# Patient Record
Sex: FEMALE | Race: WHITE | Employment: FULL TIME | URBAN - METROPOLITAN AREA
[De-identification: names, ages, dates, MRNs, and addresses within clinical notes are randomized per-mention and may not be internally consistent; named-entity substitution may affect disease eponyms.]

---

## 2020-03-16 ENCOUNTER — TELEPHONE (OUTPATIENT)
Dept: WOUND CARE | Age: 60
End: 2020-03-16

## 2020-03-16 NOTE — TELEPHONE ENCOUNTER
Attempted to perform COVID-19 telephone screening but patient did not answer phone call. Left voicemail regarding nature of call and to please return call.

## 2020-03-17 ENCOUNTER — HOSPITAL ENCOUNTER (OUTPATIENT)
Dept: WOUND CARE | Age: 60
Discharge: HOME | End: 2020-03-17
Payer: COMMERCIAL

## 2020-03-17 VITALS
SYSTOLIC BLOOD PRESSURE: 120 MMHG | HEIGHT: 65 IN | RESPIRATION RATE: 16 BRPM | BODY MASS INDEX: 41.65 KG/M2 | WEIGHT: 250 LBS | DIASTOLIC BLOOD PRESSURE: 76 MMHG | TEMPERATURE: 97.8 F | HEART RATE: 84 BPM

## 2020-03-17 PROBLEM — Z43.2 ILEOSTOMY CARE (HCC): Status: ACTIVE | Noted: 2020-03-17

## 2020-03-17 PROCEDURE — 99204 OFFICE O/P NEW MOD 45 MIN: CPT | Performed by: NURSE PRACTITIONER

## 2020-03-17 PROCEDURE — 99211 OFF/OP EST MAY X REQ PHY/QHP: CPT

## 2020-03-17 PROCEDURE — 99201 HC NEW PT, E/M LEVEL 1: CPT

## 2020-03-17 RX ORDER — GINSENG 100 MG
CAPSULE ORAL ONCE
Status: CANCELLED | OUTPATIENT
Start: 2020-03-17

## 2020-03-17 RX ORDER — DULAGLUTIDE 1.5 MG/.5ML
1.5 INJECTION, SOLUTION SUBCUTANEOUS WEEKLY
COMMUNITY
End: 2020-12-10 | Stop reason: SDUPTHER

## 2020-03-17 RX ORDER — LOSARTAN POTASSIUM 100 MG/1
100 TABLET ORAL DAILY
COMMUNITY

## 2020-03-17 RX ORDER — BACITRACIN ZINC AND POLYMYXIN B SULFATE 500; 1000 [USP'U]/G; [USP'U]/G
OINTMENT TOPICAL ONCE
Status: CANCELLED | OUTPATIENT
Start: 2020-03-17

## 2020-03-17 RX ORDER — CHLORAL HYDRATE 500 MG
3000 CAPSULE ORAL DAILY
COMMUNITY

## 2020-03-17 RX ORDER — BETAMETHASONE DIPROPIONATE 0.05 %
OINTMENT (GRAM) TOPICAL ONCE
Status: CANCELLED | OUTPATIENT
Start: 2020-03-17

## 2020-03-17 RX ORDER — ACETAMINOPHEN 500 MG
500 TABLET ORAL EVERY 6 HOURS PRN
COMMUNITY

## 2020-03-17 RX ORDER — OMEPRAZOLE 10 MG/1
10 CAPSULE, DELAYED RELEASE ORAL EVERY OTHER DAY
COMMUNITY
End: 2020-12-10

## 2020-03-17 RX ORDER — ATORVASTATIN CALCIUM 10 MG/1
10 TABLET, FILM COATED ORAL DAILY
COMMUNITY

## 2020-03-17 RX ORDER — LIDOCAINE 40 MG/G
CREAM TOPICAL ONCE
Status: CANCELLED | OUTPATIENT
Start: 2020-03-17

## 2020-03-17 RX ORDER — INSULIN GLARGINE 100 [IU]/ML
50 INJECTION, SOLUTION SUBCUTANEOUS 2 TIMES DAILY
COMMUNITY
End: 2020-12-10 | Stop reason: ALTCHOICE

## 2020-03-17 RX ORDER — LIDOCAINE 50 MG/G
OINTMENT TOPICAL ONCE
Status: CANCELLED | OUTPATIENT
Start: 2020-03-17

## 2020-03-17 RX ORDER — LIDOCAINE HYDROCHLORIDE 40 MG/ML
SOLUTION TOPICAL ONCE
Status: CANCELLED | OUTPATIENT
Start: 2020-03-17

## 2020-03-17 RX ORDER — CLOBETASOL PROPIONATE 0.5 MG/G
OINTMENT TOPICAL ONCE
Status: CANCELLED | OUTPATIENT
Start: 2020-03-17

## 2020-03-17 RX ORDER — BACITRACIN, NEOMYCIN, POLYMYXIN B 400; 3.5; 5 [USP'U]/G; MG/G; [USP'U]/G
OINTMENT TOPICAL ONCE
Status: CANCELLED | OUTPATIENT
Start: 2020-03-17

## 2020-03-17 RX ORDER — LEVOTHYROXINE SODIUM 0.03 MG/1
25 TABLET ORAL DAILY
COMMUNITY
End: 2020-12-10 | Stop reason: SDUPTHER

## 2020-03-17 RX ORDER — GENTAMICIN SULFATE 1 MG/G
OINTMENT TOPICAL ONCE
Status: CANCELLED | OUTPATIENT
Start: 2020-03-17

## 2020-03-17 ASSESSMENT — PAIN SCALES - GENERAL
PAINLEVEL_OUTOF10: 0
PAINLEVEL_OUTOF10: 0

## 2020-03-17 NOTE — PROGRESS NOTES
acute distress  Skin: warm and dry  Head: normocephalic and atraumatic  Eyes: pupils equal, round, and reactive to light  Pulmonary/Chest: clear to auscultation bilaterally- no wheezes, rales or rhonchi, normal air movement, no respiratory distress  Cardiovascular: normal rate, regular rhythm, intact distal pulses and no carotid bruits    Ostomy Type: ileostomy with soft formed liquid stool    Stoma Assessment: red, moist with mucus fistula open at 12 o'clock and wound at 1 o'clock. Stoma \"piston's in and out with movement and position changes. Peristomal Skin Assessment: Wound with fragile pink edges and red moist wound bed. Pt has a history of PG when first had ileostomy in . Plan   Plan for Ostomy Care:  Goals:  Treat new wound and progress to healing without infection                Asssess new pouching system with more flexible convexity and same or better wear time                     Patient examined and evaluated and put in a flexible light convex Coloplast pouch system. Treating wound with silver alginate and covered with thin DuoDerm to prevent undermining pouch system. Pt agreeable to changes and questions answered. Extra supplies given. Please see attached Discharge Instructions    Written patient dismissal instructions given to patient and signed by patient or POA. Discharge [address] [address]  [address] [facility]. [address] [facility] [address], [address]   [phone]      Name: Catarina Fernandez  : 1960   AGE: 63 y.o.   MRN: [id number]  Today's Date: 3/17/2020    Ostomy skin care  Cleanse skin prior to applying a new pouch/wafer with:  [] Water    [x] Cleanse skin with Mild Soap & Water  [x] May Shower    [] Other:         Pouch/Wafer/Accessory Product Numbers       [x] Appliance: 1 piece Coloplast    [x] Product [id number]  [] Other: Accessories:        []   Barrier Film outside edges of pouch. [] Apply belt if using  [x] Lay/Sit quietly for 15-20 min to allow adhesives to mold to skin. [] Other: _____________________________________    Emptying Pouch  Colostomy/Ileostomy  [x] Empty  pouch when 1/3 full of gas, stool. Clean bottom edge with damp toilet paper or bathroom wipe and close pouch. [x] For non-drainable pouch - remove when 1/2 full and throw away. Clean wafer flange (ring) with toilet tissue or damp paper towel before reapplying new pouch      Other:  [x] Shower or Swim Care Pat pouch dry, Use hair dryer on cool setting to dry back of pouch and border and Reapply barrier extenders or tape.   [] Odor Control with deodorizer into bottom of pouch after each emptying  [] Lubricating gel to pouch to help emptying of thick stool  [] Other: See additional instructions _______________________________________     Contact Ostomy Care Nurse Practitioner  [x] leaking issues and skin irritation     ** FOLLOW UP IN 1 WEEK**    Provider Signature/Date/Time:_____________________________________________    CORDELL Cuh, MSN, RN, CWON-AP  [phone]                   Electronically signed by CORDELL Romero - CNP on 3/17/2020 at 2:47 PM

## 2020-03-24 ENCOUNTER — HOSPITAL ENCOUNTER (OUTPATIENT)
Dept: WOUND CARE | Age: 60
Discharge: HOME | End: 2020-03-24
Payer: COMMERCIAL

## 2020-03-24 VITALS
HEART RATE: 83 BPM | DIASTOLIC BLOOD PRESSURE: 87 MMHG | TEMPERATURE: 97.9 F | RESPIRATION RATE: 16 BRPM | SYSTOLIC BLOOD PRESSURE: 151 MMHG

## 2020-03-24 PROCEDURE — 99211 OFF/OP EST MAY X REQ PHY/QHP: CPT

## 2020-03-24 PROCEDURE — 99213 OFFICE O/P EST LOW 20 MIN: CPT | Performed by: NURSE PRACTITIONER

## 2020-03-24 RX ORDER — FLUTICASONE FUROATE AND VILANTEROL TRIFENATATE 100; 25 UG/1; UG/1
1 POWDER RESPIRATORY (INHALATION) PRN
COMMUNITY

## 2020-03-24 ASSESSMENT — PAIN SCALES - GENERAL
PAINLEVEL_OUTOF10: 0
PAINLEVEL_OUTOF10: 0

## 2020-03-24 NOTE — PROGRESS NOTES
Laterality Date    ABDOMEN SURGERY      COLON RESECTION FOR ULCERATIVE COLITIS THEN HAD ANUS REMOVED    ILEOSTOMY OR JEJUNOSTOMY      PERMANENT       FAMILY HISTORY    Family History   Problem Relation Age of Onset    Cancer Mother         uterine    No Known Problems Father        SOCIAL HISTORY    Social History     Tobacco Use    Smoking status: Never Smoker    Smokeless tobacco: Never Used   Substance Use Topics    Alcohol use: No    Drug use: No       ALLERGIES    No Known Allergies    MEDICATIONS    Current Outpatient Medications on File Prior to Encounter   Medication Sig Dispense Refill    fluticasone-vilanterol (BREO ELLIPTA) 100-25 MCG/INH AEPB inhaler Inhale 1 puff into the lungs daily      losartan (COZAAR) 100 MG tablet Take 100 mg by mouth daily      levothyroxine (SYNTHROID) 25 MCG tablet Take 25 mcg by mouth Daily      omeprazole (PRILOSEC) 10 MG delayed release capsule Take 10 mg by mouth every other day      Omega-3 Fatty Acids (FISH OIL) 1000 MG CAPS Take 3,000 mg by mouth daily      insulin glargine (LANTUS) 100 UNIT/ML injection vial Inject 50 Units into the skin 2 times daily      insulin lispro (HUMALOG) 100 UNIT/ML injection vial Inject 25 Units into the skin 2 times daily      Dulaglutide (TRULICITY) 1.5 OI/4.7TZ SOPN Inject 1.5 mg into the skin once a week Saturdays      atorvastatin (LIPITOR) 10 MG tablet Take 10 mg by mouth daily      CRANBERRY PO Take by mouth daily      acetaminophen (TYLENOL) 500 MG tablet Take 500 mg by mouth every 6 hours as needed for Pain      Loratadine (CLARITIN) 10 MG CAPS Take  by mouth.  pioglitazone (ACTOS) 15 MG tablet Take 15 mg by mouth daily.  metformin (GLUCOPHAGE-XR) 500 MG XR tablet Take 500 mg by mouth daily (with breakfast)        No current facility-administered medications on file prior to encounter. REVIEW OF SYSTEMS    Pertinent items are noted in HPI.     Objective    BP (!) 151/87   Pulse 83   Temp 97.9 °F (36.6 °C) (Oral)   Resp 16     Wt Readings from Last 3 Encounters:   20 250 lb (113.4 kg)       PHYSICAL EXAM    General Appearance: alert and oriented to person, place and time, well-developed and well-nourished, in no acute distress and obese  Skin: warm and dry  Head: normocephalic and atraumatic  Eyes: pupils equal, round, and reactive to light  Pulmonary/Chest: normal air movement, no respiratory distress  Cardiovascular: normal rate and regular rhythm    Ostomy Type: ileostomy with soft liquid yellow/green stool    Stoma Assessment: Stoma red moist and protruding    Peristomal Skin Assessment: wound tissue epithelializing and smaller in size. Plan   Patient examined and evaluated and will continue with same system. Discussed with pt potential ways getting pressure around appliance that could have caused wound. Pt to continue with current plan of care until wound healed and then will continue to use thin DuoDerm over site to protect for awhile. Pt to call if any further issues with peristomal skin, has plenty of supplies. No scheduled follow-up visits. Please see attached Discharge Instructions    Written patient dismissal instructions given to patient and signed by patient or POA. Discharge Instructions            [address] [place] [address],  [address]   [phone]        Name: Luigi Rivera  : 1960   AGE: 63 y.o. MRN: [id number]  Today's Date: 3/24/2020     Ostomy skin care  Cleanse skin prior to applying a new pouch/wafer with:  []? Water             [x]? Cleanse skin with Mild Soap & Water  [x]? May Shower    []? Other:           Pouch/Wafer/Accessory Product Numbers        [x]? Appliance: 1 piece Coloplast    [x]? Product [id number]  []? Other: Accessories:        []? Barrier Film                                                             [x]? Adapt Barrier Ring - flat       []?     Adhesive

## 2020-12-10 ENCOUNTER — VIRTUAL VISIT (OUTPATIENT)
Dept: ENDOCRINOLOGY | Age: 60
End: 2020-12-10
Payer: COMMERCIAL

## 2020-12-10 PROBLEM — I10 ESSENTIAL HYPERTENSION: Status: ACTIVE | Noted: 2020-12-10

## 2020-12-10 PROBLEM — Z79.4 CONTROLLED TYPE 2 DIABETES MELLITUS WITHOUT COMPLICATION, WITH LONG-TERM CURRENT USE OF INSULIN (HCC): Status: ACTIVE | Noted: 2020-12-10

## 2020-12-10 PROBLEM — K51.019: Status: ACTIVE | Noted: 2020-12-10

## 2020-12-10 PROBLEM — E78.2 MIXED HYPERLIPIDEMIA: Status: ACTIVE | Noted: 2020-12-10

## 2020-12-10 PROBLEM — E03.9 HYPOTHYROIDISM: Status: ACTIVE | Noted: 2020-12-10

## 2020-12-10 PROBLEM — E11.9 CONTROLLED TYPE 2 DIABETES MELLITUS WITHOUT COMPLICATION, WITH LONG-TERM CURRENT USE OF INSULIN (HCC): Status: ACTIVE | Noted: 2020-12-10

## 2020-12-10 PROCEDURE — 99243 OFF/OP CNSLTJ NEW/EST LOW 30: CPT | Performed by: INTERNAL MEDICINE

## 2020-12-10 RX ORDER — LANCETS
EACH MISCELLANEOUS
COMMUNITY
Start: 2020-11-07

## 2020-12-10 RX ORDER — PIOGLITAZONEHYDROCHLORIDE 15 MG/1
15 TABLET ORAL DAILY
Qty: 30 TABLET | Refills: 3 | Status: SHIPPED | OUTPATIENT
Start: 2020-12-10 | End: 2021-04-13

## 2020-12-10 RX ORDER — PEN NEEDLE, DIABETIC 29 G X1/2"
NEEDLE, DISPOSABLE MISCELLANEOUS
COMMUNITY
Start: 2020-11-13 | End: 2020-12-10 | Stop reason: SDUPTHER

## 2020-12-10 RX ORDER — INSULIN GLARGINE 100 [IU]/ML
INJECTION, SOLUTION SUBCUTANEOUS
Qty: 10 PEN | Refills: 3 | Status: SHIPPED | OUTPATIENT
Start: 2020-12-10 | End: 2021-07-14 | Stop reason: SDUPTHER

## 2020-12-10 RX ORDER — PEN NEEDLE, DIABETIC 29 G X1/2"
NEEDLE, DISPOSABLE MISCELLANEOUS
Qty: 100 EACH | Refills: 5 | Status: SHIPPED | OUTPATIENT
Start: 2020-12-10 | End: 2022-10-27

## 2020-12-10 RX ORDER — CHOLECALCIFEROL (VITAMIN D3) 1250 MCG
CAPSULE ORAL WEEKLY
COMMUNITY

## 2020-12-10 RX ORDER — LEVOTHYROXINE SODIUM 0.03 MG/1
25 TABLET ORAL DAILY
Qty: 30 TABLET | Refills: 3 | Status: SHIPPED | OUTPATIENT
Start: 2020-12-10 | End: 2021-08-13

## 2020-12-10 RX ORDER — DULAGLUTIDE 1.5 MG/.5ML
1.5 INJECTION, SOLUTION SUBCUTANEOUS WEEKLY
Qty: 4 PEN | Refills: 3 | Status: SHIPPED | OUTPATIENT
Start: 2020-12-10 | End: 2021-09-24

## 2020-12-10 NOTE — PROGRESS NOTES
Danika Valle is a 62 y.o. female is referred to me by Dr Timo Domingo for evaluation and management of uncontrolled Type 2  Diabetes and Hypothyroidism. Patient has complex medical history inclusive of hypertension hyperlipidemia, asthma, PCOD, ulcerative colitis status post total colectomy and now has a colectomy bag. Danika Valle was diagnosed with Diabetes mellitus at age 28   Diabetes was diagnosed at routine screening . Marisa Valle got diabetic education in the past.  Comorbid conditions: Neuropathy    Hypothyroidism diagnosed in 2015 which was diagnosed in 2015 due to her abnormal thyroid fx test   She also has MNG, which was considered stable as per previous imaging and there were no compressive symptoms  She has hypertension and hyperlipidemia   She has Ulcerative coliitis diagnosed  at age 11 she had colectomy in 2004 , in 2006 she had rectal surgery due to UC she has colectomy bag     INTERIM:    Diabetes   She presents for her initial diabetic visit. She has type 2 diabetes mellitus. No MedicAlert identification noted. The initial diagnosis of diabetes was made 29 years ago. Her disease course has been improving. Hypoglycemia symptoms include hunger and sweats. Associated symptoms include foot paresthesias. Pertinent negatives for diabetes include no weight loss. There are no hypoglycemic complications. Symptoms are improving. Risk factors for coronary artery disease include dyslipidemia and obesity. Current diabetic treatment includes insulin injections. She is compliant with treatment most of the time. Her breakfast blood glucose is taken between 7-8 am. Her breakfast blood glucose range is generally 70-90 mg/dl. Patient is currently taking 50 units of Lantus twice daily and has occasional fasting glucose in the 70s since she has modified her diet. And she tends to omit bedtime doses.   She takes Humalog with the meals approximately 18 units she bases it on carbohydrate content Past Medical History:   Diagnosis Date    Asthma     Diabetes mellitus ([facility]     GERD (gastroesophageal reflux disease)     Hyperlipidemia     Hypertension     Ileostomy care ([facility] 3/17/2020    Iritis     PCOS (polycystic ovarian syndrome)     Pyoderma     Thyroid disease     Ulcerative colitis     Ulcerative colitis ([facility]       Patient Active Problem List   Diagnosis    Ileostomy care ([facility]    Controlled type 2 diabetes mellitus without complication, with long-term current use of insulin (HCC)    Hypothyroidism    Essential hypertension    Mixed hyperlipidemia    Ulcerative (chronic) enterocolitis, unspecified complication (HCC)     Past Surgical History:   Procedure Laterality Date    ABDOMEN SURGERY      COLON RESECTION FOR ULCERATIVE COLITIS THEN HAD ANUS REMOVED    ILEOSTOMY OR JEJUNOSTOMY      PERMANENT     Social History     Socioeconomic History    Marital status: Single     Spouse name: Not on file    Number of children: Not on file    Years of education: Not on file    Highest education level: Not on file   Occupational History    Not on file   Social Needs    Financial resource strain: Not on file    Food insecurity     Worry: Not on file     Inability: Not on file    Transportation needs     Medical: Not on file     Non-medical: Not on file   Tobacco Use    Smoking status: Never Smoker    Smokeless tobacco: Never Used   Substance and Sexual Activity    Alcohol use: No    Drug use: No    Sexual activity: Not on file   Lifestyle    Physical activity     Days per week: Not on file     Minutes per session: Not on file    Stress: Not on file   Relationships    Social connections     Talks on phone: Not on file     Gets together: Not on file     Attends Nondenominational service: Not on file     Active member of club or organization: Not on file     Attends meetings of clubs or organizations: Not on file     Relationship status: Not on file    Intimate partner violence Fear of current or ex partner: Not on file     Emotionally abused: Not on file     Physically abused: Not on file     Forced sexual activity: Not on file   Other Topics Concern    Not on file   Social History Narrative    Not on file     Family History   Problem Relation Age of Onset    Cancer Mother         uterine    No Known Problems Father      Current Outpatient Medications   Medication Sig Dispense Refill    ONE TOUCH ULTRASOFT LANCETS MISC USE TO TEST BLOOD GLUCOSE 4 TIMES DAILY      POTASSIUM PO Take by mouth      MAGNESIUM PO Take by mouth      Montelukast Sodium (SINGULAIR PO) Take by mouth      Cholecalciferol (VITAMIN D3) 1.25 MG (73212 UT) CAPS Take by mouth once a week      TRIAMCINOLONE ACETONIDE NA by Nasal route      dapagliflozin (FARXIGA) 10 MG tablet TAKE 1 TABLET BY MOUTH EVERY DAY IN THE MORNING 30 tablet 3    levothyroxine (SYNTHROID) 25 MCG tablet Take 1 tablet by mouth Daily 30 tablet 3    Dulaglutide (TRULICITY) 1.5 GG/1.2GW SOPN Inject 1.5 mg into the skin once a week Saturdays 4 pen 3    insulin lispro (HUMALOG) 100 UNIT/ML injection vial Inject 25 units into the skin once daily. 1 vial 3    insulin glargine (LANTUS SOLOSTAR) 100 UNIT/ML injection pen Inject 50 units into the skin twice daily.  10 pen 3    BD INSULIN SYRINGE U/F 30G X 1/2\" 0.3 ML MISC USE WITH INSULIN TWICE A  each 5    pioglitazone (ACTOS) 15 MG tablet Take 1 tablet by mouth daily 30 tablet 3    metFORMIN (GLUCOPHAGE) 500 MG tablet TAKE 1 TABLET BY MOUTH EVERY 24 HOURS 60 tablet 3    fluticasone-vilanterol (BREO ELLIPTA) 100-25 MCG/INH AEPB inhaler Inhale 1 puff into the lungs daily      losartan (COZAAR) 100 MG tablet Take 100 mg by mouth daily      Omega-3 Fatty Acids (FISH OIL) 1000 MG CAPS Take 3,000 mg by mouth daily      atorvastatin (LIPITOR) 10 MG tablet Take 10 mg by mouth daily      CRANBERRY PO Take by mouth daily      acetaminophen (TYLENOL) 500 MG tablet Take 500 mg by mouth every 6 hours as needed for Pain      Loratadine (CLARITIN) 10 MG CAPS Take  by mouth. No current facility-administered medications for this visit. No Known Allergies  Family Status   Relation Name Status    Mother      Father           OBJECTIVE:  There were no vitals taken for this visit. Wt Readings from Last 3 Encounters:   20 250 lb (113.4 kg)       Constitutional: no acute distress, well appearing and well nourished  Psychiatric: oriented to person, place and time, judgement and insight and normal, recent and remote memory and intact and mood and affect are normal  Skin: skin and subcutaneous tissue is normal without mass, normal turgor  Head and Face: examination of head and face revealed no abnormalities  Eyes: no lid or conjunctival swelling, erythema or discharge, pupils are normal  Ears/Nose: external inspection of ears and nose revealed no abnormalities, hearing is grossly normal  Oropharynx/Mouth/Face: lips, tongue and gums are normal with no lesions, the voice quality was normal  Neck: neck is supple and symmetric, with midline trachea and no masses, thyroid is normal  Lymphatics: normal cervical lymph nodes, normal supraclavicular nodes  Pulmonary: no increased work of breathing or signs of respiratory distress, lungs are clear to auscultation  Cardiovascular: normal heart rate and rhythm, normal S1 and S2, no murmurs   Abdomen: abdomen is soft, non-tender with no masses  Musculoskeletal: normal gait and station . Neurological: normal coordination and normal general cortical function  Foot exam with PCP in     Lab Results   Component Value Date    LABA1C 7.1 11/15/2013    LABA1C 7.4 2013    LABA1C 7.3 2013         ASSESSMENT/PLAN:  1. Controlled type 2 diabetes mellitus without complication, with long-term current use of insulin A1c improved to 7.0 in September.   Tomeka Muñoz was advised that lifestyle modification is the key to better control of her Diabetes. We discussed carbohydrate restriction in detail. Since patient is having fasting hypoglycemia on the current regimen of 15 units twice daily of Lantus will reduce the dose to 70 units daily and will uptitrate if the fasting glucose is above 150  She will continue with her sliding scale of Humalog with each meal  She would like to continue with Trulicity 1.5 mg weekly as it is helped with her appetite significantly she did not notice any weight loss with this medication  She is also on Farxiga, Metformin 1000 mg twice daily and Actos. She denies any swelling or edema with this medication  We will continue with the oral pills  She will start sending her sugar logs to me on regular basis and will further titrate the dose of insulin based on those  Patient was offered to get a continuous glucose sensor she is concerned that her skin is sensitive and she might get issues with the sensor as she is currently experiencing with her colectomy bag. Larry Feliciano was advised to check her  fingerstick glucose at least 3-4 times daily. Hypoglycemia protocol reviewed in detail with patient Patient was advised to carry glucose tablets    Patient was advised that sending of her fingerstick blood glucose logs is crucial in management of her  diabetes. I will adjust the  doses of diabetic medications  according to sent data. Health Maintenance       Last Eye Exam: advised to have annual dilated eye exam. her last eye exam was in 2020  Last Podiatry Exam:  Last foot exam was in sept 2020 with PCP   Lipids: Last LDL level was at a level of 58 in May 2020  Microalbumin/Creatinine Ratio: I have ordered MA with next lab work     . Education: Reviewed ABCs of diabetes management (respective goals in parentheses): A1C (<7), blood pressure (<130/80), and cholesterol (LDL <100). - dapagliflozin (FARXIGA) 10 MG tablet; TAKE 1 TABLET BY MOUTH EVERY DAY IN THE MORNING  Dispense: 30 tablet;  Refill: 3  - Dulaglutide (TRULICITY) 1.5 VQ/0.4BU SOPN; Inject 1.5 mg into the skin once a week Saturdays  Dispense: 4 pen; Refill: 3  - insulin lispro (HUMALOG) 100 UNIT/ML injection vial; Inject 25 units into the skin once daily. Dispense: 1 vial; Refill: 3  - insulin glargine (LANTUS SOLOSTAR) 100 UNIT/ML injection pen; Inject 50 units into the skin twice daily. Dispense: 10 pen; Refill: 3  - BD INSULIN SYRINGE U/F 30G X 1/2\" 0.3 ML MISC; USE WITH INSULIN TWICE A DAY  Dispense: 100 each; Refill: 5  - pioglitazone (ACTOS) 15 MG tablet; Take 1 tablet by mouth daily  Dispense: 30 tablet; Refill: 3  - metFORMIN (GLUCOPHAGE) 500 MG tablet; TAKE 1 TABLET BY MOUTH EVERY 24 HOURS  Dispense: 60 tablet; Refill: 3    2. Hypothyroidism, unspecified type  On review of chart notes from previous endocrinologist at [address] [facility] and the [address] [facility] visits at [facility] shows that she has a history of Hashimoto's hypothyroidism and apparently in 2010 her thyroid antibodies were positive. I do not have access to those labs I will repeat her thyroid antibodies and will continue on 25 mcg of levothyroxine as patient symptomatically feels improved at this dose. She feels her sleep got better when she takes this medication.    - levothyroxine (SYNTHROID) 25 MCG tablet; Take 1 tablet by mouth Daily  Dispense: 30 tablet; Refill: 3    3. Essential hypertension  Blood pressure control is adequate historically and she is on losartan 100 mg daily    4. Mixed hyperlipidemia  Last LDL was at target in May 2020 she is currently on Lipitor 10 mg daily    5.  Ulcerative (chronic) enterocolitis, status post colectomy and rectal surgery she has a colectomy bag now  Symptoms are now stable    Reviewed and/or ordered clinical lab results yes   Reviewed and/or ordered radiology tests Yes  Reviewed and/or ordered other diagnostic tests yes   Made a decision to obtain old records yes   Reviewed and summarized old records yes     Tomeka Muñoz was counseled regarding symptoms of current diagnosis, course and complications of disease if inadequately treated, side effects of medications, diagnosis, treatment options, and prognosis, risks, benefits, complications, and alternatives of treatment, labs, imaging and other studies and treatment targets and goals. She understands instructions and counseling    TELEHEALTH EVALUATION -- Audio/Visual (During [id number] public health emergency)  Pursuant to the emergency declaration under the [address] [place] [address] waiver authority and the [employer] and [employer] Act, this Virtual  Visit was conducted, with patient's consent, to reduce the patient's risk of exposure to COVID-19 and provide care for  patient. Services were provided through a video synchronous discussion virtually to substitute for in-person clinic visit. Patient's location : home     Patient provided verbal consent to use the video visit. Total time spent : 45 min Reviewing the chart, conducting an interview, performing a limited exam by video and educating the patient on my assessment plan. Return in about 3 months (around 3/10/2021). Please note that some or all of this report was generated using voice recognition software. Please notify me in case of any questions about the content of this document, as some errors in transcription may have occurred .

## 2020-12-10 NOTE — PROGRESS NOTES
Appt scheduled. Patient is going to use a [facility] lab. Glucose logs mailed.

## 2021-02-01 DIAGNOSIS — E11.9 CONTROLLED TYPE 2 DIABETES MELLITUS WITHOUT COMPLICATION, WITH LONG-TERM CURRENT USE OF INSULIN (HCC): Primary | ICD-10-CM

## 2021-02-01 DIAGNOSIS — Z79.4 CONTROLLED TYPE 2 DIABETES MELLITUS WITHOUT COMPLICATION, WITH LONG-TERM CURRENT USE OF INSULIN (HCC): Primary | ICD-10-CM

## 2021-02-01 RX ORDER — PEN NEEDLE, DIABETIC 31 GX5/16"
NEEDLE, DISPOSABLE MISCELLANEOUS
Qty: 100 EACH | Refills: 3 | Status: SHIPPED | OUTPATIENT
Start: 2021-02-01 | End: 2021-04-14 | Stop reason: SDUPTHER

## 2021-02-01 NOTE — TELEPHONE ENCOUNTER
Fax from [facility]. Refill request for Pen needles    LOV    12-10-20  FOV    3-11-21

## 2021-04-14 DIAGNOSIS — E11.9 CONTROLLED TYPE 2 DIABETES MELLITUS WITHOUT COMPLICATION, WITH LONG-TERM CURRENT USE OF INSULIN (HCC): ICD-10-CM

## 2021-04-14 DIAGNOSIS — Z79.4 CONTROLLED TYPE 2 DIABETES MELLITUS WITHOUT COMPLICATION, WITH LONG-TERM CURRENT USE OF INSULIN (HCC): ICD-10-CM

## 2021-04-14 RX ORDER — PEN NEEDLE, DIABETIC 31 GX5/16"
NEEDLE, DISPOSABLE MISCELLANEOUS
Qty: 100 EACH | Refills: 5 | Status: SHIPPED | OUTPATIENT
Start: 2021-04-14 | End: 2022-03-16

## 2021-04-14 NOTE — TELEPHONE ENCOUNTER
Fax from [facility]. Pt says she uses 2 needles daily.  Please resend w/ correct directions for the BD UF Short pen Needle [phone]

## 2021-04-24 ENCOUNTER — HOSPITAL ENCOUNTER (OUTPATIENT)
Age: 61
Discharge: HOME | End: 2021-04-24
Payer: COMMERCIAL

## 2021-04-24 DIAGNOSIS — E11.9 CONTROLLED TYPE 2 DIABETES MELLITUS WITHOUT COMPLICATION, WITH LONG-TERM CURRENT USE OF INSULIN (HCC): ICD-10-CM

## 2021-04-24 DIAGNOSIS — I10 ESSENTIAL HYPERTENSION: ICD-10-CM

## 2021-04-24 DIAGNOSIS — Z79.4 CONTROLLED TYPE 2 DIABETES MELLITUS WITHOUT COMPLICATION, WITH LONG-TERM CURRENT USE OF INSULIN (HCC): ICD-10-CM

## 2021-04-24 DIAGNOSIS — E78.2 MIXED HYPERLIPIDEMIA: ICD-10-CM

## 2021-04-24 DIAGNOSIS — E03.9 HYPOTHYROIDISM, UNSPECIFIED TYPE: ICD-10-CM

## 2021-04-24 LAB
A/G RATIO: 1.2 (ref 1.1–2.2)
ALBUMIN SERPL-MCNC: 3.9 G/DL (ref 3.4–5)
ALP BLD-CCNC: 88 U/L (ref 40–129)
ALT SERPL-CCNC: 20 U/L (ref 10–40)
ANION GAP SERPL CALCULATED.3IONS-SCNC: 10 MMOL/L (ref 3–16)
ANTI-THYROGLOB ABS: 12 IU/ML
AST SERPL-CCNC: 17 U/L (ref 15–37)
BILIRUB SERPL-MCNC: 0.4 MG/DL (ref 0–1)
BUN BLDV-MCNC: 7 MG/DL (ref 7–20)
CALCIUM SERPL-MCNC: 9 MG/DL (ref 8.3–10.6)
CHLORIDE BLD-SCNC: 102 MMOL/L (ref 99–110)
CHOLESTEROL, TOTAL: 125 MG/DL (ref 0–199)
CO2: 26 MMOL/L (ref 21–32)
CREAT SERPL-MCNC: 0.6 MG/DL (ref 0.6–1.1)
CREATININE URINE: 193.7 MG/DL (ref 28–259)
GFR AFRICAN AMERICAN: >60
GFR NON-AFRICAN AMERICAN: >60
GLOBULIN: 3.3 G/DL
GLUCOSE BLD-MCNC: 153 MG/DL (ref 70–99)
HDLC SERPL-MCNC: 41 MG/DL (ref 40–60)
LDL CHOLESTEROL CALCULATED: 63 MG/DL
MICROALBUMIN UR-MCNC: <1.2 MG/DL
MICROALBUMIN/CREAT UR-RTO: NORMAL MG/G (ref 0–30)
POTASSIUM SERPL-SCNC: 4.4 MMOL/L (ref 3.5–5.1)
SODIUM BLD-SCNC: 138 MMOL/L (ref 136–145)
T4 FREE: 1.4 NG/DL (ref 0.9–1.8)
THYROID PEROXIDASE (TPO) ABS: 7 IU/ML
TOTAL PROTEIN: 7.2 G/DL (ref 6.4–8.2)
TRIGL SERPL-MCNC: 106 MG/DL (ref 0–150)
TSH SERPL DL<=0.05 MIU/L-ACNC: 2.73 UIU/ML (ref 0.27–4.2)
VITAMIN D 25-HYDROXY: 48.4 NG/ML
VLDLC SERPL CALC-MCNC: 21 MG/DL

## 2021-04-24 PROCEDURE — 83036 HEMOGLOBIN GLYCOSYLATED A1C: CPT

## 2021-04-24 PROCEDURE — 84439 ASSAY OF FREE THYROXINE: CPT

## 2021-04-24 PROCEDURE — 84443 ASSAY THYROID STIM HORMONE: CPT

## 2021-04-24 PROCEDURE — 80053 COMPREHEN METABOLIC PANEL: CPT

## 2021-04-24 PROCEDURE — 82043 UR ALBUMIN QUANTITATIVE: CPT

## 2021-04-24 PROCEDURE — 86800 THYROGLOBULIN ANTIBODY: CPT

## 2021-04-24 PROCEDURE — 82570 ASSAY OF URINE CREATININE: CPT

## 2021-04-24 PROCEDURE — 82306 VITAMIN D 25 HYDROXY: CPT

## 2021-04-24 PROCEDURE — 80061 LIPID PANEL: CPT

## 2021-04-24 PROCEDURE — 86376 MICROSOMAL ANTIBODY EACH: CPT

## 2021-04-25 LAB
ESTIMATED AVERAGE GLUCOSE: 177.2 MG/DL
HBA1C MFR BLD: 7.8 %

## 2021-04-27 ENCOUNTER — VIRTUAL VISIT (OUTPATIENT)
Dept: ENDOCRINOLOGY | Age: 61
End: 2021-04-27
Payer: COMMERCIAL

## 2021-04-27 DIAGNOSIS — E06.3 HYPOTHYROIDISM DUE TO HASHIMOTO'S THYROIDITIS: ICD-10-CM

## 2021-04-27 DIAGNOSIS — I10 ESSENTIAL HYPERTENSION: ICD-10-CM

## 2021-04-27 DIAGNOSIS — E78.2 MIXED HYPERLIPIDEMIA: ICD-10-CM

## 2021-04-27 DIAGNOSIS — E03.8 HYPOTHYROIDISM DUE TO HASHIMOTO'S THYROIDITIS: ICD-10-CM

## 2021-04-27 PROCEDURE — 99214 OFFICE O/P EST MOD 30 MIN: CPT | Performed by: INTERNAL MEDICINE

## 2021-04-27 PROCEDURE — 3051F HG A1C>EQUAL 7.0%<8.0%: CPT | Performed by: INTERNAL MEDICINE

## 2021-04-27 NOTE — PROGRESS NOTES
Angela Keys is a 62 y.o. female is seen for management of uncontrolled Type 2  Diabetes and Hypothyroidism. Patient has complex medical history inclusive of hypertension hyperlipidemia, asthma, PCOD, ulcerative colitis status post total colectomy and now has a colectomy bag. Angela Keys was diagnosed with Diabetes mellitus at age 28   Diabetes was diagnosed at routine screening . Eloise Poncetrent Angela Keys got diabetic education in the past.  Comorbid conditions: Neuropathy    Hypothyroidism diagnosed in 2015 which was diagnosed in 2015 due to her abnormal thyroid fx test   She also has MNG, which was considered stable as per previous imaging and there were no compressive symptoms  She has hypertension and hyperlipidemia   She has Ulcerative coliitis diagnosed  at age 11 she had colectomy in 2004 , in 2006 she had rectal surgery due to UC she has colectomy bag     INTERIM:    Diabetes  She presents for her initial diabetic visit. She has type 2 diabetes mellitus. No MedicAlert identification noted. The initial diagnosis of diabetes was made 29 years ago. Her disease course has been improving. Hypoglycemia symptoms include hunger and sweats. Associated symptoms include foot paresthesias. Pertinent negatives for diabetes include no weight loss. There are no hypoglycemic complications. Symptoms are improving. Risk factors for coronary artery disease include dyslipidemia and obesity. Current diabetic treatment includes insulin injections. She is compliant with treatment most of the time. Her breakfast blood glucose is taken between 7-8 am. Her breakfast blood glucose range is generally 70-90 mg/dl. since she has modified her diet. And she tends to omit bedtime doses.   She takes Humalog with the meals approximately 2 to 10 units units she bases it on carbohydrate content     She admits she has not been watching her diet closely and her glucose are running high due to that she will work on her diet and send me her logs      Past Medical History:   Diagnosis Date    Asthma     Diabetes mellitus ([facility]     GERD (gastroesophageal reflux disease)     Hyperlipidemia     Hypertension     Ileostomy care ([facility] 3/17/2020    Iritis     PCOS (polycystic ovarian syndrome)     Pyoderma     Thyroid disease     Ulcerative colitis     Ulcerative colitis ([facility]       Patient Active Problem List   Diagnosis    Ileostomy care ([facility]    Uncontrolled type 2 diabetes mellitus with complication, with long-term current use of insulin ([facility]    Hypothyroidism    Essential hypertension    Mixed hyperlipidemia    Ulcerative (chronic) enterocolitis, unspecified complication (HCC)     Past Surgical History:   Procedure Laterality Date    ABDOMEN SURGERY      COLON RESECTION FOR ULCERATIVE COLITIS THEN HAD ANUS REMOVED    ILEOSTOMY OR JEJUNOSTOMY      PERMANENT     Social History     Socioeconomic History    Marital status: Single     Spouse name: Not on file    Number of children: Not on file    Years of education: Not on file    Highest education level: Not on file   Occupational History    Not on file   Social Needs    Financial resource strain: Not on file    Food insecurity     Worry: Not on file     Inability: Not on file    Transportation needs     Medical: Not on file     Non-medical: Not on file   Tobacco Use    Smoking status: Never Smoker    Smokeless tobacco: Never Used   Substance and Sexual Activity    Alcohol use: No    Drug use: No    Sexual activity: Not on file   Lifestyle    Physical activity     Days per week: Not on file     Minutes per session: Not on file    Stress: Not on file   Relationships    Social connections     Talks on phone: Not on file     Gets together: Not on file     Attends [employer] service: Not on file     Active member of club or organization: Not on file     Attends meetings of clubs or organizations: Not on file     Relationship status: Not on file    Intimate partner violence atorvastatin (LIPITOR) 10 MG tablet Take 10 mg by mouth daily      CRANBERRY PO Take by mouth daily      acetaminophen (TYLENOL) 500 MG tablet Take 500 mg by mouth every 6 hours as needed for Pain      Loratadine (CLARITIN) 10 MG CAPS Take  by mouth.  dapagliflozin (FARXIGA) 10 MG tablet TAKE 1 TABLET BY MOUTH EVERY DAY IN THE MORNING (Patient not taking: Reported on 2021) 30 tablet 3     No current facility-administered medications for this visit. No Known Allergies  Family Status   Relation Name Status    Mother      Father           OBJECTIVE:  There were no vitals taken for this visit. Wt Readings from Last 3 Encounters:   20 250 lb (113.4 kg)       Constitutional: no acute distress, well appearing and well nourished  Psychiatric: oriented to person, place and time, judgement and insight and normal, recent and remote memory intact and mood and affect are normal  Skin: skin and subcutaneous tissue is normal without visible mass,   Head and Face: visual inspection  of head and face revealed no abnormalities  Eyes: visual inspection showed no lid or conjunctival swelling, erythema or discharge, pupils are normal, equal, round  Ears/Nose: external inspection of ears and nose revealed no abnormalities, hearing is grossly normal  Oropharynx/Mouth/Face: lips, tongue and gums appear  normal with no lesions, the voice quality was normal  Neck: neck appears symmetric, with no visible masses,   Pulmonary: no increased work of breathing or signs of respiratory distress,  Musculoskeletal: normal on inspection    Neurological: normal coordination and normal general cortical function    Foot exam with PCP in  0    Lab Results   Component Value Date    LABA1C 7.8 2021    LABA1C 7.1 11/15/2013    LABA1C 7.4 2013         ASSESSMENT/PLAN:  1.  Controlled type 2 diabetes mellitus without complication, with long-term current use of insulin     Diabetes control has worsened since the last visit with me  Vicki Sharif was advised that lifestyle modification is the key to better control of her Diabetes. We discussed carbohydrate restriction in detail. --Since now having hyper glycemia mostly she was advised to increase her basal insulin  She will continue with her sliding scale of Humalog with each meal  She is advised to start taking her meal boluses regularly and increase her Humalog with the meals  She would like to continue with Trulicity 1.5 mg weekly as it is helped with her appetite significantly she did not notice any weight loss with this medication, she has taken it off and on she was advised to be regular with this medication and that I will recommend increasing the dose to 3 mg weekly  She stopped  Farxiga, Metformin 1000 mg twice daily and Actos. She denies any swelling or edema with this medication  We will continue with the oral pills  She will start sending her sugar logs to me on regular basis and will further titrate the dose of insulin based on those  Patient was offered to get a continuous glucose sensor she is concerned that her skin is sensitive and she might get issues with the sensor as she is currently experiencing with her colectomy bag. Vicki Sharif was advised to check her  fingerstick glucose at least 3-4 times daily. Hypoglycemia protocol reviewed in detail with patient Patient was advised to carry glucose tablets    Patient was advised that sending of her fingerstick blood glucose logs is crucial in management of her  diabetes. I will adjust the  doses of diabetic medications  according to sent data. Health Maintenance       Last Eye Exam: advised to have annual dilated eye exam. her last eye exam was in 2020  Last Podiatry Exam:  Last foot exam was in sept 2020 with PCP   Lipids: Last LDL level was at a level of 58 in May 2020  Microalbumin/Creatinine Ratio: I have ordered MA with next lab work     .  Education: Reviewed ABCs of diabetes management (respective goals in parentheses): A1C (<7), blood pressure (<130/80), and cholesterol (LDL <100). 2. Hypothyroidism, unspecified type thyroid antibodies were negative in April 2021  On review of chart notes from previous endocrinologist at [facility] and the [facility] visits at [facility] shows that she has a history of Hashimoto's hypothyroidism and apparently in 2010 her thyroid antibodies were positive. I do not have access to those labs I will repeat her thyroid antibodies and will continue on 25 mcg of levothyroxine as patient symptomatically feels improved at this dose. She feels her sleep got better when she takes this medication.    - levothyroxine (SYNTHROID) 25 MCG tablet; Take 1 tablet by mouth Daily  Dispense: 30 tablet; Refill: 3    3. Essential hypertension  Blood pressure control is adequate historically and she is on losartan 100 mg daily    4. Mixed hyperlipidemia  Last LDL was at target in April 2021 she is currently on Lipitor 10 mg daily    5. Ulcerative (chronic) enterocolitis, status post colectomy and rectal surgery she has a colectomy bag now  Symptoms are now stable    Reviewed and/or ordered clinical lab results yes   Reviewed and/or ordered radiology tests Yes  Reviewed and/or ordered other diagnostic tests yes   Made a decision to obtain old records yes   Reviewed and summarized old records yes     Merline Blind was counseled regarding symptoms of current diagnosis, course and complications of disease if inadequately treated, side effects of medications, diagnosis, treatment options, and prognosis, risks, benefits, complications, and alternatives of treatment, labs, imaging and other studies and treatment targets and goals.   She understands instructions and counseling    TELEHEALTH EVALUATION -- Audio/Visual ([facility])  Pursuant to the emergency declaration under the [facility], 1135 waiver authority and the Coronavirus Preparedness and Response Supplemental Appropriations Act, this Virtual  Visit was conducted, with patient's consent, to reduce the patient's risk of exposure to COVID-19 and provide care for  patient. Services were provided through a video synchronous discussion virtually to substitute for in-person clinic visit. Patient's location : home     Patient provided verbal consent to use the video visit. Total time spent : 30+ min Reviewing the chart, conducting an interview, performing a limited exam by video and educating the patient on my assessment plan. No follow-ups on file. Please note that some or all of this report was generated using voice recognition software. Please notify me in case of any questions about the content of this document, as some errors in transcription may have occurred .

## 2021-07-14 ENCOUNTER — TELEPHONE (OUTPATIENT)
Dept: ENDOCRINOLOGY | Age: 61
End: 2021-07-14

## 2021-07-14 DIAGNOSIS — E11.9 CONTROLLED TYPE 2 DIABETES MELLITUS WITHOUT COMPLICATION, WITH LONG-TERM CURRENT USE OF INSULIN (HCC): ICD-10-CM

## 2021-07-14 DIAGNOSIS — Z79.4 CONTROLLED TYPE 2 DIABETES MELLITUS WITHOUT COMPLICATION, WITH LONG-TERM CURRENT USE OF INSULIN (HCC): ICD-10-CM

## 2021-07-14 RX ORDER — INSULIN GLARGINE 100 [IU]/ML
INJECTION, SOLUTION SUBCUTANEOUS
Qty: 10 PEN | Refills: 3 | Status: SHIPPED | OUTPATIENT
Start: 2021-07-14 | End: 2021-11-08

## 2021-07-14 NOTE — TELEPHONE ENCOUNTER
PT needs to have Talhatus called into [facility] @ [phone].       LOV:  4/27/21    FOV:  7/29/21

## 2021-07-24 ENCOUNTER — HOSPITAL ENCOUNTER (OUTPATIENT)
Age: 61
Discharge: HOME | End: 2021-07-24
Payer: COMMERCIAL

## 2021-07-24 DIAGNOSIS — E03.9 HYPOTHYROIDISM, UNSPECIFIED TYPE: ICD-10-CM

## 2021-07-24 DIAGNOSIS — E11.9 CONTROLLED TYPE 2 DIABETES MELLITUS WITHOUT COMPLICATION, WITH LONG-TERM CURRENT USE OF INSULIN (HCC): ICD-10-CM

## 2021-07-24 DIAGNOSIS — Z79.4 CONTROLLED TYPE 2 DIABETES MELLITUS WITHOUT COMPLICATION, WITH LONG-TERM CURRENT USE OF INSULIN (HCC): ICD-10-CM

## 2021-07-24 DIAGNOSIS — E78.2 MIXED HYPERLIPIDEMIA: ICD-10-CM

## 2021-07-24 DIAGNOSIS — I10 ESSENTIAL HYPERTENSION: ICD-10-CM

## 2021-07-24 DIAGNOSIS — E03.8 HYPOTHYROIDISM DUE TO HASHIMOTO'S THYROIDITIS: ICD-10-CM

## 2021-07-24 DIAGNOSIS — E06.3 HYPOTHYROIDISM DUE TO HASHIMOTO'S THYROIDITIS: ICD-10-CM

## 2021-07-24 LAB
A/G RATIO: 1.2 (ref 1.1–2.2)
ALBUMIN SERPL-MCNC: 3.9 G/DL (ref 3.4–5)
ALP BLD-CCNC: 90 U/L (ref 40–129)
ALT SERPL-CCNC: 24 U/L (ref 10–40)
ANION GAP SERPL CALCULATED.3IONS-SCNC: 10 MMOL/L (ref 3–16)
AST SERPL-CCNC: 24 U/L (ref 15–37)
BILIRUB SERPL-MCNC: 0.4 MG/DL (ref 0–1)
BUN BLDV-MCNC: 9 MG/DL (ref 7–20)
CALCIUM SERPL-MCNC: 9 MG/DL (ref 8.3–10.6)
CHLORIDE BLD-SCNC: 105 MMOL/L (ref 99–110)
CHOLESTEROL, TOTAL: 119 MG/DL (ref 0–199)
CO2: 25 MMOL/L (ref 21–32)
CREAT SERPL-MCNC: 0.6 MG/DL (ref 0.6–1.2)
GFR AFRICAN AMERICAN: >60
GFR NON-AFRICAN AMERICAN: >60
GLOBULIN: 3.3 G/DL
GLUCOSE BLD-MCNC: 120 MG/DL (ref 70–99)
HDLC SERPL-MCNC: 49 MG/DL (ref 40–60)
LDL CHOLESTEROL CALCULATED: 55 MG/DL
POTASSIUM SERPL-SCNC: 4.5 MMOL/L (ref 3.5–5.1)
SODIUM BLD-SCNC: 140 MMOL/L (ref 136–145)
T4 FREE: 1.4 NG/DL (ref 0.9–1.8)
TOTAL PROTEIN: 7.2 G/DL (ref 6.4–8.2)
TRIGL SERPL-MCNC: 75 MG/DL (ref 0–150)
TSH SERPL DL<=0.05 MIU/L-ACNC: 2.91 UIU/ML (ref 0.27–4.2)
VLDLC SERPL CALC-MCNC: 15 MG/DL

## 2021-07-24 PROCEDURE — 80061 LIPID PANEL: CPT

## 2021-07-24 PROCEDURE — 82043 UR ALBUMIN QUANTITATIVE: CPT

## 2021-07-24 PROCEDURE — 84443 ASSAY THYROID STIM HORMONE: CPT

## 2021-07-24 PROCEDURE — 83036 HEMOGLOBIN GLYCOSYLATED A1C: CPT

## 2021-07-24 PROCEDURE — 84439 ASSAY OF FREE THYROXINE: CPT

## 2021-07-24 PROCEDURE — 36415 COLL VENOUS BLD VENIPUNCTURE: CPT

## 2021-07-24 PROCEDURE — 82570 ASSAY OF URINE CREATININE: CPT

## 2021-07-24 PROCEDURE — 80053 COMPREHEN METABOLIC PANEL: CPT

## 2021-07-25 LAB
CREATININE URINE: 216.3 MG/DL (ref 28–259)
ESTIMATED AVERAGE GLUCOSE: 180 MG/DL
HBA1C MFR BLD: 7.9 %
MICROALBUMIN UR-MCNC: <1.2 MG/DL
MICROALBUMIN/CREAT UR-RTO: NORMAL MG/G (ref 0–30)

## 2021-07-29 ENCOUNTER — OFFICE VISIT (OUTPATIENT)
Dept: ENDOCRINOLOGY | Age: 61
End: 2021-07-29
Payer: COMMERCIAL

## 2021-07-29 VITALS
DIASTOLIC BLOOD PRESSURE: 74 MMHG | BODY MASS INDEX: 41.99 KG/M2 | HEART RATE: 94 BPM | HEIGHT: 65 IN | WEIGHT: 252 LBS | SYSTOLIC BLOOD PRESSURE: 140 MMHG

## 2021-07-29 DIAGNOSIS — E78.2 MIXED HYPERLIPIDEMIA: ICD-10-CM

## 2021-07-29 DIAGNOSIS — I10 ESSENTIAL HYPERTENSION: ICD-10-CM

## 2021-07-29 PROCEDURE — 99214 OFFICE O/P EST MOD 30 MIN: CPT | Performed by: INTERNAL MEDICINE

## 2021-07-29 PROCEDURE — 3051F HG A1C>EQUAL 7.0%<8.0%: CPT | Performed by: INTERNAL MEDICINE

## 2021-07-29 NOTE — PROGRESS NOTES
Tammie Go is a 62 y.o. female is seen for management of uncontrolled Type 2  Diabetes and Hypothyroidism. Patient has complex medical history inclusive of hypertension hyperlipidemia, asthma, PCOD, ulcerative colitis status post total colectomy and now has a colectomy bag. Tammie Go was diagnosed with Diabetes mellitus at age 28   Diabetes was diagnosed at routine screening . Meryl Caldera Tammie Go got diabetic education in the past.  Comorbid conditions: Neuropathy    Hypothyroidism diagnosed in 2015 which was diagnosed in 2015 due to her abnormal thyroid fx test   She also has MNG, which was considered stable as per previous imaging and there were no compressive symptoms  She has hypertension and hyperlipidemia   She has Ulcerative coliitis diagnosed  at age 11 she had colectomy in 2004 , in 2006 she had rectal surgery due to UC she has colectomy bag     INTERIM:    Diabetes  She presents for her initial diabetic visit. She has type 2 diabetes mellitus. No MedicAlert identification noted. The initial diagnosis of diabetes was made 29 years ago. Her disease course has been improving. Hypoglycemia symptoms include hunger and sweats. Associated symptoms include foot paresthesias. Pertinent negatives for diabetes include no weight loss. There are no hypoglycemic complications. Symptoms are improving. Risk factors for coronary artery disease include dyslipidemia and obesity. Current diabetic treatment includes insulin injections. She is compliant with treatment most of the time. Her breakfast blood glucose is taken between 7-8 am. Her breakfast blood glucose range is generally 70-90 mg/dl.         She tells me she is more compliant with meds but usually doesn't take insulin at lunch since she is at work   Mostly dinner has high carbs   She takes Humalog with the meals approximately 2 to 10 units units she bases it on carbohydrate content  She has been noticing flare up of Asthma     Past Medical History: Diagnosis Date    Asthma     Diabetes mellitus ([facility]     GERD (gastroesophageal reflux disease)     Hyperlipidemia     Hypertension     Ileostomy care ([facility] 3/17/2020    Iritis     PCOS (polycystic ovarian syndrome)     Pyoderma     Thyroid disease     Ulcerative colitis     Ulcerative colitis ([facility]       Patient Active Problem List   Diagnosis    Ileostomy care ([facility]    Uncontrolled type 2 diabetes mellitus with complication, with long-term current use of insulin ([facility]    Hypothyroidism    Essential hypertension    Mixed hyperlipidemia    Ulcerative (chronic) enterocolitis, unspecified complication (HCC)     Past Surgical History:   Procedure Laterality Date    ABDOMEN SURGERY      COLON RESECTION FOR ULCERATIVE COLITIS THEN HAD ANUS REMOVED    ILEOSTOMY OR JEJUNOSTOMY      PERMANENT     Social History     Socioeconomic History    Marital status: Single     Spouse name: Not on file    Number of children: Not on file    Years of education: Not on file    Highest education level: Not on file   Occupational History    Not on file   Tobacco Use    Smoking status: Never Smoker    Smokeless tobacco: Never Used   Vaping Use    Vaping Use: Never used   Substance and Sexual Activity    Alcohol use: No    Drug use: No    Sexual activity: Not on file   Other Topics Concern    Not on file   Social History Narrative    Not on file     Social Determinants of Health     Financial Resource Strain:     Difficulty of Paying Living Expenses:    Food Insecurity:     Worried About Running Out of Food in the Last Year:     Ran Out of Food in the Last Year:    Transportation Needs:     Lack of Transportation (Medical):      Lack of Transportation (Non-Medical):    Physical Activity:     Days of Exercise per Week:     Minutes of Exercise per Session:    Stress:     Feeling of Stress :    Social Connections:     Frequency of Communication with Friends and Family:     Frequency of Social Gatherings with Friends and Family:     Attends Tenriism Services:     Active Member of Clubs or Organizations:     Attends Club or Organization Meetings:     Marital Status:    Intimate Partner Violence:     Fear of Current or Ex-Partner:     Emotionally Abused:     Physically Abused:     Sexually Abused:      Family History   Problem Relation Age of Onset    Cancer Mother         uterine    No Known Problems Father      Current Outpatient Medications   Medication Sig Dispense Refill    ALBUTEROL IN Inhale into the lungs      insulin glargine (LANTUS SOLOSTAR) 100 UNIT/ML injection pen Inject 50 units into the skin twice daily. 10 pen 3    insulin lispro (HUMALOG) 100 UNIT/ML injection vial INJECT 25 UNITS INTO THE SKIN ONCE DAILY. 10 mL 3    Multiple Vitamin (MULTIVITAMIN ADULT PO) Take by mouth      Ferrous Sulfate (IRON PO) Take by mouth      COENZYME Q10 PO Take by mouth      Insulin Pen Needle (PEN NEEDLES 31GX5/16\") 31G X 8 MM MISC Use to inject insulin twice daily.  100 each 5    pioglitazone (ACTOS) 15 MG tablet TAKE 1 TABLET BY MOUTH EVERY DAY 90 tablet 1    ONE TOUCH ULTRASOFT LANCETS MISC USE TO TEST BLOOD GLUCOSE 4 TIMES DAILY      POTASSIUM PO Take by mouth      MAGNESIUM PO Take by mouth      Montelukast Sodium (SINGULAIR PO) Take by mouth      Cholecalciferol (VITAMIN D3) 1.25 MG (38433 UT) CAPS Take by mouth once a week      TRIAMCINOLONE ACETONIDE NA by Nasal route as needed       levothyroxine (SYNTHROID) 25 MCG tablet Take 1 tablet by mouth Daily 30 tablet 3    Dulaglutide (TRULICITY) 1.5 AH/7.6BR SOPN Inject 1.5 mg into the skin once a week Saturdays 4 pen 3    BD INSULIN SYRINGE U/F 30G X 1/2\" 0.3 ML MISC USE WITH INSULIN TWICE A  each 5    metFORMIN (GLUCOPHAGE) 500 MG tablet TAKE 1 TABLET BY MOUTH EVERY 24 HOURS 60 tablet 3    fluticasone-vilanterol (BREO ELLIPTA) 100-25 MCG/INH AEPB inhaler Inhale 1 puff into the lungs daily      losartan (COZAAR) 100 MG ASSESSMENT/PLAN:    1. Controlled type 2 diabetes mellitus without complication, with long-term current use of insulin     aic 7.9     Adam Salas was advised that lifestyle modification is the key to better control of her Diabetes. We discussed carbohydrate restriction in detail. Fasting glucose 130 or above   lantus 50 units BID   Increase to 52 units BID   She takes humalog with lunch and dinner   Her friend who was cooking for her  in 2021   Now she lives with her sister   ---trulicity 1.5 mg weekly ---still has nausea  farxiga gave multiple yeast infection   She is on, Metformin 500  Mg  daily only and Actos 15 mg daily . She denies any swelling or edema with this medication  We will continue with the oral pills    She will start sending her sugar logs to me on regular basis and will further titrate the dose of insulin based on those  Patient was offered to get a continuous glucose sensor she is concerned that her skin is sensitive and she might get issues with the sensor as she is currently experiencing with her colectomy bag. Hypoglycemia protocol reviewed in detail with patient Patient was advised to carry glucose tablets    Patient was advised that sending of her fingerstick blood glucose logs is crucial in management of her  diabetes. I will adjust the  doses of diabetic medications  according to sent data. Health Maintenance       Last Eye Exam: advised to have annual dilated eye exam. her last eye exam was in   Last Podiatry Exam:  Last foot exam was in 2020 with PCP   Lipids: Last LDL level was at a level of 58 in May 2020  Microalbumin/Creatinine Ratio: I have ordered MA with next lab work     . Education: Reviewed ABCs of diabetes management (respective goals in parentheses): A1C (<7), blood pressure (<130/80), and cholesterol (LDL <100).     2. Acquired Hypothyroidism, ---thyroid Ab negative 2021   On review of chart notes from previous endocrinologist at [facility] and the [address] visits at [facility] shows that she has a history of Hashimoto's hypothyroidism and apparently in 2010 her thyroid antibodies were positive. I do not have access to those labs I will repeat her thyroid antibodies and will continue on 25 mcg of levothyroxine as patient symptomatically feels improved at this dose. She feels her sleep got better when she takes this medication. 3. Essential hypertension  Blood pressure control is adequate historically and she is on losartan 100 mg daily    4. Mixed hyperlipidemia  Last LDL was at target in April 2021 she is currently on Lipitor 10 mg daily    5. Ulcerative (chronic) enterocolitis, status post colectomy and rectal surgery she has a colectomy bag now  Symptoms are now stable    Reviewed and/or ordered clinical lab results yes   Reviewed and/or ordered radiology tests Yes  Reviewed and/or ordered other diagnostic tests yes   Made a decision to obtain old records yes   Reviewed and summarized old records yes     Adam Salas was counseled regarding symptoms of current diagnosis, course and complications of disease if inadequately treated, side effects of medications, diagnosis, treatment options, and prognosis, risks, benefits, complications, and alternatives of treatment, labs, imaging and other studies and treatment targets and goals. She understands instructions and counseling. Return in about 3 months (around 10/29/2021). Please note that some or all of this report was generated using voice recognition software. Please notify me in case of any questions about the content of this document, as some errors in transcription may have occurred .

## 2021-11-07 DIAGNOSIS — Z79.4 CONTROLLED TYPE 2 DIABETES MELLITUS WITHOUT COMPLICATION, WITH LONG-TERM CURRENT USE OF INSULIN (HCC): ICD-10-CM

## 2021-11-07 DIAGNOSIS — E11.9 CONTROLLED TYPE 2 DIABETES MELLITUS WITHOUT COMPLICATION, WITH LONG-TERM CURRENT USE OF INSULIN (HCC): ICD-10-CM

## 2021-11-08 RX ORDER — INSULIN GLARGINE 100 [IU]/ML
INJECTION, SOLUTION SUBCUTANEOUS
Qty: 5 PEN | Refills: 3 | Status: SHIPPED | OUTPATIENT
Start: 2021-11-08 | End: 2022-03-16

## 2021-11-15 ENCOUNTER — HOSPITAL ENCOUNTER (INPATIENT)
Age: 61
LOS: 4 days | Discharge: HOME | DRG: 336 | End: 2021-11-19
Attending: EMERGENCY MEDICINE | Admitting: INTERNAL MEDICINE
Payer: COMMERCIAL

## 2021-11-15 ENCOUNTER — APPOINTMENT (OUTPATIENT)
Dept: CT IMAGING | Age: 61
DRG: 336 | End: 2021-11-15
Payer: COMMERCIAL

## 2021-11-15 ENCOUNTER — TELEPHONE (OUTPATIENT)
Dept: ENDOCRINOLOGY | Age: 61
End: 2021-11-15

## 2021-11-15 ENCOUNTER — ANESTHESIA EVENT (OUTPATIENT)
Dept: OPERATING ROOM | Age: 61
DRG: 336 | End: 2021-11-15
Payer: COMMERCIAL

## 2021-11-15 DIAGNOSIS — K43.0 INCARCERATED INCISIONAL HERNIA: ICD-10-CM

## 2021-11-15 DIAGNOSIS — R10.84 GENERALIZED ABDOMINAL PAIN: Primary | ICD-10-CM

## 2021-11-15 DIAGNOSIS — R11.2 NON-INTRACTABLE VOMITING WITH NAUSEA, UNSPECIFIED VOMITING TYPE: ICD-10-CM

## 2021-11-15 DIAGNOSIS — K56.609 SBO (SMALL BOWEL OBSTRUCTION) (HCC): ICD-10-CM

## 2021-11-15 LAB
A/G RATIO: 1 (ref 1.1–2.2)
ALBUMIN SERPL-MCNC: 3.9 G/DL (ref 3.4–5)
ALP BLD-CCNC: 98 U/L (ref 40–129)
ALT SERPL-CCNC: 24 U/L (ref 10–40)
ANION GAP SERPL CALCULATED.3IONS-SCNC: 14 MMOL/L (ref 3–16)
AST SERPL-CCNC: 26 U/L (ref 15–37)
BASOPHILS ABSOLUTE: 0.1 K/UL (ref 0–0.2)
BASOPHILS RELATIVE PERCENT: 0.5 %
BILIRUB SERPL-MCNC: 0.4 MG/DL (ref 0–1)
BUN BLDV-MCNC: 10 MG/DL (ref 7–20)
CALCIUM SERPL-MCNC: 9.9 MG/DL (ref 8.3–10.6)
CHLORIDE BLD-SCNC: 100 MMOL/L (ref 99–110)
CO2: 22 MMOL/L (ref 21–32)
CREAT SERPL-MCNC: 0.6 MG/DL (ref 0.6–1.2)
EKG ATRIAL RATE: 119 BPM
EKG DIAGNOSIS: NORMAL
EKG P AXIS: 29 DEGREES
EKG P-R INTERVAL: 156 MS
EKG Q-T INTERVAL: 308 MS
EKG QRS DURATION: 82 MS
EKG QTC CALCULATION (BAZETT): 433 MS
EKG R AXIS: -17 DEGREES
EKG T AXIS: 15 DEGREES
EKG VENTRICULAR RATE: 119 BPM
EOSINOPHILS ABSOLUTE: 0.1 K/UL (ref 0–0.6)
EOSINOPHILS RELATIVE PERCENT: 1.2 %
GFR AFRICAN AMERICAN: >60
GFR NON-AFRICAN AMERICAN: >60
GLUCOSE BLD-MCNC: 150 MG/DL (ref 70–99)
GLUCOSE BLD-MCNC: 183 MG/DL (ref 70–99)
GLUCOSE BLD-MCNC: 211 MG/DL (ref 70–99)
GLUCOSE BLD-MCNC: 220 MG/DL (ref 70–99)
GLUCOSE BLD-MCNC: 224 MG/DL (ref 70–99)
HCT VFR BLD CALC: 42.6 % (ref 36–48)
HEMOGLOBIN: 14.3 G/DL (ref 12–16)
LACTIC ACID, SEPSIS: 2.9 MMOL/L (ref 0.4–1.9)
LACTIC ACID: 3.1 MMOL/L (ref 0.4–2)
LACTIC ACID: 4.3 MMOL/L (ref 0.4–2)
LIPASE: 34 U/L (ref 13–60)
LYMPHOCYTES ABSOLUTE: 2 K/UL (ref 1–5.1)
LYMPHOCYTES RELATIVE PERCENT: 19.9 %
MCH RBC QN AUTO: 28.7 PG (ref 26–34)
MCHC RBC AUTO-ENTMCNC: 33.5 G/DL (ref 31–36)
MCV RBC AUTO: 85.6 FL (ref 80–100)
MONOCYTES ABSOLUTE: 0.5 K/UL (ref 0–1.3)
MONOCYTES RELATIVE PERCENT: 5.3 %
NEUTROPHILS ABSOLUTE: 7.4 K/UL (ref 1.7–7.7)
NEUTROPHILS RELATIVE PERCENT: 73.1 %
PDW BLD-RTO: 14.3 % (ref 12.4–15.4)
PERFORMED ON: ABNORMAL
PLATELET # BLD: 265 K/UL (ref 135–450)
PMV BLD AUTO: 7.2 FL (ref 5–10.5)
POTASSIUM SERPL-SCNC: 4.2 MMOL/L (ref 3.5–5.1)
RBC # BLD: 4.98 M/UL (ref 4–5.2)
SARS-COV-2, NAAT: NOT DETECTED
SODIUM BLD-SCNC: 136 MMOL/L (ref 136–145)
TOTAL PROTEIN: 7.7 G/DL (ref 6.4–8.2)
WBC # BLD: 10.1 K/UL (ref 4–11)

## 2021-11-15 PROCEDURE — 6360000004 HC RX CONTRAST MEDICATION: Performed by: EMERGENCY MEDICINE

## 2021-11-15 PROCEDURE — 87635 SARS-COV-2 COVID-19 AMP PRB: CPT

## 2021-11-15 PROCEDURE — 1200000000 HC SEMI PRIVATE

## 2021-11-15 PROCEDURE — 99222 1ST HOSP IP/OBS MODERATE 55: CPT | Performed by: SURGERY

## 2021-11-15 PROCEDURE — 80053 COMPREHEN METABOLIC PANEL: CPT

## 2021-11-15 PROCEDURE — 2580000003 HC RX 258: Performed by: SURGERY

## 2021-11-15 PROCEDURE — 94761 N-INVAS EAR/PLS OXIMETRY MLT: CPT

## 2021-11-15 PROCEDURE — 36415 COLL VENOUS BLD VENIPUNCTURE: CPT

## 2021-11-15 PROCEDURE — 93010 ELECTROCARDIOGRAM REPORT: CPT | Performed by: INTERNAL MEDICINE

## 2021-11-15 PROCEDURE — 85025 COMPLETE CBC W/AUTO DIFF WBC: CPT

## 2021-11-15 PROCEDURE — 6370000000 HC RX 637 (ALT 250 FOR IP): Performed by: HOSPITALIST

## 2021-11-15 PROCEDURE — 74177 CT ABD & PELVIS W/CONTRAST: CPT

## 2021-11-15 PROCEDURE — 2580000003 HC RX 258: Performed by: INTERNAL MEDICINE

## 2021-11-15 PROCEDURE — 6360000002 HC RX W HCPCS: Performed by: INTERNAL MEDICINE

## 2021-11-15 PROCEDURE — 83605 ASSAY OF LACTIC ACID: CPT

## 2021-11-15 PROCEDURE — 6370000000 HC RX 637 (ALT 250 FOR IP): Performed by: INTERNAL MEDICINE

## 2021-11-15 PROCEDURE — 83690 ASSAY OF LIPASE: CPT

## 2021-11-15 PROCEDURE — 99284 EMERGENCY DEPT VISIT MOD MDM: CPT

## 2021-11-15 PROCEDURE — 2500000003 HC RX 250 WO HCPCS: Performed by: SURGERY

## 2021-11-15 PROCEDURE — 94640 AIRWAY INHALATION TREATMENT: CPT

## 2021-11-15 PROCEDURE — 83036 HEMOGLOBIN GLYCOSYLATED A1C: CPT

## 2021-11-15 PROCEDURE — 93005 ELECTROCARDIOGRAM TRACING: CPT | Performed by: ANESTHESIOLOGY

## 2021-11-15 PROCEDURE — 6360000002 HC RX W HCPCS: Performed by: SURGERY

## 2021-11-15 RX ORDER — SODIUM CHLORIDE 0.9 % (FLUSH) 0.9 %
5-40 SYRINGE (ML) INJECTION EVERY 12 HOURS SCHEDULED
Status: DISCONTINUED | OUTPATIENT
Start: 2021-11-15 | End: 2021-11-19 | Stop reason: HOSPADM

## 2021-11-15 RX ORDER — DEXTROSE MONOHYDRATE 25 G/50ML
12.5 INJECTION, SOLUTION INTRAVENOUS PRN
Status: DISCONTINUED | OUTPATIENT
Start: 2021-11-15 | End: 2021-11-19 | Stop reason: HOSPADM

## 2021-11-15 RX ORDER — DEXTROSE MONOHYDRATE 50 MG/ML
100 INJECTION, SOLUTION INTRAVENOUS PRN
Status: DISCONTINUED | OUTPATIENT
Start: 2021-11-15 | End: 2021-11-19 | Stop reason: HOSPADM

## 2021-11-15 RX ORDER — NICOTINE POLACRILEX 4 MG
15 LOZENGE BUCCAL PRN
Status: DISCONTINUED | OUTPATIENT
Start: 2021-11-15 | End: 2021-11-19 | Stop reason: HOSPADM

## 2021-11-15 RX ORDER — ATORVASTATIN CALCIUM 10 MG/1
10 TABLET, FILM COATED ORAL DAILY
Status: DISCONTINUED | OUTPATIENT
Start: 2021-11-15 | End: 2021-11-19 | Stop reason: HOSPADM

## 2021-11-15 RX ORDER — BUDESONIDE AND FORMOTEROL FUMARATE DIHYDRATE 80; 4.5 UG/1; UG/1
2 AEROSOL RESPIRATORY (INHALATION) 2 TIMES DAILY
Status: DISCONTINUED | OUTPATIENT
Start: 2021-11-15 | End: 2021-11-19 | Stop reason: HOSPADM

## 2021-11-15 RX ORDER — SODIUM CHLORIDE 9 MG/ML
INJECTION, SOLUTION INTRAVENOUS CONTINUOUS
Status: DISCONTINUED | OUTPATIENT
Start: 2021-11-15 | End: 2021-11-18

## 2021-11-15 RX ORDER — CHLORAL HYDRATE 500 MG
3000 CAPSULE ORAL DAILY
Status: DISCONTINUED | OUTPATIENT
Start: 2021-11-15 | End: 2021-11-15 | Stop reason: CLARIF

## 2021-11-15 RX ORDER — SODIUM CHLORIDE 9 MG/ML
25 INJECTION, SOLUTION INTRAVENOUS PRN
Status: DISCONTINUED | OUTPATIENT
Start: 2021-11-15 | End: 2021-11-19 | Stop reason: HOSPADM

## 2021-11-15 RX ORDER — ACETAMINOPHEN 650 MG/1
650 SUPPOSITORY RECTAL EVERY 6 HOURS PRN
Status: DISCONTINUED | OUTPATIENT
Start: 2021-11-15 | End: 2021-11-19 | Stop reason: HOSPADM

## 2021-11-15 RX ORDER — CIPROFLOXACIN 2 MG/ML
400 INJECTION, SOLUTION INTRAVENOUS EVERY 12 HOURS
Status: DISCONTINUED | OUTPATIENT
Start: 2021-11-15 | End: 2021-11-19 | Stop reason: HOSPADM

## 2021-11-15 RX ORDER — SODIUM CHLORIDE, SODIUM LACTATE, POTASSIUM CHLORIDE, CALCIUM CHLORIDE 600; 310; 30; 20 MG/100ML; MG/100ML; MG/100ML; MG/100ML
INJECTION, SOLUTION INTRAVENOUS CONTINUOUS
Status: ACTIVE | OUTPATIENT
Start: 2021-11-15 | End: 2021-11-17

## 2021-11-15 RX ORDER — 0.9 % SODIUM CHLORIDE 0.9 %
1000 INTRAVENOUS SOLUTION INTRAVENOUS ONCE
Status: COMPLETED | OUTPATIENT
Start: 2021-11-15 | End: 2021-11-15

## 2021-11-15 RX ORDER — LOSARTAN POTASSIUM 100 MG/1
100 TABLET ORAL DAILY
Status: DISCONTINUED | OUTPATIENT
Start: 2021-11-15 | End: 2021-11-19 | Stop reason: HOSPADM

## 2021-11-15 RX ORDER — ALBUTEROL SULFATE 90 UG/1
2 AEROSOL, METERED RESPIRATORY (INHALATION) 2 TIMES DAILY
Status: DISCONTINUED | OUTPATIENT
Start: 2021-11-15 | End: 2021-11-19 | Stop reason: HOSPADM

## 2021-11-15 RX ORDER — ALBUTEROL SULFATE 90 UG/1
2 AEROSOL, METERED RESPIRATORY (INHALATION) EVERY 6 HOURS PRN
Status: DISCONTINUED | OUTPATIENT
Start: 2021-11-15 | End: 2021-11-15

## 2021-11-15 RX ORDER — SODIUM CHLORIDE 0.9 % (FLUSH) 0.9 %
10 SYRINGE (ML) INJECTION PRN
Status: DISCONTINUED | OUTPATIENT
Start: 2021-11-15 | End: 2021-11-19 | Stop reason: HOSPADM

## 2021-11-15 RX ORDER — MORPHINE SULFATE 2 MG/ML
2 INJECTION, SOLUTION INTRAMUSCULAR; INTRAVENOUS EVERY 4 HOURS PRN
Status: COMPLETED | OUTPATIENT
Start: 2021-11-15 | End: 2021-11-15

## 2021-11-15 RX ORDER — ONDANSETRON 4 MG/1
4 TABLET, ORALLY DISINTEGRATING ORAL EVERY 8 HOURS PRN
Status: DISCONTINUED | OUTPATIENT
Start: 2021-11-15 | End: 2021-11-19 | Stop reason: HOSPADM

## 2021-11-15 RX ORDER — INSULIN LISPRO 100 [IU]/ML
0-6 INJECTION, SOLUTION INTRAVENOUS; SUBCUTANEOUS EVERY 4 HOURS
Status: DISCONTINUED | OUTPATIENT
Start: 2021-11-15 | End: 2021-11-17

## 2021-11-15 RX ORDER — ONDANSETRON 2 MG/ML
4 INJECTION INTRAMUSCULAR; INTRAVENOUS EVERY 6 HOURS PRN
Status: DISCONTINUED | OUTPATIENT
Start: 2021-11-15 | End: 2021-11-19 | Stop reason: HOSPADM

## 2021-11-15 RX ORDER — ACETAMINOPHEN 325 MG/1
650 TABLET ORAL EVERY 6 HOURS PRN
Status: DISCONTINUED | OUTPATIENT
Start: 2021-11-15 | End: 2021-11-19 | Stop reason: HOSPADM

## 2021-11-15 RX ORDER — MONTELUKAST SODIUM 10 MG/1
10 TABLET ORAL NIGHTLY
Status: DISCONTINUED | OUTPATIENT
Start: 2021-11-15 | End: 2021-11-19 | Stop reason: HOSPADM

## 2021-11-15 RX ORDER — POLYETHYLENE GLYCOL 3350 17 G/17G
17 POWDER, FOR SOLUTION ORAL DAILY PRN
Status: DISCONTINUED | OUTPATIENT
Start: 2021-11-15 | End: 2021-11-19 | Stop reason: HOSPADM

## 2021-11-15 RX ORDER — LEVOTHYROXINE SODIUM 0.03 MG/1
25 TABLET ORAL DAILY
Status: DISCONTINUED | OUTPATIENT
Start: 2021-11-15 | End: 2021-11-19 | Stop reason: HOSPADM

## 2021-11-15 RX ORDER — FLUTICASONE FUROATE AND VILANTEROL 100; 25 UG/1; UG/1
1 POWDER RESPIRATORY (INHALATION) DAILY
Status: DISCONTINUED | OUTPATIENT
Start: 2021-11-15 | End: 2021-11-15 | Stop reason: CLARIF

## 2021-11-15 RX ADMIN — CIPROFLOXACIN 400 MG: 2 INJECTION, SOLUTION INTRAVENOUS at 13:05

## 2021-11-15 RX ADMIN — METRONIDAZOLE 500 MG: 500 INJECTION, SOLUTION INTRAVENOUS at 21:15

## 2021-11-15 RX ADMIN — MORPHINE SULFATE 2 MG: 2 INJECTION, SOLUTION INTRAMUSCULAR; INTRAVENOUS at 08:13

## 2021-11-15 RX ADMIN — MONTELUKAST SODIUM 10 MG: 10 TABLET, FILM COATED ORAL at 21:07

## 2021-11-15 RX ADMIN — MORPHINE SULFATE 2 MG: 2 INJECTION, SOLUTION INTRAMUSCULAR; INTRAVENOUS at 12:56

## 2021-11-15 RX ADMIN — SODIUM CHLORIDE, POTASSIUM CHLORIDE, SODIUM LACTATE AND CALCIUM CHLORIDE: 600; 310; 30; 20 INJECTION, SOLUTION INTRAVENOUS at 08:32

## 2021-11-15 RX ADMIN — Medication 2 PUFF: at 09:25

## 2021-11-15 RX ADMIN — INSULIN GLARGINE 25 UNITS: 100 INJECTION, SOLUTION SUBCUTANEOUS at 16:52

## 2021-11-15 RX ADMIN — IOPAMIDOL 75 ML: 755 INJECTION, SOLUTION INTRAVENOUS at 02:32

## 2021-11-15 RX ADMIN — ATORVASTATIN CALCIUM 10 MG: 10 TABLET, FILM COATED ORAL at 21:07

## 2021-11-15 RX ADMIN — METRONIDAZOLE 500 MG: 500 INJECTION, SOLUTION INTRAVENOUS at 16:40

## 2021-11-15 RX ADMIN — MORPHINE SULFATE 2 MG: 2 INJECTION, SOLUTION INTRAMUSCULAR; INTRAVENOUS at 21:06

## 2021-11-15 RX ADMIN — SODIUM CHLORIDE 1000 ML: 9 INJECTION, SOLUTION INTRAVENOUS at 16:45

## 2021-11-15 RX ADMIN — SODIUM CHLORIDE, POTASSIUM CHLORIDE, SODIUM LACTATE AND CALCIUM CHLORIDE: 600; 310; 30; 20 INJECTION, SOLUTION INTRAVENOUS at 11:25

## 2021-11-15 RX ADMIN — Medication 2 PUFF: at 19:59

## 2021-11-15 RX ADMIN — SODIUM CHLORIDE: 9 INJECTION, SOLUTION INTRAVENOUS at 18:07

## 2021-11-15 RX ADMIN — ONDANSETRON 4 MG: 4 TABLET, ORALLY DISINTEGRATING ORAL at 21:07

## 2021-11-15 RX ADMIN — SODIUM CHLORIDE 1000 ML: 9 INJECTION, SOLUTION INTRAVENOUS at 14:47

## 2021-11-15 RX ADMIN — INSULIN GLARGINE 25 UNITS: 100 INJECTION, SOLUTION SUBCUTANEOUS at 11:34

## 2021-11-15 RX ADMIN — ACETAMINOPHEN 650 MG: 325 TABLET ORAL at 17:32

## 2021-11-15 RX ADMIN — Medication 10 ML: at 21:08

## 2021-11-15 ASSESSMENT — PAIN SCALES - GENERAL
PAINLEVEL_OUTOF10: 4
PAINLEVEL_OUTOF10: 6
PAINLEVEL_OUTOF10: 4
PAINLEVEL_OUTOF10: 7

## 2021-11-15 ASSESSMENT — ENCOUNTER SYMPTOMS
SHORTNESS OF BREATH: 0
VOMITING: 1
WHEEZING: 0
COUGH: 0
ABDOMINAL PAIN: 1
RHINORRHEA: 0
DIARRHEA: 0
NAUSEA: 1

## 2021-11-15 NOTE — ED PROVIDER NOTES
[facility]        Pt Name: Mykel Bertrand  MRN: [id number]  [place] 1960  Date of evaluation: 11/15/2021  Provider: Verónica Casey PA-C  PCP: Ellie ORELLANA  Note Started: 12:55 AM EST        I have seen and evaluated this patient with my supervising physician Dr. Deonna Hernandez. CHIEF COMPLAINT       Chief Complaint   Patient presents with    Abdominal Pain     Pt has a hx of colitis with colon, rectum and portions of the small bowel removed. Pt CO abdominal cramping, decreased stool output and emesis x 2 today. HISTORY OF PRESENT ILLNESS   (Location, Timing/Onset, Context/Setting, Quality, Duration, Modifying Factors, Severity, Associated Signs and Symptoms)  Note limiting factors. Chief Complaint: Abd pain     Mykel Bertrand is a 62 y.o. female who presents for evaluation of severe abdominal pain and cramping that started this morning with associated nausea and vomiting. Patient has history of ulcerative colitis with complete colon and rectal resection. She has chronic in the ileostomy. Patient states that she has not had any output and is not passing gas and is concerned for small bowel obstruction. She denies fevers or chills. No cough congestion runny nose. No chest pain or shortness of breath. No urinary complaints. She has no other complaints or concerns at this time. Nursing Notes were all reviewed and agreed with or any disagreements were addressed in the HPI. REVIEW OF SYSTEMS    (2-9 systems for level 4, 10 or more for level 5)     Review of Systems   Constitutional: Negative for appetite change, chills and fever. HENT: Negative for congestion and rhinorrhea. Respiratory: Negative for cough, shortness of breath and wheezing. Cardiovascular: Negative for chest pain. Gastrointestinal: Positive for abdominal pain, nausea and vomiting. Negative for diarrhea.    Genitourinary: Negative for difficulty urinating, dysuria and hematuria. Musculoskeletal: Negative for neck pain and neck stiffness. Skin: Negative for rash. Neurological: Negative for headaches. Positives and Pertinent negatives as per HPI. Except as noted above in the ROS, all other systems were reviewed and negative. PAST MEDICAL HISTORY     Past Medical History:   Diagnosis Date    Asthma     Diabetes mellitus ([facility]     GERD (gastroesophageal reflux disease)     Hyperlipidemia     Hypertension     Ileostomy care ([facility] 3/17/2020    Iritis     PCOS (polycystic ovarian syndrome)     Pyoderma     Thyroid disease     Ulcerative colitis     Ulcerative colitis ([facility]          SURGICAL HISTORY     Past Surgical History:   Procedure Laterality Date    ABDOMEN SURGERY      COLON RESECTION FOR ULCERATIVE COLITIS THEN HAD ANUS REMOVED    ILEOSTOMY OR JEJUNOSTOMY      PERMANENT         CURRENTMEDICATIONS       Previous Medications    ACETAMINOPHEN (TYLENOL) 500 MG TABLET    Take 500 mg by mouth every 6 hours as needed for Pain    ALBUTEROL IN    Inhale into the lungs    ATORVASTATIN (LIPITOR) 10 MG TABLET    Take 10 mg by mouth daily    BD INSULIN SYRINGE U/F 30G X 1/2\" 0.3 ML MISC    USE WITH INSULIN TWICE A DAY    CHOLECALCIFEROL (VITAMIN D3) 1.25 MG (16423 UT) CAPS    Take by mouth once a week    COENZYME Q10 PO    Take by mouth    CRANBERRY PO    Take by mouth daily    FERROUS SULFATE (IRON PO)    Take by mouth    FLUTICASONE-VILANTEROL (BREO ELLIPTA) 100-25 MCG/INH AEPB INHALER    Inhale 1 puff into the lungs daily    INSULIN LISPRO (HUMALOG) 100 UNIT/ML INJECTION VIAL    INJECT 25 UNITS INTO THE SKIN ONCE DAILY. INSULIN PEN NEEDLE (PEN NEEDLES 31GX5/16\") 31G X 8 MM MISC    Use to inject insulin twice daily. LANTUS SOLOSTAR 100 UNIT/ML INJECTION PEN    INJECT 50 UNITS INTO THE SKIN TWICE DAILY.     LEVOTHYROXINE (SYNTHROID) 25 MCG TABLET    TAKE 1 TABLET BY MOUTH EVERY DAY    LORATADINE (CLARITIN) 10 MG CAPS    Take  by mouth. LOSARTAN (COZAAR) 100 MG TABLET    Take 100 mg by mouth daily    MAGNESIUM PO    Take by mouth    METFORMIN (GLUCOPHAGE) 500 MG TABLET    TAKE 1 TABLET BY MOUTH EVERY 24 HOURS    MONTELUKAST SODIUM (SINGULAIR PO)    Take by mouth    MULTIPLE VITAMIN (MULTIVITAMIN ADULT PO)    Take by mouth    OMEGA-3 FATTY ACIDS (FISH OIL) 1000 MG CAPS    Take 3,000 mg by mouth daily    ONE TOUCH ULTRASOFT LANCETS MISC    USE TO TEST BLOOD GLUCOSE 4 TIMES DAILY    PIOGLITAZONE (ACTOS) 15 MG TABLET    TAKE 1 TABLET BY MOUTH EVERY DAY    POTASSIUM PO    Take by mouth    TRIAMCINOLONE ACETONIDE NA    by Nasal route as needed     TRULICITY 1.5 QB/8.2BR SOPN    INJECT 1.5 MG SUBCUTANEOUSLY EVERY 7 DAYS. ALLERGIES     Patient has no known allergies. FAMILYHISTORY       Family History   Problem Relation Age of Onset    Cancer Mother         uterine    No Known Problems Father           SOCIAL HISTORY       Social History     Tobacco Use    Smoking status: Never Smoker    Smokeless tobacco: Never Used   Vaping Use    Vaping Use: Never used   Substance Use Topics    Alcohol use: No    Drug use: No       SCREENINGS             PHYSICAL EXAM    (up to 7 for level 4, 8 or more for level 5)     ED Triage Vitals   BP Temp Temp src Pulse Resp SpO2 Height Weight   -- -- -- -- -- -- -- --       Physical Exam  Vitals and nursing note reviewed. Constitutional:       Appearance: She is well-developed. She is not diaphoretic. HENT:      Head: Normocephalic and atraumatic. Right Ear: External ear normal.      Left Ear: External ear normal.      Nose: Nose normal.   Eyes:      General:         Right eye: No discharge. Left eye: No discharge. Cardiovascular:      Rate and Rhythm: Normal rate and regular rhythm. Heart sounds: Normal heart sounds. Pulmonary:      Effort: Pulmonary effort is normal. No respiratory distress. Breath sounds: Normal breath sounds.    Chest: Chest wall: No tenderness. Abdominal:      General: Bowel sounds are decreased. There is no distension. Palpations: Abdomen is soft. Tenderness: There is generalized abdominal tenderness. There is no guarding or rebound. Musculoskeletal:         General: Normal range of motion. Cervical back: Normal range of motion and neck supple. Skin:     General: Skin is warm and dry. Neurological:      Mental Status: She is alert and oriented to person, place, and time. Psychiatric:         Behavior: Behavior normal.         DIAGNOSTIC RESULTS   LABS:    Labs Reviewed   CBC WITH AUTO DIFFERENTIAL   COMPREHENSIVE METABOLIC PANEL   LIPASE   URINE RT REFLEX TO CULTURE   LACTATE, SEPSIS   LACTATE, SEPSIS       When ordered only abnormal lab results are displayed. All other labs were within normal range or not returned as of this dictation. EKG: When ordered, EKG's are interpreted by the Emergency Department Physician in the absence of a cardiologist.  Please see their note for interpretation of EKG. RADIOLOGY:   Non-plain film images such as CT, Ultrasound and MRI are read by the radiologist. Plain radiographic images are visualized and preliminarily interpreted by the ED Provider with the below findings:        Interpretation per the Radiologist below, if available at the time of this note:    CT ABDOMEN PELVIS W IV CONTRAST Additional Contrast? None    (Results Pending)     No results found. PROCEDURES   Unless otherwise noted below, none     Procedures    CRITICAL CARE TIME   N/A    CONSULTS:  None      EMERGENCY DEPARTMENT COURSE and DIFFERENTIAL DIAGNOSIS/MDM:   Vitals:    Vitals:    11/15/21 0055   BP: 134/71   Pulse: 88   Resp: 20   SpO2: 98%       Patient was given the following medications:  Medications - No data to display        Patient presents for evaluation of severe abdominal pain nausea vomiting. On exam, she is resting comfortably in bed no acute distress nontoxic.   Vitals are stable and she is afebrile. Lungs are clear to auscultation bilaterally. She has generalized abdominal pain worse to the left abdomen with no peritoneal signs. Ileostomy site is well-appearing. Minimal stool in bag. Hypoactive bowel sounds. Patient declined need for any pain medication at this time and will be reevaluated. Work-up pending at end of shift. Please see attending note for additional information regarding these results, reevaluation patient disposition. FINAL IMPRESSION      1. Generalized abdominal pain    2. Non-intractable vomiting with nausea, unspecified vomiting type          DISPOSITION/PLAN   DISPOSITION        PATIENT REFERRED TO:  No follow-up provider specified.     DISCHARGE MEDICATIONS:  New Prescriptions    No medications on file       DISCONTINUED MEDICATIONS:  Discontinued Medications    No medications on file              (Please note that portions of this note were completed with a voice recognition program.  Efforts were made to edit the dictations but occasionally words are mis-transcribed.)    Zeny Mallory PA-C (electronically signed)           Nitesh Gaspar PA-C  11/15/21 0201

## 2021-11-15 NOTE — PROGRESS NOTES
11/15/21 1106   RT Protocol   History Pulmonary Disease 2   Respiratory pattern 0   Breath sounds 2   Cough 0   Indications for Bronchodilator Therapy None   Bronchodilator Assessment Score 4

## 2021-11-15 NOTE — ED PROVIDER NOTES
[facility]        Pt Name: Andre Deleon  MRN: [id number]  [place] 1960  Date of evaluation: 11/15/2021  Provider: Laina Rowell MD  PCP: Ponce ORELLANA    This patient was seen and evaluated by the attending physician Laina Rowell MD.      [address] [place] [address]       Chief Complaint   Patient presents with    Abdominal Pain     Pt has a hx of colitis with colon, rectum and portions of the small bowel removed. Pt CO abdominal cramping, decreased stool output and emesis x 2 today. HISTORY OF PRESENT ILLNESS   (Location/Symptom, Timing/Onset, Context/Setting, Quality, Duration, Modifying Factors, Severity)  Note limiting factors. Andre Deleon is a 62 y.o. female today with a chief complaint of abdominal pain. History ulcerative colitis status post colonic resection with rectal and anal resection as well and diverting ileostomy. For the past days notes abdominal pain cramping and decreased ostomy output. No fevers chills sweats no headaches chest pain dyspnea. Concern for SBO. Nursing Notes were all reviewed and agreed with or any disagreements were addressed  in the HPI. REVIEW OF SYSTEMS    (2-9 systems for level 4, 10 or more for level 5)     Review of Systems    Positives and Pertinent negatives as per HPI. Except as noted abovein the ROS, all other systems were reviewed and negative.        PAST MEDICAL HISTORY     Past Medical History:   Diagnosis Date    Asthma     Diabetes mellitus ([address]     GERD (gastroesophageal reflux disease)     Hyperlipidemia     Hypertension     [facility]le[facility] care ([facility] 3/17/2020    Iritis     PCOS (polycystic ovarian syndrome)     Pyoderma     Thyroid disease     Ulcerative colitis     Ulcerative colitis ([facility]          SURGICAL HISTORY     Past Surgical History:   Procedure Laterality Date    ABDOMEN SURGERY      COLON RESECTION FOR ULCERATIVE COLITIS THEN HAD ANUS REMOVED    ILEOSTOMY OR JEJUNOSTOMY      PERMANENT         CURRENTMEDICATIONS       Previous Medications    ACETAMINOPHEN (TYLENOL) 500 MG TABLET    Take 500 mg by mouth every 6 hours as needed for Pain    ALBUTEROL IN    Inhale into the lungs    ATORVASTATIN (LIPITOR) 10 MG TABLET    Take 10 mg by mouth daily    BD INSULIN SYRINGE U/F 30G X 1/2\" 0.3 ML MISC    USE WITH INSULIN TWICE A DAY    CHOLECALCIFEROL (VITAMIN D3) 1.25 MG (23381 UT) CAPS    Take by mouth once a week    COENZYME Q10 PO    Take by mouth    CRANBERRY PO    Take by mouth daily    FERROUS SULFATE (IRON PO)    Take by mouth    FLUTICASONE-VILANTEROL (BREO ELLIPTA) 100-25 MCG/INH AEPB INHALER    Inhale 1 puff into the lungs daily    INSULIN LISPRO (HUMALOG) 100 UNIT/ML INJECTION VIAL    INJECT 25 UNITS INTO THE SKIN ONCE DAILY. INSULIN PEN NEEDLE (PEN NEEDLES 31GX5/16\") 31G X 8 MM MISC    Use to inject insulin twice daily. LANTUS SOLOSTAR 100 UNIT/ML INJECTION PEN    INJECT 50 UNITS INTO THE SKIN TWICE DAILY. LEVOTHYROXINE (SYNTHROID) 25 MCG TABLET    TAKE 1 TABLET BY MOUTH EVERY DAY    LORATADINE (CLARITIN) 10 MG CAPS    Take  by mouth. LOSARTAN (COZAAR) 100 MG TABLET    Take 100 mg by mouth daily    MAGNESIUM PO    Take by mouth    METFORMIN (GLUCOPHAGE) 500 MG TABLET    TAKE 1 TABLET BY MOUTH EVERY 24 HOURS    MONTELUKAST SODIUM (SINGULAIR PO)    Take by mouth    MULTIPLE VITAMIN (MULTIVITAMIN ADULT PO)    Take by mouth    OMEGA-3 FATTY ACIDS (FISH OIL) 1000 MG CAPS    Take 3,000 mg by mouth daily    ONE TOUCH ULTRASOFT LANCETS MISC    USE TO TEST BLOOD GLUCOSE 4 TIMES DAILY    PIOGLITAZONE (ACTOS) 15 MG TABLET    TAKE 1 TABLET BY MOUTH EVERY DAY    POTASSIUM PO    Take by mouth    TRIAMCINOLONE ACETONIDE NA    by Nasal route as needed     TRULICITY 1.5 BQ/6.2NI SOPN    INJECT 1.5 MG SUBCUTANEOUSLY EVERY 7 DAYS. ALLERGIES     Patient has no known allergies.     FAMILYHISTORY       Family History   Problem Relation Age of Onset    Cancer Mother         uterine    No Known Problems Father           SOCIAL HISTORY       Social History     Socioeconomic History    Marital status: Single     Spouse name: None    Number of children: None    Years of education: None    Highest education level: None   Occupational History    None   Tobacco Use    Smoking status: Never Smoker    Smokeless tobacco: Never Used   Vaping Use    Vaping Use: Never used   Substance and Sexual Activity    Alcohol use: No    Drug use: No    Sexual activity: None   Other Topics Concern    None   Social History Narrative    None     Social Determinants of Health     Financial Resource Strain:     Difficulty of Paying Living Expenses: Not on file   Food Insecurity:     Worried About Running Out of Food in the Last Year: Not on file    Gomez of Food in the Last Year: Not on file   Transportation Needs:     Lack of Transportation (Medical): Not on file    Lack of Transportation (Non-Medical):  Not on file   Physical Activity:     Days of Exercise per Week: Not on file    Minutes of Exercise per Session: Not on file   Stress:     Feeling of Stress : Not on file   Social Connections:     Frequency of Communication with Friends and Family: Not on file    Frequency of Social Gatherings with Friends and Family: Not on file    Attends [employer] Services: Not on file    Active Member of [address] [place] [address] or Organizations: Not on file    Attends Club or Organization Meetings: Not on file    Marital Status: Not on file   Intimate Partner Violence:     Fear of Current or Ex-Partner: Not on file    Emotionally Abused: Not on file    Physically Abused: Not on file    Sexually Abused: Not on file   Housing Stability:     Unable to Pay for Housing in the Last Year: Not on file    Number of [place] in the Last Year: Not on file    Unstable Housing in the Last Year: Not on file       SCREENINGS             PHYSICAL EXAM    (up to 7 for level 4, 8 or more for level 5)     ED Triage Vitals [11/15/21 0055]   BP Temp Temp src Pulse Resp SpO2 Height Weight   134/71 -- -- 88 20 98 % -- --       Physical Exam    General Appearance:  Alert, cooperative, no distress, appears stated age. Head:  Normocephalic, without obvious abnormality, atraumatic. Eyes:  conjunctiva/corneas clear, EOM's intact. Sclera anicteric. ENT: Mucous membranes moist.   Neck: Supple, symmetrical, trachea midline, no adenopathy. No jugular venous distention. Lungs:   No Respiratory Distress. Chest Wall:  Atraumatic   Heart:  RRR   Abdomen:   Soft, NT, ND; diminished bowel sounds. Not tympanitic. Extremities:  Full range of motion. Pulses: Symmetric x4   Skin:  No rashes or lesions to exposed skin. Neurologic: Alert and oriented X 3. Motor grossly normal.  Speech clear. DIAGNOSTIC RESULTS   LABS:    Labs Reviewed   COMPREHENSIVE METABOLIC PANEL - Abnormal; Notable for the following components:       Result Value    Glucose 183 (*)     Albumin/Globulin Ratio 1.0 (*)     All other components within normal limits    Narrative:     Performed at:  [facility] [address], [place], [address] [employer]   Phone [phone]   LACTATE, SEPSIS - Abnormal; Notable for the following components:    Lactic Acid, Sepsis 2.9 (*)     All other components within normal limits    Narrative:     Performed at:  [facility] [address], [place], [address] [employer]   Phone [phone]   CBC WITH AUTO DIFFERENTIAL    Narrative:     Performed at:  [facility] [address],  [place] [address] [employer]   Phone [phone]   LIPASE    Narrative:     Performed at:  [facility] [address] [facility]   Phone [phone]   URINE RT REFLEX TO CULTURE   LACTATE, SEPSIS       All other labs were within normal range or not returned as of this dictation.     EKG: All EKG's are interpreted by the Emergency Department Physician in the absence of a cardiologist.  Please see their note for interpretation of EKG. RADIOLOGY:   Non-plain film images such as CT, Ultrasound and MRI are read by the radiologist. Plain radiographic images are visualized andpreliminarily interpreted by the  ED Provider with the below findings:        Interpretation perthe Radiologist below, if available at the time of this note:    CT ABDOMEN PELVIS W IV CONTRAST Additional Contrast? None   Preliminary Result   1. Small bowel obstruction with transition point within the right paramidline   ventral hernia in the anterior pelvis. Trace free fluid is noted. 2. Right lower quadrant peristomal hernia is noted containing bowel. Although there is no significant bowel dilation within the hernia sac, there   is mild inflammatory stranding and trace amount of fluid raising suspicion   for incarceration. 3. Cholelithiasis. 4. Thickened endometrium measuring up to 10 mm. Recommend correlation with   any history of postmenopausal bleeding. Consider further evaluation with   nonemergent ultrasound. No results found. PROCEDURES   Unless otherwise noted below, none     Procedures    CRITICAL CARE TIME   N/A    CONSULTS:  IP CONSULT TO GENERAL SURGERY  IP CONSULT TO HOSPITALIST      EMERGENCY DEPARTMENT COURSE and DIFFERENTIAL DIAGNOSIS/MDM:   Vitals:    Vitals:    11/15/21 0055 11/15/21 0149 11/15/21 0159 11/15/21 0219   BP: 134/71 139/75 133/78 127/68   Pulse: 88 93 93 102   Resp: 20 16 18 21   SpO2: 98%          Patient was given thefollowing medications:  Medications   iopamidol (ISOVUE-370) 76 % injection 75 mL (75 mLs IntraVENous Given 11/15/21 0232)       81-year-old female with history of prior cellulitis including significant resections now with a diverting ileostomy, also history of diabetes. Here today with chief complaint abdominal pain and concern for obstruction.     Labs and imaging as per above. Labs reviewed; benign  Imaging with SBO noted. CS placed to general surgery & hospitalists. Will admit      03 40 patient with sudden increase in abdominal pain is now actively retching and guarding her abdomen. This is a change in examination. I will call surgery back. Rather concerning for incarcerated internal hernia. General surgery again reviewed the CT and concurs. After OR this morning. I sent a Covid swab as well. FINAL IMPRESSION      1. Generalized abdominal pain    2. Non-intractable vomiting with nausea, unspecified vomiting type    3. SBO (small bowel obstruction) ([facility]          DISPOSITION/PLAN   DISPOSITION        PATIENT REFERREDTO:  No follow-up provider specified.     DISCHARGE MEDICATIONS:  New Prescriptions    No medications on file       DISCONTINUED MEDICATIONS:  Discontinued Medications    No medications on file              (Please note that portions ofthis note were completed with a voice recognition program.  Efforts were made to edit the dictations but occasionally words are mis-transcribed.)    Juliana Henry MD (electronically signed)           Juliana Henry MD  11/15/21 59[facility] MD Spike  11/15/21 1396

## 2021-11-15 NOTE — ED NOTES
Bed: 21  Expected date:   Expected time:   Means of arrival: [facility]  Comments:     Raúl Gilbert RN  11/15/21 7138

## 2021-11-15 NOTE — RT PROTOCOL NOTE
using Per Protocol order mode. 4-6 - enter or revise RT Bronchodilator order(s) to two equivalent RT bronchodilator orders with one order with BID Frequency and one order with Frequency of every 4 hours PRN wheezing or increased work of breathing using Per Protocol order mode. 7-10 - enter or revise RT Bronchodilator order(s) to two equivalent RT bronchodilator orders with one order with TID Frequency and one order with Frequency of every 4 hours PRN wheezing or increased work of breathing using Per Protocol order mode. 11-13 - enter or revise RT Bronchodilator order(s) to one equivalent RT bronchodilator order with QID Frequency and an Albuterol order with Frequency of every 4 hours PRN wheezing or increased work of breathing using Per Protocol order mode. Greater than 13 - enter or revise RT Bronchodilator order(s) to one equivalent RT bronchodilator order with every 4 hours Frequency and an Albuterol order with Frequency of every 2 hours PRN wheezing or increased work of breathing using Per Protocol order mode. RT to enter RT Home Evaluation for COPD & MDI Assessment order using Per Protocol order mode.     Electronically signed by Jayme Mackey RCP on 11/15/2021 at 11:09 AM

## 2021-11-15 NOTE — TELEPHONE ENCOUNTER
PT is currently in [facility].  She states she has multi hernias and is going to have surgery for them.   This is just an FYI for Dr Laury Umaña

## 2021-11-15 NOTE — H&P
Hospital Medicine History and Physical    11/15/2021    Date of Admission: 11/15/2021    Date of Service: Pt seen/examined on 11/15/2021 and admitted to inpatient. Assessment/plan:  1. Complete small bowel obstruction. Secondary to ventral hernia; there is concern for incarcerated hernia. General surgeon consulted from ER - recommends admission to hospital medicine service. Make NPO. Start IVF. NGT to low intermittent suction. As needed morphine for pain. Patient will likely need urgent surgery evaluation; I have discussed with emergency room physician (who is currently reaching out to general surgeon). 2. Abnormal finding on CT-abd/pelvis with thickened endometrium measuring up to 10 mm. Patient will benefit from pelvic ultrasound for further evaluation. 3. Other comorbidities: history of diabetes 2 with hyperglycemia, GERD, hyperlipidemia, essential hypertension, PCOS, ulcerative colitis s/p colon, rectum . Activities: Up with assist  Prophylaxis: SC lovenox  Code status: Full    ==========================================================  Chief complaint:  Chief Complaint   Patient presents with    Abdominal Pain     Pt has a hx of colitis with colon, rectum and portions of the small bowel removed. Pt CO abdominal cramping, decreased stool output and emesis x 2 today. History of Presenting Illness: This is a pleasant 62 y.o. female with history of diabetes 2 with hyperglycemia, GERD, hyperlipidemia, essential hypertension, PCOS, ulcerative colitis s/p colon, rectum and partial small bowel resection, who presents to the emergency room with complaints of abdominal discomfort, , nausea, vomiting and decreased PO intake.     CT-abd/pelvis with IV contrast obtained in ER reveals small bowel obstruction with transition point within the right para-midline ventral hernia in the anterior pelvis; right lower quadrant peristomal hernia noted containing bowel, concerning for incarceration; thickened endometrium measuring up to 10 mm. Past Medical History:      Diagnosis Date    Asthma     [facility] mellitus ([facility]     GERD (gastroesophageal reflux disease)     Hyperlipidemia     Hypertension     [facility]ostomy care ([facility] 3/17/2020    Iritis     PCOS (polycystic ovarian syndrome)     Pyoderma     Thyroid disease     Ulcerative colitis     Ulcerative colitis ([facility]        Past Surgical History:      Procedure Laterality Date    ABDOMEN SURGERY      COLON RESECTION FOR ULCERATIVE COLITIS THEN HAD ANUS REMOVED    ILEOSTOMY OR JEJUNOSTOMY      PERMANENT       Medications (prior to admission):  Prior to Admission medications    Medication Sig Start Date End Date Taking? Authorizing Provider   LANTUS SOLOSTAR 100 UNIT/ML injection pen INJECT 50 UNITS INTO THE SKIN TWICE DAILY. 11/8/21   Val Villalobos MD   levothyroxine (SYNTHROID) 25 MCG tablet TAKE 1 TABLET BY MOUTH EVERY DAY 11/1/21   Val Villalobos MD   TRULICITY 1.5 JK/2.9CV SOPN INJECT 1.5 MG SUBCUTANEOUSLY EVERY 7 DAYS. 9/24/21   Val Villalobos MD   ALBUTEROL IN Inhale into the lungs    Historical Provider, MD   insulin lispro (HUMALOG) 100 UNIT/ML injection vial INJECT 25 UNITS INTO THE SKIN ONCE DAILY. 7/12/21   Val Villalobos MD   Multiple Vitamin (MULTIVITAMIN ADULT PO) Take by mouth    Historical Provider, MD   Ferrous Sulfate (IRON PO) Take by mouth    Historical Provider, MD   COENZYME Q10 PO Take by mouth    Historical Provider, MD   Insulin Pen Needle (PEN NEEDLES 31GX5/16\") 31G X 8 MM MISC Use to inject insulin twice daily.  4/14/21   Val Villalobos MD   pioglitazone (ACTOS) 15 MG tablet TAKE 1 TABLET BY MOUTH EVERY DAY 4/13/21   Val Villalobos MD   ONE TOUCH ULTRASOFT LANCETS MISC USE TO TEST BLOOD GLUCOSE 4 TIMES DAILY 11/7/20   Miles Provider, MD   POTASSIUM PO Take by mouth    Historical Provider, MD   MAGNESIUM PO Take by mouth    Historical Provider, MD   Montelukast Sodium (SINGULAIR PO) Take by mouth    Historical Provider, MD Cholecalciferol (VITAMIN D3) 1.25 MG (87204 UT) CAPS Take by mouth once a week    Historical Provider, MD   TRIAMCINOLONE ACETONIDE NA by Nasal route as needed     Historical Provider, MD   BD INSULIN SYRINGE U/F 30G X 1/2\" 0.3 ML MISC USE WITH INSULIN TWICE A DAY 12/10/20   Celine Ryan MD   metFORMIN (GLUCOPHAGE) 500 MG tablet TAKE 1 TABLET BY MOUTH EVERY 24 HOURS 12/10/20   Celine Ryan MD   fluticasone-vilanterol (BREO ELLIPTA) 100-25 MCG/INH AEPB inhaler Inhale 1 puff into the lungs daily    Historical Provider, MD   losartan (COZAAR) 100 MG tablet Take 100 mg by mouth daily    Historical Provider, MD   Omega-3 Fatty Acids (FISH OIL) 1000 MG CAPS Take 3,000 mg by mouth daily    Historical Provider, MD   atorvastatin (LIPITOR) 10 MG tablet Take 10 mg by mouth daily    Historical Provider, MD   CRANBERRY PO Take by mouth daily    Historical Provider, MD   acetaminophen (TYLENOL) 500 MG tablet Take 500 mg by mouth every 6 hours as needed for Pain    Historical Provider, MD   Loratadine (CLARITIN) 10 MG CAPS Take  by mouth. Historical Provider, MD       Allergy(ies):  Patient has no known allergies. Social History:  TOBACCO:  reports that she has never smoked. She has never used smokeless tobacco.  ETOH:  reports no history of alcohol use. Family History:      Problem Relation Age of Onset    Cancer Mother         uterine    No Known Problems Father        Review of Systems:  Pertinent positives are listed in HPI. At least 10-point ROS reviewed and were negative. Vitals and physical examination:  /68   Pulse 102   Resp 21   LMP 08/16/2010   SpO2 98%   Gen/overall appearance: In moderate distress due to abdominal pain and nausea. Alert. Oriented x3  Head: Normocephalic, atraumatic  Eyes: EOMI, good acuity  ENT: Oral mucosa moist  Neck: No JVD, thyromegaly  CVS: Nml S1S2, no MRG, RRR  Pulm: Clear bilaterally. No crackles/wheezes  Gastrointestinal: Abdomen is distended.   Tender to palpation in the right paraumbilical area. There is no rebound or guarding. Musculoskeletal: No edema. Warm  Neuro: No focal deficit. Moves extremity spontaneously. Psychiatry: Appropriate affect. Not agitated. Skin: Warm, dry with normal turgor. No rash  Capillary refill: Brisk,< 3 seconds   Peripheral Pulses: +2 palpable, equal bilaterally       Labs/imaging/EKG:  CBC:   Recent Labs     11/15/21  0141   WBC 10.1   HGB 14.3        BMP:    Recent Labs     11/15/21  0141      K 4.2      CO2 22   BUN 10   CREATININE 0.6   GLUCOSE 183*     Hepatic:   Recent Labs     11/15/21  0141   AST 26   ALT 24   BILITOT 0.4   ALKPHOS 80       CT ABDOMEN PELVIS W IV CONTRAST Additional Contrast? None    Result Date: 11/15/2021  1. Small bowel obstruction with transition point within the right paramidline ventral hernia in the anterior pelvis. Trace free fluid is noted. 2. Right lower quadrant peristomal hernia is noted containing bowel. Although there is no significant bowel dilation within the hernia sac, there is mild inflammatory stranding and trace amount of fluid raising suspicion for incarceration. 3. Cholelithiasis. 4. Thickened endometrium measuring up to 10 mm. Recommend correlation with any history of postmenopausal bleeding. Consider further evaluation with nonemergent ultrasound. Discussed with ER physician.       Thank you Razia Mccullough for the opportunity to be involved in this patient's care.    -----------------------------  Abram Beck MD  [facility] [place] hospitalist

## 2021-11-15 NOTE — ANESTHESIA PRE PROCEDURE
Department of Anesthesiology  Preprocedure Note       Name:  Sachin Levine   Age:  62 y.o.  :  1960                                          MRN:  [id number]         Date:  11/15/2021      Surgeon: Alexandria Rod):  Pauly Sabillon MD    Procedure: Procedure(s):  REPAIR INCISIONAL HERNIA WITH POSSIBLE MESH, POSSIBLE SMALL BOWEL RESECTION    Medications prior to admission:   Prior to Admission medications    Medication Sig Start Date End Date Taking? Authorizing Provider   LANTUS SOLOSTAR 100 UNIT/ML injection pen INJECT 50 UNITS INTO THE SKIN TWICE DAILY. 21   Som Carpenter MD   levothyroxine (SYNTHROID) 25 MCG tablet TAKE 1 TABLET BY MOUTH EVERY DAY 21   Som Carpenter MD   TRULICITY 1.5 UH/1.5OH SOPN INJECT 1.5 MG SUBCUTANEOUSLY EVERY 7 DAYS. 21   Som Carpenter MD   ALBUTEROL IN Inhale into the lungs    Historical Provider, MD   insulin lispro (HUMALOG) 100 UNIT/ML injection vial INJECT 25 UNITS INTO THE SKIN ONCE DAILY. 21   Som Carpenter MD   Multiple Vitamin (MULTIVITAMIN ADULT PO) Take by mouth    Historical Provider, MD   Ferrous Sulfate (IRON PO) Take by mouth    Historical Provider, MD   COENZYME Q10 PO Take by mouth    Historical Provider, MD   Insulin Pen Needle (PEN NEEDLES 31GX5/16\") 31G X 8 MM MISC Use to inject insulin twice daily.  21   Som Carpenter MD   pioglitazone (ACTOS) 15 MG tablet TAKE 1 TABLET BY MOUTH EVERY DAY 21   Som Carpenter MD   ONE TOUCH ULTRASOFT LANCETS MISC USE TO TEST BLOOD GLUCOSE 4 TIMES DAILY 20   Historical Provider, MD   POTASSIUM PO Take by mouth    Historical Provider, MD   MAGNESIUM PO Take by mouth    Historical Provider, MD   Montelukast Sodium (SINGULAIR PO) Take by mouth    Historical Provider, MD   Cholecalciferol (VITAMIN D3) 1.25 MG (77408 UT) CAPS Take by mouth once a week    Historical Provider, MD   TRIAMCINOLONE ACETONIDE NA by Nasal route as needed     Historical Provider, MD   BD INSULIN SYRINGE U/F 30G X 1/2\" 0.3 ML MISC USE WITH INSULIN TWICE A DAY 12/10/20   Marivel Blood MD   metFORMIN (GLUCOPHAGE) 500 MG tablet TAKE 1 TABLET BY MOUTH EVERY 24 HOURS 12/10/20   Marivel Blood MD   fluticasone-vilanterol (BREO ELLIPTA) 100-25 MCG/INH AEPB inhaler Inhale 1 puff into the lungs daily    Historical Provider, MD   losartan (COZAAR) 100 MG tablet Take 100 mg by mouth daily    Historical Provider, MD   Omega-3 Fatty Acids (FISH OIL) 1000 MG CAPS Take 3,000 mg by mouth daily    Historical Provider, MD   atorvastatin (LIPITOR) 10 MG tablet Take 10 mg by mouth daily    Historical Provider, MD   CRANBERRY PO Take by mouth daily    Historical Provider, MD   acetaminophen (TYLENOL) 500 MG tablet Take 500 mg by mouth every 6 hours as needed for Pain    Historical Provider, MD   Loratadine (CLARITIN) 10 MG CAPS Take  by mouth.     Historical Provider, MD       Current medications:    Current Facility-Administered Medications   Medication Dose Route Frequency Provider Last Rate Last Admin    lactated ringers infusion   IntraVENous Continuous Rayleen Goldberg,  mL/hr at 11/15/21 1125 New Bag at 11/15/21 1125    albuterol sulfate  (90 Base) MCG/ACT inhaler 2 puff  2 puff Inhalation Q6H PRN Rayleen Goldberg, MD        atorvastatin (LIPITOR) tablet 10 mg  10 mg Oral Daily Rayleen Goldberg, MD        insulin glargine (LANTUS;BASAGLAR) injection pen 25 Units  25 Units SubCUTAneous QAM Rayleen Goldberg, MD   25 Units at 11/15/21 1134    levothyroxine (SYNTHROID) tablet 25 mcg  25 mcg Oral Daily Rayleen Goldberg, MD        losartan (COZAAR) tablet 100 mg  100 mg Oral Daily Rayleen Goldberg, MD        montelukast (SINGULAIR) tablet 10 mg  10 mg Oral Nightly Rayleen Goldberg, MD        insulin lispro (1 Unit Dial) 0-6 Units  0-6 Units SubCUTAneous Q4H Lisa Martin MD        glucose (GLUTOSE) 40 % oral gel 15 g  15 g Oral PRN Rayleen Goldberg, MD        dextrose 50 % IV solution  12.5 g IntraVENous PRN Rayleen Goldberg, MD  glucagon (rDNA) injection 1 mg  1 mg IntraMUSCular PRN Cherri Pagan MD        dextrose 5 % solution  100 mL/hr IntraVENous PRN Cherri Pagan MD        sodium chloride flush 0.9 % injection 5-40 mL  5-40 mL IntraVENous 2 times per day Cherri Pagan MD        sodium chloride flush 0.9 % injection 10 mL  10 mL IntraVENous PRN Cherri Pagan MD        0.9 % sodium chloride infusion  25 mL IntraVENous PRN Cherri Pagan MD        enoxaparin (LOVENOX) injection 40 mg  40 mg SubCUTAneous Daily Cherri Pagan MD        ondansetron (ZOFRAN-ODT) disintegrating tablet 4 mg  4 mg Oral Q8H PRN Cherri Pagan MD        Or    ondansetron (ZOFRAN) injection 4 mg  4 mg IntraVENous Q6H PRN Cherri Pagan MD        polyethylene glycol (GLYCOLAX) packet 17 g  17 g Oral Daily PRN Cherri Pagan MD        acetaminophen (TYLENOL) tablet 650 mg  650 mg Oral Q6H PRN Cherri Pagan MD        Or    acetaminophen (TYLENOL) suppository 650 mg  650 mg Rectal Q6H PRN Cherri Pagan MD        morphine (PF) injection 2 mg  2 mg IntraVENous Q4H PRN Cherri Pagan MD   2 mg at 11/15/21 1256    budesonide-formoterol (SYMBICORT) 80-4.5 MCG/ACT inhaler 2 puff  2 puff Inhalation BID Cherri Pagan MD   2 puff at 11/15/21 0925    0.9 % sodium chloride bolus  1,000 mL IntraVENous Once Donny Avalos MD        ciprofloxacin (CIPRO) IVPB 400 mg  400 mg IntraVENous Q12H Donny Avalos  mL/hr at 11/15/21 1305 400 mg at 11/15/21 1305    metronidazole (FLAGYL) 500 mg in NaCl 100 mL IVPB premix  500 mg IntraVENous Q8H Donny Avalos MD           Allergies:  No Known Allergies    Problem List:    Patient Active Problem List   Diagnosis Code    Ileostomy care [facility]) Z43.2    Uncontrolled type 2 diabetes mellitus with complication, with long-term current use of insulin ([facility] E11.8, E11.65, Z79.4    Hypothyroidism E03.9    Essential hypertension I10    Mixed hyperlipidemia E78.2    Ulcerative (chronic) enterocolitis, unspecified complication ([facility] L27.386    Small bowel obstruction ([facility] K56.609       Past Medical History:        Diagnosis Date    Asthma     [facility]     GERD (gastroesophageal reflux disease)     Hyperlipidemia     Hypertension     [facility] 3[facility]17[facility]    Iritis     PCOS (polycystic ovarian syndrome)     Pyoderma     Thyroid disease     Ulcerative colitis     [facility]        Past Surgical History:        Procedure Laterality Date    ABDOMEN SURGERY      COLON RESECTION FOR ULCERATIVE COLITIS THEN HAD ANUS REMOVED    ILEOSTOMY OR JEJUNOSTOMY      PERMANENT       Social History:    Social History     Tobacco Use    Smoking status: Never Smoker    Smokeless tobacco: Never Used   Substance Use Topics    Alcohol use:  No                                Counseling given: Not Answered      Vital Signs (Current):   Vitals:    11/15/21 0219 11/15/21 0813 11/15/21 0925 11/15/21 1130   BP: 127/68 138/83  129/61   Pulse: 102 113  121   Resp: 21 20 16   Temp:  36.6 °C (97.9 °F)  36.7 °C (98.1 °F)   TempSrc:    Oral   SpO2:  92% 95% 93%                                              BP Readings from Last 3 Encounters:   11/15/21 129/61   07/29/21 (!) 140/74   03/24/20 (!) 151/87       NPO Status:                                                                                 BMI:   Wt Readings from Last 3 Encounters:   07/29/21 252 lb (114.3 kg)   03/17/20 250 lb (113.4 kg)     There is no height or weight on file to calculate BMI.    CBC:   Lab Results   Component Value Date    WBC 10.1 11/15/2021    RBC 4.98 11/15/2021    HGB 14.3 11/15/2021    HCT 42.6 11/15/2021    MCV 85.6 11/15/2021    RDW 14.3 11/15/2021     11/15/2021       CMP:   Lab Results   Component Value Date     11/15/2021    K 4.2 11/15/2021     11/15/2021    CO2 22 11/15/2021    BUN 10 11/15/2021    CREATININE 0.6 11/15/2021 GFRAA >60 11/15/2021    GFRAA >60 03/22/2013    AGRATIO 1.0 11/15/2021    LABGLOM >60 11/15/2021    GLUCOSE 183 11/15/2021    PROT 7.7 11/15/2021    PROT 7.8 12/22/2012    CALCIUM 9.9 11/15/2021    BILITOT 0.4 11/15/2021    ALKPHOS 98 11/15/2021    AST 26 11/15/2021    ALT 24 11/15/2021       POC Tests:   Recent Labs     11/15/21  1136   POCGLU 220*       Coags: No results found for: PROTIME, INR, APTT    HCG (If Applicable): No results found for: PREGTESTUR, PREGSERUM, HCG, HCGQUANT     ABGs: No results found for: PHART, PO2ART, TJC0JDV, WCY5SLB, BEART, D7TEVXNB     Type & Screen (If Applicable):  No results found for: LABABO, LABRH    Drug/Infectious Status (If Applicable):  No results found for: HIV, HEPCAB    COVID-19 Screening (If Applicable):   Lab Results   Component Value Date    COVID19 Not Detected 11/15/2021           Anesthesia Evaluation  Patient summary reviewed and Nursing notes reviewed no history of anesthetic complications:   Airway:         Dental:          Pulmonary:   (+) asthma:                            Cardiovascular:    (+) hypertension:,                   Neuro/Psych:               GI/Hepatic/Renal:   (+) GERD:, PUD,           Endo/Other:    (+) DiabetesType II DM, poorly controlled, using insulin, hypothyroidism::., .                 Abdominal:             Vascular: Other Findings:           Anesthesia Plan      general     ASA 3             Anesthetic plan and risks discussed with patient. Use of blood products discussed with patient whom consented to blood products. Plan discussed with CRNA.     Attending anesthesiologist reviewed and agrees with Preprocedure content              CORDELL Teixeira - CHERY   11/15/2021

## 2021-11-15 NOTE — CONSULTS
[facility] GENERAL AND LAPAROSCOPIC SURGERY                       PATIENT NAME: Rhonda Salter     ADMISSION DATE: 11/15/2021 12:41 AM      TODAY'S DATE: 11/15/2021    Reason for Consult:  ABDOMINAL PAIN    Requesting Physician:  DR. Gerri Paige. OMA    HISTORY OF PRESENT ILLNESS:              The patient is a 62 y.o. female who presents with abdominal pain, has had over one day, felt sharp, moderate, mid abdomen and coming intermittently. Has had lower ileostomy output as well. Pt with prior total colectomy, loop ileostomy for UC, then had revision, and ultimately a rectal perforation resulting in resection and permanent ileostomy. Prior CT done last year shows hernia, has not had repair. Has more recent sx's now of hernia and concern of obstruction. Pt has noted more bulging out around her stomal area as well. Past Medical History:        Diagnosis Date    Asthma     Diabetes mellitus ([facility]     GERD (gastroesophageal reflux disease)     Hyperlipidemia     Hypertension     Ileostomy care ([facility] 3/17/2020    Iritis     PCOS (polycystic ovarian syndrome)     Pyoderma     Thyroid disease     Ulcerative colitis     Ulcerative colitis ([facility]        Past Surgical History:        Procedure Laterality Date    ABDOMEN SURGERY      COLON RESECTION FOR ULCERATIVE COLITIS THEN HAD ANUS REMOVED    ILEOSTOMY OR JEJUNOSTOMY      PERMANENT       Current Medications:   Current Facility-Administered Medications: lactated ringers infusion, , IntraVENous, Continuous  ondansetron (ZOFRAN-ODT) disintegrating tablet 4 mg, 4 mg, Oral, Q8H PRN **OR** ondansetron (ZOFRAN) injection 4 mg, 4 mg, IntraVENous, Q6H PRN  morphine (PF) injection 2 mg, 2 mg, IntraVENous, Q4H PRN  Prior to Admission medications    Medication Sig Start Date End Date Taking? Authorizing Provider   LANSOFIAUS SOLOSTAR 100 UNIT/ML injection pen INJECT 50 UNITS INTO THE SKIN TWICE DAILY.  11/8/21   Ana Maria Andres MD   levothyroxine (SYNTHROID) 25 MCG tablet TAKE 1 TABLET BY MOUTH EVERY DAY 11/1/21   Erica Rehman MD   TRULICITY 1.5 IW/2.9CA SOPN INJECT 1.5 MG SUBCUTANEOUSLY EVERY 7 DAYS. 9/24/21   Erica Rehman MD   ALBUTEROL IN Inhale into the lungs    Historical Provider, MD   insulin lispro (HUMALOG) 100 UNIT/ML injection vial INJECT 25 UNITS INTO THE SKIN ONCE DAILY. 7/12/21   Erica Rehman MD   Multiple Vitamin (MULTIVITAMIN ADULT PO) Take by mouth    Historical Provider, MD   Ferrous Sulfate (IRON PO) Take by mouth    Historical Provider, MD   COENZYME Q10 PO Take by mouth    Historical Provider, MD   Insulin Pen Needle (PEN NEEDLES 31GX5/16\") 31G X 8 MM MISC Use to inject insulin twice daily.  4/14/21   Erica Rehman MD   pioglitazone (ACTOS) 15 MG tablet TAKE 1 TABLET BY MOUTH EVERY DAY 4/13/21   Erica Rehman MD   ONE TOUCH ULTRASOFT LANCETS MISC USE TO TEST BLOOD GLUCOSE 4 TIMES DAILY 11/7/20   Historical Provider, MD   POTASSIUM PO Take by mouth    Historical Provider, MD   MAGNESIUM PO Take by mouth    Historical Provider, MD   Montelukast Sodium (SINGULAIR PO) Take by mouth    Historical Provider, MD   Cholecalciferol (VITAMIN D3) 1.25 MG (21847 UT) CAPS Take by mouth once a week    Historical Provider, MD   TRIAMCINOLONE ACETONIDE NA by Nasal route as needed     Historical Provider, MD   BD INSULIN SYRINGE U/F 30G X 1/2\" 0.3 ML MISC USE WITH INSULIN TWICE A DAY 12/10/20   Erica Rehman MD   metFORMIN (GLUCOPHAGE) 500 MG tablet TAKE 1 TABLET BY MOUTH EVERY 24 HOURS 12/10/20   Erica Rehman MD   fluticasone-vilanterol (BREO ELLIPTA) 100-25 MCG/INH AEPB inhaler Inhale 1 puff into the lungs daily    Historical Provider, MD   losartan (COZAAR) 100 MG tablet Take 100 mg by mouth daily    Historical Provider, MD   Omega-3 Fatty Acids (FISH OIL) 1000 MG CAPS Take 3,000 mg by mouth daily    Historical Provider, MD   atorvastatin (LIPITOR) 10 MG tablet Take 10 mg by mouth daily    Historical Provider, MD   CRANBERRY PO Take by mouth daily    Historical Provider, MD acetaminophen (TYLENOL) 500 MG tablet Take 500 mg by mouth every 6 hours as needed for Pain    Historical Provider, MD   Loratadine (CLARITIN) 10 MG CAPS Take  by mouth. Historical Provider, MD        Allergies:  Patient has no known allergies. Social History:    reports that she has never smoked. She has never used smokeless tobacco. She reports that she does not drink alcohol and does not use drugs.     Family History:        Problem Relation Age of Onset    Cancer Mother         uterine    No Known Problems Father        REVIEW OF SYSTEMS:  CONSTITUTIONAL:  negative  HEENT:  negative  RESPIRATORY:  negative  CARDIOVASCULAR:  negative  GASTROINTESTINAL:  negative except for abdominal distention  GENITOURINARY:  negative  HEMATOLOGIC/LYMPHATIC:  negative  NEUROLOGICAL:  negative  SKIN: negative    PHYSICAL EXAM:  VITALS:  /68   Pulse 102   Resp 21   LMP 08/16/2010   SpO2 98%   24HR INTAKE/OUTPUT:  No intake or output data in the 24 hours ending 11/15/21 0730  DRAIN/TUBE OUTPUT:     CONSTITUTIONAL:  alert, mild distress and morbidly obese  EYES:  sclera clear  ENT:  normocepalic, without obvious abnormality  NECK:  supple, symmetrical, trachea midline and no carotid bruits  LUNGS:  clear to auscultation  CARDIOVASCULAR:  regular rate and rhythm and no murmur noted  ABDOMEN:  scars noted large midline scar, normal bowel sounds, soft, non-distended, tenderness noted mild to moderate without peritoneal findings in mid and lower abdomen, voluntary guarding absent, no masses palpated, no hepatosplenomegally and hernia absent - mid and lower abdomen, and peristomal. Stoma on right abdomen  MUSCULOSKELETAL:  0+ pitting edema lower extremities  NEUROLOGIC:  Mental Status Exam:  Level of Alertness:   awake  Orientation:   person, place, time  SKIN:  no bruising or bleeding, normal skin color, texture, turgor and no redness, warmth, or swelling    DATA:    CBC:   Recent Labs     11/15/21  0141   WBC 10.1 HGB 14.3   HCT 42.6        BMP:    Recent Labs     11/15/21  0141      K 4.2      CO2 22   BUN 10   CREATININE 0.6   GLUCOSE 183*     Hepatic:   Recent Labs     11/15/21  0141   AST 26   ALT 24   BILITOT 0.4   ALKPHOS 98     Mag:    No results for input(s): MG in the last 72 hours. Phos:   No results for input(s): PHOS in the last 72 hours. INR: No results for input(s): INR in the last 72 hours. LIPASE:   Recent Labs     11/15/21  0141   LIPASE 34.0      AMYLASE: No results for input(s): AMYLASE in the last 72 hours. Radiology Review:      CT ABDOMEN PELVIS W IV CONTRAST Additional Contrast? None    Result Date: 11/15/2021  1. Small bowel obstruction with transition point within the right paramidline ventral hernia in the anterior pelvis. Trace free fluid is noted. 2. Right lower quadrant peristomal hernia is noted containing bowel. Although there is no significant bowel dilation within the hernia sac, there is mild inflammatory stranding and trace amount of fluid raising suspicion for incarceration. 3. Cholelithiasis. 4. Thickened endometrium measuring up to 10 mm. Recommend correlation with any history of postmenopausal bleeding. Consider further evaluation with nonemergent ultrasound. IMPRESSION/RECOMMENDATIONS:    Incisional hernia - several in incision with incarcerated small bowel and omental fatty tissue. Also with peristomal hernia  Will need surgery to repair. Give IVF bolus, NPO, NG decompression today and plan exploration. Will take a few hours likely, may involve moving stoma as well. D/W pt, agrees and understands plan and problem. Will proceed. Covid is negative.     Thank you,    Sachin Ojeda MD

## 2021-11-15 NOTE — PROGRESS NOTES
Pt arrived to 4t from ER, A&Ox4, /83   Pulse 113   Temp 97.9 °F (36.6 °C)   Resp 20   LMP 08/16/2010   SpO2 92%   C/o 7/10 abdominal pain, morphine given, NG started on CLWS with brown output, pt oriented to room and call light.

## 2021-11-16 ENCOUNTER — ANESTHESIA (OUTPATIENT)
Dept: OPERATING ROOM | Age: 61
DRG: 336 | End: 2021-11-16
Payer: COMMERCIAL

## 2021-11-16 VITALS
RESPIRATION RATE: 8 BRPM | OXYGEN SATURATION: 90 % | TEMPERATURE: 98.6 F | SYSTOLIC BLOOD PRESSURE: 111 MMHG | DIASTOLIC BLOOD PRESSURE: 60 MMHG

## 2021-11-16 LAB
A/G RATIO: 1.1 (ref 1.1–2.2)
ABO/RH: NORMAL
ALBUMIN SERPL-MCNC: 3.3 G/DL (ref 3.4–5)
ALP BLD-CCNC: 68 U/L (ref 40–129)
ALT SERPL-CCNC: 16 U/L (ref 10–40)
ANION GAP SERPL CALCULATED.3IONS-SCNC: 8 MMOL/L (ref 3–16)
ANTIBODY SCREEN: NORMAL
AST SERPL-CCNC: 13 U/L (ref 15–37)
BASOPHILS ABSOLUTE: 0 K/UL (ref 0–0.2)
BASOPHILS RELATIVE PERCENT: 0.4 %
BILIRUB SERPL-MCNC: 0.6 MG/DL (ref 0–1)
BUN BLDV-MCNC: 9 MG/DL (ref 7–20)
CALCIUM SERPL-MCNC: 8.3 MG/DL (ref 8.3–10.6)
CHLORIDE BLD-SCNC: 106 MMOL/L (ref 99–110)
CO2: 24 MMOL/L (ref 21–32)
CREAT SERPL-MCNC: 0.6 MG/DL (ref 0.6–1.2)
EOSINOPHILS ABSOLUTE: 0.1 K/UL (ref 0–0.6)
EOSINOPHILS RELATIVE PERCENT: 1.5 %
ESTIMATED AVERAGE GLUCOSE: 185.8 MG/DL
GFR AFRICAN AMERICAN: >60
GFR NON-AFRICAN AMERICAN: >60
GLUCOSE BLD-MCNC: 148 MG/DL (ref 70–99)
GLUCOSE BLD-MCNC: 175 MG/DL (ref 70–99)
GLUCOSE BLD-MCNC: 181 MG/DL (ref 70–99)
GLUCOSE BLD-MCNC: 198 MG/DL (ref 70–99)
GLUCOSE BLD-MCNC: 205 MG/DL (ref 70–99)
GLUCOSE BLD-MCNC: 221 MG/DL (ref 70–99)
GLUCOSE BLD-MCNC: 232 MG/DL (ref 70–99)
HBA1C MFR BLD: 8.1 %
HCT VFR BLD CALC: 35.1 % (ref 36–48)
HEMOGLOBIN: 11.9 G/DL (ref 12–16)
LYMPHOCYTES ABSOLUTE: 1.1 K/UL (ref 1–5.1)
LYMPHOCYTES RELATIVE PERCENT: 22.5 %
MAGNESIUM: 1.7 MG/DL (ref 1.8–2.4)
MCH RBC QN AUTO: 29 PG (ref 26–34)
MCHC RBC AUTO-ENTMCNC: 33.9 G/DL (ref 31–36)
MCV RBC AUTO: 85.7 FL (ref 80–100)
MONOCYTES ABSOLUTE: 0.7 K/UL (ref 0–1.3)
MONOCYTES RELATIVE PERCENT: 15.6 %
NEUTROPHILS ABSOLUTE: 2.8 K/UL (ref 1.7–7.7)
NEUTROPHILS RELATIVE PERCENT: 60 %
PDW BLD-RTO: 14.7 % (ref 12.4–15.4)
PERFORMED ON: ABNORMAL
PHOSPHORUS: 2.1 MG/DL (ref 2.5–4.9)
PLATELET # BLD: 205 K/UL (ref 135–450)
PMV BLD AUTO: 6.8 FL (ref 5–10.5)
POTASSIUM SERPL-SCNC: 3.8 MMOL/L (ref 3.5–5.1)
RBC # BLD: 4.09 M/UL (ref 4–5.2)
SODIUM BLD-SCNC: 138 MMOL/L (ref 136–145)
TOTAL PROTEIN: 6.3 G/DL (ref 6.4–8.2)
WBC # BLD: 4.7 K/UL (ref 4–11)

## 2021-11-16 PROCEDURE — 94640 AIRWAY INHALATION TREATMENT: CPT

## 2021-11-16 PROCEDURE — 6360000002 HC RX W HCPCS: Performed by: ANESTHESIOLOGY

## 2021-11-16 PROCEDURE — 83735 ASSAY OF MAGNESIUM: CPT

## 2021-11-16 PROCEDURE — 2580000003 HC RX 258: Performed by: SURGERY

## 2021-11-16 PROCEDURE — 1200000000 HC SEMI PRIVATE

## 2021-11-16 PROCEDURE — 0DN80ZZ RELEASE SMALL INTESTINE, OPEN APPROACH: ICD-10-PCS | Performed by: SURGERY

## 2021-11-16 PROCEDURE — 2500000003 HC RX 250 WO HCPCS: Performed by: SURGERY

## 2021-11-16 PROCEDURE — 6360000002 HC RX W HCPCS: Performed by: NURSE ANESTHETIST, CERTIFIED REGISTERED

## 2021-11-16 PROCEDURE — 6370000000 HC RX 637 (ALT 250 FOR IP): Performed by: SURGERY

## 2021-11-16 PROCEDURE — 7100000001 HC PACU RECOVERY - ADDTL 15 MIN: Performed by: SURGERY

## 2021-11-16 PROCEDURE — 2580000003 HC RX 258: Performed by: INTERNAL MEDICINE

## 2021-11-16 PROCEDURE — 88302 TISSUE EXAM BY PATHOLOGIST: CPT

## 2021-11-16 PROCEDURE — C9290 INJ, BUPIVACAINE LIPOSOME: HCPCS | Performed by: SURGERY

## 2021-11-16 PROCEDURE — 2580000003 HC RX 258: Performed by: ANESTHESIOLOGY

## 2021-11-16 PROCEDURE — 2700000000 HC OXYGEN THERAPY PER DAY

## 2021-11-16 PROCEDURE — 2500000003 HC RX 250 WO HCPCS: Performed by: NURSE ANESTHETIST, CERTIFIED REGISTERED

## 2021-11-16 PROCEDURE — 3600000012 HC SURGERY LEVEL 2 ADDTL 15MIN: Performed by: SURGERY

## 2021-11-16 PROCEDURE — 2709999900 HC NON-CHARGEABLE SUPPLY: Performed by: SURGERY

## 2021-11-16 PROCEDURE — 3600000002 HC SURGERY LEVEL 2 BASE: Performed by: SURGERY

## 2021-11-16 PROCEDURE — 0WUF0JZ SUPPLEMENT ABDOMINAL WALL WITH SYNTHETIC SUBSTITUTE, OPEN APPROACH: ICD-10-PCS | Performed by: SURGERY

## 2021-11-16 PROCEDURE — 6360000002 HC RX W HCPCS: Performed by: INTERNAL MEDICINE

## 2021-11-16 PROCEDURE — 3700000000 HC ANESTHESIA ATTENDED CARE: Performed by: SURGERY

## 2021-11-16 PROCEDURE — 94761 N-INVAS EAR/PLS OXIMETRY MLT: CPT

## 2021-11-16 PROCEDURE — 2500000003 HC RX 250 WO HCPCS: Performed by: FAMILY MEDICINE

## 2021-11-16 PROCEDURE — 49568 PR IMPLANT MESH HERNIA REPAIR/DEBRIDEMENT CLOSURE: CPT | Performed by: SURGERY

## 2021-11-16 PROCEDURE — 6360000002 HC RX W HCPCS: Performed by: SURGERY

## 2021-11-16 PROCEDURE — 2580000003 HC RX 258: Performed by: FAMILY MEDICINE

## 2021-11-16 PROCEDURE — 80053 COMPREHEN METABOLIC PANEL: CPT

## 2021-11-16 PROCEDURE — 2580000003 HC RX 258: Performed by: NURSE ANESTHETIST, CERTIFIED REGISTERED

## 2021-11-16 PROCEDURE — 85025 COMPLETE CBC W/AUTO DIFF WBC: CPT

## 2021-11-16 PROCEDURE — 84100 ASSAY OF PHOSPHORUS: CPT

## 2021-11-16 PROCEDURE — 3700000001 HC ADD 15 MINUTES (ANESTHESIA): Performed by: SURGERY

## 2021-11-16 PROCEDURE — 36415 COLL VENOUS BLD VENIPUNCTURE: CPT

## 2021-11-16 PROCEDURE — 86901 BLOOD TYPING SEROLOGIC RH(D): CPT

## 2021-11-16 PROCEDURE — 86900 BLOOD TYPING SEROLOGIC ABO: CPT

## 2021-11-16 PROCEDURE — 7100000000 HC PACU RECOVERY - FIRST 15 MIN: Performed by: SURGERY

## 2021-11-16 PROCEDURE — C1781 MESH (IMPLANTABLE): HCPCS | Performed by: SURGERY

## 2021-11-16 PROCEDURE — 86850 RBC ANTIBODY SCREEN: CPT

## 2021-11-16 PROCEDURE — 49561 PR REPAIR INCISIONAL HERNIA,STRANG: CPT | Performed by: SURGERY

## 2021-11-16 DEVICE — MESH HERN XL W22.1XL27.1CM POLYPR OVL UNCOATED MFIL: Type: IMPLANTABLE DEVICE | Site: ABDOMEN | Status: FUNCTIONAL

## 2021-11-16 RX ORDER — SODIUM CHLORIDE, SODIUM LACTATE, POTASSIUM CHLORIDE, CALCIUM CHLORIDE 600; 310; 30; 20 MG/100ML; MG/100ML; MG/100ML; MG/100ML
INJECTION, SOLUTION INTRAVENOUS CONTINUOUS PRN
Status: DISCONTINUED | OUTPATIENT
Start: 2021-11-16 | End: 2021-11-16 | Stop reason: SDUPTHER

## 2021-11-16 RX ORDER — PROMETHAZINE HYDROCHLORIDE 25 MG/ML
6.25 INJECTION, SOLUTION INTRAMUSCULAR; INTRAVENOUS
Status: DISCONTINUED | OUTPATIENT
Start: 2021-11-16 | End: 2021-11-16

## 2021-11-16 RX ORDER — KETOROLAC TROMETHAMINE 30 MG/ML
INJECTION, SOLUTION INTRAMUSCULAR; INTRAVENOUS PRN
Status: DISCONTINUED | OUTPATIENT
Start: 2021-11-16 | End: 2021-11-16 | Stop reason: SDUPTHER

## 2021-11-16 RX ORDER — METOCLOPRAMIDE HYDROCHLORIDE 5 MG/ML
10 INJECTION INTRAMUSCULAR; INTRAVENOUS EVERY 6 HOURS
Status: DISPENSED | OUTPATIENT
Start: 2021-11-16 | End: 2021-11-18

## 2021-11-16 RX ORDER — ROCURONIUM BROMIDE 10 MG/ML
INJECTION, SOLUTION INTRAVENOUS PRN
Status: DISCONTINUED | OUTPATIENT
Start: 2021-11-16 | End: 2021-11-16 | Stop reason: SDUPTHER

## 2021-11-16 RX ORDER — FENTANYL CITRATE 50 UG/ML
25 INJECTION, SOLUTION INTRAMUSCULAR; INTRAVENOUS EVERY 5 MIN PRN
Status: DISCONTINUED | OUTPATIENT
Start: 2021-11-16 | End: 2021-11-16

## 2021-11-16 RX ORDER — MORPHINE SULFATE 2 MG/ML
4 INJECTION, SOLUTION INTRAMUSCULAR; INTRAVENOUS
Status: DISCONTINUED | OUTPATIENT
Start: 2021-11-16 | End: 2021-11-18

## 2021-11-16 RX ORDER — PROPOFOL 10 MG/ML
INJECTION, EMULSION INTRAVENOUS PRN
Status: DISCONTINUED | OUTPATIENT
Start: 2021-11-16 | End: 2021-11-16 | Stop reason: SDUPTHER

## 2021-11-16 RX ORDER — BUPIVACAINE HYDROCHLORIDE AND EPINEPHRINE 5; 5 MG/ML; UG/ML
INJECTION, SOLUTION EPIDURAL; INTRACAUDAL; PERINEURAL CONTINUOUS PRN
Status: COMPLETED | OUTPATIENT
Start: 2021-11-16 | End: 2021-11-16

## 2021-11-16 RX ORDER — DEXAMETHASONE SODIUM PHOSPHATE 4 MG/ML
INJECTION, SOLUTION INTRA-ARTICULAR; INTRALESIONAL; INTRAMUSCULAR; INTRAVENOUS; SOFT TISSUE PRN
Status: DISCONTINUED | OUTPATIENT
Start: 2021-11-16 | End: 2021-11-16 | Stop reason: SDUPTHER

## 2021-11-16 RX ORDER — CIPROFLOXACIN 2 MG/ML
INJECTION, SOLUTION INTRAVENOUS PRN
Status: DISCONTINUED | OUTPATIENT
Start: 2021-11-16 | End: 2021-11-16 | Stop reason: SDUPTHER

## 2021-11-16 RX ORDER — LIDOCAINE HYDROCHLORIDE 10 MG/ML
1 INJECTION, SOLUTION EPIDURAL; INFILTRATION; INTRACAUDAL; PERINEURAL
Status: DISCONTINUED | OUTPATIENT
Start: 2021-11-16 | End: 2021-11-16

## 2021-11-16 RX ORDER — KETOROLAC TROMETHAMINE 30 MG/ML
15 INJECTION, SOLUTION INTRAMUSCULAR; INTRAVENOUS EVERY 6 HOURS PRN
Status: DISCONTINUED | OUTPATIENT
Start: 2021-11-16 | End: 2021-11-19 | Stop reason: HOSPADM

## 2021-11-16 RX ORDER — KETAMINE HCL IN NACL, ISO-OSM 100MG/10ML
SYRINGE (ML) INJECTION PRN
Status: DISCONTINUED | OUTPATIENT
Start: 2021-11-16 | End: 2021-11-16 | Stop reason: SDUPTHER

## 2021-11-16 RX ORDER — SUCCINYLCHOLINE/SOD CL,ISO/PF 200MG/10ML
SYRINGE (ML) INTRAVENOUS PRN
Status: DISCONTINUED | OUTPATIENT
Start: 2021-11-16 | End: 2021-11-16 | Stop reason: SDUPTHER

## 2021-11-16 RX ORDER — SODIUM CHLORIDE 9 MG/ML
INJECTION, SOLUTION INTRAVENOUS CONTINUOUS
Status: DISCONTINUED | OUTPATIENT
Start: 2021-11-16 | End: 2021-11-16

## 2021-11-16 RX ORDER — LIDOCAINE HYDROCHLORIDE 20 MG/ML
INJECTION, SOLUTION EPIDURAL; INFILTRATION; INTRACAUDAL; PERINEURAL PRN
Status: DISCONTINUED | OUTPATIENT
Start: 2021-11-16 | End: 2021-11-16 | Stop reason: SDUPTHER

## 2021-11-16 RX ORDER — MAGNESIUM HYDROXIDE 1200 MG/15ML
LIQUID ORAL CONTINUOUS PRN
Status: COMPLETED | OUTPATIENT
Start: 2021-11-16 | End: 2021-11-16

## 2021-11-16 RX ORDER — ONDANSETRON 2 MG/ML
4 INJECTION INTRAMUSCULAR; INTRAVENOUS
Status: DISCONTINUED | OUTPATIENT
Start: 2021-11-16 | End: 2021-11-16

## 2021-11-16 RX ORDER — GLYCOPYRROLATE 0.2 MG/ML
INJECTION INTRAMUSCULAR; INTRAVENOUS PRN
Status: DISCONTINUED | OUTPATIENT
Start: 2021-11-16 | End: 2021-11-16 | Stop reason: SDUPTHER

## 2021-11-16 RX ORDER — HYDROMORPHONE HCL 110MG/55ML
PATIENT CONTROLLED ANALGESIA SYRINGE INTRAVENOUS PRN
Status: DISCONTINUED | OUTPATIENT
Start: 2021-11-16 | End: 2021-11-16 | Stop reason: SDUPTHER

## 2021-11-16 RX ORDER — MIDAZOLAM HYDROCHLORIDE 1 MG/ML
INJECTION INTRAMUSCULAR; INTRAVENOUS PRN
Status: DISCONTINUED | OUTPATIENT
Start: 2021-11-16 | End: 2021-11-16 | Stop reason: SDUPTHER

## 2021-11-16 RX ORDER — HYDROCODONE BITARTRATE AND ACETAMINOPHEN 5; 325 MG/1; MG/1
1 TABLET ORAL
Status: DISCONTINUED | OUTPATIENT
Start: 2021-11-16 | End: 2021-11-16

## 2021-11-16 RX ORDER — ACETAMINOPHEN 500 MG
1000 TABLET ORAL EVERY 8 HOURS SCHEDULED
Status: DISCONTINUED | OUTPATIENT
Start: 2021-11-16 | End: 2021-11-16 | Stop reason: HOSPADM

## 2021-11-16 RX ORDER — ONDANSETRON 2 MG/ML
INJECTION INTRAMUSCULAR; INTRAVENOUS PRN
Status: DISCONTINUED | OUTPATIENT
Start: 2021-11-16 | End: 2021-11-16 | Stop reason: SDUPTHER

## 2021-11-16 RX ORDER — MORPHINE SULFATE 2 MG/ML
2 INJECTION, SOLUTION INTRAMUSCULAR; INTRAVENOUS
Status: DISCONTINUED | OUTPATIENT
Start: 2021-11-16 | End: 2021-11-18

## 2021-11-16 RX ORDER — HYDROMORPHONE HCL 110MG/55ML
0.25 PATIENT CONTROLLED ANALGESIA SYRINGE INTRAVENOUS EVERY 5 MIN PRN
Status: DISCONTINUED | OUTPATIENT
Start: 2021-11-16 | End: 2021-11-16

## 2021-11-16 RX ORDER — MAGNESIUM SULFATE HEPTAHYDRATE 500 MG/ML
INJECTION, SOLUTION INTRAMUSCULAR; INTRAVENOUS PRN
Status: DISCONTINUED | OUTPATIENT
Start: 2021-11-16 | End: 2021-11-16 | Stop reason: SDUPTHER

## 2021-11-16 RX ORDER — FENTANYL CITRATE 50 UG/ML
50 INJECTION, SOLUTION INTRAMUSCULAR; INTRAVENOUS EVERY 5 MIN PRN
Status: DISCONTINUED | OUTPATIENT
Start: 2021-11-16 | End: 2021-11-16

## 2021-11-16 RX ORDER — HYDROMORPHONE HCL 110MG/55ML
0.5 PATIENT CONTROLLED ANALGESIA SYRINGE INTRAVENOUS EVERY 5 MIN PRN
Status: DISCONTINUED | OUTPATIENT
Start: 2021-11-16 | End: 2021-11-16

## 2021-11-16 RX ADMIN — ROCURONIUM BROMIDE 20 MG: 10 INJECTION, SOLUTION INTRAVENOUS at 08:58

## 2021-11-16 RX ADMIN — DEXAMETHASONE SODIUM PHOSPHATE 4 MG: 4 INJECTION, SOLUTION INTRAMUSCULAR; INTRAVENOUS at 07:42

## 2021-11-16 RX ADMIN — Medication 10 ML: at 20:45

## 2021-11-16 RX ADMIN — Medication 10 MG: at 10:13

## 2021-11-16 RX ADMIN — MORPHINE SULFATE 2 MG: 2 INJECTION, SOLUTION INTRAMUSCULAR; INTRAVENOUS at 20:35

## 2021-11-16 RX ADMIN — MORPHINE SULFATE 2 MG: 2 INJECTION, SOLUTION INTRAMUSCULAR; INTRAVENOUS at 17:29

## 2021-11-16 RX ADMIN — METRONIDAZOLE 500 MG: 500 INJECTION, SOLUTION INTRAVENOUS at 14:30

## 2021-11-16 RX ADMIN — ONDANSETRON 4 MG: 2 INJECTION INTRAMUSCULAR; INTRAVENOUS at 09:37

## 2021-11-16 RX ADMIN — HYDROMORPHONE HYDROCHLORIDE 0.5 MG: 2 INJECTION, SOLUTION INTRAMUSCULAR; INTRAVENOUS; SUBCUTANEOUS at 12:03

## 2021-11-16 RX ADMIN — HYDROMORPHONE HYDROCHLORIDE 0.5 MG: 2 INJECTION, SOLUTION INTRAMUSCULAR; INTRAVENOUS; SUBCUTANEOUS at 11:53

## 2021-11-16 RX ADMIN — SUGAMMADEX 200 MG: 100 INJECTION, SOLUTION INTRAVENOUS at 10:47

## 2021-11-16 RX ADMIN — SODIUM CHLORIDE, POTASSIUM CHLORIDE, SODIUM LACTATE AND CALCIUM CHLORIDE: 600; 310; 30; 20 INJECTION, SOLUTION INTRAVENOUS at 00:56

## 2021-11-16 RX ADMIN — SODIUM CHLORIDE: 9 INJECTION, SOLUTION INTRAVENOUS at 22:24

## 2021-11-16 RX ADMIN — KETOROLAC TROMETHAMINE 15 MG: 30 INJECTION, SOLUTION INTRAMUSCULAR; INTRAVENOUS at 17:06

## 2021-11-16 RX ADMIN — HYDROMORPHONE HYDROCHLORIDE 0.5 MG: 2 INJECTION, SOLUTION INTRAMUSCULAR; INTRAVENOUS; SUBCUTANEOUS at 09:45

## 2021-11-16 RX ADMIN — HYDROMORPHONE HYDROCHLORIDE 0.5 MG: 2 INJECTION, SOLUTION INTRAMUSCULAR; INTRAVENOUS; SUBCUTANEOUS at 11:26

## 2021-11-16 RX ADMIN — HYDROMORPHONE HYDROCHLORIDE 0.5 MG: 2 INJECTION, SOLUTION INTRAMUSCULAR; INTRAVENOUS; SUBCUTANEOUS at 10:43

## 2021-11-16 RX ADMIN — MAGNESIUM SULFATE HEPTAHYDRATE 1 G: 500 INJECTION, SOLUTION INTRAMUSCULAR; INTRAVENOUS at 07:42

## 2021-11-16 RX ADMIN — GLYCOPYRROLATE 0.2 MG: 0.2 INJECTION, SOLUTION INTRAMUSCULAR; INTRAVENOUS at 07:30

## 2021-11-16 RX ADMIN — ROCURONIUM BROMIDE 10 MG: 10 INJECTION, SOLUTION INTRAVENOUS at 08:28

## 2021-11-16 RX ADMIN — ROCURONIUM BROMIDE 20 MG: 10 INJECTION, SOLUTION INTRAVENOUS at 09:30

## 2021-11-16 RX ADMIN — Medication 100 MG: at 07:34

## 2021-11-16 RX ADMIN — CIPROFLOXACIN 400 MG: 2 INJECTION, SOLUTION INTRAVENOUS at 00:52

## 2021-11-16 RX ADMIN — Medication 10 ML: at 12:32

## 2021-11-16 RX ADMIN — SODIUM CHLORIDE, POTASSIUM CHLORIDE, SODIUM LACTATE AND CALCIUM CHLORIDE: 600; 310; 30; 20 INJECTION, SOLUTION INTRAVENOUS at 08:33

## 2021-11-16 RX ADMIN — LIDOCAINE HYDROCHLORIDE 50 MG: 20 INJECTION, SOLUTION EPIDURAL; INFILTRATION; INTRACAUDAL; PERINEURAL at 07:34

## 2021-11-16 RX ADMIN — ROCURONIUM BROMIDE 50 MG: 10 INJECTION, SOLUTION INTRAVENOUS at 07:42

## 2021-11-16 RX ADMIN — METOCLOPRAMIDE 10 MG: 5 INJECTION, SOLUTION INTRAMUSCULAR; INTRAVENOUS at 18:30

## 2021-11-16 RX ADMIN — INSULIN LISPRO 2 UNITS: 100 INJECTION, SOLUTION INTRAVENOUS; SUBCUTANEOUS at 22:03

## 2021-11-16 RX ADMIN — MONTELUKAST SODIUM 10 MG: 10 TABLET, FILM COATED ORAL at 20:35

## 2021-11-16 RX ADMIN — ONDANSETRON 4 MG: 2 INJECTION INTRAMUSCULAR; INTRAVENOUS at 04:53

## 2021-11-16 RX ADMIN — Medication 2 PUFF: at 20:01

## 2021-11-16 RX ADMIN — KETOROLAC TROMETHAMINE 30 MG: 60 INJECTION, SOLUTION INTRAMUSCULAR at 09:37

## 2021-11-16 RX ADMIN — POTASSIUM PHOSPHATE, MONOBASIC AND POTASSIUM PHOSPHATE, DIBASIC 15 MMOL: 224; 236 INJECTION, SOLUTION INTRAVENOUS at 18:29

## 2021-11-16 RX ADMIN — METOCLOPRAMIDE 10 MG: 5 INJECTION, SOLUTION INTRAMUSCULAR; INTRAVENOUS at 13:13

## 2021-11-16 RX ADMIN — SODIUM CHLORIDE, POTASSIUM CHLORIDE, SODIUM LACTATE AND CALCIUM CHLORIDE: 600; 310; 30; 20 INJECTION, SOLUTION INTRAVENOUS at 22:25

## 2021-11-16 RX ADMIN — HYDROMORPHONE HYDROCHLORIDE 0.5 MG: 2 INJECTION, SOLUTION INTRAMUSCULAR; INTRAVENOUS; SUBCUTANEOUS at 07:30

## 2021-11-16 RX ADMIN — Medication 10 MG: at 09:53

## 2021-11-16 RX ADMIN — Medication 10 MG: at 10:25

## 2021-11-16 RX ADMIN — SODIUM CHLORIDE: 9 INJECTION, SOLUTION INTRAVENOUS at 12:31

## 2021-11-16 RX ADMIN — HYDROMORPHONE HYDROCHLORIDE 0.5 MG: 2 INJECTION, SOLUTION INTRAMUSCULAR; INTRAVENOUS; SUBCUTANEOUS at 10:57

## 2021-11-16 RX ADMIN — PROPOFOL 120 MG: 10 INJECTION, EMULSION INTRAVENOUS at 07:34

## 2021-11-16 RX ADMIN — METRONIDAZOLE 500 MG: 500 INJECTION, SOLUTION INTRAVENOUS at 04:59

## 2021-11-16 RX ADMIN — CIPROFLOXACIN 400 MG: 2 INJECTION, SOLUTION INTRAVENOUS at 12:33

## 2021-11-16 RX ADMIN — MIDAZOLAM 2 MG: 1 INJECTION INTRAMUSCULAR; INTRAVENOUS at 07:30

## 2021-11-16 RX ADMIN — ACETAMINOPHEN 650 MG: 325 TABLET ORAL at 22:19

## 2021-11-16 RX ADMIN — CIPROFLOXACIN 400 MG: 2 INJECTION, SOLUTION INTRAVENOUS at 08:15

## 2021-11-16 RX ADMIN — METRONIDAZOLE 500 MG: 500 INJECTION, SOLUTION INTRAVENOUS at 20:39

## 2021-11-16 ASSESSMENT — PULMONARY FUNCTION TESTS
PIF_VALUE: 28
PIF_VALUE: 22
PIF_VALUE: 28
PIF_VALUE: 26
PIF_VALUE: 27
PIF_VALUE: 27
PIF_VALUE: 22
PIF_VALUE: 27
PIF_VALUE: 26
PIF_VALUE: 24
PIF_VALUE: 25
PIF_VALUE: 27
PIF_VALUE: 27
PIF_VALUE: 24
PIF_VALUE: 25
PIF_VALUE: 29
PIF_VALUE: 25
PIF_VALUE: 22
PIF_VALUE: 26
PIF_VALUE: 26
PIF_VALUE: 23
PIF_VALUE: 27
PIF_VALUE: 29
PIF_VALUE: 25
PIF_VALUE: 0
PIF_VALUE: 23
PIF_VALUE: 25
PIF_VALUE: 26
PIF_VALUE: 22
PIF_VALUE: 22
PIF_VALUE: 25
PIF_VALUE: 1
PIF_VALUE: 27
PIF_VALUE: 23
PIF_VALUE: 26
PIF_VALUE: 23
PIF_VALUE: 27
PIF_VALUE: 27
PIF_VALUE: 26
PIF_VALUE: 1
PIF_VALUE: 25
PIF_VALUE: 29
PIF_VALUE: 29
PIF_VALUE: 25
PIF_VALUE: 26
PIF_VALUE: 27
PIF_VALUE: 23
PIF_VALUE: 24
PIF_VALUE: 24
PIF_VALUE: 28
PIF_VALUE: 22
PIF_VALUE: 23
PIF_VALUE: 27
PIF_VALUE: 23
PIF_VALUE: 27
PIF_VALUE: 26
PIF_VALUE: 27
PIF_VALUE: 27
PIF_VALUE: 26
PIF_VALUE: 22
PIF_VALUE: 22
PIF_VALUE: 23
PIF_VALUE: 22
PIF_VALUE: 22
PIF_VALUE: 26
PIF_VALUE: 27
PIF_VALUE: 5
PIF_VALUE: 27
PIF_VALUE: 26
PIF_VALUE: 27
PIF_VALUE: 20
PIF_VALUE: 28
PIF_VALUE: 22
PIF_VALUE: 26
PIF_VALUE: 22
PIF_VALUE: 22
PIF_VALUE: 27
PIF_VALUE: 25
PIF_VALUE: 22
PIF_VALUE: 25
PIF_VALUE: 26
PIF_VALUE: 25
PIF_VALUE: 26
PIF_VALUE: 23
PIF_VALUE: 26
PIF_VALUE: 22
PIF_VALUE: 27
PIF_VALUE: 26
PIF_VALUE: 26
PIF_VALUE: 27
PIF_VALUE: 23
PIF_VALUE: 1
PIF_VALUE: 27
PIF_VALUE: 28
PIF_VALUE: 26
PIF_VALUE: 23
PIF_VALUE: 27
PIF_VALUE: 26
PIF_VALUE: 24
PIF_VALUE: 28
PIF_VALUE: 25
PIF_VALUE: 28
PIF_VALUE: 26
PIF_VALUE: 28
PIF_VALUE: 28
PIF_VALUE: 23
PIF_VALUE: 27
PIF_VALUE: 27
PIF_VALUE: 26
PIF_VALUE: 25
PIF_VALUE: 26
PIF_VALUE: 24
PIF_VALUE: 26
PIF_VALUE: 0
PIF_VALUE: 25
PIF_VALUE: 26
PIF_VALUE: 27
PIF_VALUE: 33
PIF_VALUE: 23
PIF_VALUE: 24
PIF_VALUE: 22
PIF_VALUE: 26
PIF_VALUE: 26
PIF_VALUE: 23
PIF_VALUE: 26
PIF_VALUE: 22
PIF_VALUE: 27
PIF_VALUE: 22
PIF_VALUE: 22
PIF_VALUE: 23
PIF_VALUE: 25
PIF_VALUE: 23
PIF_VALUE: 22
PIF_VALUE: 23
PIF_VALUE: 3
PIF_VALUE: 26
PIF_VALUE: 25
PIF_VALUE: 22
PIF_VALUE: 27
PIF_VALUE: 22
PIF_VALUE: 27
PIF_VALUE: 28
PIF_VALUE: 22
PIF_VALUE: 1
PIF_VALUE: 25
PIF_VALUE: 28
PIF_VALUE: 22
PIF_VALUE: 26
PIF_VALUE: 27
PIF_VALUE: 22
PIF_VALUE: 25
PIF_VALUE: 23
PIF_VALUE: 26
PIF_VALUE: 27
PIF_VALUE: 26
PIF_VALUE: 27
PIF_VALUE: 27
PIF_VALUE: 1
PIF_VALUE: 27
PIF_VALUE: 23
PIF_VALUE: 22
PIF_VALUE: 27
PIF_VALUE: 24
PIF_VALUE: 26
PIF_VALUE: 22
PIF_VALUE: 22
PIF_VALUE: 28
PIF_VALUE: 25
PIF_VALUE: 26
PIF_VALUE: 23
PIF_VALUE: 27
PIF_VALUE: 24
PIF_VALUE: 29
PIF_VALUE: 23
PIF_VALUE: 22
PIF_VALUE: 27
PIF_VALUE: 24
PIF_VALUE: 24
PIF_VALUE: 22
PIF_VALUE: 26
PIF_VALUE: 26
PIF_VALUE: 23
PIF_VALUE: 25
PIF_VALUE: 26
PIF_VALUE: 1
PIF_VALUE: 25
PIF_VALUE: 27
PIF_VALUE: 26
PIF_VALUE: 27
PIF_VALUE: 26
PIF_VALUE: 25

## 2021-11-16 ASSESSMENT — PAIN SCALES - GENERAL
PAINLEVEL_OUTOF10: 2
PAINLEVEL_OUTOF10: 2
PAINLEVEL_OUTOF10: 0
PAINLEVEL_OUTOF10: 9
PAINLEVEL_OUTOF10: 7
PAINLEVEL_OUTOF10: 2
PAINLEVEL_OUTOF10: 4
PAINLEVEL_OUTOF10: 7
PAINLEVEL_OUTOF10: 2
PAINLEVEL_OUTOF10: 9
PAINLEVEL_OUTOF10: 0

## 2021-11-16 ASSESSMENT — PAIN - FUNCTIONAL ASSESSMENT: PAIN_FUNCTIONAL_ASSESSMENT: 0-10

## 2021-11-16 NOTE — PROGRESS NOTES
[address] [place] [address] PROGRESS NOTE    11/16/2021 4:24 PM        Name: Andre Deleon . Admitted: 11/15/2021  Primary Care Provider: Cristiano Patel (Tel: [phone])      Subjective:  . Patient feeling ok. Post op pain as expected however pain much better compared to yesterday. No cp or sob.      Reviewed interval ancillary notes    Current Medications  metoclopramide (REGLAN) injection 10 mg, Q6H  morphine (PF) injection 2 mg, Q2H PRN   Or  morphine (PF) injection 4 mg, Q2H PRN  ketorolac (TORADOL) injection 15 mg, Q6H PRN  potassium phosphate 15 mmol in dextrose 5 % 250 mL IVPB, Once  lactated ringers infusion, Continuous  atorvastatin (LIPITOR) tablet 10 mg, Daily  insulin glargine (LANTUS;BASAGLAR) injection pen 25 Units, QAM  levothyroxine (SYNTHROID) tablet 25 mcg, Daily  losartan (COZAAR) tablet 100 mg, Daily  montelukast (SINGULAIR) tablet 10 mg, Nightly  insulin lispro (1 Unit Dial) 0-6 Units, Q4H  glucose (GLUTOSE) 40 % oral gel 15 g, PRN  dextrose 50 % IV solution, PRN  glucagon (rDNA) injection 1 mg, PRN  dextrose 5 % solution, PRN  sodium chloride flush 0.9 % injection 5-40 mL, 2 times per day  sodium chloride flush 0.9 % injection 10 mL, PRN  0.9 % sodium chloride infusion, PRN  enoxaparin (LOVENOX) injection 40 mg, Daily  ondansetron (ZOFRAN-ODT) disintegrating tablet 4 mg, Q8H PRN   Or  ondansetron (ZOFRAN) injection 4 mg, Q6H PRN  polyethylene glycol (GLYCOLAX) packet 17 g, Daily PRN  acetaminophen (TYLENOL) tablet 650 mg, Q6H PRN   Or  acetaminophen (TYLENOL) suppository 650 mg, Q6H PRN  budesonide-formoterol (SYMBICORT) 80-4.5 MCG/ACT inhaler 2 puff, BID  ciprofloxacin (CIPRO) IVPB 400 mg, Q12H  metronidazole (FLAGYL) 500 mg in NaCl 100 mL IVPB premix, Q8H  albuterol sulfate  (90 Base) MCG/ACT inhaler 2 puff, BID  0.9 % sodium chloride infusion, Continuous        Objective:  /69   Pulse 88   Temp 98.3 °F (36.8 °C) (Oral)   Resp 16   Ht 5' 5\" (1.651 m)   Wt 255 lb (115.7 kg)   LMP 08/16/2010   SpO2 94%   BMI 42.43 kg/m²     Intake/Output Summary (Last 24 hours) at 11/16/2021 1624  Last data filed at 11/16/2021 1312  Gross per 24 hour   Intake 2320 ml   Output 100 ml   Net 2220 ml      Wt Readings from Last 3 Encounters:   11/15/21 255 lb (115.7 kg)   07/29/21 252 lb (114.3 kg)   03/17/20 250 lb (113.4 kg)       General appearance:  Appears comfortable  Eyes: Sclera clear. Pupils equal.  ENT: Moist oral mucosa. Trachea midline, no adenopathy. Cardiovascular: Regular rhythm, normal S1, S2. No murmur. No edema in lower extremities  Respiratory: Not using accessory muscles. Good inspiratory effort. Clear to auscultation bilaterally, no wheeze or crackles. GI: Abdomen soft, no tenderness, not distended, normal bowel sounds  Musculoskeletal: No cyanosis in digits, neck supple  Neurology: CN 2-12 grossly intact. No speech or motor deficits  Psych: Normal affect. Alert and oriented in time, place and person  Skin: Warm, dry, normal turgor    Labs and Tests:  CBC:   Recent Labs     11/15/21  0141 11/16/21  0449   WBC 10.1 4.7   HGB 14.3 11.9*    205     BMP:    Recent Labs     11/15/21  0141 11/16/21  0449    138   K 4.2 3.8    106   CO2 22 24   BUN 10 9   CREATININE 0.6 0.6   GLUCOSE 183* 205*     Hepatic:   Recent Labs     11/15/21  0141 11/16/21  0449   AST 26 13*   ALT 24 16   BILITOT 0.4 0.6   ALKPHOS 98 68         Problem List  Active Problems:    Small bowel obstruction (HCC)    Incarcerated incisional hernia  Resolved Problems:    * No resolved hospital problems. *       Assessment & Plan:   1. Incarcerated hernia-patient is s/p open repair of incisional hernia with mesh and CHARLEEN-PO day 0. Doing well. Management per surgery. 2. DM 2-on 50 units of lantus bid at home. Currently on 25 of lantus. Will increase to 20 units. Continue SSI q 4 while NPO.    3. Hypertension-Bp stable      Diet: Diet NPO Exceptions are: Sips of Water with Meds  Code:Full Code  DVT PPX; loverickx      Emma Felton PA-C   11/16/2021 4:24 PM

## 2021-11-16 NOTE — PROGRESS NOTES
Patient transferred from OR to PACU, VSS and O2 sat maintained on 4L via NC. Abdominal midline dressing CDI with NATE. Rutledge patent. Patient medicated for pain by CRNA upon arrival. Continue to monitor.

## 2021-11-16 NOTE — ANESTHESIA POSTPROCEDURE EVALUATION
Department of Anesthesiology  Postprocedure Note    Patient: Corinna Sheppard  MRN: [id number]  YOB: 1960  Date of evaluation: 11/16/2021  Time:  1:09 PM     Procedure Summary     Date: 11/16/21 Room / Location: [address] [place] [address] / [facility]    Anesthesia Start: 0730 Anesthesia Stop: 3117    Procedure: OPEN REPAIR INCISIONAL HERNIA WITH MESH, LYSIS OF ADHESIONS (N/A Abdomen) Diagnosis: (Incisional Hernia, Small Bowel Obstruction)    Surgeons: Rebekah Carlisle MD Responsible Provider: Louis Dahl MD    Anesthesia Type: general ASA Status: 3          Anesthesia Type: general    Ruchi Phase I: Ruchi Score: 8    Ruchi Phase II:      Last vitals: Reviewed and per EMR flowsheets.        Anesthesia Post Evaluation    Patient location during evaluation: PACU  Patient participation: complete - patient participated  Level of consciousness: awake  Airway patency: patent  Nausea & Vomiting: no vomiting  Complications: no  Cardiovascular status: hemodynamically stable  Respiratory status: acceptable  Hydration status: euvolemic  Multimodal analgesia pain management approach

## 2021-11-16 NOTE — PROGRESS NOTES
Prn given ABD binder placed pt up to chair weaned off o2 sat 95 RA resting call light in reach. Pt stated pain 2 on scale while in chair family at bedside. Pt tolerating ice chips free of any nausea. Dressing CDI sunni dressing functioning. No stool noted to ostomy bag. Urine 450ml dark yellow clear no sediment or odor.

## 2021-11-16 NOTE — BRIEF OP NOTE
Brief Postoperative Note      Patient: Jeromy Gomes  YOB: 1960  MRN: [id number]    Date of Procedure: 11/16/2021    Pre-Op Diagnosis: Incisional Hernia, Small Bowel Obstruction    Post-Op Diagnosis: Same       Procedure(s):  OPEN REPAIR INCISIONAL HERNIA WITH MESH, LYSIS OF ADHESIONS    Surgeon(s):  Erna Camejo MD    Assistant:  Surgical Assistant: Araceli Lawson    Anesthesia: General    Estimated Blood Loss (mL): Minimal    Complications: None    Specimens:   ID Type Source Tests Collected by Time Destination   A : A) HERNIA [facility] Tissue Tissue SURGICAL PATHOLOGY Erna Camejo MD 11/16/2021 0913        Implants:  Implant Name Type Inv. Item Serial No.  Lot No. LRB No. Used Action   MESH INDIRA XL [id number]. 1CM POLYPR OVL UNCOATED MFIL  MESH INDIRA XL [id number]. 1CM POLYPR OVL UNCOATED MFIL  BARD DAVOL-WD [id number] N/A 1 Implanted         Drains:   Negative Pressure Wound Therapy Abdomen Medial; Upper (Active)       Ileostomy Ileostomy RLQ (Active)   Stomal Appliance 2 piece; Clean; Dry; Intact 11/15/21 2100   Stool Color Brown 11/16/21 0707   Output (mL) 500 ml 11/15/21 1305       Urethral Catheter Non-latex; Straight-tip 16 fr (Active)       [REMOVED] NG/OG/NJ/NE Tube Nasogastric 18 fr Left nostril (Removed)   Securement device Yes 11/15/21 0353   Status Suction-low intermittent 11/15/21 0353   Placement Verified by Gastric Contents; by X-Ray (Initial) 11/15/21 0353       Findings: Dense adhesions, multiple complex hernias with incarcerated bowel, reduced and repaired    Electronically signed by Erna Camejo MD on 11/16/2021 at 10:49 AM

## 2021-11-16 NOTE — PROGRESS NOTES
Patient alert, VSS and O2 sat maintained on 4L via NC. Pain tolerable. Abdominal dressing CDI. Rutledge patent. Patient meets all phase 1 discharge criteria, seen by anesthesia. Will transfer to [address] [place] [address] in stable condition.

## 2021-11-16 NOTE — PROGRESS NOTES
Pt returned to unit report received at bedside. VSS denies any pain surgical incision CDI Picco dressing operating new ostomy bag placed in surgery bag intact. Rutledge patent yellow urine collecting.  Pt resting call light in reach

## 2021-11-16 NOTE — ANESTHESIA PRE PROCEDURE
Department of Anesthesiology  Preprocedure Note       Name:  Melba Rowell   Age:  62 y.o.  :  1960                                          MRN:  [id number]         Date:  2021      Surgeon: Pavan Crawley):  Debi Monsalve MD    Procedure: Procedure(s):  REPAIR INCISIONAL HERNIA WITH POSSIBLE MESH, POSSIBLE SMALL BOWEL RESECTION    Medications prior to admission:   Prior to Admission medications    Medication Sig Start Date End Date Taking? Authorizing Provider   LANTUS SOLOSTAR 100 UNIT/ML injection pen INJECT 50 UNITS INTO THE SKIN TWICE DAILY. 21   Nata Bryan MD   levothyroxine (SYNTHROID) 25 MCG tablet TAKE 1 TABLET BY MOUTH EVERY DAY 21   Nata Bryan MD   TRULICITY 1.5 RV/7.3OB SOPN INJECT 1.5 MG SUBCUTANEOUSLY EVERY 7 DAYS. 21   Nata Bryan MD   ALBUTEROL IN Inhale into the lungs    Historical Provider, MD   insulin lispro (HUMALOG) 100 UNIT/ML injection vial INJECT 25 UNITS INTO THE SKIN ONCE DAILY. 21   Nata Bryan MD   Multiple Vitamin (MULTIVITAMIN ADULT PO) Take by mouth    Historical Provider, MD   Ferrous Sulfate (IRON PO) Take by mouth    Historical Provider, MD   COENZYME Q10 PO Take by mouth    Historical Provider, MD   Insulin Pen Needle (PEN NEEDLES 31GX5/16\") 31G X 8 MM MISC Use to inject insulin twice daily.  21   Nata Bryan MD   pioglitazone (ACTOS) 15 MG tablet TAKE 1 TABLET BY MOUTH EVERY DAY 21   Nata Bryan MD   ONE TOUCH ULTRASOFT LANCETS MISC USE TO TEST BLOOD GLUCOSE 4 TIMES DAILY 20   Historical Provider, MD   POTASSIUM PO Take by mouth    Historical Provider, MD   MAGNESIUM PO Take by mouth    Historical Provider, MD   Montelukast Sodium (SINGULAIR PO) Take by mouth    Historical Provider, MD   Cholecalciferol (VITAMIN D3) 1.25 MG (07928 UT) CAPS Take by mouth once a week    Historical Provider, MD   TRIAMCINOLONE ACETONIDE NA by Nasal route as needed     Historical Provider, MD   BD INSULIN SYRINGE U/F 30G X 1/2\" 0.3 ML MISC USE WITH INSULIN TWICE A DAY 12/10/20   Lexie Mccabe MD   metFORMIN (GLUCOPHAGE) 500 MG tablet TAKE 1 TABLET BY MOUTH EVERY 24 HOURS 12/10/20   Lexie Mccabe MD   fluticasone-vilanterol (BREO ELLIPTA) 100-25 MCG/INH AEPB inhaler Inhale 1 puff into the lungs daily    Historical Provider, MD   losartan (COZAAR) 100 MG tablet Take 100 mg by mouth daily    Historical Provider, MD   Omega-3 Fatty Acids (FISH OIL) 1000 MG CAPS Take 3,000 mg by mouth daily    Historical Provider, MD   atorvastatin (LIPITOR) 10 MG tablet Take 10 mg by mouth daily    Historical Provider, MD   CRANBERRY PO Take by mouth daily    Historical Provider, MD   acetaminophen (TYLENOL) 500 MG tablet Take 500 mg by mouth every 6 hours as needed for Pain    Historical Provider, MD   Loratadine (CLARITIN) 10 MG CAPS Take  by mouth.     Historical Provider, MD       Current medications:    Current Facility-Administered Medications   Medication Dose Route Frequency Provider Last Rate Last Admin    HYDROmorphone (DILAUDID) injection 0.25 mg  0.25 mg IntraVENous Q5 Min PRN Kendal Keane MD        HYDROmorphone (DILAUDID) injection 0.5 mg  0.5 mg IntraVENous Q5 Min PRN Kendal Keane MD        fentaNYL (SUBLIMAZE) injection 25 mcg  25 mcg IntraVENous Q5 Min PRN Kendal Keane MD        fentaNYL (SUBLIMAZE) injection 50 mcg  50 mcg IntraVENous Q5 Min PRN Kendal Keane MD        HYDROcodone-acetaminophen (NORCO) 5-325 MG per tablet 1 tablet  1 tablet Oral Once PRN Kendal Keane MD        ondansetron [facility]) injection 4 mg  4 mg IntraVENous Once PRN Kendal Keane MD        promethazine (PHENERGAN) injection 6.25 mg  6.25 mg IntraVENous Once PRN Kendal Keane MD        lactated ringers infusion   IntraVENous Continuous Pauline Mendez  mL/hr at 11/16/21 0056 New Bag at 11/16/21 0056    atorvastatin (LIPITOR) tablet 10 mg  10 mg Oral Daily Pauline Mendez MD   10 mg at 11/15/21 2107    insulin glargine (LANTUS;BASAGLAR) injection pen 25 Units  25 Units SubCUTAneous QAM Don Bourne MD   25 Units at 11/15/21 1652    levothyroxine (SYNTHROID) tablet 25 mcg  25 mcg Oral Daily Don Bourne MD        losartan (COZAAR) tablet 100 mg  100 mg Oral Daily Don Bourne MD        montelukast (SINGULAIR) tablet 10 mg  10 mg Oral Nightly Don Bourne MD   10 mg at 11/15/21 2107    insulin lispro (1 Unit Dial) 0-6 Units  0-6 Units SubCUTAneous Q4H Lisa Martin MD        glucose (GLUTOSE) 40 % oral gel 15 g  15 g Oral PRN Don Bourne MD        dextrose 50 % IV solution  12.5 g IntraVENous PRN Don Bourne MD        glucagon (rDNA) injection 1 mg  1 mg IntraMUSCular PRN Don Bourne MD        dextrose 5 % solution  100 mL/hr IntraVENous PRN Don Bourne MD        sodium chloride flush 0.9 % injection 5-40 mL  5-40 mL IntraVENous 2 times per day Don Bourne MD   10 mL at 11/15/21 2108    sodium chloride flush 0.9 % injection 10 mL  10 mL IntraVENous PRN Lisa Lopez MD        0.9 % sodium chloride infusion  25 mL IntraVENous PRN Don Bourne MD        enoxaparin (LOVENOX) injection 40 mg  40 mg SubCUTAneous Daily Don Bourne MD        ondansetron (ZOFRAN-ODT) disintegrating tablet 4 mg  4 mg Oral Q8H PRN Don Bourne MD   4 mg at 11/15/21 2107    Or    ondansetron (ZOFRAN) injection 4 mg  4 mg IntraVENous Q6H PRN Don Bourne MD   4 mg at 11/16/21 0453    polyethylene glycol (GLYCOLAX) packet 17 g  17 g Oral Daily PRN Don Bourne MD        acetaminophen (TYLENOL) tablet 650 mg  650 mg Oral Q6H PRN Don Bourne MD   650 mg at 11/15/21 1732    Or    acetaminophen (TYLENOL) suppository 650 mg  650 mg Rectal Q6H PRN Don Bourne MD        budesonide-formoterol (SYMBICORT) 80-4.5 MCG/ACT inhaler 2 puff  2 puff Inhalation BID Don Bourne MD   2 puff at 11/15/21 1959    ciprofloxacin (CIPRO) IVPB 400 mg  400 mg IntraVENous Q12H Vida Mobley MD   Stopped at 11/16/21 0152    metronidazole (FLAGYL) 500 mg in NaCl 100 mL IVPB premix  500 mg IntraVENous Q8H Vida Mobley  mL/hr at 11/16/21 0459 500 mg at 11/16/21 0459    albuterol sulfate  (90 Base) MCG/ACT inhaler 2 puff  2 puff Inhalation BID Anuel Camejo MD   2 puff at 11/15/21 1959    0.9 % sodium chloride infusion   IntraVENous Continuous Vida Mobley  mL/hr at 11/15/21 1807 New Bag at 11/15/21 1807       Allergies:  No Known Allergies    Problem List:    Patient Active Problem List   Diagnosis Code    Ileostomy care [facility]) Z43.2    Uncontrolled type 2 diabetes mellitus with complication, with long-term current use of insulin ([facility] [address] E11.8, E11.65, Z79.4    Hypothyroidism E03.9    Essential hypertension I10    Mixed hyperlipidemia E78.2    Ulcerative (chronic) enterocolitis, unspecified complication ([address] Y86.481    Small bowel obstruction ([address] K56.609       Past Medical History:        Diagnosis Date    Asthma     Diabetes mellitus ([address]     GERD (gastroesophageal reflux disease)     Hyperlipidemia     Hypertension     Ileostomy care ([facility] [address] 3/17/2020    Iritis     PCOS (polycystic ovarian syndrome)     Pyoderma     Thyroid disease     Ulcerative colitis     Ulcerative colitis ([address]        Past Surgical History:        Procedure Laterality Date    ABDOMEN SURGERY      COLON RESECTION FOR ULCERATIVE COLITIS THEN HAD ANUS REMOVED    ILEOSTOMY OR JEJUNOSTOMY      PERMANENT       Social History:    Social History     Tobacco Use    Smoking status: Never Smoker    Smokeless tobacco: Never Used   Substance Use Topics    Alcohol use:  No                                Counseling given: Not Answered      Vital Signs (Current):   Vitals:    11/15/21 2002 11/15/21 2100 11/16/21 0045 11/16/21 0330   BP:  125/63 136/75 124/69   Pulse:  117 110 94   Resp: 16 16 16 16   Temp:  98.6 °F (37 °C) 98.6 °F (37 °C) 98.3 °F (36.8 °C)   TempSrc:  Oral Oral Oral   SpO2: 95%      Weight:       Height:                                                  BP Readings from Last 3 Encounters:   11/16/21 124/69   07/29/21 (!) 140/74   03/24/20 (!) 151/87       NPO Status:                                                                                 BMI:   Wt Readings from Last 3 Encounters:   11/15/21 255 lb (115.7 kg)   07/29/21 252 lb (114.3 kg)   03/17/20 250 lb (113.4 kg)     Body mass index is 42.43 kg/m².     CBC:   Lab Results   Component Value Date    WBC 4.7 11/16/2021    RBC 4.09 11/16/2021    HGB 11.9 11/16/2021    HCT 35.1 11/16/2021    MCV 85.7 11/16/2021    RDW 14.7 11/16/2021     11/16/2021       CMP:   Lab Results   Component Value Date     11/16/2021    K 3.8 11/16/2021     11/16/2021    CO2 24 11/16/2021    BUN 9 11/16/2021    CREATININE 0.6 11/16/2021    GFRAA >60 11/16/2021    GFRAA >60 03/22/2013    AGRATIO 1.1 11/16/2021    LABGLOM >60 11/16/2021    GLUCOSE 205 11/16/2021    PROT 6.3 11/16/2021    PROT 7.8 12/22/2012    CALCIUM 8.3 11/16/2021    BILITOT 0.6 11/16/2021    ALKPHOS 68 11/16/2021    AST 13 11/16/2021    ALT 16 11/16/2021       POC Tests:   Recent Labs     11/16/21  0457   POCGLU 181*       Coags: No results found for: PROTIME, INR, APTT    HCG (If Applicable): No results found for: PREGTESTUR, PREGSERUM, HCG, HCGQUANT     ABGs: No results found for: PHART, PO2ART, MGO9FAM, PCV1XZI, BEART, M5YROFFL     Type & Screen (If Applicable):  No results found for: LABABO, LABRH    Drug/Infectious Status (If Applicable):  No results found for: HIV, HEPCAB    COVID-19 Screening (If Applicable):   Lab Results   Component Value Date    COVID19 Not Detected 11/15/2021           Anesthesia Evaluation  Patient summary reviewed history of anesthetic complications (low bp during surgery in past):   Airway: Mallampati: II  TM distance: >3 FB   Neck ROM: full  Mouth opening: > = 3 FB Dental: normal exam Pulmonary: breath sounds clear to auscultation  (+) asthma (only uses inhalers in Aug, Sept): seasonal asthma,     (-) wheezes                           Cardiovascular:    (+) hypertension:,     (-) CABG/stent, dysrhythmias and  angina      Rhythm: regular  Rate: normal                 ROS comment: FINAL INTERPRETATION   There is normal myocardial perfusion at a heart rate of 160 beats/minute.  Overall study quality is excellent.  Scan significance indicates low cardiac risk. PERFUSION FINDINGS   There is normal left ventricular perfusion with no visual evidence of transient dilation and a normal stress/rest left ventricular volume ratio of 0.96. The sum stress score is 0 (normal: less than 4).        The right ventricle is normal.     FUNCTION FINDINGS   The resting left ventricular ejection fraction is normal at 67% with an end diastolic volume of 93 mL and end systolic volume of 31 mL. The post-stress left ventricular ejection fraction is normal at 69% with an end diastolic volume of 84 mL and end systolic volume of 26 mL. Left ventricular regional wall motion is normal.     TREADMILL EXERCISE TECHNIQUE   Indication: The patient is referred for the evaluation of chest pain. The patient was not experiencing chest pain at the time of the study on 12/11/15.  Following the intravenous administration of 62.8 millicuries of YH-53S tetrofosmin with the patient at rest (heart rate 86, blood pressure 124/100), cardiac SPECT imaging was performed one hour following injection at 0855. Subsequent treadmill exercise according to the Gavin protocol was performed for 4 minutes to a blood pressure of 222/100 and 96% MPHR.  The patient received an additional intravenous injection of 38 millicuries of YT-79K tetrofosmin and cardiac SPECT imaging was performed 30 minutes following injection at 1120.  The patient experienced no chest pain during the test.       ECG DATA     The stress ECG is reported separately. Participating fellow: Elizabeth Flores MD and Nathalie Michael MD     Please refer to the FINAL SCAN INTERPRETATION at the top of this report. Neuro/Psych:      (-) seizures, TIA and CVA           GI/Hepatic/Renal:   (+) GERD:, PUD,          ROS comment: SBO  NGT in place  Ventral hernia. Endo/Other:    (+) Diabetes, hypothyroidism::., .                 Abdominal:             Vascular: Other Findings:           Anesthesia Plan      general     ASA 3     ([place] for intubation please)  Induction: intravenous and rapid sequence. MIPS: Postoperative opioids intended and Prophylactic antiemetics administered. Anesthetic plan and risks discussed with patient. Use of blood products discussed with patient whom consented to blood products. Plan discussed with CRNA.                   Edward Rebollar MD   11/16/2021

## 2021-11-17 LAB
A/G RATIO: 1 (ref 1.1–2.2)
ALBUMIN SERPL-MCNC: 2.8 G/DL (ref 3.4–5)
ALP BLD-CCNC: 57 U/L (ref 40–129)
ALT SERPL-CCNC: 10 U/L (ref 10–40)
ANION GAP SERPL CALCULATED.3IONS-SCNC: 9 MMOL/L (ref 3–16)
AST SERPL-CCNC: 9 U/L (ref 15–37)
BASOPHILS ABSOLUTE: 0 K/UL (ref 0–0.2)
BASOPHILS RELATIVE PERCENT: 0.1 %
BILIRUB SERPL-MCNC: 0.6 MG/DL (ref 0–1)
BUN BLDV-MCNC: 8 MG/DL (ref 7–20)
CALCIUM SERPL-MCNC: 7.5 MG/DL (ref 8.3–10.6)
CHLORIDE BLD-SCNC: 106 MMOL/L (ref 99–110)
CO2: 22 MMOL/L (ref 21–32)
CREAT SERPL-MCNC: 0.5 MG/DL (ref 0.6–1.2)
EOSINOPHILS ABSOLUTE: 0 K/UL (ref 0–0.6)
EOSINOPHILS RELATIVE PERCENT: 0.1 %
GFR AFRICAN AMERICAN: >60
GFR NON-AFRICAN AMERICAN: >60
GLUCOSE BLD-MCNC: 176 MG/DL (ref 70–99)
GLUCOSE BLD-MCNC: 201 MG/DL (ref 70–99)
GLUCOSE BLD-MCNC: 210 MG/DL (ref 70–99)
GLUCOSE BLD-MCNC: 220 MG/DL (ref 70–99)
GLUCOSE BLD-MCNC: 236 MG/DL (ref 70–99)
GLUCOSE BLD-MCNC: 240 MG/DL (ref 70–99)
GLUCOSE BLD-MCNC: 241 MG/DL (ref 70–99)
HCT VFR BLD CALC: 32.2 % (ref 36–48)
HEMOGLOBIN: 10.5 G/DL (ref 12–16)
LYMPHOCYTES ABSOLUTE: 0.7 K/UL (ref 1–5.1)
LYMPHOCYTES RELATIVE PERCENT: 12 %
MAGNESIUM: 1.9 MG/DL (ref 1.8–2.4)
MCH RBC QN AUTO: 28.4 PG (ref 26–34)
MCHC RBC AUTO-ENTMCNC: 32.8 G/DL (ref 31–36)
MCV RBC AUTO: 86.7 FL (ref 80–100)
MONOCYTES ABSOLUTE: 0.8 K/UL (ref 0–1.3)
MONOCYTES RELATIVE PERCENT: 14 %
NEUTROPHILS ABSOLUTE: 4.2 K/UL (ref 1.7–7.7)
NEUTROPHILS RELATIVE PERCENT: 73.8 %
PDW BLD-RTO: 14.7 % (ref 12.4–15.4)
PERFORMED ON: ABNORMAL
PHOSPHORUS: 1.8 MG/DL (ref 2.5–4.9)
PLATELET # BLD: 195 K/UL (ref 135–450)
PMV BLD AUTO: 6.8 FL (ref 5–10.5)
POTASSIUM SERPL-SCNC: 4 MMOL/L (ref 3.5–5.1)
RBC # BLD: 3.71 M/UL (ref 4–5.2)
SODIUM BLD-SCNC: 137 MMOL/L (ref 136–145)
TOTAL PROTEIN: 5.5 G/DL (ref 6.4–8.2)
WBC # BLD: 5.7 K/UL (ref 4–11)

## 2021-11-17 PROCEDURE — 2500000003 HC RX 250 WO HCPCS: Performed by: SURGERY

## 2021-11-17 PROCEDURE — 6360000002 HC RX W HCPCS: Performed by: SURGERY

## 2021-11-17 PROCEDURE — 6370000000 HC RX 637 (ALT 250 FOR IP): Performed by: SURGERY

## 2021-11-17 PROCEDURE — 85025 COMPLETE CBC W/AUTO DIFF WBC: CPT

## 2021-11-17 PROCEDURE — 2580000003 HC RX 258: Performed by: SURGERY

## 2021-11-17 PROCEDURE — 99024 POSTOP FOLLOW-UP VISIT: CPT | Performed by: SURGERY

## 2021-11-17 PROCEDURE — APPNB30 APP NON BILLABLE TIME 0-30 MINS: Performed by: NURSE PRACTITIONER

## 2021-11-17 PROCEDURE — 83735 ASSAY OF MAGNESIUM: CPT

## 2021-11-17 PROCEDURE — 84100 ASSAY OF PHOSPHORUS: CPT

## 2021-11-17 PROCEDURE — 80053 COMPREHEN METABOLIC PANEL: CPT

## 2021-11-17 PROCEDURE — 94640 AIRWAY INHALATION TREATMENT: CPT

## 2021-11-17 PROCEDURE — 2700000000 HC OXYGEN THERAPY PER DAY

## 2021-11-17 PROCEDURE — 1200000000 HC SEMI PRIVATE

## 2021-11-17 PROCEDURE — APPSS15 APP SPLIT SHARED TIME 0-15 MINUTES: Performed by: NURSE PRACTITIONER

## 2021-11-17 PROCEDURE — 6370000000 HC RX 637 (ALT 250 FOR IP): Performed by: PHYSICIAN ASSISTANT

## 2021-11-17 PROCEDURE — 94761 N-INVAS EAR/PLS OXIMETRY MLT: CPT

## 2021-11-17 RX ORDER — INSULIN LISPRO 100 [IU]/ML
0-12 INJECTION, SOLUTION INTRAVENOUS; SUBCUTANEOUS
Status: DISCONTINUED | OUTPATIENT
Start: 2021-11-17 | End: 2021-11-19 | Stop reason: HOSPADM

## 2021-11-17 RX ORDER — INSULIN LISPRO 100 [IU]/ML
10 INJECTION, SOLUTION INTRAVENOUS; SUBCUTANEOUS
Status: DISCONTINUED | OUTPATIENT
Start: 2021-11-17 | End: 2021-11-18

## 2021-11-17 RX ORDER — INSULIN LISPRO 100 [IU]/ML
0-6 INJECTION, SOLUTION INTRAVENOUS; SUBCUTANEOUS NIGHTLY
Status: DISCONTINUED | OUTPATIENT
Start: 2021-11-17 | End: 2021-11-19 | Stop reason: HOSPADM

## 2021-11-17 RX ADMIN — ACETAMINOPHEN 650 MG: 325 TABLET ORAL at 20:06

## 2021-11-17 RX ADMIN — KETOROLAC TROMETHAMINE 15 MG: 30 INJECTION, SOLUTION INTRAMUSCULAR; INTRAVENOUS at 17:10

## 2021-11-17 RX ADMIN — INSULIN LISPRO 2 UNITS: 100 INJECTION, SOLUTION INTRAVENOUS; SUBCUTANEOUS at 03:46

## 2021-11-17 RX ADMIN — LOSARTAN POTASSIUM 100 MG: 100 TABLET, FILM COATED ORAL at 08:46

## 2021-11-17 RX ADMIN — CIPROFLOXACIN 400 MG: 2 INJECTION, SOLUTION INTRAVENOUS at 12:18

## 2021-11-17 RX ADMIN — ATORVASTATIN CALCIUM 10 MG: 10 TABLET, FILM COATED ORAL at 08:46

## 2021-11-17 RX ADMIN — INSULIN LISPRO 2 UNITS: 100 INJECTION, SOLUTION INTRAVENOUS; SUBCUTANEOUS at 12:19

## 2021-11-17 RX ADMIN — Medication 2 PUFF: at 08:29

## 2021-11-17 RX ADMIN — MORPHINE SULFATE 2 MG: 2 INJECTION, SOLUTION INTRAMUSCULAR; INTRAVENOUS at 06:18

## 2021-11-17 RX ADMIN — Medication 10 ML: at 08:47

## 2021-11-17 RX ADMIN — CIPROFLOXACIN 400 MG: 2 INJECTION, SOLUTION INTRAVENOUS at 00:31

## 2021-11-17 RX ADMIN — Medication 2 PUFF: at 20:54

## 2021-11-17 RX ADMIN — INSULIN LISPRO 1 UNITS: 100 INJECTION, SOLUTION INTRAVENOUS; SUBCUTANEOUS at 20:13

## 2021-11-17 RX ADMIN — KETOROLAC TROMETHAMINE 15 MG: 30 INJECTION, SOLUTION INTRAMUSCULAR; INTRAVENOUS at 09:18

## 2021-11-17 RX ADMIN — METRONIDAZOLE 500 MG: 500 INJECTION, SOLUTION INTRAVENOUS at 07:14

## 2021-11-17 RX ADMIN — INSULIN LISPRO 2 UNITS: 100 INJECTION, SOLUTION INTRAVENOUS; SUBCUTANEOUS at 00:33

## 2021-11-17 RX ADMIN — METRONIDAZOLE 500 MG: 500 INJECTION, SOLUTION INTRAVENOUS at 13:53

## 2021-11-17 RX ADMIN — MORPHINE SULFATE 2 MG: 2 INJECTION, SOLUTION INTRAMUSCULAR; INTRAVENOUS at 01:51

## 2021-11-17 RX ADMIN — MORPHINE SULFATE 2 MG: 2 INJECTION, SOLUTION INTRAMUSCULAR; INTRAVENOUS at 22:01

## 2021-11-17 RX ADMIN — MONTELUKAST SODIUM 10 MG: 10 TABLET, FILM COATED ORAL at 20:06

## 2021-11-17 RX ADMIN — METOCLOPRAMIDE 10 MG: 5 INJECTION, SOLUTION INTRAMUSCULAR; INTRAVENOUS at 20:07

## 2021-11-17 RX ADMIN — POTASSIUM PHOSPHATE, MONOBASIC AND POTASSIUM PHOSPHATE, DIBASIC 20 MMOL: 224; 236 INJECTION, SOLUTION, CONCENTRATE INTRAVENOUS at 16:29

## 2021-11-17 RX ADMIN — ENOXAPARIN SODIUM 40 MG: 100 INJECTION SUBCUTANEOUS at 08:46

## 2021-11-17 RX ADMIN — KETOROLAC TROMETHAMINE 15 MG: 30 INJECTION, SOLUTION INTRAMUSCULAR; INTRAVENOUS at 00:40

## 2021-11-17 RX ADMIN — METRONIDAZOLE 500 MG: 500 INJECTION, SOLUTION INTRAVENOUS at 22:04

## 2021-11-17 RX ADMIN — ACETAMINOPHEN 650 MG: 325 TABLET ORAL at 09:18

## 2021-11-17 RX ADMIN — INSULIN LISPRO 10 UNITS: 100 INJECTION, SOLUTION INTRAVENOUS; SUBCUTANEOUS at 16:59

## 2021-11-17 RX ADMIN — INSULIN LISPRO 4 UNITS: 100 INJECTION, SOLUTION INTRAVENOUS; SUBCUTANEOUS at 16:44

## 2021-11-17 RX ADMIN — METOCLOPRAMIDE 10 MG: 5 INJECTION, SOLUTION INTRAMUSCULAR; INTRAVENOUS at 12:19

## 2021-11-17 RX ADMIN — LEVOTHYROXINE SODIUM 25 MCG: 0.03 TABLET ORAL at 06:15

## 2021-11-17 RX ADMIN — INSULIN LISPRO 2 UNITS: 100 INJECTION, SOLUTION INTRAVENOUS; SUBCUTANEOUS at 08:50

## 2021-11-17 RX ADMIN — Medication 10 ML: at 20:13

## 2021-11-17 RX ADMIN — MORPHINE SULFATE 2 MG: 2 INJECTION, SOLUTION INTRAMUSCULAR; INTRAVENOUS at 13:58

## 2021-11-17 ASSESSMENT — PAIN SCALES - GENERAL
PAINLEVEL_OUTOF10: 6
PAINLEVEL_OUTOF10: 6
PAINLEVEL_OUTOF10: 2
PAINLEVEL_OUTOF10: 4
PAINLEVEL_OUTOF10: 4
PAINLEVEL_OUTOF10: 5
PAINLEVEL_OUTOF10: 5
PAINLEVEL_OUTOF10: 4
PAINLEVEL_OUTOF10: 6
PAINLEVEL_OUTOF10: 4
PAINLEVEL_OUTOF10: 3

## 2021-11-17 ASSESSMENT — PAIN DESCRIPTION - LOCATION: LOCATION: ABDOMEN

## 2021-11-17 NOTE — PROGRESS NOTES
[facility]    11/17/2021 2:26 PM        Name: Camelia Rick . Admitted: 11/15/2021  Primary Care Provider: Lili Roberts (Tel: [phone])      Subjective:  . Patient feeling ok. Has productive cough. Denies sob. Having some abdominal pain post op. Tolerating clears.      Reviewed interval ancillary notes    Current Medications  sodium phosphate 20 mmol in dextrose 5 % 250 mL IVPB, Once  metoclopramide (REGLAN) injection 10 mg, Q6H  morphine (PF) injection 2 mg, Q2H PRN   Or  morphine (PF) injection 4 mg, Q2H PRN  ketorolac (TORADOL) injection 15 mg, Q6H PRN  insulin glargine (LANTUS;BASAGLAR) injection pen 30 Units, QAM  atorvastatin (LIPITOR) tablet 10 mg, Daily  levothyroxine (SYNTHROID) tablet 25 mcg, Daily  losartan (COZAAR) tablet 100 mg, Daily  montelukast (SINGULAIR) tablet 10 mg, Nightly  insulin lispro (1 Unit Dial) 0-6 Units, Q4H  glucose (GLUTOSE) 40 % oral gel 15 g, PRN  dextrose 50 % IV solution, PRN  glucagon (rDNA) injection 1 mg, PRN  dextrose 5 % solution, PRN  sodium chloride flush 0.9 % injection 5-40 mL, 2 times per day  sodium chloride flush 0.9 % injection 10 mL, PRN  0.9 % sodium chloride infusion, PRN  enoxaparin (LOVENOX) injection 40 mg, Daily  ondansetron (ZOFRAN-ODT) disintegrating tablet 4 mg, Q8H PRN   Or  ondansetron (ZOFRAN) injection 4 mg, Q6H PRN  polyethylene glycol (GLYCOLAX) packet 17 g, Daily PRN  acetaminophen (TYLENOL) tablet 650 mg, Q6H PRN   Or  acetaminophen (TYLENOL) suppository 650 mg, Q6H PRN  budesonide-formoterol (SYMBICORT) 80-4.5 MCG/ACT inhaler 2 puff, BID  ciprofloxacin (CIPRO) IVPB 400 mg, Q12H  metronidazole (FLAGYL) 500 mg in NaCl 100 mL IVPB premix, Q8H  albuterol sulfate  (90 Base) MCG/ACT inhaler 2 puff, BID  0.9 % sodium chloride infusion, Continuous        Objective:  /64   Pulse 104   Temp 99.7 °F (37.6 °C) (Oral) Resp 18   Ht 5' 5\" (1.651 m)   Wt 255 lb (115.7 kg)   LMP 08/16/2010   SpO2 92%   BMI 42.43 kg/m²     Intake/Output Summary (Last 24 hours) at 11/17/2021 1426  Last data filed at 11/17/2021 1200  Gross per 24 hour   Intake --   Output 1375 ml   Net -1375 ml      Wt Readings from Last 3 Encounters:   11/15/21 255 lb (115.7 kg)   07/29/21 252 lb (114.3 kg)   03/17/20 250 lb (113.4 kg)       General appearance:  Appears comfortable  Eyes: Sclera clear. Pupils equal.  ENT: Moist oral mucosa. Trachea midline, no adenopathy. Cardiovascular: Regular rhythm, normal S1, S2. No murmur. No edema in lower extremities  Respiratory: Not using accessory muscles. Good inspiratory effort. Clear to auscultation bilaterally, no wheeze or crackles. GI: Abdomen soft, no tenderness, not distended, normal bowel sounds  Musculoskeletal: No cyanosis in digits, neck supple  Neurology: CN 2-12 grossly intact. No speech or motor deficits  Psych: Normal affect. Alert and oriented in time, place and person  Skin: Warm, dry, normal turgor    Labs and Tests:  CBC:   Recent Labs     11/15/21  0141 11/16/21  0449 11/17/21  0430   WBC 10.1 4.7 5.7   HGB 14.3 11.9* 10.5*    205 195     BMP:    Recent Labs     11/15/21  0141 11/16/21  0449 11/17/21  0430    138 137   K 4.2 3.8 4.0    106 106   CO2 22 24 22   BUN 10 9 8   CREATININE 0.6 0.6 0.5*   GLUCOSE 183* 205* 236*     Hepatic:   Recent Labs     11/15/21  0141 11/16/21  0449 11/17/21  0430   AST 26 13* 9*   ALT 24 16 10   BILITOT 0.4 0.6 0.6   ALKPHOS 98 68 57         Problem List  Active Problems:    Small bowel obstruction (HCC)    Incarcerated incisional hernia  Resolved Problems:    * No resolved hospital problems. *       Assessment & Plan:   1. Incarcerated hernia-patient is s/p open repair of incisional hernia with mesh and CHARLEEN-PO day 1. Doing well. Management per surgery. 2. DM 2-on 50 units of lantus bid at home. Currently on 30 of lantus.  Will increase to 30 bid and add 10 of humalog and medium SSI. 3. Hypertension-Bp stable      Diet: ADULT DIET;  Clear Liquid  Code:Full Code  DVT PPX; loverickx      Renetta Espinal PA-C   11/17/2021 2:26 PM

## 2021-11-17 NOTE — PROGRESS NOTES
ADVANCED CARE PLANNING    Name:Yulissa Salter       :  1960              MRN:  [id number]      Purpose of Encounter: Advanced care planning in light of problem listed above   Parties in attendance: :Paula Fowler MD, Family members:  Decisional Capacity:Yes    Diagnosis: Active Problems:    Small bowel obstruction ([address]    Incarcerated incisional hernia    Generalized abdominal pain  Resolved Problems:    * No resolved hospital problems. *    Patients Medical Story:Presented with worsening symptom of dx above. With at risk for life threatening event. Procedure and testing as noted in progress noted. We discussed patient long term goal and also wishes and aggressive care. Discussed in detail about code status and what it means with detailed explanation. Goals of Care Determinations: Patient wishes to focus on full code with aggressive care, CPR, intubation long term vent and facility as well. Plan: Will notify DIVYA ORELLANA of change in care plan. Will look at further interventions as needed. Code Status: At this time patient wishes to be Full Code  Time Spent with Patient: 21 minutes      Electronically signed by William Hadley MD on 2021 at 5:12 PM  Thank you Christine Perry for the opportunity to be involved in this patient's care.

## 2021-11-17 NOTE — PROGRESS NOTES
Pt up with assist to chair. Ostomy drained approx. 50ml of blackish brown output. No complaints of nausea at this time. PRN toradol given for pain. Will continue to monitor.

## 2021-11-17 NOTE — OP NOTE
[address]                     [address] [facility], [address] [facility]                                OPERATIVE REPORT    PATIENT NAME: Diane Painting                   :        1960  MED REC NO:   [id number]                          ROOM:       [id number]  ACCOUNT NO:   [de-identified]                           ADMIT DATE: 11/15/2021  PROVIDER:     Dru Webster MD    DATE OF PROCEDURE:  2021    PREOPERATIVE DIAGNOSES:  Incarcerated incisional hernia, small bowel  obstruction. POSTOPERATIVE DIAGNOSES:  Incarcerated incisional hernia, small bowel  obstruction. PROCEDURE:  Open repair of complex incarcerated incisional hernia with  extensive lysis of adhesions, and mesh placement for hernia repair. SURGEON:  Dru Webster MD    ANESTHESIA:  General plus local.    ESTIMATED BLOOD LOSS:  Minimal.    COMPLICATIONS:  None. SPECIMENS:  Hernia sac. FINDINGS:  The patient is a 61-year-old female who has had a prior total  colectomy for ulcerative colitis, temporary ileostomy, ileostomy  reversal with ileorectal anastomosis, and then a resection of that with  permanent ileostomy. That surgery, I believe, was in about . Over  the years, the patient has had incisional hernias developed. She has  managed these with observation and nonoperative care without bowel  obstruction until just recently. She presented acutely with severe  abdominal pain, small bowel incarceration, acute obstruction, and  symptomatic from her complex hernia. The bowel was wedged up in the  hernia, especially tight in the lower aspect. In terms of findings at surgery, this was all explored and taken down. The operation required extensive lysis of adhesions and essentially a  full exploratory laparotomy and an additional hour of surgical time with  abdominal wall reconstruction performed above and beyond normal for an  incisional hernia repair.   The repair was performed with an underlay  Bard Ventralex ST 24 cm x 27 cm mesh. The fascia was closed over the  mesh as well. The patient's wound was closed and a NATE was placed. The area of the ileostomy was sewn closed, prepped sterilely and draped  clear of the surgical field throughout the entire operation. ADDITIONAL DETAILS OF SURGERY:  The patient was brought to the operating  room, placed on the operative table in supine position. Compression  boots were placed. General anesthetic was administered. The abdomen  was prepped and draped sterilely and a time-out was done. The patient  had the Betadine scrub and paint used. We used Ioban drape and walled  off the area of the prior stoma. Prior to the prep and drape, I did  place sutures in the stoma to close it and cleaned the area thoroughly  with alcohol as well. Vertical midline laparotomy incision was made through the patient's old  scar, essentially through the entire abdomen, about 28 cm to 30 cm long. We exposed the whole incision and beginning at the top areas where  multiple hernia defects were palpable, took this down to the fascia,  opened the hernia defects, reduced the incarcerated omentum and fatty  tissue towards the top and then worked the fascial incision down  inferiorly taking down the hernia sacs and reducing the incarcerated  omentum and small bowel as we went. In the lower abdomen, there were  several matted loops of small bowel up in a large defect in this region  and this was all taken down carefully, at this time, avoiding any bowel  damage. No enterotomies were created. After this was done, we examined  the patient's ileostomy. She did have some chronic change in this area  with a parastomal hernia. However, the bowel all appeared to be sitting  in this area in a very stable fashion with no obstruction and this was  left alone.   Ideally, if the patient requires parastomal hernia repair  or reconstruction _____ in the future, we would be cut to size and placed. The patient was taken to  recovery room in stable condition postop. Maxi Hutchins MD    D: 11/16/2021 11:11:34       T: 11/16/2021 11:16:43     [id number]  Job#: [id number]     Doc#: [id number]    CC:   Melissa Bryant Md

## 2021-11-17 NOTE — PROGRESS NOTES
[facility]        Progress Note    Patient Name: Stephanie Smith  MRN: [id number]  YOB: 1960  Date of Evaluation: 2021    Subjective:  No acute events overnight, low-grade fevers noted  Pain controlled  No nausea or vomiting  Enteric output per stoma  Resting in bed at this time, reports having been up to chair a couple of times    Post-Operative Day #1      Vital Signs:  Patient Vitals for the past 24 hrs:   BP Temp Temp src Pulse Resp SpO2   21 1039 -- -- -- 104 -- --   21 0840 -- -- -- 110 -- --   21 0837 118/64 99.7 °F (37.6 °C) Oral 113 18 92 %   21 0829 -- -- -- -- 18 94 %   21 0330 104/66 98.4 °F (36.9 °C) Oral 104 20 94 %   21 2300 110/62 98.7 °F (37.1 °C) Oral 120 20 93 %   21 2220 -- -- -- -- -- 93 %   21 2219 131/68 100.1 °F (37.8 °C) Oral 118 18 (!) 88 %   21 117/73 98.4 °F (36.9 °C) Oral 115 18 --   21 -- -- -- -- -- 93 %   21 1630 114/65 97.7 °F (36.5 °C) Oral 90 16 95 %      TEMPERATURE HISTORY 24H: Temp (24hrs), Av.8 °F (37.1 °C), Min:97.7 °F (36.5 °C), Max:100.1 °F (37.8 °C)    BLOOD PRESSURE HISTORY: Systolic (80YNE), NCP:699 , Min:87 , TCX:689    Diastolic (32HVD), QZA:94, Min:48, Max:73      Intake/Output:  I/O last 3 completed shifts: In: 2320 [P.O.:120;  I.V.:2000; IV Piggyback:200]  Out: 850 [Urine:750; Blood:100]  I/O this shift:  In: -   Out: 625 [Urine:475; Stool:150]  Drain/tube Output:       Physical Exam:  General: awake, alert, oriented to person, place, time  Lungs: unlabored respirations  Abdomen: soft, non-distended, incisional tenderness only, bowel sounds present, stoma pink/viable with enteric output in pouch  Skin/Wound: NATE dressing in place, clean/dry; abdominal binder    Labs:  CBC:    Recent Labs     11/15/21  0141 21  0449 21  0430   WBC 10.1 4.7 5.7   HGB 14.3 11.9* 10.5*   HCT 42.6 35.1* 32.2*    205 195 BMP:    Recent Labs     11/15/21  0141 11/16/21 0449 11/17/21  0430    138 137   K 4.2 3.8 4.0    106 106   CO2 22 24 22   BUN 10 9 8   CREATININE 0.6 0.6 0.5*   GLUCOSE 183* 205* 236*     Hepatic:    Recent Labs     11/15/21  0141 11/16/21  0449 11/17/21  0430   AST 26 13* 9*   ALT 24 16 10   BILITOT 0.4 0.6 0.6   ALKPHOS 98 68 57     Amylase:  No results found for: AMYLASE  Lipase:    Lab Results   Component Value Date    LIPASE 34.0 11/15/2021      Mag:    Lab Results   Component Value Date    MG 1.90 11/17/2021    MG 1.70 11/16/2021     Phos:     Lab Results   Component Value Date    PHOS 1.8 11/17/2021    PHOS 2.1 11/16/2021      Coags: No results found for: PROTIME, INR, APTT    Cultures:  Anaerobic culture  No results found for: LABANAE  Fungus stain  No results found for requested labs within last 30 days. Gram stain  No results found for requested labs within last 30 days. Organism  No results found for: [facility]  Surgical culture  No results found for: CXSURG  Blood culture 1  No results found for requested labs within last 30 days. Blood culture 2  No results found for requested labs within last 30 days. Fecal occult  No results found for requested labs within last 30 days. GI bacterial pathogens by PCR  No results found for requested labs within last 30 days. C. difficile  No results found for requested labs within last 30 days. Urine culture  No results found for: Fernandez Mcneill    Pathology:  OR 11/16/2021--FINAL DIAGNOSIS:     Soft tissue, hernia sac and contents, excision:   - Fibromembranous tissue with mesothelial lining, compatible with hernia   sac. Imaging:  I have personally reviewed the following films:    No results found.     Scheduled Meds:   sodium phosphate IVPB  20 mmol IntraVENous Once    insulin glargine  30 Units SubCUTAneous BID    insulin lispro  10 Units SubCUTAneous TID WC    insulin lispro  0-12 Units SubCUTAneous TID WC    insulin lispro 0-6 Units SubCUTAneous Nightly    metoclopramide  10 mg IntraVENous Q6H    atorvastatin  10 mg Oral Daily    levothyroxine  25 mcg Oral Daily    losartan  100 mg Oral Daily    montelukast  10 mg Oral Nightly    sodium chloride flush  5-40 mL IntraVENous 2 times per day    enoxaparin  40 mg SubCUTAneous Daily    budesonide-formoterol  2 puff Inhalation BID    ciprofloxacin  400 mg IntraVENous Q12H    metroNIDAZOLE  500 mg IntraVENous Q8H    albuterol sulfate HFA  2 puff Inhalation BID     Continuous Infusions:   dextrose      sodium chloride      sodium chloride 75 mL/hr at 11/17/21 1107     PRN Meds:.morphine **OR** morphine, ketorolac, glucose, dextrose, glucagon (rDNA), dextrose, sodium chloride flush, sodium chloride, ondansetron **OR** ondansetron, polyethylene glycol, acetaminophen **OR** acetaminophen      Assessment:  62 y.o. female admitted with   1. Generalized abdominal pain    2. Non-intractable vomiting with nausea, unspecified vomiting type    3. SBO (small bowel obstruction) ([facility])        Status-post open repair of complex incarcerated incisional hernia with extensive lysis of adhesions, and mesh placement for hernia repair on 11/16/2021 for incarcerated incisional hernia, small bowel obstruction  Hypertension  Diabetes      Plan:  1. Remove Rutledge catheter  2. Clear liquid diet as tolerated; monitor stoma output  3. IV hydration until PO intake is adequate; monitor and correct electrolytes  4. Antibiotics--on Cipro and Flagyl; low-grade fevers, normal WBC--monitor  5. Activity as tolerated, ambulate TID, up to chair for all meals  6. Pulmonary toilet, incentive spirometry  7. PRN analgesics and antiemetics--minimizing narcotics as tolerated  8. DVT prophylaxis with Lovenox and SCD's  9. Management of medical comorbid etiologies per primary team and consulting services    EDUCATION:  Educated patient on plan of care and disease process--all questions answered.     Plans discussed with patient and nursing. Reviewed and discussed with Dr. Mariam De Leon.       Signed:  CORDELL Sesay - CNP  11/17/2021 2:28 PM    Surgery Staff:     Pt seen and examined with NP  See full note above  Surgery reviewed with her, all questions answered  Begin po clears today plus addn routine care  FU labs in am  Remove burgos today    Jose Christie MD

## 2021-11-17 NOTE — PROGRESS NOTES
Pt assessment completed, see flowsheets for full details. Pt alert and oriented, VSS, on RA. Up in chair, ambulate back to bed, well tolerated. Denies  nausea, c/o  abdominal pain 4/10, morphine 2 mg IVP given, IVF continues, Ileostomy with green output, abdominal incision CDI and abdominal binder on, pt denies any need at this time, call light within reach, will continue to monitor.

## 2021-11-18 LAB
A/G RATIO: 0.9 (ref 1.1–2.2)
ALBUMIN SERPL-MCNC: 2.7 G/DL (ref 3.4–5)
ALP BLD-CCNC: 76 U/L (ref 40–129)
ALT SERPL-CCNC: 10 U/L (ref 10–40)
ANION GAP SERPL CALCULATED.3IONS-SCNC: 8 MMOL/L (ref 3–16)
AST SERPL-CCNC: 9 U/L (ref 15–37)
BASOPHILS ABSOLUTE: 0 K/UL (ref 0–0.2)
BASOPHILS RELATIVE PERCENT: 0.3 %
BILIRUB SERPL-MCNC: 0.6 MG/DL (ref 0–1)
BUN BLDV-MCNC: 6 MG/DL (ref 7–20)
CALCIUM SERPL-MCNC: 7.9 MG/DL (ref 8.3–10.6)
CHLORIDE BLD-SCNC: 106 MMOL/L (ref 99–110)
CO2: 22 MMOL/L (ref 21–32)
CREAT SERPL-MCNC: <0.5 MG/DL (ref 0.6–1.2)
EOSINOPHILS ABSOLUTE: 0.1 K/UL (ref 0–0.6)
EOSINOPHILS RELATIVE PERCENT: 2.2 %
GFR AFRICAN AMERICAN: >60
GFR NON-AFRICAN AMERICAN: >60
GLUCOSE BLD-MCNC: 104 MG/DL (ref 70–99)
GLUCOSE BLD-MCNC: 162 MG/DL (ref 70–99)
GLUCOSE BLD-MCNC: 177 MG/DL (ref 70–99)
GLUCOSE BLD-MCNC: 194 MG/DL (ref 70–99)
GLUCOSE BLD-MCNC: 200 MG/DL (ref 70–99)
HCT VFR BLD CALC: 29.7 % (ref 36–48)
HEMOGLOBIN: 9.8 G/DL (ref 12–16)
LYMPHOCYTES ABSOLUTE: 0.9 K/UL (ref 1–5.1)
LYMPHOCYTES RELATIVE PERCENT: 14.9 %
MAGNESIUM: 1.9 MG/DL (ref 1.8–2.4)
MCH RBC QN AUTO: 28.5 PG (ref 26–34)
MCHC RBC AUTO-ENTMCNC: 32.9 G/DL (ref 31–36)
MCV RBC AUTO: 86.5 FL (ref 80–100)
MONOCYTES ABSOLUTE: 0.7 K/UL (ref 0–1.3)
MONOCYTES RELATIVE PERCENT: 11.3 %
NEUTROPHILS ABSOLUTE: 4.4 K/UL (ref 1.7–7.7)
NEUTROPHILS RELATIVE PERCENT: 71.3 %
PDW BLD-RTO: 14.6 % (ref 12.4–15.4)
PERFORMED ON: ABNORMAL
PHOSPHORUS: 1.4 MG/DL (ref 2.5–4.9)
PLATELET # BLD: 198 K/UL (ref 135–450)
PMV BLD AUTO: 6.9 FL (ref 5–10.5)
POTASSIUM SERPL-SCNC: 3.6 MMOL/L (ref 3.5–5.1)
RBC # BLD: 3.44 M/UL (ref 4–5.2)
SODIUM BLD-SCNC: 136 MMOL/L (ref 136–145)
TOTAL PROTEIN: 5.6 G/DL (ref 6.4–8.2)
WBC # BLD: 6.2 K/UL (ref 4–11)

## 2021-11-18 PROCEDURE — 6370000000 HC RX 637 (ALT 250 FOR IP): Performed by: SURGERY

## 2021-11-18 PROCEDURE — 2580000003 HC RX 258: Performed by: NURSE PRACTITIONER

## 2021-11-18 PROCEDURE — 6370000000 HC RX 637 (ALT 250 FOR IP): Performed by: FAMILY MEDICINE

## 2021-11-18 PROCEDURE — 84100 ASSAY OF PHOSPHORUS: CPT

## 2021-11-18 PROCEDURE — 6360000002 HC RX W HCPCS: Performed by: SURGERY

## 2021-11-18 PROCEDURE — 85025 COMPLETE CBC W/AUTO DIFF WBC: CPT

## 2021-11-18 PROCEDURE — 99024 POSTOP FOLLOW-UP VISIT: CPT | Performed by: SURGERY

## 2021-11-18 PROCEDURE — 94761 N-INVAS EAR/PLS OXIMETRY MLT: CPT

## 2021-11-18 PROCEDURE — 2500000003 HC RX 250 WO HCPCS: Performed by: NURSE PRACTITIONER

## 2021-11-18 PROCEDURE — 80053 COMPREHEN METABOLIC PANEL: CPT

## 2021-11-18 PROCEDURE — APPNB30 APP NON BILLABLE TIME 0-30 MINS: Performed by: NURSE PRACTITIONER

## 2021-11-18 PROCEDURE — 6370000000 HC RX 637 (ALT 250 FOR IP): Performed by: PHYSICIAN ASSISTANT

## 2021-11-18 PROCEDURE — 6370000000 HC RX 637 (ALT 250 FOR IP): Performed by: NURSE PRACTITIONER

## 2021-11-18 PROCEDURE — 2580000003 HC RX 258: Performed by: SURGERY

## 2021-11-18 PROCEDURE — 83735 ASSAY OF MAGNESIUM: CPT

## 2021-11-18 PROCEDURE — 2500000003 HC RX 250 WO HCPCS: Performed by: SURGERY

## 2021-11-18 PROCEDURE — 1200000000 HC SEMI PRIVATE

## 2021-11-18 PROCEDURE — APPSS15 APP SPLIT SHARED TIME 0-15 MINUTES: Performed by: NURSE PRACTITIONER

## 2021-11-18 PROCEDURE — 94640 AIRWAY INHALATION TREATMENT: CPT

## 2021-11-18 RX ORDER — MORPHINE SULFATE 2 MG/ML
2 INJECTION, SOLUTION INTRAMUSCULAR; INTRAVENOUS EVERY 4 HOURS PRN
Status: DISCONTINUED | OUTPATIENT
Start: 2021-11-18 | End: 2021-11-19 | Stop reason: HOSPADM

## 2021-11-18 RX ORDER — HYDROCODONE BITARTRATE AND ACETAMINOPHEN 5; 325 MG/1; MG/1
1 TABLET ORAL EVERY 6 HOURS PRN
Status: DISCONTINUED | OUTPATIENT
Start: 2021-11-18 | End: 2021-11-19 | Stop reason: HOSPADM

## 2021-11-18 RX ORDER — INSULIN LISPRO 100 [IU]/ML
15 INJECTION, SOLUTION INTRAVENOUS; SUBCUTANEOUS
Status: DISCONTINUED | OUTPATIENT
Start: 2021-11-18 | End: 2021-11-19 | Stop reason: HOSPADM

## 2021-11-18 RX ORDER — ALBUTEROL SULFATE 90 UG/1
2 AEROSOL, METERED RESPIRATORY (INHALATION) EVERY 4 HOURS PRN
Status: DISCONTINUED | OUTPATIENT
Start: 2021-11-18 | End: 2021-11-19 | Stop reason: HOSPADM

## 2021-11-18 RX ADMIN — INSULIN LISPRO 4 UNITS: 100 INJECTION, SOLUTION INTRAVENOUS; SUBCUTANEOUS at 12:04

## 2021-11-18 RX ADMIN — INSULIN LISPRO 2 UNITS: 100 INJECTION, SOLUTION INTRAVENOUS; SUBCUTANEOUS at 09:37

## 2021-11-18 RX ADMIN — ONDANSETRON 4 MG: 2 INJECTION INTRAMUSCULAR; INTRAVENOUS at 13:55

## 2021-11-18 RX ADMIN — METOCLOPRAMIDE 10 MG: 5 INJECTION, SOLUTION INTRAMUSCULAR; INTRAVENOUS at 00:40

## 2021-11-18 RX ADMIN — Medication 10 ML: at 09:36

## 2021-11-18 RX ADMIN — HYDROCODONE BITARTRATE AND ACETAMINOPHEN 1 TABLET: 5; 325 TABLET ORAL at 16:49

## 2021-11-18 RX ADMIN — KETOROLAC TROMETHAMINE 15 MG: 30 INJECTION, SOLUTION INTRAMUSCULAR; INTRAVENOUS at 13:54

## 2021-11-18 RX ADMIN — LEVOTHYROXINE SODIUM 25 MCG: 0.03 TABLET ORAL at 06:33

## 2021-11-18 RX ADMIN — SODIUM CHLORIDE: 9 INJECTION, SOLUTION INTRAVENOUS at 00:36

## 2021-11-18 RX ADMIN — ENOXAPARIN SODIUM 40 MG: 100 INJECTION SUBCUTANEOUS at 09:33

## 2021-11-18 RX ADMIN — KETOROLAC TROMETHAMINE 15 MG: 30 INJECTION, SOLUTION INTRAMUSCULAR; INTRAVENOUS at 21:57

## 2021-11-18 RX ADMIN — Medication 2 PUFF: at 20:06

## 2021-11-18 RX ADMIN — INSULIN LISPRO 10 UNITS: 100 INJECTION, SOLUTION INTRAVENOUS; SUBCUTANEOUS at 09:37

## 2021-11-18 RX ADMIN — ACETAMINOPHEN 650 MG: 325 TABLET ORAL at 03:43

## 2021-11-18 RX ADMIN — METOCLOPRAMIDE 10 MG: 5 INJECTION, SOLUTION INTRAMUSCULAR; INTRAVENOUS at 06:31

## 2021-11-18 RX ADMIN — Medication 2 PUFF: at 08:24

## 2021-11-18 RX ADMIN — MONTELUKAST SODIUM 10 MG: 10 TABLET, FILM COATED ORAL at 21:57

## 2021-11-18 RX ADMIN — KETOROLAC TROMETHAMINE 15 MG: 30 INJECTION, SOLUTION INTRAMUSCULAR; INTRAVENOUS at 06:56

## 2021-11-18 RX ADMIN — ACETAMINOPHEN 650 MG: 325 TABLET ORAL at 21:57

## 2021-11-18 RX ADMIN — POTASSIUM PHOSPHATE, MONOBASIC AND POTASSIUM PHOSPHATE, DIBASIC 30 MMOL: 224; 236 INJECTION, SOLUTION, CONCENTRATE INTRAVENOUS at 12:04

## 2021-11-18 RX ADMIN — MORPHINE SULFATE 2 MG: 2 INJECTION, SOLUTION INTRAMUSCULAR; INTRAVENOUS at 10:00

## 2021-11-18 RX ADMIN — Medication 2 PUFF: at 08:25

## 2021-11-18 RX ADMIN — METRONIDAZOLE 500 MG: 500 INJECTION, SOLUTION INTRAVENOUS at 06:30

## 2021-11-18 RX ADMIN — INSULIN LISPRO 2 UNITS: 100 INJECTION, SOLUTION INTRAVENOUS; SUBCUTANEOUS at 16:50

## 2021-11-18 RX ADMIN — INSULIN LISPRO 10 UNITS: 100 INJECTION, SOLUTION INTRAVENOUS; SUBCUTANEOUS at 12:05

## 2021-11-18 RX ADMIN — INSULIN GLARGINE 15 UNITS: 100 INJECTION, SOLUTION SUBCUTANEOUS at 22:03

## 2021-11-18 RX ADMIN — THIAMINE HYDROCHLORIDE 100 MG: 100 INJECTION, SOLUTION INTRAMUSCULAR; INTRAVENOUS at 19:03

## 2021-11-18 RX ADMIN — ACETAMINOPHEN 650 MG: 325 TABLET ORAL at 12:01

## 2021-11-18 RX ADMIN — INSULIN LISPRO 15 UNITS: 100 INJECTION, SOLUTION INTRAVENOUS; SUBCUTANEOUS at 16:50

## 2021-11-18 RX ADMIN — LOSARTAN POTASSIUM 100 MG: 100 TABLET, FILM COATED ORAL at 09:34

## 2021-11-18 RX ADMIN — CIPROFLOXACIN 400 MG: 2 INJECTION, SOLUTION INTRAVENOUS at 14:06

## 2021-11-18 RX ADMIN — METRONIDAZOLE 500 MG: 500 INJECTION, SOLUTION INTRAVENOUS at 22:00

## 2021-11-18 RX ADMIN — ATORVASTATIN CALCIUM 10 MG: 10 TABLET, FILM COATED ORAL at 09:34

## 2021-11-18 RX ADMIN — KETOROLAC TROMETHAMINE 15 MG: 30 INJECTION, SOLUTION INTRAMUSCULAR; INTRAVENOUS at 00:55

## 2021-11-18 RX ADMIN — CIPROFLOXACIN 400 MG: 2 INJECTION, SOLUTION INTRAVENOUS at 00:39

## 2021-11-18 RX ADMIN — Medication 2 PUFF: at 20:08

## 2021-11-18 ASSESSMENT — PAIN - FUNCTIONAL ASSESSMENT
PAIN_FUNCTIONAL_ASSESSMENT: PREVENTS OR INTERFERES SOME ACTIVE ACTIVITIES AND ADLS

## 2021-11-18 ASSESSMENT — PAIN SCALES - GENERAL
PAINLEVEL_OUTOF10: 6
PAINLEVEL_OUTOF10: 4
PAINLEVEL_OUTOF10: 3
PAINLEVEL_OUTOF10: 5
PAINLEVEL_OUTOF10: 4
PAINLEVEL_OUTOF10: 3
PAINLEVEL_OUTOF10: 4
PAINLEVEL_OUTOF10: 4
PAINLEVEL_OUTOF10: 6

## 2021-11-18 ASSESSMENT — PAIN DESCRIPTION - DESCRIPTORS
DESCRIPTORS: OTHER (COMMENT)
DESCRIPTORS: CRAMPING

## 2021-11-18 ASSESSMENT — PAIN DESCRIPTION - ORIENTATION
ORIENTATION: LEFT
ORIENTATION: LEFT;LOWER
ORIENTATION: UPPER;RIGHT
ORIENTATION: LEFT;LOWER
ORIENTATION: LEFT;LOWER

## 2021-11-18 ASSESSMENT — PAIN DESCRIPTION - FREQUENCY
FREQUENCY: CONTINUOUS
FREQUENCY: CONTINUOUS
FREQUENCY: INTERMITTENT
FREQUENCY: CONTINUOUS

## 2021-11-18 ASSESSMENT — PAIN DESCRIPTION - LOCATION
LOCATION: ABDOMEN

## 2021-11-18 ASSESSMENT — PAIN DESCRIPTION - PROGRESSION
CLINICAL_PROGRESSION: GRADUALLY WORSENING
CLINICAL_PROGRESSION: GRADUALLY WORSENING
CLINICAL_PROGRESSION: GRADUALLY IMPROVING
CLINICAL_PROGRESSION: GRADUALLY WORSENING

## 2021-11-18 ASSESSMENT — PAIN DESCRIPTION - PAIN TYPE
TYPE: SURGICAL PAIN

## 2021-11-18 ASSESSMENT — PAIN DESCRIPTION - ONSET
ONSET: ON-GOING

## 2021-11-18 NOTE — PROGRESS NOTES
[facility]        Progress Note    Patient Name: Andre Deleon  MRN: [id number]  YOB: 1960  Date of Evaluation: 2021    Subjective:  No acute events overnight, low-grade fevers resolved  Pain controlled, did increased some overnight after trying to go without dose of morphine  No nausea or vomiting, tolerating clear liquids  Enteric output per stoma; voiding since Rutledge removed  Up to chair at this time    Post-Operative Day #2      Vital Signs:  Patient Vitals for the past 24 hrs:   BP Temp Temp src Pulse Resp SpO2 Height Weight   21 0930 131/68 98.2 °F (36.8 °C) Oral 99 18 94 % -- --   21 0828 -- -- -- -- 16 93 % -- --   21 0336 121/63 98.4 °F (36.9 °C) Oral 93 16 92 % -- --   21 0315 -- -- -- -- -- -- 5' 5\" (1.651 m) 266 lb 4.8 oz (120.8 kg)   21 0030 128/76 98.4 °F (36.9 °C) Oral 103 18 92 % -- --   21 -- -- -- -- -- 92 % -- --   21 1956 127/85 98.4 °F (36.9 °C) Oral 106 16 92 % -- --   21 1600 139/65 98.7 °F (37.1 °C) Oral 112 18 91 % -- --      TEMPERATURE HISTORY 24H: Temp (24hrs), Av.4 °F (36.9 °C), Min:98.2 °F (36.8 °C), Max:98.7 °F (37.1 °C)    BLOOD PRESSURE HISTORY: Systolic (69RGQ), HTI:842 , Min:104 , KBD:697    Diastolic (90TMT), HQE:68, Min:63, Max:85      Intake/Output:  I/O last 3 completed shifts: In: 1850.8 [P.O.:240; I.V.:600; IV Piggyback:1010.8]  Out: 1800 [Urine:1375; Stool:425]  I/O this shift:  In: 1538.7 [I.V.:1141.1;  IV Piggyback:397.6]  Out: -   Drain/tube Output:       Physical Exam:  General: awake, alert, oriented to person, place, time  Lungs: unlabored respirations  Abdomen: soft, non-distended, incisional tenderness only, bowel sounds present, stoma pink/viable with enteric output in pouch  Skin/Wound: NATE dressing in place, clean/dry; abdominal binder    Labs:  CBC:    Recent Labs     21  0449 21  0430 21  0437   WBC 4.7 5.7 6.2   HGB 11.9* 10.5* 9.8*   HCT 35.1* 32.2* 29.7*    195 198     BMP:    Recent Labs     11/16/21 0449 11/17/21 0430 11/18/21 0437    137 136   K 3.8 4.0 3.6    106 106   CO2 24 22 22   BUN 9 8 6*   CREATININE 0.6 0.5* <0.5*   GLUCOSE 205* 236* 194*     Hepatic:    Recent Labs     11/16/21 0449 11/17/21 0430 11/18/21 0437   AST 13* 9* 9*   ALT 16 10 10   BILITOT 0.6 0.6 0.6   ALKPHOS 68 57 76     Amylase:  No results found for: AMYLASE  Lipase:    Lab Results   Component Value Date    LIPASE 34.0 11/15/2021      Mag:    Lab Results   Component Value Date    MG 1.90 11/18/2021    MG 1.90 11/17/2021     Phos:     Lab Results   Component Value Date    PHOS 1.4 11/18/2021    PHOS 1.8 11/17/2021      Coags: No results found for: PROTIME, INR, APTT    Cultures:  Anaerobic culture  No results found for: LABANAE  Fungus stain  No results found for requested labs within last 30 days. Gram stain  No results found for requested labs within last 30 days. Organism  No results found for: [facility]  Surgical culture  No results found for: CXSURG  Blood culture 1  No results found for requested labs within last 30 days. Blood culture 2  No results found for requested labs within last 30 days. Fecal occult  No results found for requested labs within last 30 days. GI bacterial pathogens by PCR  No results found for requested labs within last 30 days. C. difficile  No results found for requested labs within last 30 days. Urine culture  No results found for: Jil Cohen    Pathology:  OR 11/16/2021--FINAL DIAGNOSIS:     Soft tissue, hernia sac and contents, excision:   - Fibromembranous tissue with mesothelial lining, compatible with hernia   sac. Imaging:  I have personally reviewed the following films:    No results found.     Scheduled Meds:   potassium phosphate IVPB  30 mmol IntraVENous Once    thiamine (VITAMIN B1) IVPB  100 mg IntraVENous Q24H    insulin glargine  30 Units SubCUTAneous BID    insulin lispro  10 Units SubCUTAneous TID     insulin lispro  0-12 Units SubCUTAneous TID     insulin lispro  0-6 Units SubCUTAneous Nightly    atorvastatin  10 mg Oral Daily    levothyroxine  25 mcg Oral Daily    losartan  100 mg Oral Daily    montelukast  10 mg Oral Nightly    sodium chloride flush  5-40 mL IntraVENous 2 times per day    enoxaparin  40 mg SubCUTAneous Daily    budesonide-formoterol  2 puff Inhalation BID    ciprofloxacin  400 mg IntraVENous Q12H    metroNIDAZOLE  500 mg IntraVENous Q8H    albuterol sulfate HFA  2 puff Inhalation BID     Continuous Infusions:   dextrose      sodium chloride       PRN Meds:. HYDROcodone 5 mg - acetaminophen, morphine **OR** [DISCONTINUED] morphine, albuterol sulfate HFA, ketorolac, glucose, dextrose, glucagon (rDNA), dextrose, sodium chloride flush, sodium chloride, ondansetron **OR** ondansetron, polyethylene glycol, acetaminophen **OR** acetaminophen      Assessment:  62 y.o. female admitted with   1. Generalized abdominal pain    2. Non-intractable vomiting with nausea, unspecified vomiting type    3. SBO (small bowel obstruction) ([facility])        Status-post open repair of complex incarcerated incisional hernia with extensive lysis of adhesions, and mesh placement for hernia repair on 11/16/2021 for incarcerated incisional hernia, small bowel obstruction  Hypertension  Diabetes      Plan:  1. Keep NATE dressing in place, surgery with change tomorrow  2. Advance to low fiber diet as tolerated; monitor stoma output  3. Stop IV hydration; monitor and correct electrolytes  4. Antibiotics--on Cipro and Flagyl; low-grade fevers resolved, normal WBC--monitor  5. Activity as tolerated, ambulate TID, up to chair for all meals  6. Pulmonary toilet, incentive spirometry  7. PRN analgesics and antiemetics--minimizing narcotics as tolerated, transition to PO  8. DVT prophylaxis with Lovenox and SCD's  9.  Management of medical comorbid etiologies per primary team and consulting services  10. Disposition: Anticipate discharge home in the next 24-48 hours    EDUCATION:  Educated patient on plan of care and disease process--all questions answered. Plans discussed with patient and nursing. Reviewed and discussed with Dr. Batista Credit.       Signed:  CORDELL Flores CNP  11/18/2021 2:08 PM     Surgery Staff:     Pt seen and examined with NP  See full note above  Pt doing well overall  Adv diet today  Rutledge out  Change NATE tomorrow  DC plans in 1 -2 days    Cecille Ballesteros MD

## 2021-11-18 NOTE — PROGRESS NOTES
Assessment complete. Alert, oriented x4. Dressing to abdomen intact; scant old drainage noted. Complaining of abdominal pain, given PRN Toradol per [facility]; per patient, she would like to avoid taking PRN morphine. Ostomy bag intact. Assisted to bathroom, voided. Ambulated short distance in hallway, tolerated fairly well. IS educated, encouraged, practiced. SCDs educated, in place. The care plan and education has been reviewed and mutually agreed upon with the patient. In bed, alarm on, bed in lowest position, call light and table within reach. No further needs expressed at this time. [id number]  Patient feeling restless, assisted up to chair. [id number]  Assisted back to bed. Complaining of pain, given PRN Tylenol per [facility].    0659  Complaining of pain, given PRN Toradol per MAR.

## 2021-11-18 NOTE — RT PROTOCOL NOTE
RT Inhaler-Nebulizer Bronchodilator Protocol Note    There is a bronchodilator order in the chart from a provider indicating to follow the RT Bronchodilator Protocol and there is an Initiate RT Inhaler-Nebulizer Bronchodilator Protocol order as well (see protocol at bottom of note). CXR Findings:  No results found. The findings from the last RT Protocol Assessment were as follows:   History Pulmonary Disease: Chronic pulmonary disease  Respiratory Pattern: Regular pattern and RR 12-20 bpm  Breath Sounds: Slightly diminished and/or crackles  Cough: Strong, spontaneous, non-productive  Indication for Bronchodilator Therapy: None  Bronchodilator Assessment Score: 4    Aerosolized bronchodilator medication orders have been revised according to the RT Inhaler-Nebulizer Bronchodilator Protocol below. Respiratory Therapist to perform RT Therapy Protocol Assessment initially then follow the protocol. Repeat RT Therapy Protocol Assessment PRN for score 0-3 or on second treatment, BID, and PRN for scores above 3. No Indications - adjust the frequency to every 6 hours PRN wheezing or bronchospasm, if no treatments needed after 48 hours then discontinue using Per Protocol order mode. If indication present, adjust the RT bronchodilator orders based on the Bronchodilator Assessment Score as indicated below. Use Inhaler orders unless patient has one or more of the following: on home nebulizer, not able to hold breath for 10 seconds, is not alert and oriented, cannot activate and use MDI correctly, or respiratory rate 25 breaths per minute or more, then use the equivalent nebulizer order(s) with same Frequency and PRN reasons based on the score. If a patient is on this medication at home then do not decrease Frequency below that used at home.     0-3 - enter or revise RT bronchodilator order(s) to equivalent RT Bronchodilator order with Frequency of every 4 hours PRN for wheezing or increased work of breathing using Per Protocol order mode. 4-6 - enter or revise RT Bronchodilator order(s) to two equivalent RT bronchodilator orders with one order with BID Frequency and one order with Frequency of every 4 hours PRN wheezing or increased work of breathing using Per Protocol order mode. 7-10 - enter or revise RT Bronchodilator order(s) to two equivalent RT bronchodilator orders with one order with TID Frequency and one order with Frequency of every 4 hours PRN wheezing or increased work of breathing using Per Protocol order mode. 11-13 - enter or revise RT Bronchodilator order(s) to one equivalent RT bronchodilator order with QID Frequency and an Albuterol order with Frequency of every 4 hours PRN wheezing or increased work of breathing using Per Protocol order mode. Greater than 13 - enter or revise RT Bronchodilator order(s) to one equivalent RT bronchodilator order with every 4 hours Frequency and an Albuterol order with Frequency of every 2 hours PRN wheezing or increased work of breathing using Per Protocol order mode. RT to enter RT Home Evaluation for COPD & MDI Assessment order using Per Protocol order mode.     Electronically signed by Leo Anna RCP on 11/18/2021 at 12:38 PM

## 2021-11-18 NOTE — PROGRESS NOTES
Physician Progress Note      Merlin Boo  CSN #:                  [id number]  :                       1960  ADMIT DATE:       11/15/2021 12:41 AM  DISCH DATE:  RESPONDING  PROVIDER #:        Dixie Soares MD          QUERY TEXT:    Pt admitted with small bowel obstruction. Pt noted to have drop in H&H   following procedure. If possible, please document in the progress notes and/or   discharge summary if you are evaluating and/or treating any of the following: The medical record reflects the following:  Risk Factors: Patient is post-op hernia repair and extensive lysis of   adhesions  Clinical Indicators: H&H on admission 14.3/42.6, H&H on 2021: 9.8/29.7  Treatment: Monitoring of daily labs, patient was typed and cross-matched  Options provided:  -- Postoperative acute blood loss anemia  -- Other - I will add my own diagnosis  -- Disagree - Not applicable / Not valid  -- Disagree - Clinically unable to determine / Unknown  -- Refer to Clinical Documentation Reviewer    PROVIDER RESPONSE TEXT:    This patient has postoperative acute blood loss anemia.     Query created by: Madelin Vale on 2021 11:49 AM      Electronically signed by:  Dixie Soares MD 2021 12:22 PM

## 2021-11-18 NOTE — PROGRESS NOTES
[facility]    11/18/2021 2:20 PM        Name: Cici Childers . Admitted: 11/15/2021  Primary Care Provider: Bushra Medellin (Tel: [phone])      Subjective:  . Patient feeling ok. Has occasional cough. Denies sob. Having some abdominal pain post op. Tolerating clears.      Reviewed interval ancillary notes    Current Medications  potassium phosphate 30 mmol in dextrose 5 % 250 mL IVPB, Once  HYDROcodone-acetaminophen (NORCO) 5-325 MG per tablet 1 tablet, Q6H PRN  morphine (PF) injection 2 mg, Q4H PRN  thiamine (B-1) 100 mg in sodium chloride 0.9 % 100 mL IVPB, Q24H  albuterol sulfate  (90 Base) MCG/ACT inhaler 2 puff, Q4H PRN  insulin glargine (LANTUS;BASAGLAR) injection pen 30 Units, BID  insulin lispro (1 Unit Dial) 10 Units, TID WC  insulin lispro (1 Unit Dial) 0-12 Units, TID WC  insulin lispro (1 Unit Dial) 0-6 Units, Nightly  ketorolac (TORADOL) injection 15 mg, Q6H PRN  atorvastatin (LIPITOR) tablet 10 mg, Daily  levothyroxine (SYNTHROID) tablet 25 mcg, Daily  losartan (COZAAR) tablet 100 mg, Daily  montelukast (SINGULAIR) tablet 10 mg, Nightly  glucose (GLUTOSE) 40 % oral gel 15 g, PRN  dextrose 50 % IV solution, PRN  glucagon (rDNA) injection 1 mg, PRN  dextrose 5 % solution, PRN  sodium chloride flush 0.9 % injection 5-40 mL, 2 times per day  sodium chloride flush 0.9 % injection 10 mL, PRN  0.9 % sodium chloride infusion, PRN  enoxaparin (LOVENOX) injection 40 mg, Daily  ondansetron (ZOFRAN-ODT) disintegrating tablet 4 mg, Q8H PRN   Or  ondansetron (ZOFRAN) injection 4 mg, Q6H PRN  polyethylene glycol (GLYCOLAX) packet 17 g, Daily PRN  acetaminophen (TYLENOL) tablet 650 mg, Q6H PRN   Or  acetaminophen (TYLENOL) suppository 650 mg, Q6H PRN  budesonide-formoterol (SYMBICORT) 80-4.5 MCG/ACT inhaler 2 puff, BID  ciprofloxacin (CIPRO) IVPB 400 mg, Q12H  metronidazole (FLAGYL) 500 mg in NaCl 100 mL IVPB premix, Q8H  albuterol sulfate  (90 Base) MCG/ACT inhaler 2 puff, BID        Objective:  /68   Pulse 99   Temp 98.2 °F (36.8 °C) (Oral)   Resp 18   Ht 5' 5\" (1.651 m)   Wt 266 lb 4.8 oz (120.8 kg)   LMP 08/16/2010   SpO2 94%   BMI 44.31 kg/m²     Intake/Output Summary (Last 24 hours) at 11/18/2021 1420  Last data filed at 11/18/2021 0803  Gross per 24 hour   Intake 3389.46 ml   Output 1175 ml   Net 2214.46 ml      Wt Readings from Last 3 Encounters:   11/18/21 266 lb 4.8 oz (120.8 kg)   07/29/21 252 lb (114.3 kg)   03/17/20 250 lb (113.4 kg)       General appearance:  Appears comfortable  Eyes: Sclera clear. Pupils equal.  ENT: Moist oral mucosa. Trachea midline, no adenopathy. Cardiovascular: Regular rhythm, normal S1, S2. No murmur. No edema in lower extremities  Respiratory: Not using accessory muscles. Good inspiratory effort. Clear to auscultation bilaterally, no wheeze or crackles. GI: Abdomen soft, no tenderness, not distended, normal bowel sounds, stoma RLQ  Musculoskeletal: No cyanosis in digits, neck supple  Neurology: CN 2-12 grossly intact. No speech or motor deficits  Psych: Normal affect. Alert and oriented in time, place and person  Skin: Warm, dry, normal turgor    Labs and Tests:  CBC:   Recent Labs     11/16/21 0449 11/17/21 0430 11/18/21 0437   WBC 4.7 5.7 6.2   HGB 11.9* 10.5* 9.8*    195 198     BMP:    Recent Labs     11/16/21 0449 11/17/21 0430 11/18/21 0437    137 136   K 3.8 4.0 3.6    106 106   CO2 24 22 22   BUN 9 8 6*   CREATININE 0.6 0.5* <0.5*   GLUCOSE 205* 236* 194*     Hepatic:   Recent Labs     11/16/21 0449 11/17/21 0430 11/18/21 0437   AST 13* 9* 9*   ALT 16 10 10   BILITOT 0.6 0.6 0.6   ALKPHOS 68 57 76         Problem List  Active Problems:    Small bowel obstruction (HCC)    Incarcerated incisional hernia    Generalized abdominal pain  Resolved Problems:    * No resolved hospital problems.  * Assessment & Plan:   1. Incarcerated hernia-patient is s/p open repair of incisional hernia with mesh and CHARLEEN-PO day 2. Doing well. Management per surgery. 2. DM 2-on 50 units of lantus bid at home. Currently on 30 of lantus. Will continue 30 bid and increase humalog to 15 units. Continue medium SSI. 3. Hypertension-Bp stable      Diet: ADULT DIET;  Regular; Low Fiber  Code:Full Code  DVT PPX; lovenox      Winston Salinas PA-C   11/18/2021 2:20 PM

## 2021-11-19 VITALS
HEIGHT: 65 IN | DIASTOLIC BLOOD PRESSURE: 76 MMHG | WEIGHT: 266.3 LBS | HEART RATE: 93 BPM | TEMPERATURE: 98.3 F | SYSTOLIC BLOOD PRESSURE: 136 MMHG | RESPIRATION RATE: 18 BRPM | OXYGEN SATURATION: 99 % | BODY MASS INDEX: 44.37 KG/M2

## 2021-11-19 LAB
ANION GAP SERPL CALCULATED.3IONS-SCNC: 11 MMOL/L (ref 3–16)
BANDED NEUTROPHILS RELATIVE PERCENT: 11 % (ref 0–7)
BASOPHILS ABSOLUTE: 0 K/UL (ref 0–0.2)
BASOPHILS RELATIVE PERCENT: 0 %
BUN BLDV-MCNC: 6 MG/DL (ref 7–20)
CALCIUM SERPL-MCNC: 8.5 MG/DL (ref 8.3–10.6)
CHLORIDE BLD-SCNC: 104 MMOL/L (ref 99–110)
CO2: 23 MMOL/L (ref 21–32)
CREAT SERPL-MCNC: <0.5 MG/DL (ref 0.6–1.2)
EOSINOPHILS ABSOLUTE: 0.4 K/UL (ref 0–0.6)
EOSINOPHILS RELATIVE PERCENT: 4 %
GFR AFRICAN AMERICAN: >60
GFR NON-AFRICAN AMERICAN: >60
GLUCOSE BLD-MCNC: 107 MG/DL (ref 70–99)
GLUCOSE BLD-MCNC: 112 MG/DL (ref 70–99)
GLUCOSE BLD-MCNC: 121 MG/DL (ref 70–99)
GLUCOSE BLD-MCNC: 138 MG/DL (ref 70–99)
GLUCOSE BLD-MCNC: 82 MG/DL (ref 70–99)
HCT VFR BLD CALC: 31.5 % (ref 36–48)
HEMOGLOBIN: 10.4 G/DL (ref 12–16)
LYMPHOCYTES ABSOLUTE: 1.6 K/UL (ref 1–5.1)
LYMPHOCYTES RELATIVE PERCENT: 18 %
MAGNESIUM: 1.9 MG/DL (ref 1.8–2.4)
MCH RBC QN AUTO: 28.3 PG (ref 26–34)
MCHC RBC AUTO-ENTMCNC: 33.2 G/DL (ref 31–36)
MCV RBC AUTO: 85.3 FL (ref 80–100)
MONOCYTES ABSOLUTE: 0.5 K/UL (ref 0–1.3)
MONOCYTES RELATIVE PERCENT: 6 %
NEUTROPHILS ABSOLUTE: 6.3 K/UL (ref 1.7–7.7)
NEUTROPHILS RELATIVE PERCENT: 61 %
NUCLEATED RED BLOOD CELLS: 1 /100 WBC
PDW BLD-RTO: 14.5 % (ref 12.4–15.4)
PERFORMED ON: ABNORMAL
PERFORMED ON: NORMAL
PLATELET # BLD: 262 K/UL (ref 135–450)
PMV BLD AUTO: 6.5 FL (ref 5–10.5)
POTASSIUM REFLEX MAGNESIUM: 3.4 MMOL/L (ref 3.5–5.1)
RBC # BLD: 3.69 M/UL (ref 4–5.2)
SODIUM BLD-SCNC: 138 MMOL/L (ref 136–145)
WBC # BLD: 8.8 K/UL (ref 4–11)

## 2021-11-19 PROCEDURE — APPNB30 APP NON BILLABLE TIME 0-30 MINS: Performed by: NURSE PRACTITIONER

## 2021-11-19 PROCEDURE — 6370000000 HC RX 637 (ALT 250 FOR IP): Performed by: SURGERY

## 2021-11-19 PROCEDURE — 94640 AIRWAY INHALATION TREATMENT: CPT

## 2021-11-19 PROCEDURE — APPSS15 APP SPLIT SHARED TIME 0-15 MINUTES: Performed by: NURSE PRACTITIONER

## 2021-11-19 PROCEDURE — 2580000003 HC RX 258: Performed by: SURGERY

## 2021-11-19 PROCEDURE — 2500000003 HC RX 250 WO HCPCS: Performed by: SURGERY

## 2021-11-19 PROCEDURE — 83735 ASSAY OF MAGNESIUM: CPT

## 2021-11-19 PROCEDURE — 6360000002 HC RX W HCPCS: Performed by: SURGERY

## 2021-11-19 PROCEDURE — 80048 BASIC METABOLIC PNL TOTAL CA: CPT

## 2021-11-19 PROCEDURE — 6370000000 HC RX 637 (ALT 250 FOR IP): Performed by: NURSE PRACTITIONER

## 2021-11-19 PROCEDURE — 99024 POSTOP FOLLOW-UP VISIT: CPT | Performed by: SURGERY

## 2021-11-19 PROCEDURE — 6370000000 HC RX 637 (ALT 250 FOR IP): Performed by: PHYSICIAN ASSISTANT

## 2021-11-19 PROCEDURE — 94761 N-INVAS EAR/PLS OXIMETRY MLT: CPT

## 2021-11-19 PROCEDURE — 85025 COMPLETE CBC W/AUTO DIFF WBC: CPT

## 2021-11-19 PROCEDURE — 36415 COLL VENOUS BLD VENIPUNCTURE: CPT

## 2021-11-19 RX ORDER — SULFAMETHOXAZOLE AND TRIMETHOPRIM 800; 160 MG/1; MG/1
1 TABLET ORAL 2 TIMES DAILY
Qty: 10 TABLET | Refills: 0 | Status: ON HOLD | OUTPATIENT
Start: 2021-11-19 | End: 2021-11-25 | Stop reason: HOSPADM

## 2021-11-19 RX ORDER — POTASSIUM CHLORIDE 20 MEQ/1
20 TABLET, EXTENDED RELEASE ORAL ONCE
Status: COMPLETED | OUTPATIENT
Start: 2021-11-19 | End: 2021-11-19

## 2021-11-19 RX ORDER — HYDROCODONE BITARTRATE AND ACETAMINOPHEN 5; 325 MG/1; MG/1
1 TABLET ORAL EVERY 6 HOURS PRN
Qty: 12 TABLET | Refills: 0 | Status: ON HOLD | OUTPATIENT
Start: 2021-11-19 | End: 2021-11-25 | Stop reason: HOSPADM

## 2021-11-19 RX ADMIN — CIPROFLOXACIN 400 MG: 2 INJECTION, SOLUTION INTRAVENOUS at 12:37

## 2021-11-19 RX ADMIN — HYDROCODONE BITARTRATE AND ACETAMINOPHEN 1 TABLET: 5; 325 TABLET ORAL at 03:20

## 2021-11-19 RX ADMIN — LOSARTAN POTASSIUM 100 MG: 100 TABLET, FILM COATED ORAL at 09:32

## 2021-11-19 RX ADMIN — METRONIDAZOLE 500 MG: 500 INJECTION, SOLUTION INTRAVENOUS at 05:39

## 2021-11-19 RX ADMIN — ACETAMINOPHEN 650 MG: 325 TABLET ORAL at 12:45

## 2021-11-19 RX ADMIN — INSULIN LISPRO 15 UNITS: 100 INJECTION, SOLUTION INTRAVENOUS; SUBCUTANEOUS at 12:47

## 2021-11-19 RX ADMIN — ATORVASTATIN CALCIUM 10 MG: 10 TABLET, FILM COATED ORAL at 09:32

## 2021-11-19 RX ADMIN — Medication 10 ML: at 09:34

## 2021-11-19 RX ADMIN — ENOXAPARIN SODIUM 40 MG: 100 INJECTION SUBCUTANEOUS at 09:32

## 2021-11-19 RX ADMIN — METRONIDAZOLE 500 MG: 500 INJECTION, SOLUTION INTRAVENOUS at 13:57

## 2021-11-19 RX ADMIN — Medication 2 PUFF: at 08:43

## 2021-11-19 RX ADMIN — HYDROCODONE BITARTRATE AND ACETAMINOPHEN 1 TABLET: 5; 325 TABLET ORAL at 09:32

## 2021-11-19 RX ADMIN — ONDANSETRON 4 MG: 4 TABLET, ORALLY DISINTEGRATING ORAL at 09:32

## 2021-11-19 RX ADMIN — CIPROFLOXACIN 400 MG: 2 INJECTION, SOLUTION INTRAVENOUS at 01:58

## 2021-11-19 RX ADMIN — LEVOTHYROXINE SODIUM 25 MCG: 0.03 TABLET ORAL at 05:39

## 2021-11-19 RX ADMIN — HYDROCODONE BITARTRATE AND ACETAMINOPHEN 1 TABLET: 5; 325 TABLET ORAL at 15:56

## 2021-11-19 RX ADMIN — KETOROLAC TROMETHAMINE 15 MG: 30 INJECTION, SOLUTION INTRAMUSCULAR; INTRAVENOUS at 06:07

## 2021-11-19 RX ADMIN — INSULIN LISPRO 15 UNITS: 100 INJECTION, SOLUTION INTRAVENOUS; SUBCUTANEOUS at 09:35

## 2021-11-19 RX ADMIN — POTASSIUM CHLORIDE 20 MEQ: 20 TABLET, EXTENDED RELEASE ORAL at 13:59

## 2021-11-19 ASSESSMENT — PAIN DESCRIPTION - ORIENTATION
ORIENTATION: LOWER;LEFT;RIGHT
ORIENTATION: LOWER;LEFT;RIGHT

## 2021-11-19 ASSESSMENT — PAIN DESCRIPTION - LOCATION
LOCATION: ABDOMEN

## 2021-11-19 ASSESSMENT — PAIN DESCRIPTION - DESCRIPTORS
DESCRIPTORS: SORE

## 2021-11-19 ASSESSMENT — PAIN DESCRIPTION - PAIN TYPE
TYPE: SURGICAL PAIN

## 2021-11-19 ASSESSMENT — PAIN DESCRIPTION - PROGRESSION
CLINICAL_PROGRESSION: GRADUALLY IMPROVING
CLINICAL_PROGRESSION: GRADUALLY WORSENING

## 2021-11-19 ASSESSMENT — PAIN SCALES - GENERAL
PAINLEVEL_OUTOF10: 5
PAINLEVEL_OUTOF10: 4
PAINLEVEL_OUTOF10: 3
PAINLEVEL_OUTOF10: 6
PAINLEVEL_OUTOF10: 3

## 2021-11-19 ASSESSMENT — PAIN DESCRIPTION - FREQUENCY
FREQUENCY: CONTINUOUS
FREQUENCY: CONTINUOUS

## 2021-11-19 ASSESSMENT — PAIN DESCRIPTION - ONSET: ONSET: ON-GOING

## 2021-11-19 NOTE — PROGRESS NOTES
Data- discharge order received, pt verbalized agreement to discharge, disposition to previous residence, no needs for HHC/DME. Action- discharge instructions prepared and given to pt, pt verbalized understanding. Medication information packet given r/t NEW and/or CHANGED prescriptions emphasizing name/purpose/side effects, pt verbalized understanding. Discharge instruction summary: Diet- as tolerated, Activity- as tolerated no heavy lifting, instructions given, Primary Care Physician as followsBoyce Case [id number] f/u appointment needs to be scheduled, immunizations reviewed, prescription medications given scripts for pharmacy. Inpatient surgical procedure precautions reviewed: handouts given and reviewed. 1. WEIGHT: Admit Weight: 255 lb (115.7 kg) (11/15/21 1739)        Today  Weight: 266 lb 4.8 oz (120.8 kg) (11/18/21 0315)       2. O2 SAT.: SpO2: 99 % (11/19/21 1710)    Response- Pt belongings gathered, IV removed. Disposition is home (no HHC/DME needs), pt waiting in room for ride, will notify RN when ride arrives.

## 2021-11-19 NOTE — PROGRESS NOTES
Pt tolerating diet without nausea. Pt producing stool via ostomy. NATE dressing intact. D/c order in place, pt states she feels she is ready for d/c.

## 2021-11-19 NOTE — PROGRESS NOTES
Recent Labs     11/17/21 0430 11/18/21 0437 11/19/21  1129   WBC 5.7 6.2 8.8   HGB 10.5* 9.8* 10.4*   HCT 32.2* 29.7* 31.5*    198 262     BMP:    Recent Labs     11/17/21  0430 11/18/21 0437 11/19/21  1129    136 138   K 4.0 3.6 3.4*    106 104   CO2 22 22 23   BUN 8 6* 6*   CREATININE 0.5* <0.5* <0.5*   GLUCOSE 236* 194* 121*     Hepatic:    Recent Labs     11/17/21 0430 11/18/21 0437   AST 9* 9*   ALT 10 10   BILITOT 0.6 0.6   ALKPHOS 57 76     Amylase:  No results found for: AMYLASE  Lipase:    Lab Results   Component Value Date    LIPASE 34.0 11/15/2021      Mag:    Lab Results   Component Value Date    MG 1.90 11/19/2021    MG 1.90 11/18/2021     Phos:     Lab Results   Component Value Date    PHOS 1.4 11/18/2021    PHOS 1.8 11/17/2021      Coags: No results found for: PROTIME, INR, APTT    Cultures:  Anaerobic culture  No results found for: LABANAE  Fungus stain  No results found for requested labs within last 30 days. Gram stain  No results found for requested labs within last 30 days. Organism  No results found for: [facility]  Surgical culture  No results found for: CXSURG  Blood culture 1  No results found for requested labs within last 30 days. Blood culture 2  No results found for requested labs within last 30 days. Fecal occult  No results found for requested labs within last 30 days. GI bacterial pathogens by PCR  No results found for requested labs within last 30 days. C. difficile  No results found for requested labs within last 30 days. Urine culture  No results found for: Yani Chase    Pathology:  OR 11/16/2021--FINAL DIAGNOSIS:     Soft tissue, hernia sac and contents, excision:   - Fibromembranous tissue with mesothelial lining, compatible with hernia   sac. Imaging:  I have personally reviewed the following films:    No results found.     Scheduled Meds:   thiamine (VITAMIN B1) IVPB  100 mg IntraVENous Q24H    insulin lispro  15 Units SubCUTAneous TID     insulin glargine  30 Units SubCUTAneous BID    insulin lispro  0-12 Units SubCUTAneous TID     insulin lispro  0-6 Units SubCUTAneous Nightly    atorvastatin  10 mg Oral Daily    levothyroxine  25 mcg Oral Daily    losartan  100 mg Oral Daily    montelukast  10 mg Oral Nightly    sodium chloride flush  5-40 mL IntraVENous 2 times per day    enoxaparin  40 mg SubCUTAneous Daily    budesonide-formoterol  2 puff Inhalation BID    ciprofloxacin  400 mg IntraVENous Q12H    metroNIDAZOLE  500 mg IntraVENous Q8H    albuterol sulfate HFA  2 puff Inhalation BID     Continuous Infusions:   dextrose      sodium chloride       PRN Meds:. HYDROcodone 5 mg - acetaminophen, morphine **OR** [DISCONTINUED] morphine, albuterol sulfate HFA, ketorolac, glucose, dextrose, glucagon (rDNA), dextrose, sodium chloride flush, sodium chloride, ondansetron **OR** ondansetron, polyethylene glycol, acetaminophen **OR** acetaminophen      Assessment:  62 y.o. female admitted with   1. Generalized abdominal pain    2. Non-intractable vomiting with nausea, unspecified vomiting type    3. SBO (small bowel obstruction) ([facility] [address]    4. Incarcerated incisional hernia        Status-post open repair of complex incarcerated incisional hernia with extensive lysis of adhesions, and mesh placement for hernia repair on 11/16/2021 for incarcerated incisional hernia, small bowel obstruction  Hypertension  Diabetes      Plan:  1. Keep NATE dressing in place, upon discharge--if ostomy pouch leaks again and NATE has to be removed, place dry gauze dressing  2. Continue low fiber diet as tolerated; monitor stoma output  3. Monitor and correct electrolytes  4. Antibiotics--on Cipro and Flagyl; 5 day course of Bactrim prescribed for discharge  5. Activity as tolerated, ambulate TID, up to chair for all meals  6. Pulmonary toilet, incentive spirometry  7.  PRN analgesics and antiemetics--minimizing narcotics as tolerated, transition to PO  8. DVT prophylaxis with Lovenox and SCD's  9. Management of medical comorbid etiologies per primary team and consulting services  10. Disposition: Okay for discharge home this evening after dinner if nausea resolved; follow up with Dr. Beverley Mcarthur in 4 days    EDUCATION:  Educated patient on plan of care and disease process--all questions answered. Plans discussed with patient and nursing. Reviewed and discussed with Dr. Beverley Mcarthur.       Signed:  CORDELL William - CNP  11/19/2021 2:01 PM     Surgery Staff:     Pt seen and examined with NP  See full note above  Doing well  NATE and ileostomy bag changed- discussed wound and keeping incision clean at bag change  Anticipating DC home later today, see in office Deena Landa MD

## 2021-11-20 NOTE — DISCHARGE SUMMARY
[facility]    Patient Demographics    Patient. Heather Reynoso 89  Date of Birth. 1960  MRN. [id number]     Primary care provider. Cielo Emerson  (Tel: [phone])    Admit date: 11/15/2021    Discharge date (blank if same as Note Date): Note Date: 11/19/2021     Reason for Hospitalization. Chief Complaint   Patient presents with    Abdominal Pain     Pt has a hx of colitis with colon, rectum and portions of the small bowel removed. Pt CO abdominal cramping, decreased stool output and emesis x 2 today. Significant Findings. Active Problems:    Small bowel obstruction (HCC)    Incarcerated incisional hernia    Generalized abdominal pain  Resolved Problems:    * No resolved hospital problems. *       Problems and results from this hospitalization that need follow up. 1. Postop follow-up  2. Thickened endometrium-outpatient pelvic ultrasound    Significant test results and incidental findings. 1. CT abd/pelvis  1. Small bowel obstruction with transition point within a right paramidline   ventral hernia in the anterior pelvis.  Trace free fluid is noted. 2. Right lower quadrant peristomal hernia is noted containing bowel. Although there is no significant bowel dilation within the hernia sac, there   is mild inflammatory stranding and trace amount of fluid raising suspicion   for incarceration. 3. Cholelithiasis. 4. Thickened endometrium measuring up to 10 mm.  Recommend correlation with   any history of postmenopausal bleeding.  Consider further evaluation with   nonemergent ultrasound.           Invasive procedures and treatments. Open repair of complex incarcerated incisional hernia with extensive lysis of adhesions and mesh placement for hernia repair 11/16    [facility].   Patient is a pleasant 60-year-old female who presented to the hospital with abdominal pain nausea and vomiting. CT scan in the emergency room revealed a small bowel obstruction with transition point in the right paramidline ventral hernia. There was a right lower quadrant parastomal hernia noted containing bowel concerning for incarceration. Patient was hydrated, had an NG tube placed, and was admitted to the hospital.  She was seen by general surgery and taken to the operating room by Dr. Eloina Dobbins on November 16 for open repair of complex incarcerated incisional hernia with extensive lysis of adhesions and mesh placement for hernia repair. Patient tolerated the procedure well. She was started on clear liquid diet which was slowly advanced. She had an uncomplicated postoperative course and was discharged home in stable condition. Consults. IP CONSULT TO GENERAL SURGERY  IP CONSULT TO HOSPITALIST    Physical examination on discharge day. /76   Pulse 93   Temp 98.3 °F (36.8 °C) (Oral)   Resp 18   Ht 5' 5\" (1.651 m)   Wt 266 lb 4.8 oz (120.8 kg)   LMP 08/16/2010   SpO2 99%   BMI 44.31 kg/m²   General appearance. Alert. Looks comfortable. HEENT. Sclera clear. Moist mucus membranes. Cardiovascular. Regular rate and rhythm, normal S1, S2. No murmur. Respiratory. Not using accessory muscles. Clear to auscultation bilaterally, no wheeze. Gastrointestinal. Abdomen soft, non-tender, not distended, normal bowel sounds  Neurology. Facial symmetry. No speech deficits. Moving all extremities equally. Extremities. No edema in lower extremities. Skin. Warm, dry, normal turgor    Condition at time of discharge stable    Medication instructions provided to patient at discharge. Medication List      START taking these medications    HYDROcodone-acetaminophen 5-325 MG per tablet  Commonly known as: Norco  Take 1 tablet by mouth every 6 hours as needed for Pain for up to 5 days.  Take lowest dose possible to manage pain; may stop taking sooner than 7 days if pain is able to be controlled with non-narcotic analgesics and therapies. Notes to patient: Use: to treat pain  Side effects: drowsiness, nausea dry mouth, muscle weakness      sulfamethoxazole-trimethoprim 800-160 MG per tablet  Commonly known as: BACTRIM DS;SEPTRA DS  Take 1 tablet by mouth 2 times daily for 5 days  Notes to patient: Use: (sulfa based) is a broad spectrum antibiotic used to treat or prevent infections  Side effects: fever, sore throat, upset stomach/indigestion, bruising, cough, diarrhea and loss of appetite. CONTINUE taking these medications    acetaminophen 500 MG tablet  Commonly known as: TYLENOL  Notes to patient: Use: Fever, mild pain  Avoid if history of liver issues  Do not exceed 4000mg of acetaminophen in a 24 hour period. ALBUTEROL IN  Notes to patient: Use: Bronchodilator to open your air way in the even of emergencies, shortness of breath. Side effects: Jitteriness, increased heart rate, thrush (coating on tongue). atorvastatin 10 MG tablet  Commonly known as: LIPITOR  Notes to patient: Use: statin used to treat cholesterol and triglyceride levels, may reduce the risk of stroke or heart attacks  Side effects: muscle weakness, fatigue, cold like symptoms, urinary tract infection, nausea, loss of appetite and indigestion. BD Insulin Syringe U/F 30G X 1/2\" 0.3 ML Misc  Generic drug: Insulin Syringe-Needle U-100  USE WITH INSULIN TWICE A DAY     Breo Ellipta 100-25 MCG/INH Aepb inhaler  Generic drug: fluticasone-vilanterol  Notes to patient: Use: combination of fluticasone and vilanterol. Fluticasone is a steroid. It prevents the release of substances in the body that cause inflammation. Vilanterol is a bronchodilator.  It works by relaxing muscles in the airways to improve breathing  Side effects: muscle aches     Claritin 10 MG capsule  Generic drug: loratadine  Notes to patient: Use: allergies, allergic rhinitis, itching  Side effects: fatigue, dry mouth     COENZYME Q10 PO     CRANBERRY PO     fish oil 1000 MG Caps  Notes to patient: Use: to lower triglycerides  Side effects: burping, change in taste, upset stomach     insulin lispro 100 UNIT/ML injection vial  Commonly known as: HumaLOG  INJECT 25 UNITS INTO THE SKIN ONCE DAILY. Notes to patient: Use: Short acting insulin used to lower blood sugars with meals  Side effects: low blood sugars, nausea dizziness     IRON PO  Notes to patient: Use: is a dietary supplement used to treat anemia  Side effects: stomach discomfort. indigestion, constipation, and possible darkened stools     Lantus SoloStar 100 UNIT/ML injection pen  Generic drug: insulin glargine  INJECT 50 UNITS INTO THE SKIN TWICE DAILY. Notes to patient: Use: long lasting insulin used to help keep blood sugars regular, lower blood sugars  Side effects: Low blood sugars, weight gain     levothyroxine 25 MCG tablet  Commonly known as: SYNTHROID  TAKE 1 TABLET BY MOUTH EVERY DAY  Notes to patient: Use: increases thyroid stimulating hormone  Side effects: weight loss, nervousness   For best results: take on an empty stomach, 30 minutes prior to eating     losartan 100 MG tablet  Commonly known as: COZAAR  Notes to patient: Angiotensin II Receptor Blockers (ARBS)  Use: Opens blood vessels around the heart, lowering blood pressure  Treats: High blood pressure by dilating blood vessels to increase blood flow. Side effects: Dry cough, dizziness, drowsiness & sensitivity to the sun may occur. Also changes in taste (metallic or salty) & elevated potassium & are possible. MAGNESIUM PO  Notes to patient: Use: restore magnesium in your body  Side effects: diarrhea, nausea, and stomach cramps.      metFORMIN 500 MG tablet  Commonly known as: GLUCOPHAGE  TAKE 1 TABLET BY MOUTH EVERY 24 HOURS  Notes to patient: Use: to treat diabetes and lower your blood sugars  Side effects: Upset stomach, loose stools & nausea     MULTIVITAMIN ADULT PO  Notes to patient: Use: dietary supplement  Side effects: upset stomach, nausea     ONE TOUCH ULTRASOFT LANCETS Misc     PEN NEEDLES 31GX5/16\" 31G X 8 MM Misc  Use to inject insulin twice daily. pioglitazone 15 MG tablet  Commonly known as: ACTOS  TAKE 1 TABLET BY MOUTH EVERY DAY  Notes to patient: Use: helps lower blood sugar  Side effects: liver failure, edema, bladder cancer     POTASSIUM PO  Notes to patient: Use: increase potassium electrolytes  Side effects: high potassium     SINGULAIR PO  Notes to patient: Use: is anti-antiinflammatory used to treat allergies and prevent asthma attacks   Side effects: increased risk of upper respiratory infection, fever, headache, sore throat, cough and stomach pain     TRIAMCINOLONE ACETONIDE NA  Notes to patient: Use: This medication is used to treat a variety of skin conditions (e.g., eczema, dermatitis, allergies, rash)  Side effects: Stinging of skin, Erythema     Trulicity 1.5 XF/7.4EO Sopn  Generic drug: Dulaglutide  INJECT 1.5 MG SUBCUTANEOUSLY EVERY 7 DAYS. Notes to patient: Injectable medication to help control type 2 diabetes  Side effects: nausea, diarrhea      Vitamin D3 1.25 MG (03394 UT) Caps  Notes to patient: Use: Dietary supplement, helps absorb calcium better. Side effects: upset stomach, nausea           Where to Get Your Medications      You can get these medications from any pharmacy    Bring a paper prescription for each of these medications  · HYDROcodone-acetaminophen 5-325 MG per tablet  · sulfamethoxazole-trimethoprim 800-160 MG per tablet         Discharge recommendations given to patient. Follow Up. Dr Aditya Duque in 2 weeks, PCP in 1 week   Disposition. home  Activity. activity as tolerated  Diet: ADULT DIET; Regular; Low Fiber      Spent 35 minutes in discharge process. Signed:   Shilpa Ramos PA-C     11/19/2021 7:13 PM

## 2021-11-21 ENCOUNTER — APPOINTMENT (OUTPATIENT)
Dept: GENERAL RADIOLOGY | Age: 61
DRG: 872 | End: 2021-11-21
Payer: COMMERCIAL

## 2021-11-21 ENCOUNTER — APPOINTMENT (OUTPATIENT)
Dept: CT IMAGING | Age: 61
DRG: 872 | End: 2021-11-21
Payer: COMMERCIAL

## 2021-11-21 ENCOUNTER — HOSPITAL ENCOUNTER (INPATIENT)
Age: 61
LOS: 4 days | Discharge: HOME | DRG: 872 | End: 2021-11-25
Attending: FAMILY MEDICINE | Admitting: FAMILY MEDICINE
Payer: COMMERCIAL

## 2021-11-21 DIAGNOSIS — Z79.4 CONTROLLED TYPE 2 DIABETES MELLITUS WITHOUT COMPLICATION, WITH LONG-TERM CURRENT USE OF INSULIN (HCC): ICD-10-CM

## 2021-11-21 DIAGNOSIS — K91.840 POSTOPERATIVE HEMORRHAGE INVOLVING DIGESTIVE SYSTEM FOLLOWING DIGESTIVE SYSTEM PROCEDURE: Primary | ICD-10-CM

## 2021-11-21 DIAGNOSIS — E11.9 CONTROLLED TYPE 2 DIABETES MELLITUS WITHOUT COMPLICATION, WITH LONG-TERM CURRENT USE OF INSULIN (HCC): ICD-10-CM

## 2021-11-21 PROBLEM — R50.82 POSTOPERATIVE FEVER: Status: ACTIVE | Noted: 2021-11-21

## 2021-11-21 LAB
A/G RATIO: 0.8 (ref 1.1–2.2)
ALBUMIN SERPL-MCNC: 3 G/DL (ref 3.4–5)
ALP BLD-CCNC: 149 U/L (ref 40–129)
ALT SERPL-CCNC: 26 U/L (ref 10–40)
ANION GAP SERPL CALCULATED.3IONS-SCNC: 11 MMOL/L (ref 3–16)
AST SERPL-CCNC: 29 U/L (ref 15–37)
BASOPHILS ABSOLUTE: 0 K/UL (ref 0–0.2)
BASOPHILS RELATIVE PERCENT: 0.6 %
BILIRUB SERPL-MCNC: 0.5 MG/DL (ref 0–1)
BILIRUBIN URINE: NEGATIVE
BLOOD, URINE: NEGATIVE
BUN BLDV-MCNC: 6 MG/DL (ref 7–20)
CALCIUM SERPL-MCNC: 8.8 MG/DL (ref 8.3–10.6)
CHLORIDE BLD-SCNC: 96 MMOL/L (ref 99–110)
CLARITY: CLEAR
CO2: 25 MMOL/L (ref 21–32)
COLOR: YELLOW
CREAT SERPL-MCNC: 0.5 MG/DL (ref 0.6–1.2)
EOSINOPHILS ABSOLUTE: 0.1 K/UL (ref 0–0.6)
EOSINOPHILS RELATIVE PERCENT: 0.7 %
GFR AFRICAN AMERICAN: >60
GFR NON-AFRICAN AMERICAN: >60
GLUCOSE BLD-MCNC: 144 MG/DL (ref 70–99)
GLUCOSE BLD-MCNC: 155 MG/DL (ref 70–99)
GLUCOSE BLD-MCNC: 159 MG/DL (ref 70–99)
GLUCOSE URINE: NEGATIVE MG/DL
HCT VFR BLD CALC: 33.3 % (ref 36–48)
HEMOGLOBIN: 11.1 G/DL (ref 12–16)
KETONES, URINE: >=80 MG/DL
LACTIC ACID: 1.2 MMOL/L (ref 0.4–2)
LEUKOCYTE ESTERASE, URINE: NEGATIVE
LIPASE: 58 U/L (ref 13–60)
LYMPHOCYTES ABSOLUTE: 0.4 K/UL (ref 1–5.1)
LYMPHOCYTES RELATIVE PERCENT: 5.3 %
MCH RBC QN AUTO: 28.2 PG (ref 26–34)
MCHC RBC AUTO-ENTMCNC: 33.4 G/DL (ref 31–36)
MCV RBC AUTO: 84.3 FL (ref 80–100)
MICROSCOPIC EXAMINATION: ABNORMAL
MONOCYTES ABSOLUTE: 0.5 K/UL (ref 0–1.3)
MONOCYTES RELATIVE PERCENT: 6.2 %
NEUTROPHILS ABSOLUTE: 7.2 K/UL (ref 1.7–7.7)
NEUTROPHILS RELATIVE PERCENT: 87.2 %
NITRITE, URINE: NEGATIVE
PDW BLD-RTO: 14.6 % (ref 12.4–15.4)
PERFORMED ON: ABNORMAL
PERFORMED ON: ABNORMAL
PH UA: 7 (ref 5–8)
PLATELET # BLD: 304 K/UL (ref 135–450)
PMV BLD AUTO: 6.7 FL (ref 5–10.5)
POTASSIUM REFLEX MAGNESIUM: 3.7 MMOL/L (ref 3.5–5.1)
PROTEIN UA: NEGATIVE MG/DL
RBC # BLD: 3.95 M/UL (ref 4–5.2)
SODIUM BLD-SCNC: 132 MMOL/L (ref 136–145)
SPECIFIC GRAVITY UA: >1.03 (ref 1–1.03)
TOTAL PROTEIN: 6.6 G/DL (ref 6.4–8.2)
URINE REFLEX TO CULTURE: ABNORMAL
URINE TYPE: ABNORMAL
UROBILINOGEN, URINE: 0.2 E.U./DL
WBC # BLD: 8.2 K/UL (ref 4–11)

## 2021-11-21 PROCEDURE — 2580000003 HC RX 258: Performed by: NURSE PRACTITIONER

## 2021-11-21 PROCEDURE — 2580000003 HC RX 258: Performed by: FAMILY MEDICINE

## 2021-11-21 PROCEDURE — 80053 COMPREHEN METABOLIC PANEL: CPT

## 2021-11-21 PROCEDURE — C9113 INJ PANTOPRAZOLE SODIUM, VIA: HCPCS | Performed by: SURGERY

## 2021-11-21 PROCEDURE — 81003 URINALYSIS AUTO W/O SCOPE: CPT

## 2021-11-21 PROCEDURE — 2580000003 HC RX 258: Performed by: SURGERY

## 2021-11-21 PROCEDURE — 6360000002 HC RX W HCPCS: Performed by: SURGERY

## 2021-11-21 PROCEDURE — 74177 CT ABD & PELVIS W/CONTRAST: CPT

## 2021-11-21 PROCEDURE — 85025 COMPLETE CBC W/AUTO DIFF WBC: CPT

## 2021-11-21 PROCEDURE — 71045 X-RAY EXAM CHEST 1 VIEW: CPT

## 2021-11-21 PROCEDURE — 87040 BLOOD CULTURE FOR BACTERIA: CPT

## 2021-11-21 PROCEDURE — 6360000004 HC RX CONTRAST MEDICATION: Performed by: NURSE PRACTITIONER

## 2021-11-21 PROCEDURE — 6370000000 HC RX 637 (ALT 250 FOR IP): Performed by: NURSE PRACTITIONER

## 2021-11-21 PROCEDURE — 6370000000 HC RX 637 (ALT 250 FOR IP): Performed by: FAMILY MEDICINE

## 2021-11-21 PROCEDURE — 36415 COLL VENOUS BLD VENIPUNCTURE: CPT

## 2021-11-21 PROCEDURE — 96375 TX/PRO/DX INJ NEW DRUG ADDON: CPT

## 2021-11-21 PROCEDURE — 96374 THER/PROPH/DIAG INJ IV PUSH: CPT

## 2021-11-21 PROCEDURE — 2060000000 HC ICU INTERMEDIATE R&B

## 2021-11-21 PROCEDURE — 6360000002 HC RX W HCPCS: Performed by: FAMILY MEDICINE

## 2021-11-21 PROCEDURE — 99284 EMERGENCY DEPT VISIT MOD MDM: CPT

## 2021-11-21 PROCEDURE — 83605 ASSAY OF LACTIC ACID: CPT

## 2021-11-21 PROCEDURE — 83036 HEMOGLOBIN GLYCOSYLATED A1C: CPT

## 2021-11-21 PROCEDURE — 83690 ASSAY OF LIPASE: CPT

## 2021-11-21 PROCEDURE — 6360000002 HC RX W HCPCS: Performed by: NURSE PRACTITIONER

## 2021-11-21 RX ORDER — SODIUM CHLORIDE 0.9 % (FLUSH) 0.9 %
5-40 SYRINGE (ML) INJECTION EVERY 12 HOURS SCHEDULED
Status: DISCONTINUED | OUTPATIENT
Start: 2021-11-21 | End: 2021-11-25 | Stop reason: HOSPADM

## 2021-11-21 RX ORDER — SODIUM CHLORIDE 9 MG/ML
INJECTION, SOLUTION INTRAVENOUS CONTINUOUS
Status: DISCONTINUED | OUTPATIENT
Start: 2021-11-21 | End: 2021-11-25 | Stop reason: HOSPADM

## 2021-11-21 RX ORDER — INSULIN LISPRO 100 [IU]/ML
0-12 INJECTION, SOLUTION INTRAVENOUS; SUBCUTANEOUS
Status: DISCONTINUED | OUTPATIENT
Start: 2021-11-21 | End: 2021-11-25 | Stop reason: HOSPADM

## 2021-11-21 RX ORDER — SODIUM CHLORIDE 0.9 % (FLUSH) 0.9 %
5-40 SYRINGE (ML) INJECTION PRN
Status: DISCONTINUED | OUTPATIENT
Start: 2021-11-21 | End: 2021-11-25 | Stop reason: HOSPADM

## 2021-11-21 RX ORDER — ONDANSETRON 2 MG/ML
4 INJECTION INTRAMUSCULAR; INTRAVENOUS EVERY 30 MIN PRN
Status: DISCONTINUED | OUTPATIENT
Start: 2021-11-21 | End: 2021-11-21 | Stop reason: HOSPADM

## 2021-11-21 RX ORDER — POLYETHYLENE GLYCOL 3350 17 G/17G
17 POWDER, FOR SOLUTION ORAL DAILY PRN
Status: DISCONTINUED | OUTPATIENT
Start: 2021-11-21 | End: 2021-11-25 | Stop reason: HOSPADM

## 2021-11-21 RX ORDER — ACETAMINOPHEN 650 MG/1
650 SUPPOSITORY RECTAL EVERY 6 HOURS PRN
Status: DISCONTINUED | OUTPATIENT
Start: 2021-11-21 | End: 2021-11-25 | Stop reason: HOSPADM

## 2021-11-21 RX ORDER — HYDRALAZINE HYDROCHLORIDE 20 MG/ML
10 INJECTION INTRAMUSCULAR; INTRAVENOUS EVERY 4 HOURS PRN
Status: DISCONTINUED | OUTPATIENT
Start: 2021-11-21 | End: 2021-11-25 | Stop reason: HOSPADM

## 2021-11-21 RX ORDER — SODIUM CHLORIDE 9 MG/ML
25 INJECTION, SOLUTION INTRAVENOUS PRN
Status: DISCONTINUED | OUTPATIENT
Start: 2021-11-21 | End: 2021-11-25 | Stop reason: HOSPADM

## 2021-11-21 RX ORDER — 0.9 % SODIUM CHLORIDE 0.9 %
1000 INTRAVENOUS SOLUTION INTRAVENOUS ONCE
Status: COMPLETED | OUTPATIENT
Start: 2021-11-21 | End: 2021-11-21

## 2021-11-21 RX ORDER — DEXTROSE MONOHYDRATE 50 MG/ML
100 INJECTION, SOLUTION INTRAVENOUS PRN
Status: DISCONTINUED | OUTPATIENT
Start: 2021-11-21 | End: 2021-11-25 | Stop reason: HOSPADM

## 2021-11-21 RX ORDER — ATORVASTATIN CALCIUM 10 MG/1
10 TABLET, FILM COATED ORAL NIGHTLY
Status: DISCONTINUED | OUTPATIENT
Start: 2021-11-21 | End: 2021-11-25 | Stop reason: HOSPADM

## 2021-11-21 RX ORDER — ACETAMINOPHEN 325 MG/1
650 TABLET ORAL EVERY 6 HOURS PRN
Status: DISCONTINUED | OUTPATIENT
Start: 2021-11-21 | End: 2021-11-25 | Stop reason: HOSPADM

## 2021-11-21 RX ORDER — LOSARTAN POTASSIUM 100 MG/1
100 TABLET ORAL DAILY
Status: DISCONTINUED | OUTPATIENT
Start: 2021-11-21 | End: 2021-11-25 | Stop reason: HOSPADM

## 2021-11-21 RX ORDER — LEVOTHYROXINE SODIUM 0.03 MG/1
25 TABLET ORAL
Status: DISCONTINUED | OUTPATIENT
Start: 2021-11-22 | End: 2021-11-25 | Stop reason: HOSPADM

## 2021-11-21 RX ORDER — PROMETHAZINE HYDROCHLORIDE 25 MG/ML
12.5 INJECTION, SOLUTION INTRAMUSCULAR; INTRAVENOUS ONCE
Status: COMPLETED | OUTPATIENT
Start: 2021-11-22 | End: 2021-11-22

## 2021-11-21 RX ORDER — MORPHINE SULFATE 4 MG/ML
4 INJECTION, SOLUTION INTRAMUSCULAR; INTRAVENOUS EVERY 4 HOURS PRN
Status: DISCONTINUED | OUTPATIENT
Start: 2021-11-21 | End: 2021-11-25 | Stop reason: HOSPADM

## 2021-11-21 RX ORDER — NICOTINE POLACRILEX 4 MG
15 LOZENGE BUCCAL PRN
Status: DISCONTINUED | OUTPATIENT
Start: 2021-11-21 | End: 2021-11-25 | Stop reason: HOSPADM

## 2021-11-21 RX ORDER — PROMETHAZINE HYDROCHLORIDE 25 MG/ML
12.5 INJECTION, SOLUTION INTRAMUSCULAR; INTRAVENOUS ONCE
Status: COMPLETED | OUTPATIENT
Start: 2021-11-21 | End: 2021-11-21

## 2021-11-21 RX ORDER — ONDANSETRON 4 MG/1
4 TABLET, ORALLY DISINTEGRATING ORAL EVERY 8 HOURS PRN
Status: DISCONTINUED | OUTPATIENT
Start: 2021-11-21 | End: 2021-11-25 | Stop reason: HOSPADM

## 2021-11-21 RX ORDER — ACETAMINOPHEN 500 MG
1000 TABLET ORAL ONCE
Status: COMPLETED | OUTPATIENT
Start: 2021-11-21 | End: 2021-11-21

## 2021-11-21 RX ORDER — ONDANSETRON 2 MG/ML
4 INJECTION INTRAMUSCULAR; INTRAVENOUS EVERY 6 HOURS PRN
Status: DISCONTINUED | OUTPATIENT
Start: 2021-11-21 | End: 2021-11-25 | Stop reason: HOSPADM

## 2021-11-21 RX ORDER — INSULIN LISPRO 100 [IU]/ML
0-6 INJECTION, SOLUTION INTRAVENOUS; SUBCUTANEOUS NIGHTLY
Status: DISCONTINUED | OUTPATIENT
Start: 2021-11-21 | End: 2021-11-25 | Stop reason: HOSPADM

## 2021-11-21 RX ORDER — DEXTROSE MONOHYDRATE 25 G/50ML
12.5 INJECTION, SOLUTION INTRAVENOUS PRN
Status: DISCONTINUED | OUTPATIENT
Start: 2021-11-21 | End: 2021-11-25 | Stop reason: HOSPADM

## 2021-11-21 RX ORDER — PANTOPRAZOLE SODIUM 40 MG/10ML
40 INJECTION, POWDER, LYOPHILIZED, FOR SOLUTION INTRAVENOUS DAILY
Status: DISCONTINUED | OUTPATIENT
Start: 2021-11-21 | End: 2021-11-22

## 2021-11-21 RX ADMIN — SODIUM CHLORIDE: 9 INJECTION, SOLUTION INTRAVENOUS at 17:26

## 2021-11-21 RX ADMIN — ACETAMINOPHEN 1000 MG: 500 TABLET ORAL at 15:59

## 2021-11-21 RX ADMIN — ACETAMINOPHEN 650 MG: 325 TABLET ORAL at 17:17

## 2021-11-21 RX ADMIN — PANTOPRAZOLE SODIUM 40 MG: 40 INJECTION, POWDER, FOR SOLUTION INTRAVENOUS at 17:20

## 2021-11-21 RX ADMIN — ACETAMINOPHEN 650 MG: 325 TABLET ORAL at 23:23

## 2021-11-21 RX ADMIN — ONDANSETRON 4 MG: 2 INJECTION INTRAMUSCULAR; INTRAVENOUS at 13:19

## 2021-11-21 RX ADMIN — ONDANSETRON 4 MG: 4 TABLET, ORALLY DISINTEGRATING ORAL at 22:10

## 2021-11-21 RX ADMIN — SODIUM CHLORIDE 1000 ML: 9 INJECTION, SOLUTION INTRAVENOUS at 13:19

## 2021-11-21 RX ADMIN — PIPERACILLIN AND TAZOBACTAM 3375 MG: 3; .375 INJECTION, POWDER, LYOPHILIZED, FOR SOLUTION INTRAVENOUS at 15:55

## 2021-11-21 RX ADMIN — SODIUM CHLORIDE, PRESERVATIVE FREE 10 ML: 5 INJECTION INTRAVENOUS at 23:56

## 2021-11-21 RX ADMIN — ATORVASTATIN CALCIUM 10 MG: 10 TABLET, FILM COATED ORAL at 21:04

## 2021-11-21 RX ADMIN — HYDRALAZINE HYDROCHLORIDE 10 MG: 20 INJECTION INTRAMUSCULAR; INTRAVENOUS at 23:56

## 2021-11-21 RX ADMIN — IOPAMIDOL 75 ML: 755 INJECTION, SOLUTION INTRAVENOUS at 13:54

## 2021-11-21 RX ADMIN — INSULIN LISPRO 1 UNITS: 100 INJECTION, SOLUTION INTRAVENOUS; SUBCUTANEOUS at 22:10

## 2021-11-21 RX ADMIN — LOSARTAN POTASSIUM 100 MG: 100 TABLET, FILM COATED ORAL at 17:17

## 2021-11-21 RX ADMIN — PROMETHAZINE HYDROCHLORIDE 12.5 MG: 25 INJECTION INTRAMUSCULAR; INTRAVENOUS at 14:49

## 2021-11-21 ASSESSMENT — PAIN DESCRIPTION - LOCATION
LOCATION: ABDOMEN
LOCATION: ABDOMEN

## 2021-11-21 ASSESSMENT — ENCOUNTER SYMPTOMS
RHINORRHEA: 0
NAUSEA: 1
SHORTNESS OF BREATH: 0
VOMITING: 1
DIARRHEA: 0
CONSTIPATION: 0
SORE THROAT: 0
BLOOD IN STOOL: 0
ABDOMINAL PAIN: 1

## 2021-11-21 ASSESSMENT — PAIN SCALES - GENERAL
PAINLEVEL_OUTOF10: 4
PAINLEVEL_OUTOF10: 2
PAINLEVEL_OUTOF10: 7
PAINLEVEL_OUTOF10: 0

## 2021-11-21 ASSESSMENT — PAIN DESCRIPTION - ORIENTATION
ORIENTATION: ANTERIOR
ORIENTATION: ANTERIOR

## 2021-11-21 NOTE — ED NOTES
Blood Culture #1 drawn from left AC at 1307  Site cleaned with Prevantics for 30 seconds and allowed to dry for 30 seconds before cultures were drawn.        Anjelica Fields RN  11/21/21 [id number]

## 2021-11-21 NOTE — ED PROVIDER NOTES
[facility]        Pt Name: Grace Mccarthy  MRN: [id number]  [place] 1960  Date of evaluation: 11/21/2021  Provider: CORDELL Miles CNP  PCP: Cielo Emerson    This patient was not seen and evaluated by the attending physician No att. providers found. CHIEF COMPLAINT       Chief Complaint   Patient presents with    Emesis     abd pain/+n/v/fever at home/recent surgery       HISTORY OF PRESENT ILLNESS   (Location/Symptom, Timing/Onset,Context/Setting, Quality, Duration, Modifying Factors, Severity)  Note limiting factors. Grace Mccarthy is a 62 y.o. female who presents emergency department with concern for nausea, vomiting, and low-grade fever. She reports on Tuesday she had an incisional hernia repair. She had an ileostomy in place for about 20 years now for ulcerative colitis. She reports that she was seen here for a bowel obstruction with incarcerated hernia which was repaired by Dr. Gya Pinto. Since then she has been on Cipro. She is uncertain if the antibiotic is causing her symptoms. She denies injury/trauma, fever, rash, headaches, weakness, dizziness, chest pain, shortness of breath, cough, congestion, diarrhea, constipation, blood in the stool, or painful urination. No family at bedside. Nursing Notes triage note reviewed and agreed with or any disagreements were addressed  in the HPI. REVIEW OF SYSTEMS    (2-9 systems for level 4, 10 or more for level 5)     Review of Systems   Constitutional: Positive for fever. Negative for chills. HENT: Negative for postnasal drip, rhinorrhea and sore throat. Eyes: Negative for visual disturbance. Respiratory: Negative for shortness of breath. Cardiovascular: Negative for chest pain. Gastrointestinal: Positive for abdominal pain, nausea and vomiting. Negative for blood in stool, constipation and diarrhea.    Genitourinary: Negative for dysuria, flank pain and hematuria. Skin: Negative for rash. Neurological: Negative for weakness and headaches. All other systems reviewed and are negative. Positives and Pertinent negatives as per HPI. Except as noted above in the ROS, all other systems were reviewed and negative. PAST MEDICAL HISTORY     Past Medical History:   Diagnosis Date    Asthma     Diabetes mellitus ([facility]     GERD (gastroesophageal reflux disease)     Hyperlipidemia     Hypertension     Ileostomy care ([facility] 3/17/2020    Iritis     PCOS (polycystic ovarian syndrome)     Pyoderma     Thyroid disease     Ulcerative colitis     Ulcerative colitis ([facility]          SURGICAL HISTORY       Past Surgical History:   Procedure Laterality Date    ABDOMEN SURGERY      COLON RESECTION FOR ULCERATIVE COLITIS THEN HAD ANUS REMOVED    ILEOSTOMY OR JEJUNOSTOMY      PERMANENT    VENTRAL HERNIA REPAIR N/A 11/16/2021    OPEN REPAIR INCISIONAL HERNIA WITH MESH, LYSIS OF ADHESIONS performed by Lio Childers MD at [address]       Previous Medications    ACETAMINOPHEN (TYLENOL) 500 MG TABLET    Take 500 mg by mouth every 6 hours as needed for Pain    ALBUTEROL IN    Inhale into the lungs    ATORVASTATIN (LIPITOR) 10 MG TABLET    Take 10 mg by mouth daily    BD INSULIN SYRINGE U/F 30G X 1/2\" 0.3 ML MISC    USE WITH INSULIN TWICE A DAY    CHOLECALCIFEROL (VITAMIN D3) 1.25 MG (68317 UT) CAPS    Take by mouth once a week    COENZYME Q10 PO    Take by mouth    CRANBERRY PO    Take by mouth daily    FERROUS SULFATE (IRON PO)    Take by mouth    FLUTICASONE-VILANTEROL (BREO ELLIPTA) 100-25 MCG/INH AEPB INHALER    Inhale 1 puff into the lungs daily    HYDROCODONE-ACETAMINOPHEN (NORCO) 5-325 MG PER TABLET    Take 1 tablet by mouth every 6 hours as needed for Pain for up to 5 days.  Take lowest dose possible to manage pain; may stop taking sooner than 7 days if pain is able to be controlled with non-narcotic analgesics and therapies. INSULIN LISPRO (HUMALOG) 100 UNIT/ML INJECTION VIAL    INJECT 25 UNITS INTO THE SKIN ONCE DAILY. INSULIN PEN NEEDLE (PEN NEEDLES 31GX5/16\") 31G X 8 MM MISC    Use to inject insulin twice daily. LANTUS SOLOSTAR 100 UNIT/ML INJECTION PEN    INJECT 50 UNITS INTO THE SKIN TWICE DAILY. LEVOTHYROXINE (SYNTHROID) 25 MCG TABLET    TAKE 1 TABLET BY MOUTH EVERY DAY    LORATADINE (CLARITIN) 10 MG CAPS    Take  by mouth. LOSARTAN (COZAAR) 100 MG TABLET    Take 100 mg by mouth daily    MAGNESIUM PO    Take by mouth    METFORMIN (GLUCOPHAGE) 500 MG TABLET    TAKE 1 TABLET BY MOUTH EVERY 24 HOURS    MONTELUKAST SODIUM (SINGULAIR PO)    Take by mouth    MULTIPLE VITAMIN (MULTIVITAMIN ADULT PO)    Take by mouth    OMEGA-3 FATTY ACIDS (FISH OIL) 1000 MG CAPS    Take 3,000 mg by mouth daily    ONE TOUCH ULTRASOFT LANCETS MISC    USE TO TEST BLOOD GLUCOSE 4 TIMES DAILY    PIOGLITAZONE (ACTOS) 15 MG TABLET    TAKE 1 TABLET BY MOUTH EVERY DAY    POTASSIUM PO    Take by mouth    SULFAMETHOXAZOLE-TRIMETHOPRIM (BACTRIM DS;SEPTRA DS) 800-160 MG PER TABLET    Take 1 tablet by mouth 2 times daily for 5 days    TRIAMCINOLONE ACETONIDE NA    by Nasal route as needed     TRULICITY 1.5 WR/7.6VP SOPN    INJECT 1.5 MG SUBCUTANEOUSLY EVERY 7 DAYS. ALLERGIES     Patient has no known allergies.     FAMILY HISTORY       Family History   Problem Relation Age of Onset    Cancer Mother         uterine    No Known Problems Father           SOCIAL HISTORY       Social History     Socioeconomic History    Marital status: Single     Spouse name: None    Number of children: None    Years of education: None    Highest education level: None   Occupational History    None   Tobacco Use    Smoking status: Never Smoker    Smokeless tobacco: Never Used   Vaping Use    Vaping Use: Never used   Substance and Sexual Activity    Alcohol use: No    Drug use: No    Sexual activity: None   Other Topics Concern    None   Social History Narrative    None     Social Determinants of Health     Financial Resource Strain:     Difficulty of Paying Living Expenses: Not on file   Food Insecurity:     Worried About Running Out of Food in the Last Year: Not on file    Gomez of Food in the Last Year: Not on file   Transportation Needs:     Lack of Transportation (Medical): Not on file    Lack of Transportation (Non-Medical): Not on file   Physical Activity:     Days of Exercise per Week: Not on file    Minutes of Exercise per Session: Not on file   Stress:     Feeling of Stress : Not on file   Social Connections:     Frequency of Communication with Friends and Family: Not on file    Frequency of Social Gatherings with Friends and Family: Not on file    Attends Druze Services: Not on file    Active Member of [address] [place] [address] WorkProducts or Organizations: Not on file    Attends Club or Organization Meetings: Not on file    Marital Status: Not on file   Intimate Partner Violence:     Fear of Current or Ex-Partner: Not on file    Emotionally Abused: Not on file    Physically Abused: Not on file    Sexually Abused: Not on file   Housing Stability:     Unable to Pay for Housing in the Last Year: Not on file    Number of [place] in the Last Year: Not on file    Unstable Housing in the Last Year: Not on file       SCREENINGS             PHYSICAL EXAM  (up to 7 for level 4, 8 or more for level 5)     ED Triage Vitals [11/21/21 1238]   BP Temp Temp Source Pulse Resp SpO2 Height Weight   (!) 151/69 100.1 °F (37.8 °C) Oral 107 16 94 % -- --       Physical Exam  Vitals and nursing note reviewed. Constitutional:       Appearance: Normal appearance. She is well-developed. She is not ill-appearing or diaphoretic. HENT:      Head: Normocephalic and atraumatic. Eyes:      General: No scleral icterus. Right eye: No discharge. Left eye: No discharge. Cardiovascular:      Rate and Rhythm: Regular rhythm. Tachycardia present. Heart sounds: Normal heart sounds. No murmur heard. No friction rub. No gallop. Pulmonary:      Effort: Pulmonary effort is normal. No respiratory distress. Breath sounds: Normal breath sounds. No stridor. No wheezing or rales. Chest:      Chest wall: No tenderness. Abdominal:      General: There is no distension. Palpations: Abdomen is soft. There is no mass. Tenderness: There is abdominal tenderness. There is no guarding or rebound. Musculoskeletal:         General: No tenderness. Normal range of motion. Cervical back: Normal range of motion and neck supple. Skin:     General: Skin is warm and dry. Coloration: Skin is pale. Neurological:      Mental Status: She is alert and oriented to person, place, and time.       Coordination: Coordination normal.   Psychiatric:         Mood and Affect: Mood normal.         Behavior: Behavior normal.         DIAGNOSTIC RESULTS   LABS:    Labs Reviewed   CBC WITH AUTO DIFFERENTIAL - Abnormal; Notable for the following components:       Result Value    RBC 3.95 (*)     Hemoglobin 11.1 (*)     Hematocrit 33.3 (*)     Lymphocytes Absolute 0.4 (*)     All other components within normal limits    Narrative:     Performed at:  [facility] [address], [place], [facility] [address] by [place]   Phone [phone]   COMPREHENSIVE METABOLIC PANEL W/ REFLEX TO MG FOR LOW K - Abnormal; Notable for the following components:    Sodium 132 (*)     Chloride 96 (*)     Glucose 155 (*)     BUN 6 (*)     CREATININE 0.5 (*)     Albumin 3.0 (*)     Albumin/Globulin Ratio 0.8 (*)     Alkaline Phosphatase 149 (*)     All other components within normal limits    Narrative:     Performed at:  [facility] [address], [place], [facility] [address]   Phone [phone]   CULTURE, BLOOD 2   CULTURE, BLOOD 1   LIPASE    Narrative:     Performed at:  [facility]  [address] [place] [address], [place]   Phone [phone]   LACTIC ACID, PLASMA    Narrative:     Performed at:  [facility]  [address]   Phone [phone]   URINE RT REFLEX TO CULTURE       All other labs werewithin normal range or not returned as of this dictation. EKG: All EKG's are interpreted by the Emergency Department Physician who either signs or Co-signs this chart in the absence of acardiologist.  Please see their note for interpretation of EKG. RADIOLOGY:   Interpretation per the Radiologist below, if available at the time of this note:    CT ABDOMEN PELVIS W IV CONTRAST Additional Contrast? Oral   Final Result   Addendum 1 of 1   ADDENDUM:   Additional impression :      Acute inflammatory changes at the duodenum in keeping with duodenitis. I   cannot exclude underlying duodenal ulcer. I spoke with Ailyn Galvan, concerning findings described within the    impression   as well as the additional finding at 2:24 p.m. 11/21/2021. Final   1. Ventral pelvic wall hematoma formation extending intra-extraperitoneal and   superficially along the abdominal/pelvic wall musculature as well. Anterior   extraperitoneal abdominal fluid and gas collection abdomen and pelvis   compatible with recent surgery, admixed with some areas hematoma. 2. Ventral abdominal and pelvic wall fluid/hematoma collections with   associated gas which may be recent postsurgical or could be related to   abscess. 3. Prominent peristomal hernia at the right lower quadrant ostomy site   containing several small bowel loops without obstruction evident. 4. Cholelithiasis without CT findings of acute cholecystitis. 5. Hepatic steatosis. XR CHEST PORTABLE   Final Result   Findings most consistent with mild CHF with cardiomegaly, and perihilar   edema. Mild right basilar atelectasis           No results found.       PROCEDURES   Unless otherwise noted below, none Procedures    CRITICAL CARE TIME     There was a high probability of life-threatening clinical deterioration in the patient's condition requiring my urgent intervention. The total critical care time spent while evaluating and treating this patient was at least 35 minutes. This excludes time spent doing separately billable procedures. This includes time at the bedside, data interpretation, medication management, obtaining critical history from collateral sources if the patient is unable to provide it directly, and physician consultation. Specifics of interventions taken and potentially life-threatening diagnostic considerations are listed in the medical decision making. CONSULTS:  IP CONSULT TO GENERAL SURGERY      EMERGENCY DEPARTMENT COURSE and DIFFERENTIAL DIAGNOSIS/MDM:   Vitals:    Vitals:    11/21/21 1238 11/21/21 1330 11/21/21 1340   BP: (!) 151/69 (!) 176/81    Pulse: 107 107 108   Resp: 16     Temp: 100.1 °F (37.8 °C)     TempSrc: Oral     SpO2: 94% 91% 93%       Yulissa Salter was given the following medications:  Medications   ondansetron (ZOFRAN) injection 4 mg (4 mg IntraVENous Given 11/21/21 1319)   piperacillin-tazobactam (ZOSYN) 4,500 mg in dextrose 5 % 100 mL IVPB (mini-bag) (has no administration in time range)   0.9 % sodium chloride bolus (1,000 mLs IntraVENous New Bag 11/21/21 1319)   iopamidol (ISOVUE-370) 76 % injection 75 mL (75 mLs IntraVENous Given 11/21/21 1354)   promethazine (PHENERGAN) injection 12.5 mg (12.5 mg IntraVENous Given 11/21/21 1449)       Camelia Rick was evaluated in the emergency department with concern for abdominal pain, nausea, vomiting, and low-grade fever post surgery. Treated with IV fluids and Zofran in the ER. CT imaging shows concern for a ventral pelvic wall hematoma with associated gas which could be postsurgical versus abscess. Treated with Zosyn in the ER. Dr. Holly Randall from general surgery was consulted on this.   He recommends inpatient management. The hospitalist was consulted and is in agreement for admission. This plan was discussed with the patient who is in agreement with the plan. FINAL IMPRESSION      1.  Postoperative hemorrhage involving digestive system following digestive system procedure          DISPOSITION/PLAN   DISPOSITION Decision To Admit 11/21/2021 02:42:58 PM      (Please note that portions of this note were completed with a voice recognition program.  Efforts were made to edit the dictations but occasionally words are mis-transcribed.)    CORDELL Latham CNP (electronically signed)        CORDELL Latham CNP  11/21/21 1128

## 2021-11-21 NOTE — ED NOTES
Bed: 16  Expected date:   Expected time:   Means of arrival: [facility]  Comments:  LOLI Barraza  11/21/21 2482

## 2021-11-21 NOTE — ED NOTES
Unable to obtain 2nd culture/lab notified for specimen collection.      Wil Mckeon RN  11/21/21 0186

## 2021-11-21 NOTE — ED NOTES
Pt ambulated to restroom and back to room. Urine specimen collected/sent to lab.      Emerson Quick RN  11/21/21 2593

## 2021-11-22 ENCOUNTER — APPOINTMENT (OUTPATIENT)
Dept: CT IMAGING | Age: 61
DRG: 872 | End: 2021-11-22
Payer: COMMERCIAL

## 2021-11-22 ENCOUNTER — APPOINTMENT (OUTPATIENT)
Dept: ULTRASOUND IMAGING | Age: 61
DRG: 872 | End: 2021-11-22
Payer: COMMERCIAL

## 2021-11-22 DIAGNOSIS — Z79.4 CONTROLLED TYPE 2 DIABETES MELLITUS WITHOUT COMPLICATION, WITH LONG-TERM CURRENT USE OF INSULIN (HCC): Primary | ICD-10-CM

## 2021-11-22 DIAGNOSIS — E11.9 CONTROLLED TYPE 2 DIABETES MELLITUS WITHOUT COMPLICATION, WITH LONG-TERM CURRENT USE OF INSULIN (HCC): Primary | ICD-10-CM

## 2021-11-22 LAB
A/G RATIO: 0.9 (ref 1.1–2.2)
ALBUMIN SERPL-MCNC: 2.7 G/DL (ref 3.4–5)
ALP BLD-CCNC: 128 U/L (ref 40–129)
ALT SERPL-CCNC: 23 U/L (ref 10–40)
ANION GAP SERPL CALCULATED.3IONS-SCNC: 14 MMOL/L (ref 3–16)
APTT: 28.6 SEC (ref 26.2–38.6)
AST SERPL-CCNC: 27 U/L (ref 15–37)
BASOPHILS ABSOLUTE: 0.1 K/UL (ref 0–0.2)
BASOPHILS RELATIVE PERCENT: 1.3 %
BILIRUB SERPL-MCNC: 0.5 MG/DL (ref 0–1)
BUN BLDV-MCNC: 8 MG/DL (ref 7–20)
CALCIUM SERPL-MCNC: 8.3 MG/DL (ref 8.3–10.6)
CHLORIDE BLD-SCNC: 102 MMOL/L (ref 99–110)
CO2: 20 MMOL/L (ref 21–32)
CREAT SERPL-MCNC: <0.5 MG/DL (ref 0.6–1.2)
EOSINOPHILS ABSOLUTE: 0 K/UL (ref 0–0.6)
EOSINOPHILS RELATIVE PERCENT: 0.6 %
ESTIMATED AVERAGE GLUCOSE: 174.3 MG/DL
GFR AFRICAN AMERICAN: >60
GFR NON-AFRICAN AMERICAN: >60
GLUCOSE BLD-MCNC: 135 MG/DL (ref 70–99)
GLUCOSE BLD-MCNC: 147 MG/DL (ref 70–99)
GLUCOSE BLD-MCNC: 147 MG/DL (ref 70–99)
GLUCOSE BLD-MCNC: 154 MG/DL (ref 70–99)
GLUCOSE BLD-MCNC: 159 MG/DL (ref 70–99)
GLUCOSE BLD-MCNC: 161 MG/DL (ref 70–99)
HBA1C MFR BLD: 7.7 %
HCT VFR BLD CALC: 30.5 % (ref 36–48)
HCT VFR BLD CALC: 30.7 % (ref 36–48)
HCT VFR BLD CALC: 32.2 % (ref 36–48)
HEMOGLOBIN: 10.2 G/DL (ref 12–16)
HEMOGLOBIN: 10.2 G/DL (ref 12–16)
HEMOGLOBIN: 10.6 G/DL (ref 12–16)
INR BLD: 1.25 (ref 0.88–1.12)
LACTIC ACID: 0.9 MMOL/L (ref 0.4–2)
LYMPHOCYTES ABSOLUTE: 0.3 K/UL (ref 1–5.1)
LYMPHOCYTES RELATIVE PERCENT: 4 %
MAGNESIUM: 1.6 MG/DL (ref 1.8–2.4)
MCH RBC QN AUTO: 28.3 PG (ref 26–34)
MCHC RBC AUTO-ENTMCNC: 33.4 G/DL (ref 31–36)
MCV RBC AUTO: 84.5 FL (ref 80–100)
MONOCYTES ABSOLUTE: 0.4 K/UL (ref 0–1.3)
MONOCYTES RELATIVE PERCENT: 6.6 %
NEUTROPHILS ABSOLUTE: 5.6 K/UL (ref 1.7–7.7)
NEUTROPHILS RELATIVE PERCENT: 87.5 %
PDW BLD-RTO: 15 % (ref 12.4–15.4)
PERFORMED ON: ABNORMAL
PHOSPHORUS: 2.4 MG/DL (ref 2.5–4.9)
PLATELET # BLD: 258 K/UL (ref 135–450)
PMV BLD AUTO: 6.3 FL (ref 5–10.5)
POTASSIUM SERPL-SCNC: 3.6 MMOL/L (ref 3.5–5.1)
PREALBUMIN: 6.9 MG/DL (ref 20–40)
PROTHROMBIN TIME: 14.2 SEC (ref 9.9–12.7)
RBC # BLD: 3.63 M/UL (ref 4–5.2)
SODIUM BLD-SCNC: 136 MMOL/L (ref 136–145)
TOTAL PROTEIN: 5.8 G/DL (ref 6.4–8.2)
TRANSFERRIN: 149 MG/DL (ref 200–360)
WBC # BLD: 6.4 K/UL (ref 4–11)

## 2021-11-22 PROCEDURE — 2580000003 HC RX 258: Performed by: SURGERY

## 2021-11-22 PROCEDURE — 2580000003 HC RX 258: Performed by: INTERNAL MEDICINE

## 2021-11-22 PROCEDURE — C9113 INJ PANTOPRAZOLE SODIUM, VIA: HCPCS | Performed by: SURGERY

## 2021-11-22 PROCEDURE — 6360000002 HC RX W HCPCS: Performed by: FAMILY MEDICINE

## 2021-11-22 PROCEDURE — 0202U NFCT DS 22 TRGT SARS-COV-2: CPT

## 2021-11-22 PROCEDURE — 85610 PROTHROMBIN TIME: CPT

## 2021-11-22 PROCEDURE — 80053 COMPREHEN METABOLIC PANEL: CPT

## 2021-11-22 PROCEDURE — 0W9F30Z DRAINAGE OF ABDOMINAL WALL WITH DRAINAGE DEVICE, PERCUTANEOUS APPROACH: ICD-10-PCS | Performed by: SURGERY

## 2021-11-22 PROCEDURE — 6360000002 HC RX W HCPCS: Performed by: INTERNAL MEDICINE

## 2021-11-22 PROCEDURE — 85014 HEMATOCRIT: CPT

## 2021-11-22 PROCEDURE — 2060000000 HC ICU INTERMEDIATE R&B

## 2021-11-22 PROCEDURE — 85025 COMPLETE CBC W/AUTO DIFF WBC: CPT

## 2021-11-22 PROCEDURE — 83605 ASSAY OF LACTIC ACID: CPT

## 2021-11-22 PROCEDURE — 2580000003 HC RX 258: Performed by: FAMILY MEDICINE

## 2021-11-22 PROCEDURE — 84466 ASSAY OF TRANSFERRIN: CPT

## 2021-11-22 PROCEDURE — 85730 THROMBOPLASTIN TIME PARTIAL: CPT

## 2021-11-22 PROCEDURE — 36415 COLL VENOUS BLD VENIPUNCTURE: CPT

## 2021-11-22 PROCEDURE — 99222 1ST HOSP IP/OBS MODERATE 55: CPT | Performed by: SURGERY

## 2021-11-22 PROCEDURE — 6370000000 HC RX 637 (ALT 250 FOR IP): Performed by: FAMILY MEDICINE

## 2021-11-22 PROCEDURE — 83735 ASSAY OF MAGNESIUM: CPT

## 2021-11-22 PROCEDURE — 94760 N-INVAS EAR/PLS OXIMETRY 1: CPT

## 2021-11-22 PROCEDURE — 93005 ELECTROCARDIOGRAM TRACING: CPT | Performed by: INTERNAL MEDICINE

## 2021-11-22 PROCEDURE — 6360000002 HC RX W HCPCS: Performed by: NURSE PRACTITIONER

## 2021-11-22 PROCEDURE — 10140 I&D HMTMA SEROMA/FLUID COLLJ: CPT

## 2021-11-22 PROCEDURE — 6360000002 HC RX W HCPCS: Performed by: PHYSICIAN ASSISTANT

## 2021-11-22 PROCEDURE — 84134 ASSAY OF PREALBUMIN: CPT

## 2021-11-22 PROCEDURE — 87205 SMEAR GRAM STAIN: CPT

## 2021-11-22 PROCEDURE — 87070 CULTURE OTHR SPECIMN AEROBIC: CPT

## 2021-11-22 PROCEDURE — 76705 ECHO EXAM OF ABDOMEN: CPT

## 2021-11-22 PROCEDURE — 84100 ASSAY OF PHOSPHORUS: CPT

## 2021-11-22 PROCEDURE — 85018 HEMOGLOBIN: CPT

## 2021-11-22 PROCEDURE — 77012 CT SCAN FOR NEEDLE BIOPSY: CPT

## 2021-11-22 PROCEDURE — 2500000003 HC RX 250 WO HCPCS: Performed by: INTERNAL MEDICINE

## 2021-11-22 PROCEDURE — 6360000002 HC RX W HCPCS: Performed by: SURGERY

## 2021-11-22 RX ORDER — LINEZOLID 2 MG/ML
600 INJECTION, SOLUTION INTRAVENOUS EVERY 12 HOURS
Status: DISCONTINUED | OUTPATIENT
Start: 2021-11-22 | End: 2021-11-25 | Stop reason: HOSPADM

## 2021-11-22 RX ORDER — BLOOD SUGAR DIAGNOSTIC
STRIP MISCELLANEOUS
Qty: 200 EACH | Refills: 5 | Status: SHIPPED | OUTPATIENT
Start: 2021-11-22 | End: 2021-11-25 | Stop reason: SDUPTHER

## 2021-11-22 RX ORDER — POTASSIUM CHLORIDE 7.45 MG/ML
10 INJECTION INTRAVENOUS PRN
Status: DISCONTINUED | OUTPATIENT
Start: 2021-11-22 | End: 2021-11-25 | Stop reason: HOSPADM

## 2021-11-22 RX ORDER — MAGNESIUM SULFATE 1 G/100ML
1000 INJECTION INTRAVENOUS PRN
Status: DISCONTINUED | OUTPATIENT
Start: 2021-11-22 | End: 2021-11-25 | Stop reason: HOSPADM

## 2021-11-22 RX ORDER — PANTOPRAZOLE SODIUM 40 MG/10ML
40 INJECTION, POWDER, LYOPHILIZED, FOR SOLUTION INTRAVENOUS 2 TIMES DAILY
Status: DISCONTINUED | OUTPATIENT
Start: 2021-11-22 | End: 2021-11-25 | Stop reason: HOSPADM

## 2021-11-22 RX ORDER — POTASSIUM CHLORIDE 20 MEQ/1
40 TABLET, EXTENDED RELEASE ORAL PRN
Status: DISCONTINUED | OUTPATIENT
Start: 2021-11-22 | End: 2021-11-25 | Stop reason: HOSPADM

## 2021-11-22 RX ORDER — FUROSEMIDE 10 MG/ML
20 INJECTION INTRAMUSCULAR; INTRAVENOUS ONCE
Status: COMPLETED | OUTPATIENT
Start: 2021-11-22 | End: 2021-11-22

## 2021-11-22 RX ORDER — MAGNESIUM SULFATE IN WATER 40 MG/ML
2000 INJECTION, SOLUTION INTRAVENOUS ONCE
Status: COMPLETED | OUTPATIENT
Start: 2021-11-22 | End: 2021-11-22

## 2021-11-22 RX ORDER — IBUPROFEN 400 MG/1
600 TABLET ORAL ONCE
Status: DISCONTINUED | OUTPATIENT
Start: 2021-11-22 | End: 2021-11-25 | Stop reason: HOSPADM

## 2021-11-22 RX ADMIN — LINEZOLID 600 MG: 600 INJECTION, SOLUTION INTRAVENOUS at 22:44

## 2021-11-22 RX ADMIN — ATORVASTATIN CALCIUM 10 MG: 10 TABLET, FILM COATED ORAL at 21:33

## 2021-11-22 RX ADMIN — SODIUM CHLORIDE, PRESERVATIVE FREE 10 ML: 5 INJECTION INTRAVENOUS at 21:31

## 2021-11-22 RX ADMIN — SODIUM CHLORIDE: 9 INJECTION, SOLUTION INTRAVENOUS at 16:13

## 2021-11-22 RX ADMIN — INSULIN LISPRO 2 UNITS: 100 INJECTION, SOLUTION INTRAVENOUS; SUBCUTANEOUS at 12:20

## 2021-11-22 RX ADMIN — PIPERACILLIN AND TAZOBACTAM 3375 MG: 3; .375 INJECTION, POWDER, LYOPHILIZED, FOR SOLUTION INTRAVENOUS at 23:54

## 2021-11-22 RX ADMIN — INSULIN LISPRO 1 UNITS: 100 INJECTION, SOLUTION INTRAVENOUS; SUBCUTANEOUS at 21:33

## 2021-11-22 RX ADMIN — FUROSEMIDE 20 MG: 10 INJECTION, SOLUTION INTRAMUSCULAR; INTRAVENOUS at 12:54

## 2021-11-22 RX ADMIN — SODIUM CHLORIDE 25 ML: 9 INJECTION, SOLUTION INTRAVENOUS at 06:36

## 2021-11-22 RX ADMIN — SODIUM CHLORIDE, PRESERVATIVE FREE 10 ML: 5 INJECTION INTRAVENOUS at 04:30

## 2021-11-22 RX ADMIN — PANTOPRAZOLE SODIUM 40 MG: 40 INJECTION, POWDER, FOR SOLUTION INTRAVENOUS at 21:32

## 2021-11-22 RX ADMIN — LOSARTAN POTASSIUM 100 MG: 100 TABLET, FILM COATED ORAL at 09:43

## 2021-11-22 RX ADMIN — SODIUM CHLORIDE 25 ML: 9 INJECTION, SOLUTION INTRAVENOUS at 22:41

## 2021-11-22 RX ADMIN — PIPERACILLIN AND TAZOBACTAM 3375 MG: 3; .375 INJECTION, POWDER, LYOPHILIZED, FOR SOLUTION INTRAVENOUS at 00:11

## 2021-11-22 RX ADMIN — INSULIN LISPRO 2 UNITS: 100 INJECTION, SOLUTION INTRAVENOUS; SUBCUTANEOUS at 09:48

## 2021-11-22 RX ADMIN — POTASSIUM PHOSPHATE, MONOBASIC AND POTASSIUM PHOSPHATE, DIBASIC 20 MMOL: 224; 236 INJECTION, SOLUTION, CONCENTRATE INTRAVENOUS at 06:36

## 2021-11-22 RX ADMIN — PANTOPRAZOLE SODIUM 40 MG: 40 INJECTION, POWDER, FOR SOLUTION INTRAVENOUS at 09:43

## 2021-11-22 RX ADMIN — PROMETHAZINE HYDROCHLORIDE 12.5 MG: 25 INJECTION INTRAMUSCULAR; INTRAVENOUS at 00:08

## 2021-11-22 RX ADMIN — PIPERACILLIN AND TAZOBACTAM 3375 MG: 3; .375 INJECTION, POWDER, LYOPHILIZED, FOR SOLUTION INTRAVENOUS at 17:05

## 2021-11-22 RX ADMIN — ACETAMINOPHEN 650 MG: 325 TABLET ORAL at 04:29

## 2021-11-22 RX ADMIN — SODIUM CHLORIDE, PRESERVATIVE FREE 10 ML: 5 INJECTION INTRAVENOUS at 09:43

## 2021-11-22 RX ADMIN — SODIUM CHLORIDE: 9 INJECTION, SOLUTION INTRAVENOUS at 04:18

## 2021-11-22 RX ADMIN — ACETAMINOPHEN 650 MG: 325 TABLET ORAL at 10:52

## 2021-11-22 RX ADMIN — PIPERACILLIN AND TAZOBACTAM 3375 MG: 3; .375 INJECTION, POWDER, LYOPHILIZED, FOR SOLUTION INTRAVENOUS at 09:48

## 2021-11-22 RX ADMIN — LINEZOLID 600 MG: 600 INJECTION, SOLUTION INTRAVENOUS at 12:15

## 2021-11-22 RX ADMIN — MAGNESIUM SULFATE HEPTAHYDRATE 2000 MG: 40 INJECTION, SOLUTION INTRAVENOUS at 05:59

## 2021-11-22 RX ADMIN — ACETAMINOPHEN 650 MG: 325 TABLET ORAL at 17:03

## 2021-11-22 RX ADMIN — LEVOTHYROXINE SODIUM 25 MCG: 0.03 TABLET ORAL at 07:36

## 2021-11-22 RX ADMIN — ACETAMINOPHEN 650 MG: 325 TABLET ORAL at 22:38

## 2021-11-22 RX ADMIN — ONDANSETRON 4 MG: 2 INJECTION INTRAMUSCULAR; INTRAVENOUS at 04:29

## 2021-11-22 RX ADMIN — INSULIN LISPRO 2 UNITS: 100 INJECTION, SOLUTION INTRAVENOUS; SUBCUTANEOUS at 17:06

## 2021-11-22 ASSESSMENT — PAIN DESCRIPTION - LOCATION
LOCATION: CHEST
LOCATION: CHEST
LOCATION: ABDOMEN

## 2021-11-22 ASSESSMENT — PAIN SCALES - GENERAL
PAINLEVEL_OUTOF10: 3
PAINLEVEL_OUTOF10: 0
PAINLEVEL_OUTOF10: 3
PAINLEVEL_OUTOF10: 0
PAINLEVEL_OUTOF10: 1
PAINLEVEL_OUTOF10: 0
PAINLEVEL_OUTOF10: 0

## 2021-11-22 ASSESSMENT — PAIN DESCRIPTION - ONSET: ONSET: AWAKENED FROM SLEEP

## 2021-11-22 ASSESSMENT — PAIN DESCRIPTION - DESCRIPTORS: DESCRIPTORS: CRAMPING

## 2021-11-22 ASSESSMENT — PAIN DESCRIPTION - ORIENTATION: ORIENTATION: ANTERIOR

## 2021-11-22 NOTE — PROGRESS NOTES
[address] [place] [address] PROGRESS NOTE    11/22/2021 12:15 PM        Name: Nava Duran . Admitted: 11/21/2021  Primary Care Provider: Asha Humphrey (Tel: [phone])      Subjective:  . Patient feeling better than yesterday. States she was very lethargic at home. No increase in abdominal pain. No cough or sob. Still feels weak.     Reviewed interval ancillary notes    Current Medications  potassium chloride (KLOR-CON M) extended release tablet 40 mEq, PRN   Or  potassium bicarb-citric acid (EFFER-K) effervescent tablet 40 mEq, PRN   Or  potassium chloride 10 mEq/100 mL IVPB (Peripheral Line), PRN  magnesium sulfate 1000 mg in dextrose 5% 100 mL IVPB, PRN  sodium phosphate 19.17 mmol in dextrose 5 % 250 mL IVPB, PRN   Or  sodium phosphate 38.37 mmol in dextrose 5 % 250 mL IVPB, PRN  ibuprofen (ADVIL;MOTRIN) tablet 600 mg, Once  linezolid (ZYVOX) IVPB 600 mg, Q12H  pantoprazole (PROTONIX) injection 40 mg, BID  0.9 % sodium chloride infusion, Continuous  atorvastatin (LIPITOR) tablet 10 mg, Nightly  morphine injection 4 mg, Q4H PRN  levothyroxine (SYNTHROID) tablet 25 mcg, QAM AC  losartan (COZAAR) tablet 100 mg, Daily  hydrALAZINE (APRESOLINE) injection 10 mg, Q4H PRN  glucose (GLUTOSE) 40 % oral gel 15 g, PRN  dextrose 50 % IV solution, PRN  glucagon (rDNA) injection 1 mg, PRN  dextrose 5 % solution, PRN  sodium chloride flush 0.9 % injection 5-40 mL, 2 times per day  sodium chloride flush 0.9 % injection 5-40 mL, PRN  0.9 % sodium chloride infusion, PRN  ondansetron (ZOFRAN-ODT) disintegrating tablet 4 mg, Q8H PRN   Or  ondansetron (ZOFRAN) injection 4 mg, Q6H PRN  polyethylene glycol (GLYCOLAX) packet 17 g, Daily PRN  acetaminophen (TYLENOL) tablet 650 mg, Q6H PRN   Or  acetaminophen (TYLENOL) suppository 650 mg, Q6H PRN  insulin lispro (1 Unit Dial) 0-12 Units, TID WC  insulin lispro (1 Unit Dial) 0-6 Units, Nightly  piperacillin-tazobactam (ZOSYN) 3,375 mg in dextrose 5 % 50 mL IVPB extended infusion (mini-bag), Q8H        Objective:  /71   Pulse 106   Temp 100.3 °F (37.9 °C) (Oral) Comment: Reported to RN  Resp 18   Ht 5' 5\" (1.651 m)   Wt 264 lb 4.8 oz (119.9 kg)   LMP 08/16/2010   SpO2 91%   BMI 43.98 kg/m²     Intake/Output Summary (Last 24 hours) at 11/22/2021 1215  Last data filed at 11/22/2021 0800  Gross per 24 hour   Intake 2429.3 ml   Output 350 ml   Net 2079.3 ml      Wt Readings from Last 3 Encounters:   11/21/21 264 lb 4.8 oz (119.9 kg)   11/18/21 266 lb 4.8 oz (120.8 kg)   07/29/21 252 lb (114.3 kg)       General appearance:  Appears comfortable  Eyes: Sclera clear. Pupils equal.  ENT: Moist oral mucosa. Trachea midline, no adenopathy. Cardiovascular: Regular rhythm, normal S1, S2. No murmur. No edema in lower extremities  Respiratory: Not using accessory muscles. Good inspiratory effort. Clear to auscultation bilaterally, no wheeze or crackles. GI: Abdomen obese, soft, no tenderness, not distended, normal bowel sounds, no abdominal wall erythema, ileostomy in place. Musculoskeletal: No cyanosis in digits, neck supple  Neurology: CN 2-12 grossly intact. No speech or motor deficits  Psych: Normal affect. Alert and oriented in time, place and person  Skin: Warm, dry, normal turgor    Labs and Tests:  CBC:   Recent Labs     11/21/21  1307 11/22/21  0101 11/22/21  0934   WBC 8.2 6.4  --    HGB 11.1* 10.2* 10.6*    258  --      BMP:    Recent Labs     11/21/21  1307 11/22/21 0101   * 136   K 3.7 3.6   CL 96* 102   CO2 25 20*   BUN 6* 8   CREATININE 0.5* <0.5*   GLUCOSE 155* 159*     Hepatic:   Recent Labs     11/21/21  1307 11/22/21 0101   AST 29 27   ALT 26 23   BILITOT 0.5 0.5   ALKPHOS 149* 128     CT abd/pelvis  1. Ventral pelvic wall hematoma formation extending intra-extraperitoneal and   superficially along the abdominal/pelvic wall musculature as well.  Anterior

## 2021-11-22 NOTE — TELEPHONE ENCOUNTER
Fax from [facility] w/ a refill request for One Touch Test Strips    LOV  7-29-21  FOV   1-27-22

## 2021-11-22 NOTE — H&P
HOSPITALISTS HISTORY AND PHYSICAL    11/21/21  Patient Information:  Geoffrey Knapp is a 62 y.o. female [id number]  PCP:  Trudy Cooley (Tel: [phone] )    Chief complaint:    Chief Complaint   Patient presents with    Emesis     abd pain/+n/v/fever at home/recent surgery        History of Present Illness:  Fracisco Mari is a 62 y.o. female with h/o UC ileostomy in place, HTN , PCOS presented with c/o abdominal pain , non bloody emesis  and fever . She is s/p incarcerated incisional hernia repair by Dr Kinsey Holland on 11/16. She was discharged home on 11/19. She has been on oral antibiotics . The pt is febrile and tachycardic  in the ED . cT abdomen and pelvis showed Ventral abdominal and pelvic wall fluid collections concerning for hemotoma / abscess   The pt has been given a dose of Zosyn and fluid bolus. Surgery has been notified     REVIEW OF SYSTEMS:   Constitutional: Negative for fever,chills or night sweats  ENT: Negative for rhinorrhea, epistaxis, hoarseness, sore throat. Respiratory: Negative for shortness of breath,wheezing  Cardiovascular: Negative for chest pain, palpitations   Gastrointestinal: +Ve for nausea, vomiting, diarrhea, + abdominal pain   Genitourinary: Negative for polyuria, dysuria   Hematologic/Lymphatic: Negative for bleeding tendency, easy bruising  Musculoskeletal: Negative for myalgias and arthralgias  Neurologic: Negative for confusion,dysarthria. Skin: Negative for itching,rash  Psychiatric: Negative for depression,anxiety, agitation. Endocrine: Negative for polydipsia,polyuria,heat /cold intolerance. Past Medical History:   has a past medical history of Asthma, Diabetes mellitus ([address] GERD (gastroesophageal reflux disease), Hyperlipidemia, Hypertension, Ileostomy care ([address] Iritis, PCOS (polycystic ovarian syndrome), Pyoderma, Thyroid disease, Ulcerative colitis, and Ulcerative colitis ([address]      Past Surgical History:   has a past surgical history that includes Abdomen surgery; ileostomy or jejunostomy; and ventral hernia repair (N/A, 11/16/2021). Medications:  No current facility-administered medications on file prior to encounter. Current Outpatient Medications on File Prior to Encounter   Medication Sig Dispense Refill    HYDROcodone-acetaminophen (NORCO) 5-325 MG per tablet Take 1 tablet by mouth every 6 hours as needed for Pain for up to 5 days. Take lowest dose possible to manage pain; may stop taking sooner than 7 days if pain is able to be controlled with non-narcotic analgesics and therapies. 12 tablet 0    sulfamethoxazole-trimethoprim (BACTRIM DS;SEPTRA DS) 800-160 MG per tablet Take 1 tablet by mouth 2 times daily for 5 days 10 tablet 0    LANTUS SOLOSTAR 100 UNIT/ML injection pen INJECT 50 UNITS INTO THE SKIN TWICE DAILY. 5 pen 3    levothyroxine (SYNTHROID) 25 MCG tablet TAKE 1 TABLET BY MOUTH EVERY DAY 90 tablet 1    ALBUTEROL IN Inhale into the lungs      insulin lispro (HUMALOG) 100 UNIT/ML injection vial INJECT 25 UNITS INTO THE SKIN ONCE DAILY. 10 mL 3    Multiple Vitamin (MULTIVITAMIN ADULT PO) Take by mouth      Ferrous Sulfate (IRON PO) Take by mouth      COENZYME Q10 PO Take by mouth      Insulin Pen Needle (PEN NEEDLES 31GX5/16\") 31G X 8 MM MISC Use to inject insulin twice daily.  100 each 5    pioglitazone (ACTOS) 15 MG tablet TAKE 1 TABLET BY MOUTH EVERY DAY 90 tablet 1    ONE TOUCH ULTRASOFT LANCETS MISC USE TO TEST BLOOD GLUCOSE 4 TIMES DAILY      POTASSIUM PO Take by mouth      MAGNESIUM PO Take by mouth      Montelukast Sodium (SINGULAIR PO) Take by mouth      Cholecalciferol (VITAMIN D3) 1.25 MG (64039 UT) CAPS Take by mouth once a week      TRIAMCINOLONE ACETONIDE NA by Nasal route as needed       BD INSULIN SYRINGE U/F 30G X 1/2\" 0.3 ML MISC USE WITH INSULIN TWICE A  each 5    metFORMIN (GLUCOPHAGE) 500 MG tablet TAKE 1 TABLET BY MOUTH EVERY 24 HOURS 60 tablet 3    fluticasone-vilanterol (BREO ELLIPTA) 100-25 MCG/INH AEPB inhaler Inhale 1 puff into the lungs daily      losartan (COZAAR) 100 MG tablet Take 100 mg by mouth daily      Omega-3 Fatty Acids (FISH OIL) 1000 MG CAPS Take 3,000 mg by mouth daily      atorvastatin (LIPITOR) 10 MG tablet Take 10 mg by mouth daily      CRANBERRY PO Take by mouth daily      acetaminophen (TYLENOL) 500 MG tablet Take 500 mg by mouth every 6 hours as needed for Pain      Loratadine (CLARITIN) 10 MG CAPS Take  by mouth.        Current Facility-Administered Medications   Medication Dose Route Frequency Provider Last Rate Last Admin    0.9 % sodium chloride infusion   IntraVENous Continuous Sam Ramirez  mL/hr at 11/22/21 0418 New Bag at 11/22/21 0418    atorvastatin (LIPITOR) tablet 10 mg  10 mg Oral Nightly Sam Ramirez MD   10 mg at 11/21/21 2104    morphine injection 4 mg  4 mg IntraVENous Q4H PRN Sam Ramirez MD        levothyroxine (SYNTHROID) tablet 25 mcg  25 mcg Oral QAM AC Sam Ramirez MD        losartan (COZAAR) tablet 100 mg  100 mg Oral Daily Sam Ramirez MD   100 mg at 11/21/21 1717    hydrALAZINE (APRESOLINE) injection 10 mg  10 mg IntraVENous Q4H PRN Sam Ramirez MD   10 mg at 11/21/21 5546    glucose (GLUTOSE) 40 % oral gel 15 g  15 g Oral PRN Sam Ramirez MD        dextrose 50 % IV solution  12.5 g IntraVENous PRN Sam Ramirez MD        glucagon (rDNA) injection 1 mg  1 mg IntraMUSCular PRN Sam Ramirez MD        dextrose 5 % solution  100 mL/hr IntraVENous PRN Sam Ramirez MD        sodium chloride flush 0.9 % injection 5-40 mL  5-40 mL IntraVENous 2 times per day Sam Ramirez MD        sodium chloride flush 0.9 % injection 5-40 mL  5-40 mL IntraVENous PRN Hina Cooper MD   10 mL at 11/22/21 0430    0.9 % sodium chloride infusion  25 mL IntraVENous PRN Sam Ramirez MD        ondansetron (ZOFRAN-ODT) disintegrating tablet 4 mg  4 mg Oral Q8H PRN Sam Ramirez MD   4 mg at 11/21/21 2210    Or    ondansetron (ZOFRAN) injection 4 mg  4 mg IntraVENous Q6H PRN Francine Novak MD   4 mg at 11/22/21 0429    polyethylene glycol (GLYCOLAX) packet 17 g  17 g Oral Daily PRN Francine Novak MD        acetaminophen (TYLENOL) tablet 650 mg  650 mg Oral Q6H PRN Francine Novak MD   650 mg at 11/22/21 0429    Or    acetaminophen (TYLENOL) suppository 650 mg  650 mg Rectal Q6H PRN Francine Novak MD        insulin lispro (1 Unit Dial) 0-12 Units  0-12 Units SubCUTAneous TID  Hina Casey Chen MD        insulin lispro (1 Unit Dial) 0-6 Units  0-6 Units SubCUTAneous Nightly Francine Novak MD   1 Units at 11/21/21 2210    pantoprazole (PROTONIX) injection 40 mg  40 mg IntraVENous Daily Trung Whitney MD   40 mg at 11/21/21 1720    piperacillin-tazobactam (ZOSYN) 3,375 mg in dextrose 5 % 50 mL IVPB extended infusion (mini-bag)  3,375 mg IntraVENous Dee Dee Pierson MD   Stopped at 11/22/21 0411     Allergies:  No Known Allergies     Social History:   reports that she has never smoked. She has never used smokeless tobacco. She reports that she does not drink alcohol and does not use drugs. Family History:  family history includes Cancer in her mother; No Known Problems in her father. ,     Physical Exam:  /64   Pulse 108   Temp 101.3 °F (38.5 °C) (Oral)   Resp 18   Ht 5' 5\" (1.651 m)   Wt 264 lb 4.8 oz (119.9 kg)   LMP 08/16/2010   SpO2 95%   BMI 43.98 kg/m²     General appearance:  Appears comfortable. Well nourished  Eyes: Sclera clear, pupils equal  ENT: Moist mucus membranes, no thrush. Trachea midline. Cardiovascular: Regular rhythm, normal S1, S2. No murmur, gallop, rub.  No edema in lower extremities  Respiratory: Clear to auscultation bilaterally, no wheeze, good inspiratory effort  Gastrointestinal: Abdomen soft, +Ve tender, not distended, normal bowel sounds ileostomy in place, incision site dry  Musculoskeletal: No cyanosis in digits, neck supple  Neurology: Cranial nerves grossly intact. Alert and oriented in time, place and person. No speech or motor deficits  Psychiatry: Appropriate affect. Not agitated  Skin: Warm, dry, normal turgor, no rash    Labs:  CBC:   Lab Results   Component Value Date    WBC 6.4 11/22/2021    RBC 3.63 11/22/2021    HGB 10.2 11/22/2021    HCT 30.7 11/22/2021    MCV 84.5 11/22/2021    MCH 28.3 11/22/2021    MCHC 33.4 11/22/2021    RDW 15.0 11/22/2021     11/22/2021    MPV 6.3 11/22/2021     BMP:    Lab Results   Component Value Date     11/22/2021    K 3.6 11/22/2021    K 3.7 11/21/2021     11/22/2021    CO2 20 11/22/2021    BUN 8 11/22/2021    CREATININE <0.5 11/22/2021    CALCIUM 8.3 11/22/2021    GFRAA >60 11/22/2021    GFRAA >60 03/22/2013    LABGLOM >60 11/22/2021    GLUCOSE 159 11/22/2021       Chest Xray:   EKG:        Problem List  Active Problems:    Postoperative fever  Resolved Problems:    * No resolved hospital problems. *        Assessment/Plan:         1. Sepsis poa with fever ( tmax 102.6) and Tachycardia  Pot op, Concerning for infected hematoma / abscess   CT abdomen and pelvis showed ventral pelvic hematoma and peritoneal wall fluid collection  Blood cultures pending   On Zosyn  NPO, Pain control, IV fluids  Surgery on board   Cont to monitor h/h    Admit as inpatien. I anticipate hospitalization spanning more than two midnights for investigation and treatment of the above medically necessary diagnoses.       Lance Mullins MD    11/21/21

## 2021-11-23 ENCOUNTER — ANESTHESIA EVENT (OUTPATIENT)
Dept: ENDOSCOPY | Age: 61
DRG: 872 | End: 2021-11-23
Payer: COMMERCIAL

## 2021-11-23 ENCOUNTER — ANESTHESIA (OUTPATIENT)
Dept: ENDOSCOPY | Age: 61
DRG: 872 | End: 2021-11-23
Payer: COMMERCIAL

## 2021-11-23 VITALS
RESPIRATION RATE: 24 BRPM | OXYGEN SATURATION: 99 % | DIASTOLIC BLOOD PRESSURE: 76 MMHG | SYSTOLIC BLOOD PRESSURE: 142 MMHG

## 2021-11-23 LAB
ANION GAP SERPL CALCULATED.3IONS-SCNC: 13 MMOL/L (ref 3–16)
BUN BLDV-MCNC: 7 MG/DL (ref 7–20)
CALCIUM SERPL-MCNC: 7.9 MG/DL (ref 8.3–10.6)
CHLORIDE BLD-SCNC: 101 MMOL/L (ref 99–110)
CO2: 21 MMOL/L (ref 21–32)
CREAT SERPL-MCNC: <0.5 MG/DL (ref 0.6–1.2)
GFR AFRICAN AMERICAN: >60
GFR NON-AFRICAN AMERICAN: >60
GLUCOSE BLD-MCNC: 115 MG/DL (ref 70–99)
GLUCOSE BLD-MCNC: 118 MG/DL (ref 70–99)
GLUCOSE BLD-MCNC: 120 MG/DL (ref 70–99)
GLUCOSE BLD-MCNC: 124 MG/DL (ref 70–99)
GLUCOSE BLD-MCNC: 126 MG/DL (ref 70–99)
GLUCOSE BLD-MCNC: 151 MG/DL (ref 70–99)
GLUCOSE BLD-MCNC: 157 MG/DL (ref 70–99)
HCT VFR BLD CALC: 29 % (ref 36–48)
HCT VFR BLD CALC: 29.2 % (ref 36–48)
HCT VFR BLD CALC: 30.2 % (ref 36–48)
HEMOGLOBIN: 10 G/DL (ref 12–16)
HEMOGLOBIN: 9.7 G/DL (ref 12–16)
HEMOGLOBIN: 9.8 G/DL (ref 12–16)
MAGNESIUM: 2 MG/DL (ref 1.8–2.4)
MCH RBC QN AUTO: 28.1 PG (ref 26–34)
MCHC RBC AUTO-ENTMCNC: 33 G/DL (ref 31–36)
MCV RBC AUTO: 85.3 FL (ref 80–100)
PDW BLD-RTO: 14.6 % (ref 12.4–15.4)
PERFORMED ON: ABNORMAL
PLATELET # BLD: 256 K/UL (ref 135–450)
PMV BLD AUTO: 6.6 FL (ref 5–10.5)
POTASSIUM SERPL-SCNC: 3.7 MMOL/L (ref 3.5–5.1)
RBC # BLD: 3.55 M/UL (ref 4–5.2)
REPORT: NORMAL
RESPIRATORY PANEL PCR: NORMAL
SODIUM BLD-SCNC: 135 MMOL/L (ref 136–145)
WBC # BLD: 5.2 K/UL (ref 4–11)

## 2021-11-23 PROCEDURE — 80048 BASIC METABOLIC PNL TOTAL CA: CPT

## 2021-11-23 PROCEDURE — 7100000001 HC PACU RECOVERY - ADDTL 15 MIN: Performed by: INTERNAL MEDICINE

## 2021-11-23 PROCEDURE — 85027 COMPLETE CBC AUTOMATED: CPT

## 2021-11-23 PROCEDURE — 85014 HEMATOCRIT: CPT

## 2021-11-23 PROCEDURE — 2580000003 HC RX 258: Performed by: SURGERY

## 2021-11-23 PROCEDURE — C9113 INJ PANTOPRAZOLE SODIUM, VIA: HCPCS | Performed by: SURGERY

## 2021-11-23 PROCEDURE — 2060000000 HC ICU INTERMEDIATE R&B

## 2021-11-23 PROCEDURE — 2580000003 HC RX 258: Performed by: REGISTERED NURSE

## 2021-11-23 PROCEDURE — 36415 COLL VENOUS BLD VENIPUNCTURE: CPT

## 2021-11-23 PROCEDURE — 2709999900 HC NON-CHARGEABLE SUPPLY: Performed by: INTERNAL MEDICINE

## 2021-11-23 PROCEDURE — 99232 SBSQ HOSP IP/OBS MODERATE 35: CPT | Performed by: SURGERY

## 2021-11-23 PROCEDURE — 6360000002 HC RX W HCPCS: Performed by: SURGERY

## 2021-11-23 PROCEDURE — 3700000001 HC ADD 15 MINUTES (ANESTHESIA): Performed by: INTERNAL MEDICINE

## 2021-11-23 PROCEDURE — 3700000000 HC ANESTHESIA ATTENDED CARE: Performed by: INTERNAL MEDICINE

## 2021-11-23 PROCEDURE — 2580000003 HC RX 258: Performed by: FAMILY MEDICINE

## 2021-11-23 PROCEDURE — 0DB68ZX EXCISION OF STOMACH, VIA NATURAL OR ARTIFICIAL OPENING ENDOSCOPIC, DIAGNOSTIC: ICD-10-PCS | Performed by: INTERNAL MEDICINE

## 2021-11-23 PROCEDURE — 6360000002 HC RX W HCPCS: Performed by: REGISTERED NURSE

## 2021-11-23 PROCEDURE — 88305 TISSUE EXAM BY PATHOLOGIST: CPT

## 2021-11-23 PROCEDURE — 85018 HEMOGLOBIN: CPT

## 2021-11-23 PROCEDURE — 2500000003 HC RX 250 WO HCPCS: Performed by: REGISTERED NURSE

## 2021-11-23 PROCEDURE — 6370000000 HC RX 637 (ALT 250 FOR IP): Performed by: FAMILY MEDICINE

## 2021-11-23 PROCEDURE — 7100000000 HC PACU RECOVERY - FIRST 15 MIN: Performed by: INTERNAL MEDICINE

## 2021-11-23 PROCEDURE — 0DB98ZX EXCISION OF DUODENUM, VIA NATURAL OR ARTIFICIAL OPENING ENDOSCOPIC, DIAGNOSTIC: ICD-10-PCS | Performed by: INTERNAL MEDICINE

## 2021-11-23 PROCEDURE — 83735 ASSAY OF MAGNESIUM: CPT

## 2021-11-23 PROCEDURE — 3609012400 HC EGD TRANSORAL BIOPSY SINGLE/MULTIPLE: Performed by: INTERNAL MEDICINE

## 2021-11-23 RX ORDER — LIDOCAINE HYDROCHLORIDE 20 MG/ML
INJECTION, SOLUTION EPIDURAL; INFILTRATION; INTRACAUDAL; PERINEURAL PRN
Status: DISCONTINUED | OUTPATIENT
Start: 2021-11-23 | End: 2021-11-23 | Stop reason: SDUPTHER

## 2021-11-23 RX ORDER — PROPOFOL 10 MG/ML
INJECTION, EMULSION INTRAVENOUS CONTINUOUS PRN
Status: DISCONTINUED | OUTPATIENT
Start: 2021-11-23 | End: 2021-11-23 | Stop reason: SDUPTHER

## 2021-11-23 RX ORDER — KETAMINE HCL IN NACL, ISO-OSM 100MG/10ML
SYRINGE (ML) INJECTION PRN
Status: DISCONTINUED | OUTPATIENT
Start: 2021-11-23 | End: 2021-11-23 | Stop reason: SDUPTHER

## 2021-11-23 RX ORDER — PROPOFOL 10 MG/ML
INJECTION, EMULSION INTRAVENOUS PRN
Status: DISCONTINUED | OUTPATIENT
Start: 2021-11-23 | End: 2021-11-23 | Stop reason: SDUPTHER

## 2021-11-23 RX ORDER — GLYCOPYRROLATE 0.2 MG/ML
INJECTION INTRAMUSCULAR; INTRAVENOUS PRN
Status: DISCONTINUED | OUTPATIENT
Start: 2021-11-23 | End: 2021-11-23 | Stop reason: SDUPTHER

## 2021-11-23 RX ORDER — SODIUM CHLORIDE 9 MG/ML
INJECTION, SOLUTION INTRAVENOUS CONTINUOUS PRN
Status: DISCONTINUED | OUTPATIENT
Start: 2021-11-23 | End: 2021-11-23 | Stop reason: SDUPTHER

## 2021-11-23 RX ORDER — SODIUM CHLORIDE 9 MG/ML
INJECTION, SOLUTION INTRAVENOUS CONTINUOUS
Status: DISCONTINUED | OUTPATIENT
Start: 2021-11-23 | End: 2021-11-25 | Stop reason: HOSPADM

## 2021-11-23 RX ADMIN — GLYCOPYRROLATE 0.1 MG: 0.2 INJECTION, SOLUTION INTRAMUSCULAR; INTRAVENOUS at 14:01

## 2021-11-23 RX ADMIN — PANTOPRAZOLE SODIUM 40 MG: 40 INJECTION, POWDER, FOR SOLUTION INTRAVENOUS at 09:13

## 2021-11-23 RX ADMIN — LOSARTAN POTASSIUM 100 MG: 100 TABLET, FILM COATED ORAL at 09:13

## 2021-11-23 RX ADMIN — SODIUM CHLORIDE: 9 INJECTION, SOLUTION INTRAVENOUS at 16:37

## 2021-11-23 RX ADMIN — PANTOPRAZOLE SODIUM 40 MG: 40 INJECTION, POWDER, FOR SOLUTION INTRAVENOUS at 20:31

## 2021-11-23 RX ADMIN — PIPERACILLIN AND TAZOBACTAM 3375 MG: 3; .375 INJECTION, POWDER, LYOPHILIZED, FOR SOLUTION INTRAVENOUS at 07:53

## 2021-11-23 RX ADMIN — LINEZOLID 600 MG: 600 INJECTION, SOLUTION INTRAVENOUS at 22:20

## 2021-11-23 RX ADMIN — LINEZOLID 600 MG: 600 INJECTION, SOLUTION INTRAVENOUS at 12:11

## 2021-11-23 RX ADMIN — LEVOTHYROXINE SODIUM 25 MCG: 0.03 TABLET ORAL at 05:53

## 2021-11-23 RX ADMIN — Medication 30 MG: at 14:01

## 2021-11-23 RX ADMIN — ACETAMINOPHEN 650 MG: 325 TABLET ORAL at 05:52

## 2021-11-23 RX ADMIN — SODIUM CHLORIDE: 9 INJECTION, SOLUTION INTRAVENOUS at 13:46

## 2021-11-23 RX ADMIN — ACETAMINOPHEN 650 MG: 325 TABLET ORAL at 12:01

## 2021-11-23 RX ADMIN — INSULIN LISPRO 1 UNITS: 100 INJECTION, SOLUTION INTRAVENOUS; SUBCUTANEOUS at 21:29

## 2021-11-23 RX ADMIN — SODIUM CHLORIDE: 9 INJECTION, SOLUTION INTRAVENOUS at 13:56

## 2021-11-23 RX ADMIN — PROPOFOL 60 MG: 10 INJECTION, EMULSION INTRAVENOUS at 14:01

## 2021-11-23 RX ADMIN — ACETAMINOPHEN 650 MG: 325 TABLET ORAL at 18:26

## 2021-11-23 RX ADMIN — SODIUM CHLORIDE, PRESERVATIVE FREE 10 ML: 5 INJECTION INTRAVENOUS at 09:15

## 2021-11-23 RX ADMIN — LIDOCAINE HYDROCHLORIDE 100 MG: 20 INJECTION, SOLUTION EPIDURAL; INFILTRATION; INTRACAUDAL; PERINEURAL at 14:01

## 2021-11-23 RX ADMIN — SODIUM CHLORIDE, PRESERVATIVE FREE 10 ML: 5 INJECTION INTRAVENOUS at 20:32

## 2021-11-23 RX ADMIN — ONDANSETRON 4 MG: 4 TABLET, ORALLY DISINTEGRATING ORAL at 05:55

## 2021-11-23 RX ADMIN — ATORVASTATIN CALCIUM 10 MG: 10 TABLET, FILM COATED ORAL at 20:32

## 2021-11-23 RX ADMIN — PROPOFOL 120 MCG/KG/MIN: 10 INJECTION, EMULSION INTRAVENOUS at 14:01

## 2021-11-23 RX ADMIN — PIPERACILLIN AND TAZOBACTAM 3375 MG: 3; .375 INJECTION, POWDER, LYOPHILIZED, FOR SOLUTION INTRAVENOUS at 16:38

## 2021-11-23 ASSESSMENT — PULMONARY FUNCTION TESTS
PIF_VALUE: 1
PIF_VALUE: 0
PIF_VALUE: 1
PIF_VALUE: 0
PIF_VALUE: 1
PIF_VALUE: 1
PIF_VALUE: 0
PIF_VALUE: 1
PIF_VALUE: 1
PIF_VALUE: 0
PIF_VALUE: 0
PIF_VALUE: 1
PIF_VALUE: 0
PIF_VALUE: 0
PIF_VALUE: 1

## 2021-11-23 ASSESSMENT — PAIN SCALES - GENERAL
PAINLEVEL_OUTOF10: 1
PAINLEVEL_OUTOF10: 0
PAINLEVEL_OUTOF10: 2
PAINLEVEL_OUTOF10: 0
PAINLEVEL_OUTOF10: 2

## 2021-11-23 ASSESSMENT — PAIN DESCRIPTION - DESCRIPTORS: DESCRIPTORS: DISCOMFORT

## 2021-11-23 NOTE — ANESTHESIA PRE PROCEDURE
Department of Anesthesiology  Preprocedure Note       Name:  Jodi Celaya   Age:  62 y.o.  :  1960                                          MRN:  [id number]         Date:  2021      Surgeon: Norma Finney):  Reagan Beltrán MD    Procedure: Procedure(s):  EGD DIAGNOSTIC ONLY    Medications prior to admission:   Prior to Admission medications    Medication Sig Start Date End Date Taking? Authorizing Provider   HYDROcodone-acetaminophen (NORCO) 5-325 MG per tablet Take 1 tablet by mouth every 6 hours as needed for Pain for up to 5 days. Take lowest dose possible to manage pain; may stop taking sooner than 7 days if pain is able to be controlled with non-narcotic analgesics and therapies. 21 Yes CORDELL Torres CNP   sulfamethoxazole-trimethoprim (BACTRIM DS;SEPTRA DS) 800-160 MG per tablet Take 1 tablet by mouth 2 times daily for 5 days 21 Yes CORDELL Torres CNP   LANTUS SOLOSTAR 100 UNIT/ML injection pen INJECT 50 UNITS INTO THE SKIN TWICE DAILY. 21  Yes Joanie Gomez MD   levothyroxine (SYNTHROID) 25 MCG tablet TAKE 1 TABLET BY MOUTH EVERY DAY 21  Yes Joanie Gomez MD   ALBUTEROL IN Inhale into the lungs   Yes Historical Provider, MD   insulin lispro (HUMALOG) 100 UNIT/ML injection vial INJECT 25 UNITS INTO THE SKIN ONCE DAILY. 21  Yes Joanie Gomez MD   Multiple Vitamin (MULTIVITAMIN ADULT PO) Take by mouth   Yes Historical Provider, MD   Ferrous Sulfate (IRON PO) Take by mouth   Yes Historical Provider, MD   COENZYME Q10 PO Take by mouth   Yes Historical Provider, MD   Insulin Pen Needle (PEN NEEDLES 31GX5/16\") 31G X 8 MM MISC Use to inject insulin twice daily.  21  Yes Joanie Gomez MD   pioglitazone (ACTOS) 15 MG tablet TAKE 1 TABLET BY MOUTH EVERY DAY 21  Yes Joanie Gomez MD   ONE TOUCH ULTRASOFT LANCETS MISC USE TO TEST BLOOD GLUCOSE 4 TIMES DAILY 20  Yes Historical Provider, MD   POTASSIUM PO Take by mouth   Yes Historical Provider, MD   MAGNESIUM PO Take by mouth   Yes Historical Provider, MD   Montelukast Sodium (SINGULAIR PO) Take by mouth   Yes Historical Provider, MD   Cholecalciferol (VITAMIN D3) 1.25 MG (96618 UT) CAPS Take by mouth once a week   Yes Historical Provider, MD   TRIAMCINOLONE ACETONIDE NA by Nasal route as needed    Yes Historical Provider, MD   BD INSULIN SYRINGE U/F 30G X 1/2\" 0.3 ML MISC USE WITH INSULIN TWICE A DAY 12/10/20  Yes Julio Molina MD   metFORMIN (GLUCOPHAGE) 500 MG tablet TAKE 1 TABLET BY MOUTH EVERY 24 HOURS 12/10/20  Yes Julio Molina MD   fluticasone-vilanterol (BREO ELLIPTA) 100-25 MCG/INH AEPB inhaler Inhale 1 puff into the lungs daily   Yes Historical Provider, MD   losartan (COZAAR) 100 MG tablet Take 100 mg by mouth daily   Yes Historical Provider, MD   Omega-3 Fatty Acids (FISH OIL) 1000 MG CAPS Take 3,000 mg by mouth daily   Yes Historical Provider, MD   atorvastatin (LIPITOR) 10 MG tablet Take 10 mg by mouth daily   Yes Historical Provider, MD   CRANBERRY PO Take by mouth daily   Yes Historical Provider, MD   acetaminophen (TYLENOL) 500 MG tablet Take 500 mg by mouth every 6 hours as needed for Pain   Yes Historical Provider, MD   Loratadine (CLARITIN) 10 MG CAPS Take  by mouth. Yes Historical Provider, MD   blood glucose test strips (ONETOUCH VERIO) strip Test 4 times daily.  11/22/21   Julio Molina MD       Current medications:    Current Facility-Administered Medications   Medication Dose Route Frequency Provider Last Rate Last Admin    potassium chloride (KLOR-CON M) extended release tablet 40 mEq  40 mEq Oral PRN Ulises Ruelas MD        Or    potassium bicarb-citric acid (EFFER-K) effervescent tablet 40 mEq  40 mEq Oral PRN Ulises Ruelas MD        Or    potassium chloride 10 mEq/100 mL IVPB (Peripheral Line)  10 mEq IntraVENous PRN Ulises Ruelas MD        magnesium sulfate 1000 mg in dextrose 5% 100 mL IVPB  1,000 mg IntraVENous PRN Ulises Ruelas MD        sodium phosphate 19.17 mmol in dextrose 5 % 250 mL IVPB  0.16 mmol/kg IntraVENous PRN Shilpi Bridges MD        Or    sodium phosphate 38.37 mmol in dextrose 5 % 250 mL IVPB  0.32 mmol/kg IntraVENous PRN Shilpi Bridges MD        ibuprofen (ADVIL;MOTRIN) tablet 600 mg  600 mg Oral Once Shilpi Bridges MD        linezolid (ZYVOX) IVPB 600 mg  600 mg IntraVENous Q12H Raul Gomez  mL/hr at 11/23/21 1211 600 mg at 11/23/21 1211    pantoprazole (PROTONIX) injection 40 mg  40 mg IntraVENous BID Raul Gomez MD   40 mg at 11/23/21 0913    0.9 % sodium chloride infusion   IntraVENous Continuous Carmen Alvarado  mL/hr at 11/22/21 1613 New Bag at 11/22/21 1613    atorvastatin (LIPITOR) tablet 10 mg  10 mg Oral Nightly Carmen Alvarado MD   10 mg at 11/22/21 2133    morphine injection 4 mg  4 mg IntraVENous Q4H PRN Carmen Alvarado MD        levothyroxine (SYNTHROID) tablet 25 mcg  25 mcg Oral QAM AC Carmen Alvarado MD   25 mcg at 11/23/21 0553    losartan (COZAAR) tablet 100 mg  100 mg Oral Daily Carmen Alvarado MD   100 mg at 11/23/21 0913    hydrALAZINE (APRESOLINE) injection 10 mg  10 mg IntraVENous Q4H PRN Carmen Alvarado MD   10 mg at 11/21/21 9456    glucose (GLUTOSE) 40 % oral gel 15 g  15 g Oral PRN Carmen Alvarado MD        dextrose 50 % IV solution  12.5 g IntraVENous PRN Carmen Alvarado MD        glucagon (rDNA) injection 1 mg  1 mg IntraMUSCular PRN Carmen Alvarado MD        dextrose 5 % solution  100 mL/hr IntraVENous PRN Carmen Alvarado MD        sodium chloride flush 0.9 % injection 5-40 mL  5-40 mL IntraVENous 2 times per day Carmen Alvarado MD   10 mL at 11/23/21 0915    sodium chloride flush 0.9 % injection 5-40 mL  5-40 mL IntraVENous PRN Hina Cooper MD   10 mL at 11/22/21 0430    0.9 % sodium chloride infusion  25 mL IntraVENous PRN Carmen Alvarado  mL/hr at 11/22/21 2241 25 mL at 11/22/21 2241    ondansetron (ZOFRAN-ODT) disintegrating tablet 4 mg  4 mg Oral Q8H PRN Hina Esha Casey MD   4 mg at 11/23/21 4082    Or    ondansetron [facility] injection 4 mg  4 mg IntraVENous Q6H PRN Genoveva Mott MD   4 mg at 11/22/21 0429    polyethylene glycol (GLYCOLAX) packet 17 g  17 g Oral Daily PRN Genoveva Mott MD        acetaminophen (TYLENOL) tablet 650 mg  650 mg Oral Q6H PRN Genoveva Mott MD   650 mg at 11/23/21 1201    Or    acetaminophen (TYLENOL) suppository 650 mg  650 mg Rectal Q6H PRN Genoveva Mott MD        insulin lispro (1 Unit Dial) 0-12 Units  0-12 Units SubCUTAneous TID WC Genoveva Mott MD   2 Units at 11/22/21 1706    insulin lispro (1 Unit Dial) 0-6 Units  0-6 Units SubCUTAneous Nightly Genoveva Mott MD   1 Units at 11/22/21 2133    piperacillin-tazobactam (ZOSYN) 3,375 mg in dextrose 5 % 50 mL IVPB extended infusion (mini-bag)  3,375 mg IntraVENous Q8H Stef Porter MD   Stopped at 11/23/21 1153       Allergies:  No Known Allergies    Problem List:    Patient Active Problem List   Diagnosis Code    Ileostomy care [facility]) Z43.2    Uncontrolled type 2 diabetes mellitus with complication, with long-term current use of insulin ([facility]) E11.8, E11.65, Z79.4    Hypothyroidism E03.9    Essential hypertension I10    Mixed hyperlipidemia E78.2    Ulcerative (chronic) enterocolitis, unspecified complication ([address] L06.577    Small bowel obstruction ([address] K56.609    Incarcerated incisional hernia K43.0    Generalized abdominal pain R10.84    Postoperative fever R50.82       Past Medical History:        Diagnosis Date    Asthma     Diabetes mellitus ([address]     GERD (gastroesophageal reflux disease)     Hyperlipidemia     Hypertension     [facility] 3/17/2020    Iritis     PCOS (polycystic ovarian syndrome)     Pyoderma     Thyroid disease     Ulcerative colitis     Ulcerative colitis ([address]        Past Surgical History:        Procedure Laterality Date    ABDOMEN SURGERY      COLON RESECTION FOR ULCERATIVE COLITIS THEN HAD ANUS REMOVED    ILEOSTOMY OR JEJUNOSTOMY      PERMANENT    VENTRAL HERNIA REPAIR N/A 11/16/2021    OPEN REPAIR INCISIONAL HERNIA WITH MESH, LYSIS OF ADHESIONS performed by Kerrie Quintana MD at [place] [address] History:    Social History     Tobacco Use    Smoking status: Never Smoker    Smokeless tobacco: Never Used   Substance Use Topics    Alcohol use: No                                Counseling given: Not Answered      Vital Signs (Current):   Vitals:    11/23/21 0400 11/23/21 0748 11/23/21 1218 11/23/21 1319   BP: 127/75 129/75 123/79 116/63   Pulse: 92 96 92 84   Resp: 18 18 16 18   Temp: 98.5 °F (36.9 °C) 98.9 °F (37.2 °C) 97.6 °F (36.4 °C) 99.6 °F (37.6 °C)   TempSrc: Oral Oral Oral Temporal   SpO2: 97% 93% 95% 98%   Weight:       Height:                                                  BP Readings from Last 3 Encounters:   11/23/21 116/63   11/19/21 136/76   11/16/21 111/60       NPO Status: Time of last liquid consumption: 0000                        Time of last solid consumption: 1800                        Date of last liquid consumption: 11/23/21                        Date of last solid food consumption: 11/21/21    BMI:   Wt Readings from Last 3 Encounters:   11/21/21 264 lb 4.8 oz (119.9 kg)   11/18/21 266 lb 4.8 oz (120.8 kg)   07/29/21 252 lb (114.3 kg)     Body mass index is 43.98 kg/m².     CBC:   Lab Results   Component Value Date    WBC 5.2 11/23/2021    RBC 3.55 11/23/2021    HGB 10.0 11/23/2021    HCT 30.2 11/23/2021    MCV 85.3 11/23/2021    RDW 14.6 11/23/2021     11/23/2021       CMP:   Lab Results   Component Value Date     11/23/2021    K 3.7 11/23/2021    K 3.7 11/21/2021     11/23/2021    CO2 21 11/23/2021    BUN 7 11/23/2021    CREATININE <0.5 11/23/2021    GFRAA >60 11/23/2021    GFRAA >60 03/22/2013    AGRATIO 0.9 11/22/2021    LABGLOM >60 11/23/2021    GLUCOSE 120 11/23/2021    PROT 5.8 11/22/2021    PROT 7.8 12/22/2012    CALCIUM 7.9 11/23/2021    BILITOT 0.5 11/22/2021 ALKPHOS 128 11/22/2021    AST 27 11/22/2021    ALT 23 11/22/2021       POC Tests:   Recent Labs     11/23/21  1319   POCGLU 151*       Coags:   Lab Results   Component Value Date    PROTIME 14.2 11/22/2021    INR 1.25 11/22/2021    APTT 28.6 11/22/2021       HCG (If Applicable): No results found for: PREGTESTUR, PREGSERUM, HCG, HCGQUANT     ABGs: No results found for: PHART, PO2ART, ZWH8JLD, COK6WRV, BEART, M4UMCZAK     Type & Screen (If Applicable):  No results found for: LABABO, LABRH    Drug/Infectious Status (If Applicable):  No results found for: HIV, HEPCAB    COVID-19 Screening (If Applicable):   Lab Results   Component Value Date    COVID19 Not Detected 11/15/2021           Anesthesia Evaluation    Airway: Mallampati: II  TM distance: >3 FB   Neck ROM: full  Mouth opening: > = 3 FB Dental:          Pulmonary:   (+) asthma:                            Cardiovascular:    (+) hypertension:,         Rhythm: regular  Rate: normal                    Neuro/Psych:               GI/Hepatic/Renal:   (+) GERD:, PUD, morbid obesity          Endo/Other:    (+) Diabetes, hypothyroidism::., .                 Abdominal:             Vascular: Other Findings:             Anesthesia Plan      MAC     ASA 3       Induction: intravenous. Anesthetic plan and risks discussed with patient. Plan discussed with CRNA.                   Rosales Wang MD   11/23/2021

## 2021-11-23 NOTE — PROGRESS NOTES
Sherry Singer will be completed first thing in the morning. Please make patient NPO and hold narcotics at midnight.     CHAITANYA Blevins, RT(MR)

## 2021-11-23 NOTE — PROGRESS NOTES
[facility]        PATIENT NAME: Grace Mccarthy     TODAY'S DATE: 11/23/2021    SUBJECTIVE:  CC abd pain  Pt with continued pain, RUQ, epigastric region mostly, focal, intermittent, more wit pressure. No emesis.    No CP or SOB  Having stomal output  Feels feverish intermittently     Current Medications:   Current Facility-Administered Medications: potassium chloride (KLOR-CON M) extended release tablet 40 mEq, 40 mEq, Oral, PRN **OR** potassium bicarb-citric acid (EFFER-K) effervescent tablet 40 mEq, 40 mEq, Oral, PRN **OR** potassium chloride 10 mEq/100 mL IVPB (Peripheral Line), 10 mEq, IntraVENous, PRN  magnesium sulfate 1000 mg in dextrose 5% 100 mL IVPB, 1,000 mg, IntraVENous, PRN  sodium phosphate 19.17 mmol in dextrose 5 % 250 mL IVPB, 0.16 mmol/kg, IntraVENous, PRN **OR** sodium phosphate 38.37 mmol in dextrose 5 % 250 mL IVPB, 0.32 mmol/kg, IntraVENous, PRN  ibuprofen (ADVIL;MOTRIN) tablet 600 mg, 600 mg, Oral, Once  linezolid (ZYVOX) IVPB 600 mg, 600 mg, IntraVENous, Q12H  pantoprazole (PROTONIX) injection 40 mg, 40 mg, IntraVENous, BID  0.9 % sodium chloride infusion, , IntraVENous, Continuous  atorvastatin (LIPITOR) tablet 10 mg, 10 mg, Oral, Nightly  morphine injection 4 mg, 4 mg, IntraVENous, Q4H PRN  levothyroxine (SYNTHROID) tablet 25 mcg, 25 mcg, Oral, QAM AC  losartan (COZAAR) tablet 100 mg, 100 mg, Oral, Daily  hydrALAZINE (APRESOLINE) injection 10 mg, 10 mg, IntraVENous, Q4H PRN  glucose (GLUTOSE) 40 % oral gel 15 g, 15 g, Oral, PRN  dextrose 50 % IV solution, 12.5 g, IntraVENous, PRN  glucagon (rDNA) injection 1 mg, 1 mg, IntraMUSCular, PRN  dextrose 5 % solution, 100 mL/hr, IntraVENous, PRN  sodium chloride flush 0.9 % injection 5-40 mL, 5-40 mL, IntraVENous, 2 times per day  sodium chloride flush 0.9 % injection 5-40 mL, 5-40 mL, IntraVENous, PRN  0.9 % sodium chloride infusion, 25 mL, IntraVENous, PRN  ondansetron (ZOFRAN-ODT) disintegrating tablet 4 mg, 4 mg, Oral, Q8H PRN **OR** ondansetron (ZOFRAN) injection 4 mg, 4 mg, IntraVENous, Q6H PRN  polyethylene glycol (GLYCOLAX) packet 17 g, 17 g, Oral, Daily PRN  acetaminophen (TYLENOL) tablet 650 mg, 650 mg, Oral, Q6H PRN **OR** acetaminophen (TYLENOL) suppository 650 mg, 650 mg, Rectal, Q6H PRN  insulin lispro (1 Unit Dial) 0-12 Units, 0-12 Units, SubCUTAneous, TID WC  insulin lispro (1 Unit Dial) 0-6 Units, 0-6 Units, SubCUTAneous, Nightly  piperacillin-tazobactam (ZOSYN) 3,375 mg in dextrose 5 % 50 mL IVPB extended infusion (mini-bag), 3,375 mg, IntraVENous, Q8H  Prior to Admission medications    Medication Sig Start Date End Date Taking? Authorizing Provider   HYDROcodone-acetaminophen (NORCO) 5-325 MG per tablet Take 1 tablet by mouth every 6 hours as needed for Pain for up to 5 days. Take lowest dose possible to manage pain; may stop taking sooner than 7 days if pain is able to be controlled with non-narcotic analgesics and therapies. 11/19/21 11/24/21 Yes CORDELL Mcguire CNP   sulfamethoxazole-trimethoprim (BACTRIM DS;SEPTRA DS) 800-160 MG per tablet Take 1 tablet by mouth 2 times daily for 5 days 11/19/21 11/24/21 Yes CORDELL Mcguire CNP   LANTUS SOLOSTAR 100 UNIT/ML injection pen INJECT 50 UNITS INTO THE SKIN TWICE DAILY. 11/8/21  Yes Derrell Heath MD   levothyroxine (SYNTHROID) 25 MCG tablet TAKE 1 TABLET BY MOUTH EVERY DAY 11/1/21  Yes Derrell Heath MD   ALBUTEROL IN Inhale into the lungs   Yes Historical Provider, MD   insulin lispro (HUMALOG) 100 UNIT/ML injection vial INJECT 25 UNITS INTO THE SKIN ONCE DAILY. 7/12/21  Yes Derrell Heath MD   Multiple Vitamin (MULTIVITAMIN ADULT PO) Take by mouth   Yes Historical Provider, MD   Ferrous Sulfate (IRON PO) Take by mouth   Yes Historical Provider, MD   COENZYME Q10 PO Take by mouth   Yes Historical Provider, MD   Insulin Pen Needle (PEN NEEDLES 31GX5/16\") 31G X 8 MM MISC Use to inject insulin twice daily.  4/14/21  Yes Derrell Heath MD   pioglitazone (ACTOS) 15 MG tablet TAKE 1 TABLET BY MOUTH EVERY DAY 4/13/21  Yes Priscilla Van MD   ONE TOUCH ULTRASOFT LANCETS MISC USE TO TEST BLOOD GLUCOSE 4 TIMES DAILY 11/7/20  Yes Historical Provider, MD   POTASSIUM PO Take by mouth   Yes Historical Provider, MD   MAGNESIUM PO Take by mouth   Yes Historical Provider, MD   Montelukast Sodium (SINGULAIR PO) Take by mouth   Yes Historical Provider, MD   Cholecalciferol (VITAMIN D3) 1.25 MG (07883 UT) CAPS Take by mouth once a week   Yes Historical Provider, MD   TRIAMCINOLONE ACETONIDE NA by Nasal route as needed    Yes Historical Provider, MD   BD INSULIN SYRINGE U/F 30G X 1/2\" 0.3 ML MISC USE WITH INSULIN TWICE A DAY 12/10/20  Yes Priscilla Van MD   metFORMIN (GLUCOPHAGE) 500 MG tablet TAKE 1 TABLET BY MOUTH EVERY 24 HOURS 12/10/20  Yes Priscilla Van MD   fluticasone-vilanterol (BREO ELLIPTA) 100-25 MCG/INH AEPB inhaler Inhale 1 puff into the lungs daily   Yes Historical Provider, MD   losartan (COZAAR) 100 MG tablet Take 100 mg by mouth daily   Yes Historical Provider, MD   Omega-3 Fatty Acids (FISH OIL) 1000 MG CAPS Take 3,000 mg by mouth daily   Yes Historical Provider, MD   atorvastatin (LIPITOR) 10 MG tablet Take 10 mg by mouth daily   Yes Historical Provider, MD   CRANBERRY PO Take by mouth daily   Yes Historical Provider, MD   acetaminophen (TYLENOL) 500 MG tablet Take 500 mg by mouth every 6 hours as needed for Pain   Yes Historical Provider, MD   Loratadine (CLARITIN) 10 MG CAPS Take  by mouth. Yes Historical Provider, MD   blood glucose test strips (ONETOUCH VERIO) strip Test 4 times daily. 11/22/21   Priscilla Van MD        Allergies:  Patient has no known allergies. Social History:    reports that she has never smoked. She has never used smokeless tobacco. She reports that she does not drink alcohol and does not use drugs.     Family History:        Problem Relation Age of Onset    Cancer Mother         uterine    No Known Problems Father        REVIEW OF SYSTEMS:  CONSTITUTIONAL:  positive for  fatigue  HEENT:  negative  RESPIRATORY:  negative  CARDIOVASCULAR:  negative  GASTROINTESTINAL:  negative except for abdominal pain  GENITOURINARY:  negative  HEMATOLOGIC/LYMPHATIC:  negative  NEUROLOGICAL:  negative  SKIN: negative      OBJECTIVE:  VITALS:  /75   Pulse 96   Temp 98.9 °F (37.2 °C) (Oral)   Resp 18   Ht 5' 5\" (1.651 m)   Wt 264 lb 4.8 oz (119.9 kg)   LMP 08/16/2010   SpO2 93%   BMI 43.98 kg/m²     INTAKE/OUTPUT:    I/O last 3 completed shifts: In: 1429.3 [I.V.:1256.4; IV Piggyback:172.9]  Out: 064 [Other:645]  No intake/output data recorded. CONSTITUTIONAL:  awake and alert  LUNGS:  clear to auscultation  HEART: RRR  ABDOMEN:  hypoactive bowel sounds, soft, non-distended, tenderness noted in the right upper quadrant subcostally, old incision OK, no cellulitis or drainage        Data:  CBC:   Recent Labs     11/21/21  1307 11/21/21  1307 11/22/21  0101 11/22/21  0934 11/22/21  1756 11/23/21  0232 11/23/21  0838   WBC 8.2  --  6.4  --   --   --  5.2   HGB 11.1*   < > 10.2*   < > 10.2* 9.8* 10.0*   HCT 33.3*   < > 30.7*   < > 30.5* 29.2* 30.2*     --  258  --   --   --  256    < > = values in this interval not displayed.      BMP:    Recent Labs     11/21/21  1307 11/22/21  0101 11/23/21  0838   * 136 135*   K 3.7 3.6 3.7   CL 96* 102 101   CO2 25 20* 21   BUN 6* 8 7   CREATININE 0.5* <0.5* <0.5*   GLUCOSE 155* 159* 120*     Hepatic:   Recent Labs     11/21/21  1307 11/22/21  0101   AST 29 27   ALT 26 23   BILITOT 0.5 0.5   ALKPHOS 149* 128     Mag:      Recent Labs     11/22/21  0101   MG 1.60*      Phos:     Recent Labs     11/22/21 0101   PHOS 2.4*      INR:   Recent Labs     11/22/21 0101   INR 1.25*       Radiology Review:  CT IR aspiration - serous fluid - cx sent      ASSESSMENT AND PLAN:  RUQ pain  Appears more likely GB, with stones on US, or possibly PUD  On atbx and PPI  Overall temps lower, exam stable  Will ask  Tyler to see for EGD, and will also check HIDA scan  Hold off on diet today    Maureen Richmond MD

## 2021-11-23 NOTE — PROGRESS NOTES
egd with large and smaller duodenal bulb ulcers, nonbleeding. Rec;  protonix 40 mg bid  replete mg per primary team   call office 1 week for biopsy results  repeat egd 8 weeks  ok for diet when ok with surg team (other imaging/hida etc pending)  Avoid nonessential nsaids  otherwise will sign off, call if needed.

## 2021-11-23 NOTE — CONSULTS
Medications on File Prior to Encounter   Medication Sig Dispense Refill    HYDROcodone-acetaminophen (NORCO) 5-325 MG per tablet Take 1 tablet by mouth every 6 hours as needed for Pain for up to 5 days. Take lowest dose possible to manage pain; may stop taking sooner than 7 days if pain is able to be controlled with non-narcotic analgesics and therapies. 12 tablet 0    sulfamethoxazole-trimethoprim (BACTRIM DS;SEPTRA DS) 800-160 MG per tablet Take 1 tablet by mouth 2 times daily for 5 days 10 tablet 0    LANTUS SOLOSTAR 100 UNIT/ML injection pen INJECT 50 UNITS INTO THE SKIN TWICE DAILY. 5 pen 3    levothyroxine (SYNTHROID) 25 MCG tablet TAKE 1 TABLET BY MOUTH EVERY DAY 90 tablet 1    ALBUTEROL IN Inhale into the lungs      insulin lispro (HUMALOG) 100 UNIT/ML injection vial INJECT 25 UNITS INTO THE SKIN ONCE DAILY. 10 mL 3    Multiple Vitamin (MULTIVITAMIN ADULT PO) Take by mouth      Ferrous Sulfate (IRON PO) Take by mouth      COENZYME Q10 PO Take by mouth      Insulin Pen Needle (PEN NEEDLES 31GX5/16\") 31G X 8 MM MISC Use to inject insulin twice daily.  100 each 5    pioglitazone (ACTOS) 15 MG tablet TAKE 1 TABLET BY MOUTH EVERY DAY 90 tablet 1    ONE TOUCH ULTRASOFT LANCETS MISC USE TO TEST BLOOD GLUCOSE 4 TIMES DAILY      POTASSIUM PO Take by mouth      MAGNESIUM PO Take by mouth      Montelukast Sodium (SINGULAIR PO) Take by mouth      Cholecalciferol (VITAMIN D3) 1.25 MG (09755 UT) CAPS Take by mouth once a week      TRIAMCINOLONE ACETONIDE NA by Nasal route as needed       BD INSULIN SYRINGE U/F 30G X 1/2\" 0.3 ML MISC USE WITH INSULIN TWICE A  each 5    metFORMIN (GLUCOPHAGE) 500 MG tablet TAKE 1 TABLET BY MOUTH EVERY 24 HOURS 60 tablet 3    fluticasone-vilanterol (BREO ELLIPTA) 100-25 MCG/INH AEPB inhaler Inhale 1 puff into the lungs daily      losartan (COZAAR) 100 MG tablet Take 100 mg by mouth daily      Omega-3 Fatty Acids (FISH OIL) 1000 MG CAPS Take 3,000 mg by mouth daily Recent Labs     11/21/21  1307 11/21/21  1307 11/22/21  0101 11/22/21  0934 11/22/21  1756 11/23/21  0232 11/23/21  0838   WBC 8.2  --  6.4  --   --   --  5.2   HGB 11.1*   < > 10.2*   < > 10.2* 9.8* 10.0*   HCT 33.3*   < > 30.7*   < > 30.5* 29.2* 30.2*   MCV 84.3  --  84.5  --   --   --  85.3     --  258  --   --   --  256    < > = values in this interval not displayed. Recent Labs     11/21/21  1307 11/22/21  0101 11/23/21  0838   * 136 135*   K 3.7 3.6 3.7   CL 96* 102 101   CO2 25 20* 21   PHOS  --  2.4*  --    BUN 6* 8 7   CREATININE 0.5* <0.5* <0.5*     Recent Labs     11/21/21  1307 11/22/21  0101   AST 29 27   ALT 26 23   BILITOT 0.5 0.5   ALKPHOS 149* 128     Recent Labs     11/21/21  1307   LIPASE 58.0     Recent Labs     11/22/21  0101   PROTIME 14.2*   INR 1.25*     No results for input(s): PTT in the last 72 hours. No results for input(s): OCCULTBLD in the last 72 hours. Imaging Studies:                            Ultrasound 11/22/21:     Impression   Cholelithiasis without evidence of acute cholecystitis.                       CT-scan of abdomen and pelvis w iv and po contrast 11/21/21:     Impression   1. Ventral pelvic wall hematoma formation extending intra-extraperitoneal and   superficially along the abdominal/pelvic wall musculature as well. Anterior   extraperitoneal abdominal fluid and gas collection abdomen and pelvis   compatible with recent surgery, admixed with some areas hematoma. 2. Ventral abdominal and pelvic wall fluid/hematoma collections with   associated gas which may be recent postsurgical or could be related to   abscess. 3. Prominent peristomal hernia at the right lower quadrant ostomy site   containing several small bowel loops without obstruction evident. 4. Cholelithiasis without CT findings of acute cholecystitis. 5. Hepatic steatosis.                         Assessment:     Active Problems:    Postoperative fever  Resolved Problems:    * No resolved hospital problems. *    Nausea, abnormal CT of the duodenum  - CT with wall thickening of 1st and 2nd portion of duodenum with inflammatory changes. Patient denies any NSAID use in the past 2 months. Fevers -work-up in process per primary team.  Covid negative. Surgery evaluating the gallbladder. History of UC -status post total colectomy. Alk phos was mildly elevated yesterday but all liver enzymes ar normal today. Recommendations:   -N.p.o.  -PPI  -Antibiotics   -EGD today    Discussed with Dr. Arben Corral, PA-C  [address]    I have personally performed a face to face diagnostic evaluation on this patient. I have interviewed and examined the patient and I agree with the findings and recommended plan of care. In summary, my findings and plan are the following: complex surg history now fever/right abd discomfort, abd soft/mild tender right abd, ct with duodeniitis, plan ppi/antbx/egd. Discussed with pt who agrees.        Jewel Scanlon MD  [address]

## 2021-11-23 NOTE — PROGRESS NOTES
Adm to [facility] 10 from endo per cart awakening. Oral bite block removed. Respirations easy & symmetrical. Abdomen soft. Ileostomy patent to ostomy bag. Abdominal dressing dry & intact. Abdomen soft. Denies pain. Coughing up clear secretions. Suctioned orally.

## 2021-11-23 NOTE — PLAN OF CARE
Pt remains free from falls & injuries; pt remains able to ambulate in room without assistance, steady on feet. Pt denies pain and states \"some abdominal discomfort\". Pt is still having episodes where she is flushed and chilled or flushed and hot. CoVid PCR respiratory panel did result \"not detected. \"    Pt understands reasoning for antibiotics and is requesting PRN tylenol every six hours. Pt remains alert & orientated x4; pt remains able to express needs & uses call light appropriately. Pt does plan to discharge home with sister when medically stable.     Call light within reach  Problem: Falls - Risk of:  Goal: Will remain free from falls  Description: Will remain free from falls  Outcome: Ongoing  Goal: Absence of physical injury  Description: Absence of physical injury  Outcome: Ongoing     Problem: Infection:  Goal: Will remain free from infection  Description: Will remain free from infection  Outcome: Ongoing     Problem: Safety:  Goal: Free from accidental physical injury  Description: Free from accidental physical injury  Outcome: Ongoing  Goal: Free from intentional harm  Description: Free from intentional harm  Outcome: Ongoing     Problem: Daily Care:  Goal: Daily care needs are met  Description: Daily care needs are met  Outcome: Ongoing     Problem: Pain:  Goal: Patient's pain/discomfort is manageable  Description: Patient's pain/discomfort is manageable  Outcome: Ongoing  Goal: Pain level will decrease  Description: Pain level will decrease  Outcome: Ongoing  Goal: Control of acute pain  Description: Control of acute pain  Outcome: Ongoing  Goal: Control of chronic pain  Description: Control of chronic pain  Outcome: Ongoing     Problem: Skin Integrity:  Goal: Skin integrity will stabilize  Description: Skin integrity will stabilize  Outcome: Ongoing     Problem: Discharge Planning:  Goal: Patients continuum of care needs are met  Description: Patients continuum of care needs are met  Outcome: Ongoing

## 2021-11-23 NOTE — CONSULTS
[facility] GENERAL AND LAPAROSCOPIC SURGERY                       PATIENT NAME: Deniz Salter     ADMISSION DATE: 11/21/2021 12:22 PM      TODAY'S DATE: 11/22/2021    Reason for Consult:  Abd pain    Requesting Physician:  Alcides NOLASCO    HISTORY OF PRESENT ILLNESS:              The patient is a 62 y.o. female who presents with abd pain, recent DC following incarcerated incisional hernia repair. Pain in RUQ, focal, sharp, moderate. Pt has had associated fever. Feels poorly. Has CT with fluid collection in old hernia space - possible hematoma or infection, also concern of duodenitis. Pt has ileostomy bag seal give way and had drainage all over incision a couple days post op.     Past Medical History:        Diagnosis Date    Asthma     Diabetes mellitus ([facility] [facility]     GERD (gastroesophageal reflux disease)     Hyperlipidemia     Hypertension     Ileostomy care ([facility] [address] 3/17/2020    Iritis     PCOS (polycystic ovarian syndrome)     Pyoderma     Thyroid disease     Ulcerative colitis     Ulcerative colitis ([facility] [address]        Past Surgical History:        Procedure Laterality Date    ABDOMEN SURGERY      COLON RESECTION FOR ULCERATIVE COLITIS THEN HAD ANUS REMOVED    ILEOSTOMY OR JEJUNOSTOMY      PERMANENT    VENTRAL HERNIA REPAIR N/A 11/16/2021    OPEN REPAIR INCISIONAL HERNIA WITH MESH, LYSIS OF ADHESIONS performed by Erna Camejo MD at [address]       Current Medications:   Current Facility-Administered Medications: potassium chloride (KLOR-CON M) extended release tablet 40 mEq, 40 mEq, Oral, PRN **OR** potassium bicarb-citric acid (EFFER-K) effervescent tablet 40 mEq, 40 mEq, Oral, PRN **OR** potassium chloride 10 mEq/100 mL IVPB (Peripheral Line), 10 mEq, IntraVENous, PRN  magnesium sulfate 1000 mg in dextrose 5% 100 mL IVPB, 1,000 mg, IntraVENous, PRN  sodium phosphate 19.17 mmol in dextrose 5 % 250 mL IVPB, 0.16 mmol/kg, IntraVENous, PRN **OR** sodium phosphate 38.37 mmol in dextrose 5 % 250 mL IVPB, 0.32 mmol/kg, IntraVENous, PRN  ibuprofen (ADVIL;MOTRIN) tablet 600 mg, 600 mg, Oral, Once  linezolid (ZYVOX) IVPB 600 mg, 600 mg, IntraVENous, Q12H  pantoprazole (PROTONIX) injection 40 mg, 40 mg, IntraVENous, BID  0.9 % sodium chloride infusion, , IntraVENous, Continuous  atorvastatin (LIPITOR) tablet 10 mg, 10 mg, Oral, Nightly  morphine injection 4 mg, 4 mg, IntraVENous, Q4H PRN  levothyroxine (SYNTHROID) tablet 25 mcg, 25 mcg, Oral, QAM AC  losartan (COZAAR) tablet 100 mg, 100 mg, Oral, Daily  hydrALAZINE (APRESOLINE) injection 10 mg, 10 mg, IntraVENous, Q4H PRN  glucose (GLUTOSE) 40 % oral gel 15 g, 15 g, Oral, PRN  dextrose 50 % IV solution, 12.5 g, IntraVENous, PRN  glucagon (rDNA) injection 1 mg, 1 mg, IntraMUSCular, PRN  dextrose 5 % solution, 100 mL/hr, IntraVENous, PRN  sodium chloride flush 0.9 % injection 5-40 mL, 5-40 mL, IntraVENous, 2 times per day  sodium chloride flush 0.9 % injection 5-40 mL, 5-40 mL, IntraVENous, PRN  0.9 % sodium chloride infusion, 25 mL, IntraVENous, PRN  ondansetron (ZOFRAN-ODT) disintegrating tablet 4 mg, 4 mg, Oral, Q8H PRN **OR** ondansetron (ZOFRAN) injection 4 mg, 4 mg, IntraVENous, Q6H PRN  polyethylene glycol (GLYCOLAX) packet 17 g, 17 g, Oral, Daily PRN  acetaminophen (TYLENOL) tablet 650 mg, 650 mg, Oral, Q6H PRN **OR** acetaminophen (TYLENOL) suppository 650 mg, 650 mg, Rectal, Q6H PRN  insulin lispro (1 Unit Dial) 0-12 Units, 0-12 Units, SubCUTAneous, TID WC  insulin lispro (1 Unit Dial) 0-6 Units, 0-6 Units, SubCUTAneous, Nightly  piperacillin-tazobactam (ZOSYN) 3,375 mg in dextrose 5 % 50 mL IVPB extended infusion (mini-bag), 3,375 mg, IntraVENous, Q8H  Prior to Admission medications    Medication Sig Start Date End Date Taking? Authorizing Provider   HYDROcodone-acetaminophen (NORCO) 5-325 MG per tablet Take 1 tablet by mouth every 6 hours as needed for Pain for up to 5 days.  Take lowest dose possible to manage pain; may stop taking sooner than 7 days if pain is able to be controlled with non-narcotic analgesics and therapies. 11/19/21 11/24/21 Yes Shivani Courtney APRN - CNP   sulfamethoxazole-trimethoprim (BACTRIM DS;SEPTRA DS) 800-160 MG per tablet Take 1 tablet by mouth 2 times daily for 5 days 11/19/21 11/24/21 Yes CORDELL Enrique - CNP   LANTUS SOLOSTAR 100 UNIT/ML injection pen INJECT 50 UNITS INTO THE SKIN TWICE DAILY. 11/8/21  Yes Berlin Russell MD   levothyroxine (SYNTHROID) 25 MCG tablet TAKE 1 TABLET BY MOUTH EVERY DAY 11/1/21  Yes Berlin Russell MD   ALBUTEROL IN Inhale into the lungs   Yes Historical Provider, MD   insulin lispro (HUMALOG) 100 UNIT/ML injection vial INJECT 25 UNITS INTO THE SKIN ONCE DAILY. 7/12/21  Yes Berlin Russell MD   Multiple Vitamin (MULTIVITAMIN ADULT PO) Take by mouth   Yes Historical Provider, MD   Ferrous Sulfate (IRON PO) Take by mouth   Yes Historical Provider, MD   COENZYME Q10 PO Take by mouth   Yes Historical Provider, MD   Insulin Pen Needle (PEN NEEDLES 31GX5/16\") 31G X 8 MM MISC Use to inject insulin twice daily.  4/14/21  Yes Berlin Russell MD   pioglitazone (ACTOS) 15 MG tablet TAKE 1 TABLET BY MOUTH EVERY DAY 4/13/21  Yes Berlin Russell MD   ONE TOUCH ULTRASOFT LANCETS MISC USE TO TEST BLOOD GLUCOSE 4 TIMES DAILY 11/7/20  Yes Historical Provider, MD   POTASSIUM PO Take by mouth   Yes Historical Provider, MD   MAGNESIUM PO Take by mouth   Yes Historical Provider, MD   Montelukast Sodium (SINGULAIR PO) Take by mouth   Yes Historical Provider, MD   Cholecalciferol (VITAMIN D3) 1.25 MG (88952 UT) CAPS Take by mouth once a week   Yes Historical Provider, MD   TRIAMCINOLONE ACETONIDE NA by Nasal route as needed    Yes Historical Provider, MD   BD INSULIN SYRINGE U/F 30G X 1/2\" 0.3 ML MISC USE WITH INSULIN TWICE A DAY 12/10/20  Yes Berlin Russell MD   metFORMIN (GLUCOPHAGE) 500 MG tablet TAKE 1 TABLET BY MOUTH EVERY 24 HOURS 12/10/20  Yes Berlin Russell MD   fluticasone-vilanterol (BREO ELLIPTA) 100-25 MCG/INH AEPB inhaler midline and no carotid bruits  LUNGS:  clear to auscultation  CARDIOVASCULAR:  regular rate and rhythm and no murmur noted  ABDOMEN:  scars noted large midline scar, hypoactive bowel sounds, soft, non-distended, tenderness noted in the right upper quadrant Tellez's sign is absent, voluntary guarding absent, no masses palpated, no hepatosplenomegally and hernia absent - repair intact, no incisional erythema or drainage, ileostomy in place, with bag  MUSCULOSKELETAL:  0+ pitting edema lower extremities  NEUROLOGIC:  Mental Status Exam:  Level of Alertness:   awake  Orientation:   person, place, time  SKIN:  no redness, warmth, or swelling    DATA:    CBC:   Recent Labs     11/21/21  1307 11/21/21  1307 11/22/21  0101 11/22/21  0934 11/22/21  1756   WBC 8.2  --  6.4  --   --    HGB 11.1*   < > 10.2* 10.6* 10.2*   HCT 33.3*   < > 30.7* 32.2* 30.5*     --  258  --   --     < > = values in this interval not displayed. BMP:    Recent Labs     11/21/21  1307 11/22/21  0101   * 136   K 3.7 3.6   CL 96* 102   CO2 25 20*   BUN 6* 8   CREATININE 0.5* <0.5*   GLUCOSE 155* 159*     Hepatic:   Recent Labs     11/21/21  1307 11/22/21  0101   AST 29 27   ALT 26 23   BILITOT 0.5 0.5   ALKPHOS 149* 128     Mag:      Recent Labs     11/22/21  0101   MG 1.60*      Phos:     Recent Labs     11/22/21 0101   PHOS 2.4*      INR:   Recent Labs     11/22/21  0101   INR 1.25*     LIPASE:   Recent Labs     11/21/21  1307   LIPASE 58.0      AMYLASE: No results for input(s): AMYLASE in the last 72 hours. Radiology Review:      CT GUIDED NEEDLE PLACEMENT    Result Date: 11/22/2021  PROCEDURE: CT DRAINAGE; CT GUIDED NDL PLACEMENT 11/22/2021 HISTORY: ORDERING SYSTEM PROVIDED HISTORY: Abd wall fluid collection post op TECHNOLOGIST PROVIDED HISTORY: Please aspirate and place drain sterilely.  I will order cultures Reason for exam:->Abd wall fluid collection post op; ORDERING SYSTEM PROVIDED HISTORY: abd wall collection post op TECHNOLOGIST PROVIDED HISTORY: Reason for exam:->abd wall collection post op TECHNIQUE: Dose modulation, iterative reconstruction, and/or weight based adjustment of the mA/kV was utilized to reduce the radiation dose to as low as reasonably achievable. CONTRAST: None. SEDATION: Local anesthetic only. EBL: None. FLUOROSCOPY DOSE AND TYPE OR TIME AND EXPOSURES: None. CT imaging. Total .92 DESCRIPTION OF PROCEDURE: Dose modulation, iterative reconstruction, and/or weight based adjustment of the mA/kv was utilized to reduce the radiation dose to as low as reasonably achievable. The risks and benefits of the procedure were explained. Informed consent was obtained. The patient was continuously monitored throughout the procedure. An appropriate location was chosen for the safest aspiration trajectory. The skin was sterilely prepared, draped and anesthetized with 1% lidocaine. The lower abdominal wall fluid collection is seen measuring simple fluid, poorly confined and more likely serous. This was evaluated with limited CT imaging. The skin at the puncture site was demarcated, sterilely prepared draped and anesthetized. Deeper soft tissues were also anesthetized towards the fluid collection. The CSS99 needle catheter device was advanced into the collection successfully. The catheter was advanced and needle withdrawn. A total of 600 cc serosanguineous non purulent fluid was removed. Maximized fluid extraction was performed utilizing vacuum bottle sterile closed container system with some manual compression to express the final extraction. This was sent to laboratory. The device was then removed and a sterile bandage placed. Mild compression was maintained for hemostasis. There were no complications. FINDINGS: Successful aspiration of the fluid collection. The vast majority of the fluid was successfully removed. Fluid was sent to laboratory.      SuccessfulCT guided diagnostic aspiration of the lower abdominal wall subcutaneous fat fluid collection. CT ABDOMEN PELVIS W IV CONTRAST Additional Contrast? Oral    Addendum Date: 11/21/2021    ADDENDUM: Additional impression : Acute inflammatory changes at the duodenum in keeping with duodenitis. I cannot exclude underlying duodenal ulcer. I spoke with Yaw Fosetr, concerning findings described within the impression as well as the additional finding at 2:24 p.m. 11/21/2021. Result Date: 11/21/2021  EXAMINATION: CT OF THE ABDOMEN AND PELVIS WITH CONTRAST 11/21/2021 1:45 pm TECHNIQUE: CT of the abdomen and pelvis was performed with the administration of intravenous contrast. Multiplanar reformatted images are provided for review. Dose modulation, iterative reconstruction, and/or weight based adjustment of the mA/kV was utilized to reduce the radiation dose to as low as reasonably achievable. COMPARISON: CT abdomen and pelvis 11/15/2021 HISTORY: ORDERING SYSTEM PROVIDED HISTORY: abd pain, nausea, and vomiting Decision Support Exception - unselect if not a suspected or confirmed emergency medical condition->Emergency Medical Condition (MA) Reason for Exam: Emesis (abd pain/+ n/v/fever at home/recent surgery) Acuity: Acute Type of Exam: Initial FINDINGS: Lower Chest: Linear band of platelike atelectasis posterior right and left lungs. Visualized portions of the cardiac and posterior mediastinal structures appear unremarkable. Organs: The liver attenuation diffusely appears abnormally low and hepatic texture appears unremarkable. No discrete hepatic lesion or intrahepatic bile duct dilatation is seen. The gallbladder has calcifications reflecting cholelithiasis without pericholecystic fluid or inflammatory change evident. The kidneys, spleen, adrenal glands and pancreas appear unremarkable. GI/Bowel: Unremarkable appearance of the stomach.   Wall thickening and mucosal enhancement involving the 1st and 2nd portions of the duodenum with surrounding inflammatory changes. Right lower quadrant ostomy. No bowel obstruction or definite acute inflammatory change evident. Pelvis: Ventral pelvic wall hematoma formation extending intra-extraperitoneal and superficially along the abdominal/pelvic wall musculature as well. The uterus and adnexal structures appear unremarkable. Urinary bladder is partially filled, unremarkable appearance. No adenopathy. Peritoneum/Retroperitoneum: Anterior extraperitoneal abdominal fluid and gas collection abdomen and pelvis compatible with recent surgery, admixed with some areas hematoma. Bones/Soft Tissues: Prominent peristomal hernia at the right lower quadrant ostomy site containing several small bowel loops without obstruction evident. Postsurgical changes from ventral herniorrhaphy, with multifocal superficial fluid collections with gas bubbles and hematoma formation evident, about 7 x 18 cm axial image 178, craniocaudal extent of 19 cm sagittal image 124. Midline ventral abdominal and pelvic wall skin closure staples. 1. Ventral pelvic wall hematoma formation extending intra-extraperitoneal and superficially along the abdominal/pelvic wall musculature as well. Anterior extraperitoneal abdominal fluid and gas collection abdomen and pelvis compatible with recent surgery, admixed with some areas hematoma. 2. Ventral abdominal and pelvic wall fluid/hematoma collections with associated gas which may be recent postsurgical or could be related to abscess. 3. Prominent peristomal hernia at the right lower quadrant ostomy site containing several small bowel loops without obstruction evident. 4. Cholelithiasis without CT findings of acute cholecystitis. 5. Hepatic steatosis. US GALLBLADDER RUQ    Result Date: 11/22/2021  EXAMINATION: RIGHT UPPER QUADRANT ULTRASOUND 11/22/2021 2:36 pm COMPARISON: None.  HISTORY: ORDERING SYSTEM PROVIDED HISTORY: RUQ pain TECHNOLOGIST PROVIDED HISTORY: Reason for exam:->RUQ pain FINDINGS: LIVER:  The liver appears to be of normal size and echotexture without focal lesion. BILIARY SYSTEM:  There is suboptimal visualization of the gallbladder with evidence of echogenic intraluminal stones. No gallbladder wall thickening or pericholecystic fluid. Common bile duct measures approximately 6.5 mm in diameter RIGHT KIDNEY: The right kidney measures approximately 12.5 cm in length of hydronephrosis. PANCREAS:  Visualized portions of the pancreas are unremarkable. Cholelithiasis without evidence of acute cholecystitis. XR CHEST PORTABLE    Result Date: 11/21/2021  EXAMINATION: ONE XRAY VIEW OF THE CHEST 11/21/2021 1:30 pm COMPARISON: None. HISTORY: ORDERING SYSTEM PROVIDED HISTORY: fever TECHNOLOGIST PROVIDED HISTORY: Reason for exam:->fever Reason for Exam: Emesis (abd pain/+n/v/fever at home/recent surgery) Acuity: Unknown Type of Exam: Unknown FINDINGS: The cardiac silhouette is enlarged. Elevated right hemidiaphragm. Central vascular congestion with perihilar opacification, possibly perihilar edema or infiltrate. There is a probable associated atelectasis at the right base. No significant pleural fluid. Findings most consistent with mild CHF with cardiomegaly, and perihilar edema. Mild right basilar atelectasis     CT DRAINAGE HEMATOMA/SEROMA/FLUID COLLECTION    Result Date: 11/22/2021  PROCEDURE: CT DRAINAGE; CT GUIDED NDL PLACEMENT 11/22/2021 HISTORY: ORDERING SYSTEM PROVIDED HISTORY: Abd wall fluid collection post op TECHNOLOGIST PROVIDED HISTORY: Please aspirate and place drain sterilely. I will order cultures Reason for exam:->Abd wall fluid collection post op; ORDERING SYSTEM PROVIDED HISTORY: abd wall collection post op TECHNOLOGIST PROVIDED HISTORY: Reason for exam:->abd wall collection post op TECHNIQUE: Dose modulation, iterative reconstruction, and/or weight based adjustment of the mA/kV was utilized to reduce the radiation dose to as low as reasonably achievable. CONTRAST: None.  SEDATION: Local anesthetic only. EBL: None. FLUOROSCOPY DOSE AND TYPE OR TIME AND EXPOSURES: None. CT imaging. Total .92 DESCRIPTION OF PROCEDURE: Dose modulation, iterative reconstruction, and/or weight based adjustment of the mA/kv was utilized to reduce the radiation dose to as low as reasonably achievable. The risks and benefits of the procedure were explained. Informed consent was obtained. The patient was continuously monitored throughout the procedure. An appropriate location was chosen for the safest aspiration trajectory. The skin was sterilely prepared, draped and anesthetized with 1% lidocaine. The lower abdominal wall fluid collection is seen measuring simple fluid, poorly confined and more likely serous. This was evaluated with limited CT imaging. The skin at the puncture site was demarcated, sterilely prepared draped and anesthetized. Deeper soft tissues were also anesthetized towards the fluid collection. The iZettle needle catheter device was advanced into the collection successfully. The catheter was advanced and needle withdrawn. A total of 600 cc serosanguineous non purulent fluid was removed. Maximized fluid extraction was performed utilizing vacuum bottle sterile closed container system with some manual compression to express the final extraction. This was sent to laboratory. The device was then removed and a sterile bandage placed. Mild compression was maintained for hemostasis. There were no complications. FINDINGS: Successful aspiration of the fluid collection. The vast majority of the fluid was successfully removed. Fluid was sent to laboratory. SuccessfulCT guided diagnostic aspiration of the lower abdominal wall subcutaneous fat fluid collection. IMPRESSION/RECOMMENDATIONS:    Abd pain and fever  More in RUQ, less so in hernia area, and  / or lower large hernia area  Has duodenal inflammation or ulcer possibly. BID PPI ordered.   Aspiration of lower abd collection done - is serous - not clinically infected, cx's sent  Pt's incision is clean, no cellulitis noted. Has possible cholecystitis with RUQ pain.  Will review US, consider HIDA if pain in that area continues  Antibiotics and cultures ordered, will follow  Full liquids po    Vonnie Canales MD

## 2021-11-23 NOTE — ANESTHESIA POSTPROCEDURE EVALUATION
Department of Anesthesiology  Postprocedure Note    Patient: Michelle White  MRN: [id number]  YOB: 1960  Date of evaluation: 11/23/2021  Time:  2:31 PM     Procedure Summary     Date: 11/23/21 Room / Location: [address] [place] [address]    Anesthesia Start: 5946 Anesthesia Stop: 6316    Procedure: EGD BIOPSY (N/A Abdomen) Diagnosis: (abdominal pain)    Surgeons: Adelaida Bolton MD Responsible Provider: Temi Cartagena MD    Anesthesia Type: MAC ASA Status: 3          Anesthesia Type: MAC    Ruchi Phase I: Ruchi Score: 8    Ruchi Phase II:      Last vitals: Reviewed and per EMR flowsheets.        Anesthesia Post Evaluation    Patient location during evaluation: PACU  Level of consciousness: awake  Complications: no  Multimodal analgesia pain management approach

## 2021-11-23 NOTE — PROGRESS NOTES
[facility]    11/23/2021 8:32 AM        Name: Camelia Rick . Admitted: 11/21/2021  Primary Care Provider: Lili Roberts (Tel: [phone])      Subjective: Bucky Luis Alfredo     Pt seen this am while up in the chair    Reports some epigastric pain/ fullness  Will have EGD this am      T max 100.3  Was 102.6 when she presented to the ED        Reviewed interval ancillary notes    Current Medications  potassium chloride (KLOR-CON M) extended release tablet 40 mEq, PRN   Or  potassium bicarb-citric acid (EFFER-K) effervescent tablet 40 mEq, PRN   Or  potassium chloride 10 mEq/100 mL IVPB (Peripheral Line), PRN  magnesium sulfate 1000 mg in dextrose 5% 100 mL IVPB, PRN  sodium phosphate 19.17 mmol in dextrose 5 % 250 mL IVPB, PRN   Or  sodium phosphate 38.37 mmol in dextrose 5 % 250 mL IVPB, PRN  ibuprofen (ADVIL;MOTRIN) tablet 600 mg, Once  linezolid (ZYVOX) IVPB 600 mg, Q12H  pantoprazole (PROTONIX) injection 40 mg, BID  0.9 % sodium chloride infusion, Continuous  atorvastatin (LIPITOR) tablet 10 mg, Nightly  morphine injection 4 mg, Q4H PRN  levothyroxine (SYNTHROID) tablet 25 mcg, QAM AC  losartan (COZAAR) tablet 100 mg, Daily  hydrALAZINE (APRESOLINE) injection 10 mg, Q4H PRN  glucose (GLUTOSE) 40 % oral gel 15 g, PRN  dextrose 50 % IV solution, PRN  glucagon (rDNA) injection 1 mg, PRN  dextrose 5 % solution, PRN  sodium chloride flush 0.9 % injection 5-40 mL, 2 times per day  sodium chloride flush 0.9 % injection 5-40 mL, PRN  0.9 % sodium chloride infusion, PRN  ondansetron (ZOFRAN-ODT) disintegrating tablet 4 mg, Q8H PRN   Or  ondansetron (ZOFRAN) injection 4 mg, Q6H PRN  polyethylene glycol (GLYCOLAX) packet 17 g, Daily PRN  acetaminophen (TYLENOL) tablet 650 mg, Q6H PRN   Or  acetaminophen (TYLENOL) suppository 650 mg, Q6H PRN  insulin lispro (1 Unit Dial) 0-12 Units, TID WC  insulin lispro (1 Unit Dial) 0-6 Units, Nightly  piperacillin-tazobactam (ZOSYN) 3,375 mg in dextrose 5 % 50 mL IVPB extended infusion (mini-bag), Q8H        Objective:  /75   Pulse 96   Temp 98.9 °F (37.2 °C) (Oral)   Resp 18   Ht 5' 5\" (1.651 m)   Wt 264 lb 4.8 oz (119.9 kg)   LMP 08/16/2010   SpO2 93%   BMI 43.98 kg/m²     Intake/Output Summary (Last 24 hours) at 11/23/2021 2634  Last data filed at 11/22/2021 1426  Gross per 24 hour   Intake --   Output 645 ml   Net -645 ml      Wt Readings from Last 3 Encounters:   11/21/21 264 lb 4.8 oz (119.9 kg)   11/18/21 266 lb 4.8 oz (120.8 kg)   07/29/21 252 lb (114.3 kg)       General appearance:  Appears comfortable while up in the chair   Eyes: Sclera clear. Pupils equal.  ENT: Moist oral mucosa. Trachea midline, no adenopathy. Cardiovascular: Regular rhythm, normal S1, S2. No murmur. No edema in lower extremities  Respiratory: Not using accessory muscles. Good inspiratory effort. Clear to auscultation bilaterally, no wheeze or crackles. GI: Abdomen soft and obese with bowel sounds present. Stoma pink with scant enteric output. Abdominal dressing is clean dry intact , mild tenderness over the RUQ   Musculoskeletal: No cyanosis in digits, neck supple  Neurology: CN 2-12 grossly intact. No speech or motor deficits  Psych: Normal affect. Alert and oriented in time, place and person  Skin: Warm, dry, normal turgor    Labs and Tests:  CBC:   Recent Labs     11/21/21  1307 11/21/21  1307 11/22/21  0101 11/22/21  0101 11/22/21  0934 11/22/21  1756 11/23/21  0232   WBC 8.2  --  6.4  --   --   --   --    HGB 11.1*   < > 10.2*   < > 10.6* 10.2* 9.8*     --  258  --   --   --   --     < > = values in this interval not displayed.      BMP:    Recent Labs     11/21/21  1307 11/22/21  0101   * 136   K 3.7 3.6   CL 96* 102   CO2 25 20*   BUN 6* 8   CREATININE 0.5* <0.5*   GLUCOSE 155* 159*     Hepatic:   Recent Labs     11/21/21  1307 11/22/21  0101   AST 29 27   ALT 26 23 BILITOT 0.5 0.5   ALKPHOS 149* 128     CT abd/pelvis  1. Ventral pelvic wall hematoma formation extending intra-extraperitoneal and   superficially along the abdominal/pelvic wall musculature as well. Anterior   extraperitoneal abdominal fluid and gas collection abdomen and pelvis   compatible with recent surgery, admixed with some areas hematoma. 2. Ventral abdominal and pelvic wall fluid/hematoma collections with   associated gas which may be recent postsurgical or could be related to   abscess. 3. Prominent peristomal hernia at the right lower quadrant ostomy site   containing several small bowel loops without obstruction evident. 4. Cholelithiasis without CT findings of acute cholecystitis. 5. Hepatic steatosis.      CXR  Findings most consistent with mild CHF with cardiomegaly, and perihilar   edema.  Mild right basilar atelectasis     egd with large and smaller duodenal bulb ulcers, nonbleeding. Rec;  protonix 40 mg bid  call office 1 week for biopsy results  repeat egd 8 weeks  ok for diet when ok with surg team (other imaging/hida etc pending)  Avoid nonessential nsaids  otherwise will sign off, call if needed. AIC 7.7   SuccessfulCT guided diagnostic aspiration of the lower abdominal wall   subcutaneous fat fluid collection. Problem List  Active Problems:    Postoperative fever  Resolved Problems:    * No resolved hospital problems. *       Assessment & Plan:   1. Right sided abd pain/  US with stones HIDA is pending, GS is following. 2. Non bleeding duodenal ulcers per EGD :  On BID protonix. Avoid NSAIDS   3. Sepsis: fever curve trending down . Currently on zyvox and Zosyn. Cultures are pending. 4. S/P open repair of complex incarcerated incision hernia with extensive CHARLEEN with mesh placement for hernia repair on 11/16/21  ( pt was d/c home and then returned to ED withing 24 hours ) General surgery is following  5.  Post Operative hematoma:   11/22 had Ct guided drainage/ aspiration of low abd wall/ fluid collection. Cultures are pending. Diet: ADULT DIET;  Full Liquid; 4 carb choices (60 gm/meal)  Code:Full Code  DVT PPX      CORDELL Stephenson CNP   11/23/2021 8:32 AM

## 2021-11-23 NOTE — PROGRESS NOTES
Transferred to room [phone]. Assist ambulated to restroom to void & empty ostomy bag. Assisted to sit in chair. VSS. Report to [employer].

## 2021-11-24 ENCOUNTER — APPOINTMENT (OUTPATIENT)
Dept: NUCLEAR MEDICINE | Age: 61
DRG: 872 | End: 2021-11-24
Payer: COMMERCIAL

## 2021-11-24 LAB
ANION GAP SERPL CALCULATED.3IONS-SCNC: 12 MMOL/L (ref 3–16)
BUN BLDV-MCNC: 5 MG/DL (ref 7–20)
CALCIUM SERPL-MCNC: 8 MG/DL (ref 8.3–10.6)
CHLORIDE BLD-SCNC: 104 MMOL/L (ref 99–110)
CO2: 21 MMOL/L (ref 21–32)
CREAT SERPL-MCNC: <0.5 MG/DL (ref 0.6–1.2)
GFR AFRICAN AMERICAN: >60
GFR NON-AFRICAN AMERICAN: >60
GLUCOSE BLD-MCNC: 117 MG/DL (ref 70–99)
GLUCOSE BLD-MCNC: 127 MG/DL (ref 70–99)
GLUCOSE BLD-MCNC: 141 MG/DL (ref 70–99)
GLUCOSE BLD-MCNC: 141 MG/DL (ref 70–99)
HCT VFR BLD CALC: 28.2 % (ref 36–48)
HCT VFR BLD CALC: 28.6 % (ref 36–48)
HCT VFR BLD CALC: 31.6 % (ref 36–48)
HEMOGLOBIN: 10.4 G/DL (ref 12–16)
HEMOGLOBIN: 9.3 G/DL (ref 12–16)
HEMOGLOBIN: 9.5 G/DL (ref 12–16)
MCH RBC QN AUTO: 28.1 PG (ref 26–34)
MCHC RBC AUTO-ENTMCNC: 33 G/DL (ref 31–36)
MCV RBC AUTO: 85.2 FL (ref 80–100)
PDW BLD-RTO: 14.4 % (ref 12.4–15.4)
PERFORMED ON: ABNORMAL
PLATELET # BLD: 240 K/UL (ref 135–450)
PMV BLD AUTO: 6.4 FL (ref 5–10.5)
POTASSIUM SERPL-SCNC: 3.7 MMOL/L (ref 3.5–5.1)
RBC # BLD: 3.31 M/UL (ref 4–5.2)
SODIUM BLD-SCNC: 137 MMOL/L (ref 136–145)
WBC # BLD: 3.6 K/UL (ref 4–11)

## 2021-11-24 PROCEDURE — 6360000004 HC RX CONTRAST MEDICATION: Performed by: SURGERY

## 2021-11-24 PROCEDURE — 2580000003 HC RX 258: Performed by: SURGERY

## 2021-11-24 PROCEDURE — A9537 TC99M MEBROFENIN: HCPCS | Performed by: SURGERY

## 2021-11-24 PROCEDURE — 85027 COMPLETE CBC AUTOMATED: CPT

## 2021-11-24 PROCEDURE — 6370000000 HC RX 637 (ALT 250 FOR IP): Performed by: NURSE PRACTITIONER

## 2021-11-24 PROCEDURE — 6360000002 HC RX W HCPCS: Performed by: SURGERY

## 2021-11-24 PROCEDURE — 80048 BASIC METABOLIC PNL TOTAL CA: CPT

## 2021-11-24 PROCEDURE — 36415 COLL VENOUS BLD VENIPUNCTURE: CPT

## 2021-11-24 PROCEDURE — C9113 INJ PANTOPRAZOLE SODIUM, VIA: HCPCS | Performed by: SURGERY

## 2021-11-24 PROCEDURE — 6370000000 HC RX 637 (ALT 250 FOR IP): Performed by: FAMILY MEDICINE

## 2021-11-24 PROCEDURE — 85014 HEMATOCRIT: CPT

## 2021-11-24 PROCEDURE — 2580000003 HC RX 258: Performed by: FAMILY MEDICINE

## 2021-11-24 PROCEDURE — 2060000000 HC ICU INTERMEDIATE R&B

## 2021-11-24 PROCEDURE — 78227 HEPATOBIL SYST IMAGE W/DRUG: CPT

## 2021-11-24 PROCEDURE — 3430000000 HC RX DIAGNOSTIC RADIOPHARMACEUTICAL: Performed by: SURGERY

## 2021-11-24 PROCEDURE — 99231 SBSQ HOSP IP/OBS SF/LOW 25: CPT | Performed by: SURGERY

## 2021-11-24 PROCEDURE — 85018 HEMOGLOBIN: CPT

## 2021-11-24 RX ORDER — SODIUM CHLORIDE 0.9 % (FLUSH) 0.9 %
5-40 SYRINGE (ML) INJECTION PRN
Status: DISCONTINUED | OUTPATIENT
Start: 2021-11-24 | End: 2021-11-25 | Stop reason: HOSPADM

## 2021-11-24 RX ORDER — SODIUM CHLORIDE 9 MG/ML
INJECTION, SOLUTION INTRAVENOUS ONCE
Status: COMPLETED | OUTPATIENT
Start: 2021-11-24 | End: 2021-11-24

## 2021-11-24 RX ADMIN — LEVOTHYROXINE SODIUM 25 MCG: 0.03 TABLET ORAL at 10:24

## 2021-11-24 RX ADMIN — ACETAMINOPHEN 650 MG: 325 TABLET ORAL at 10:26

## 2021-11-24 RX ADMIN — PIPERACILLIN AND TAZOBACTAM 3375 MG: 3; .375 INJECTION, POWDER, LYOPHILIZED, FOR SOLUTION INTRAVENOUS at 10:33

## 2021-11-24 RX ADMIN — PANTOPRAZOLE SODIUM 40 MG: 40 INJECTION, POWDER, FOR SOLUTION INTRAVENOUS at 10:17

## 2021-11-24 RX ADMIN — INSULIN LISPRO 2 UNITS: 100 INJECTION, SOLUTION INTRAVENOUS; SUBCUTANEOUS at 17:20

## 2021-11-24 RX ADMIN — LINEZOLID 600 MG: 600 INJECTION, SOLUTION INTRAVENOUS at 13:00

## 2021-11-24 RX ADMIN — SINCALIDE 2.4 MCG: 5 INJECTION, POWDER, LYOPHILIZED, FOR SOLUTION INTRAVENOUS at 08:34

## 2021-11-24 RX ADMIN — PIPERACILLIN AND TAZOBACTAM 3375 MG: 3; .375 INJECTION, POWDER, LYOPHILIZED, FOR SOLUTION INTRAVENOUS at 17:17

## 2021-11-24 RX ADMIN — SODIUM CHLORIDE: 9 INJECTION, SOLUTION INTRAVENOUS at 04:51

## 2021-11-24 RX ADMIN — SODIUM CHLORIDE, PRESERVATIVE FREE 10 ML: 5 INJECTION INTRAVENOUS at 10:18

## 2021-11-24 RX ADMIN — INSULIN LISPRO 1 UNITS: 100 INJECTION, SOLUTION INTRAVENOUS; SUBCUTANEOUS at 21:47

## 2021-11-24 RX ADMIN — Medication 5.69 MILLICURIE: at 07:25

## 2021-11-24 RX ADMIN — PANTOPRAZOLE SODIUM 40 MG: 40 INJECTION, POWDER, FOR SOLUTION INTRAVENOUS at 21:46

## 2021-11-24 RX ADMIN — SODIUM CHLORIDE 100 ML: 9 INJECTION, SOLUTION INTRAVENOUS at 08:34

## 2021-11-24 RX ADMIN — SODIUM CHLORIDE, PRESERVATIVE FREE 10 ML: 5 INJECTION INTRAVENOUS at 21:46

## 2021-11-24 RX ADMIN — Medication 10 ML: at 07:25

## 2021-11-24 RX ADMIN — ATORVASTATIN CALCIUM 10 MG: 10 TABLET, FILM COATED ORAL at 21:46

## 2021-11-24 RX ADMIN — LOSARTAN POTASSIUM 100 MG: 100 TABLET, FILM COATED ORAL at 10:17

## 2021-11-24 RX ADMIN — PIPERACILLIN AND TAZOBACTAM 3375 MG: 3; .375 INJECTION, POWDER, LYOPHILIZED, FOR SOLUTION INTRAVENOUS at 01:37

## 2021-11-24 RX ADMIN — ACETAMINOPHEN 650 MG: 325 TABLET ORAL at 01:32

## 2021-11-24 RX ADMIN — Medication 1 SPRAY: at 02:14

## 2021-11-24 RX ADMIN — ACETAMINOPHEN 650 MG: 325 TABLET ORAL at 17:20

## 2021-11-24 ASSESSMENT — PAIN SCALES - GENERAL
PAINLEVEL_OUTOF10: 0
PAINLEVEL_OUTOF10: 8
PAINLEVEL_OUTOF10: 8
PAINLEVEL_OUTOF10: 0

## 2021-11-24 NOTE — PLAN OF CARE
Pt remains free from falls & injuries;  pt in good spirits, family visited today; pt denies pain. Pt did change her illeostomy bag today. Pt noticed redness at buttocks & sacrum today. Pt encouraged to change positions more often to maintain skin integrity. Blanchable redness at site. Zinc oxide & sacral heart applied. Pt understands treatment plan and understands reasoning for antibiotics. Pt understands that she will not be able to drink or eat after midnight in anticipation of the HIDA scan tomorrow. Pt remains independent in room, steady on feet. Call light within reach.     Problem: Falls - Risk of:  Goal: Will remain free from falls  Description: Will remain free from falls  11/23/2021 2240 by Waylon Matson RN  Outcome: Ongoing     Problem: Falls - Risk of:  Goal: Absence of physical injury  Description: Absence of physical injury  11/23/2021 2240 by Waylon Matson RN  Outcome: Ongoing     Problem: Infection:  Goal: Will remain free from infection  Description: Will remain free from infection  11/23/2021 2240 by Waylon Matson RN  Outcome: Ongoing     Problem: Safety:  Goal: Free from accidental physical injury  Description: Free from accidental physical injury  11/23/2021 2240 by Waylon Matson RN  Outcome: Ongoing     Problem: Safety:  Goal: Free from intentional harm  Description: Free from intentional harm  11/23/2021 2240 by Waylon Matson RN  Outcome: Ongoing     Problem: Daily Care:  Goal: Daily care needs are met  Description: Daily care needs are met  11/23/2021 2240 by Waylon Matson RN  Outcome: Ongoing     Problem: Pain:  Goal: Patient's pain/discomfort is manageable  Description: Patient's pain/discomfort is manageable  11/23/2021 2240 by Waylon Matson RN  Outcome: Ongoing     Problem: Pain:  Goal: Pain level will decrease  Description: Pain level will decrease  11/23/2021 2240 by Waylon Matson RN  Outcome: Ongoing     Problem: Pain:  Goal: Control of acute pain  Description: Control of acute pain  11/23/2021 2240 by Zoey Mendoza RN  Outcome: Ongoing     Problem: Pain:  Goal: Control of chronic pain  Description: Control of chronic pain  11/23/2021 2240 by Zoey Mendoza RN  Outcome: Ongoing     Problem: Skin Integrity:  Goal: Skin integrity will stabilize  Description: Skin integrity will stabilize  11/23/2021 2240 by Zoey Mendoza RN  Outcome: Ongoing     Problem: Discharge Planning:  Goal: Patients continuum of care needs are met  Description: Patients continuum of care needs are met  11/23/2021 2240 by Zoey Mendoza RN  Outcome: Ongoing

## 2021-11-24 NOTE — PROGRESS NOTES
[facility]    11/24/2021 7:48 AM        Name: Eliezer Hooper . Admitted: 11/21/2021  Primary Care Provider: Wood Toth (Tel: [phone])      Subjective: Keyana Stands     T max 99.6    Seen this am while up in the chair   Feels better today  I changed abd dressing for scant yellow drainage at distal incision    Dr Soraya Kruse in to see pt and review plan of care           Reviewed interval ancillary notes    Current Medications  phenol 1.4 % mouth spray 1 spray, Q2H PRN  sodium chloride flush 0.9 % injection 5-40 mL, PRN  0.9 % sodium chloride infusion, Continuous  potassium chloride (KLOR-CON M) extended release tablet 40 mEq, PRN   Or  potassium bicarb-citric acid (EFFER-K) effervescent tablet 40 mEq, PRN   Or  potassium chloride 10 mEq/100 mL IVPB (Peripheral Line), PRN  magnesium sulfate 1000 mg in dextrose 5% 100 mL IVPB, PRN  sodium phosphate 19.17 mmol in dextrose 5 % 250 mL IVPB, PRN   Or  sodium phosphate 38.37 mmol in dextrose 5 % 250 mL IVPB, PRN  ibuprofen (ADVIL;MOTRIN) tablet 600 mg, Once  linezolid (ZYVOX) IVPB 600 mg, Q12H  pantoprazole (PROTONIX) injection 40 mg, BID  0.9 % sodium chloride infusion, Continuous  atorvastatin (LIPITOR) tablet 10 mg, Nightly  morphine injection 4 mg, Q4H PRN  levothyroxine (SYNTHROID) tablet 25 mcg, QAM AC  losartan (COZAAR) tablet 100 mg, Daily  hydrALAZINE (APRESOLINE) injection 10 mg, Q4H PRN  glucose (GLUTOSE) 40 % oral gel 15 g, PRN  dextrose 50 % IV solution, PRN  glucagon (rDNA) injection 1 mg, PRN  dextrose 5 % solution, PRN  sodium chloride flush 0.9 % injection 5-40 mL, 2 times per day  sodium chloride flush 0.9 % injection 5-40 mL, PRN  0.9 % sodium chloride infusion, PRN  ondansetron (ZOFRAN-ODT) disintegrating tablet 4 mg, Q8H PRN   Or  ondansetron (ZOFRAN) injection 4 mg, Q6H PRN  polyethylene glycol (GLYCOLAX) packet 17 g, Daily PRN  acetaminophen (TYLENOL) tablet 650 mg, Q6H PRN   Or  acetaminophen (TYLENOL) suppository 650 mg, Q6H PRN  insulin lispro (1 Unit Dial) 0-12 Units, TID WC  insulin lispro (1 Unit Dial) 0-6 Units, Nightly  piperacillin-tazobactam (ZOSYN) 3,375 mg in dextrose 5 % 50 mL IVPB extended infusion (mini-bag), Q8H        Objective:  /80   Pulse 84   Temp 98.8 °F (37.1 °C) (Oral)   Resp 18   Ht 5' 5\" (1.651 m)   Wt 264 lb 4.8 oz (119.9 kg)   LMP 08/16/2010   SpO2 99%   BMI 43.98 kg/m²     Intake/Output Summary (Last 24 hours) at 11/24/2021 0748  Last data filed at 11/23/2021 1448  Gross per 24 hour   Intake 320 ml   Output --   Net 320 ml      Wt Readings from Last 3 Encounters:   11/21/21 264 lb 4.8 oz (119.9 kg)   11/18/21 266 lb 4.8 oz (120.8 kg)   07/29/21 252 lb (114.3 kg)       General appearance:  Appears comfortable while up in the chair   Eyes: Sclera clear. Pupils equal.  ENT: Moist oral mucosa. Trachea midline, no adenopathy. Cardiovascular: Regular rhythm, normal S1, S2. No murmur. No edema in lower extremities  Respiratory: Not using accessory muscles. Good inspiratory effort. Clear to auscultation bilaterally, no wheeze or crackles. GI: Abdomen soft and obese with bowel sounds present. Stoma pink with scant enteric output. Abdominal dressing removed, scant yellow drainage noted on dressing near distal incision. Staples intact. No redness or drainage noted along incision. Musculoskeletal: No cyanosis in digits, neck supple  Neurology: CN 2-12 grossly intact. No speech or motor deficits  Psych: Normal affect.  Alert and oriented in time, place and person  Skin: Warm, dry, normal turgor    Labs and Tests:  CBC:   Recent Labs     11/21/21  1307 11/21/21  1307 11/22/21  0101 11/22/21  0934 11/23/21  0838 11/23/21  1700 11/24/21  0128   WBC 8.2  --  6.4  --  5.2  --   --    HGB 11.1*   < > 10.2*   < > 10.0* 9.7* 9.5*     --  258  --  256  --   --     < > = values in this interval not displayed. BMP:    Recent Labs     11/21/21  1307 11/22/21  0101 11/23/21  0838   * 136 135*   K 3.7 3.6 3.7   CL 96* 102 101   CO2 25 20* 21   BUN 6* 8 7   CREATININE 0.5* <0.5* <0.5*   GLUCOSE 155* 159* 120*     Hepatic:   Recent Labs     11/21/21  1307 11/22/21  0101   AST 29 27   ALT 26 23   BILITOT 0.5 0.5   ALKPHOS 149* 128     CT abd/pelvis  1. Ventral pelvic wall hematoma formation extending intra-extraperitoneal and   superficially along the abdominal/pelvic wall musculature as well. Anterior   extraperitoneal abdominal fluid and gas collection abdomen and pelvis   compatible with recent surgery, admixed with some areas hematoma. 2. Ventral abdominal and pelvic wall fluid/hematoma collections with   associated gas which may be recent postsurgical or could be related to   abscess. 3. Prominent peristomal hernia at the right lower quadrant ostomy site   containing several small bowel loops without obstruction evident. 4. Cholelithiasis without CT findings of acute cholecystitis. 5. Hepatic steatosis.      CXR  Findings most consistent with mild CHF with cardiomegaly, and perihilar   edema.  Mild right basilar atelectasis     egd with large and smaller duodenal bulb ulcers, nonbleeding. Rec;  protonix 40 mg bid  call office 1 week for biopsy results  repeat egd 8 weeks  ok for diet when ok with surg team (other imaging/hida etc pending)  Avoid nonessential nsaids  otherwise will sign off, call if needed. Hida:    No acute cholecystitis. Gallbladder ejection fraction is low, which can be seen in the setting of   functional gallbladder disorder in the appropriate clinical setting. AIC 7.7         SuccessfulCT guided diagnostic aspiration of the lower abdominal wall   subcutaneous fat fluid collection. Problem List  Active Problems:    Postoperative fever  Resolved Problems:    * No resolved hospital problems. *       Assessment & Plan:   1.  Right sided abd pain/  US with stones. HIDA shows low EF. GS is following will advance diet and monitor closely , will likely need raman in the future. 2. Non bleeding duodenal ulcers per EGD :  On BID protonix. Avoid NSAIDS , needs follow up with GI in 6 weeks   3. Sepsis: resolved. fever curve trending down . Currently on zyvox and Zosyn. Cultures are pending. 4. S/P open repair of complex incarcerated incision hernia with extensive CHARLEEN with mesh placement for hernia repair on 11/16/21  ( pt was d/c home and then returned to ED withing 24 hours ) General surgery is following  5. Post Operative hematoma:   11/22 had Ct guided drainage/ aspiration of low abd wall/ fluid collection. Cultures are pending. 6. Increased ambulation encouraged.         Diet: Diet NPO  Code:Full Code  DVT PPX      CORDELL Alaniz - CNP   11/24/2021 7:48 AM

## 2021-11-24 NOTE — PROGRESS NOTES
[facility]        PATIENT NAME: eMlba Rowell     TODAY'S DATE: 11/24/2021    SUBJECTIVE:  CC abd pain  Pt with much less pain, RUQ, epigastric region is improved, focally much less  No emesis.    No CP or SOB  Having stomal output  Feels better overall  Lb liquid diet     Current Medications:   Current Facility-Administered Medications: phenol 1.4 % mouth spray 1 spray, 1 spray, Mouth/Throat, Q2H PRN  sodium chloride flush 0.9 % injection 5-40 mL, 5-40 mL, IntraVENous, PRN  0.9 % sodium chloride infusion, , IntraVENous, Continuous  potassium chloride (KLOR-CON M) extended release tablet 40 mEq, 40 mEq, Oral, PRN **OR** potassium bicarb-citric acid (EFFER-K) effervescent tablet 40 mEq, 40 mEq, Oral, PRN **OR** potassium chloride 10 mEq/100 mL IVPB (Peripheral Line), 10 mEq, IntraVENous, PRN  magnesium sulfate 1000 mg in dextrose 5% 100 mL IVPB, 1,000 mg, IntraVENous, PRN  sodium phosphate 19.17 mmol in dextrose 5 % 250 mL IVPB, 0.16 mmol/kg, IntraVENous, PRN **OR** sodium phosphate 38.37 mmol in dextrose 5 % 250 mL IVPB, 0.32 mmol/kg, IntraVENous, PRN  ibuprofen (ADVIL;MOTRIN) tablet 600 mg, 600 mg, Oral, Once  linezolid (ZYVOX) IVPB 600 mg, 600 mg, IntraVENous, Q12H  pantoprazole (PROTONIX) injection 40 mg, 40 mg, IntraVENous, BID  0.9 % sodium chloride infusion, , IntraVENous, Continuous  atorvastatin (LIPITOR) tablet 10 mg, 10 mg, Oral, Nightly  morphine injection 4 mg, 4 mg, IntraVENous, Q4H PRN  levothyroxine (SYNTHROID) tablet 25 mcg, 25 mcg, Oral, QAM AC  losartan (COZAAR) tablet 100 mg, 100 mg, Oral, Daily  hydrALAZINE (APRESOLINE) injection 10 mg, 10 mg, IntraVENous, Q4H PRN  glucose (GLUTOSE) 40 % oral gel 15 g, 15 g, Oral, PRN  dextrose 50 % IV solution, 12.5 g, IntraVENous, PRN  glucagon (rDNA) injection 1 mg, 1 mg, IntraMUSCular, PRN  dextrose 5 % solution, 100 mL/hr, IntraVENous, PRN  sodium chloride flush 0.9 % injection 5-40 mL, 5-40 mL, IntraVENous, 2 times per day  sodium chloride flush 0.9 % injection 5-40 mL, 5-40 mL, IntraVENous, PRN  0.9 % sodium chloride infusion, 25 mL, IntraVENous, PRN  ondansetron (ZOFRAN-ODT) disintegrating tablet 4 mg, 4 mg, Oral, Q8H PRN **OR** ondansetron (ZOFRAN) injection 4 mg, 4 mg, IntraVENous, Q6H PRN  polyethylene glycol (GLYCOLAX) packet 17 g, 17 g, Oral, Daily PRN  acetaminophen (TYLENOL) tablet 650 mg, 650 mg, Oral, Q6H PRN **OR** acetaminophen (TYLENOL) suppository 650 mg, 650 mg, Rectal, Q6H PRN  insulin lispro (1 Unit Dial) 0-12 Units, 0-12 Units, SubCUTAneous, TID WC  insulin lispro (1 Unit Dial) 0-6 Units, 0-6 Units, SubCUTAneous, Nightly  piperacillin-tazobactam (ZOSYN) 3,375 mg in dextrose 5 % 50 mL IVPB extended infusion (mini-bag), 3,375 mg, IntraVENous, Q8H  Prior to Admission medications    Medication Sig Start Date End Date Taking? Authorizing Provider   HYDROcodone-acetaminophen (NORCO) 5-325 MG per tablet Take 1 tablet by mouth every 6 hours as needed for Pain for up to 5 days. Take lowest dose possible to manage pain; may stop taking sooner than 7 days if pain is able to be controlled with non-narcotic analgesics and therapies. 11/19/21 11/24/21 Yes Severa Sharp, APRN - CNP   sulfamethoxazole-trimethoprim (BACTRIM DS;SEPTRA DS) 800-160 MG per tablet Take 1 tablet by mouth 2 times daily for 5 days 11/19/21 11/24/21 Yes Severa Sharp, APRN - CNP   LANTUS SOLOSTAR 100 UNIT/ML injection pen INJECT 50 UNITS INTO THE SKIN TWICE DAILY. 11/8/21  Yes Celine Ryan MD   levothyroxine (SYNTHROID) 25 MCG tablet TAKE 1 TABLET BY MOUTH EVERY DAY 11/1/21  Yes Celine Ryan MD   ALBUTEROL IN Inhale into the lungs   Yes Historical Provider, MD   insulin lispro (HUMALOG) 100 UNIT/ML injection vial INJECT 25 UNITS INTO THE SKIN ONCE DAILY.  7/12/21  Yes Celine Ryan MD   Multiple Vitamin (MULTIVITAMIN ADULT PO) Take by mouth   Yes Historical Provider, MD   Ferrous Sulfate (IRON PO) Take by mouth   Yes Historical Provider, MD COENZYME Q10 PO Take by mouth   Yes Historical Provider, MD   Insulin Pen Needle (PEN NEEDLES 31GX5/16\") 31G X 8 MM MISC Use to inject insulin twice daily. 4/14/21  Yes Berlin Russell MD   pioglitazone (ACTOS) 15 MG tablet TAKE 1 TABLET BY MOUTH EVERY DAY 4/13/21  Yes Berlin Russell MD   ONE TOUCH ULTRASOFT LANCETS MISC USE TO TEST BLOOD GLUCOSE 4 TIMES DAILY 11/7/20  Yes Historical Provider, MD   POTASSIUM PO Take by mouth   Yes Historical Provider, MD   MAGNESIUM PO Take by mouth   Yes Historical Provider, MD   Montelukast Sodium (SINGULAIR PO) Take by mouth   Yes Historical Provider, MD   Cholecalciferol (VITAMIN D3) 1.25 MG (92824 UT) CAPS Take by mouth once a week   Yes Historical Provider, MD   TRIAMCINOLONE ACETONIDE NA by Nasal route as needed    Yes Historical Provider, MD   BD INSULIN SYRINGE U/F 30G X 1/2\" 0.3 ML MISC USE WITH INSULIN TWICE A DAY 12/10/20  Yes Berlin Russell MD   metFORMIN (GLUCOPHAGE) 500 MG tablet TAKE 1 TABLET BY MOUTH EVERY 24 HOURS 12/10/20  Yes Berlin Russell MD   fluticasone-vilanterol (BREO ELLIPTA) 100-25 MCG/INH AEPB inhaler Inhale 1 puff into the lungs daily   Yes Historical Provider, MD   losartan (COZAAR) 100 MG tablet Take 100 mg by mouth daily   Yes Historical Provider, MD   Omega-3 Fatty Acids (FISH OIL) 1000 MG CAPS Take 3,000 mg by mouth daily   Yes Historical Provider, MD   atorvastatin (LIPITOR) 10 MG tablet Take 10 mg by mouth daily   Yes Historical Provider, MD   CRANBERRY PO Take by mouth daily   Yes Historical Provider, MD   acetaminophen (TYLENOL) 500 MG tablet Take 500 mg by mouth every 6 hours as needed for Pain   Yes Historical Provider, MD   Loratadine (CLARITIN) 10 MG CAPS Take  by mouth. Yes Historical Provider, MD   blood glucose test strips (ONETOUCH VERIO) strip Test 4 times daily. 11/22/21   Berlin Russell MD        Allergies:  Patient has no known allergies. Social History:    reports that she has never smoked.  She has never used smokeless tobacco. She reports that she does not drink alcohol and does not use drugs. Family History:        Problem Relation Age of Onset    Cancer Mother         uterine    No Known Problems Father        REVIEW OF SYSTEMS:  CONSTITUTIONAL:  positive for  fatigue  HEENT:  negative  RESPIRATORY:  negative  CARDIOVASCULAR:  negative  GASTROINTESTINAL:  negative except for abdominal pain  GENITOURINARY:  negative  HEMATOLOGIC/LYMPHATIC:  negative  NEUROLOGICAL:  negative  SKIN: negative      OBJECTIVE:  VITALS:  /66   Pulse 88   Temp 98.9 °F (37.2 °C) (Oral)   Resp 18   Ht 5' 5\" (1.651 m)   Wt 264 lb 4.8 oz (119.9 kg)   LMP 08/16/2010   SpO2 96%   BMI 43.98 kg/m²     INTAKE/OUTPUT:    No intake/output data recorded. No intake/output data recorded. CONSTITUTIONAL:  awake and alert  LUNGS:  clear to auscultation  HEART: RRR  ABDOMEN:  active bowel sounds, soft, non-distended, minimal tenderness noted in the right upper quadrant, old incision OK, no cellulitis or drainage        Data:  CBC:   Recent Labs     11/22/21 0101 11/22/21  0934 11/23/21  0838 11/23/21  0838 11/23/21  1700 11/24/21  0128 11/24/21  1010   WBC 6.4  --  5.2  --   --   --  3.6*   HGB 10.2*   < > 10.0*   < > 9.7* 9.5* 9.3*   HCT 30.7*   < > 30.2*   < > 29.0* 28.6* 28.2*     --  256  --   --   --  240    < > = values in this interval not displayed.      BMP:    Recent Labs     11/22/21  0101 11/23/21  0838 11/24/21  1010    135* 137   K 3.6 3.7 3.7    101 104   CO2 20* 21 21   BUN 8 7 5*   CREATININE <0.5* <0.5* <0.5*   GLUCOSE 159* 120* 127*     Hepatic:   Recent Labs     11/22/21 0101   AST 27   ALT 23   BILITOT 0.5   ALKPHOS 128     Mag:      Recent Labs     11/22/21 0101 11/23/21  0838   MG 1.60* 2.00      Phos:     Recent Labs     11/22/21 0101   PHOS 2.4*      INR:   Recent Labs     11/22/21  0101   INR 1.25*       Radiology Review:  CT IR aspiration - serous fluid - cx sent      ASSESSMENT AND PLAN:  RUQ pain - improved  Large duodenal ulcer  HIDA with GB filling, low EF / stones present  On atbx and PPI  Will adv diet, if remains stable another 24 - 48 hours would discharge on PPI and Augmentin X 7 days.  See in office in a week to remove staples  Follow up scope with Dr. Indigo Medrano in 8 weeks  Plan elective Lap raman in the future after hernia healed      Shun Saleh MD

## 2021-11-25 VITALS
RESPIRATION RATE: 17 BRPM | BODY MASS INDEX: 43.22 KG/M2 | HEIGHT: 65 IN | TEMPERATURE: 97.3 F | OXYGEN SATURATION: 96 % | HEART RATE: 82 BPM | WEIGHT: 259.4 LBS | DIASTOLIC BLOOD PRESSURE: 67 MMHG | SYSTOLIC BLOOD PRESSURE: 116 MMHG

## 2021-11-25 LAB
ANION GAP SERPL CALCULATED.3IONS-SCNC: 10 MMOL/L (ref 3–16)
BLOOD CULTURE, ROUTINE: NORMAL
BODY FLUID CULTURE, STERILE: NORMAL
BUN BLDV-MCNC: 5 MG/DL (ref 7–20)
CALCIUM SERPL-MCNC: 8.1 MG/DL (ref 8.3–10.6)
CHLORIDE BLD-SCNC: 105 MMOL/L (ref 99–110)
CO2: 24 MMOL/L (ref 21–32)
CREAT SERPL-MCNC: <0.5 MG/DL (ref 0.6–1.2)
CULTURE, BLOOD 2: NORMAL
EKG ATRIAL RATE: 104 BPM
EKG DIAGNOSIS: NORMAL
EKG P AXIS: 32 DEGREES
EKG P-R INTERVAL: 138 MS
EKG Q-T INTERVAL: 346 MS
EKG QRS DURATION: 84 MS
EKG QTC CALCULATION (BAZETT): 454 MS
EKG R AXIS: -5 DEGREES
EKG T AXIS: 36 DEGREES
EKG VENTRICULAR RATE: 104 BPM
GFR AFRICAN AMERICAN: >60
GFR NON-AFRICAN AMERICAN: >60
GLUCOSE BLD-MCNC: 136 MG/DL (ref 70–99)
GLUCOSE BLD-MCNC: 143 MG/DL (ref 70–99)
GLUCOSE BLD-MCNC: 257 MG/DL (ref 70–99)
GRAM STAIN RESULT: NORMAL
HCT VFR BLD CALC: 27.2 % (ref 36–48)
HCT VFR BLD CALC: 28.8 % (ref 36–48)
HEMOGLOBIN: 9 G/DL (ref 12–16)
HEMOGLOBIN: 9.5 G/DL (ref 12–16)
MCH RBC QN AUTO: 27.7 PG (ref 26–34)
MCHC RBC AUTO-ENTMCNC: 33.2 G/DL (ref 31–36)
MCV RBC AUTO: 83.4 FL (ref 80–100)
PDW BLD-RTO: 14.4 % (ref 12.4–15.4)
PERFORMED ON: ABNORMAL
PERFORMED ON: ABNORMAL
PLATELET # BLD: 244 K/UL (ref 135–450)
PMV BLD AUTO: 6.4 FL (ref 5–10.5)
POTASSIUM SERPL-SCNC: 3.5 MMOL/L (ref 3.5–5.1)
RBC # BLD: 3.26 M/UL (ref 4–5.2)
SODIUM BLD-SCNC: 139 MMOL/L (ref 136–145)
WBC # BLD: 3.6 K/UL (ref 4–11)

## 2021-11-25 PROCEDURE — 36415 COLL VENOUS BLD VENIPUNCTURE: CPT

## 2021-11-25 PROCEDURE — 99024 POSTOP FOLLOW-UP VISIT: CPT | Performed by: SURGERY

## 2021-11-25 PROCEDURE — 85027 COMPLETE CBC AUTOMATED: CPT

## 2021-11-25 PROCEDURE — 2580000003 HC RX 258: Performed by: FAMILY MEDICINE

## 2021-11-25 PROCEDURE — 85014 HEMATOCRIT: CPT

## 2021-11-25 PROCEDURE — 85018 HEMOGLOBIN: CPT

## 2021-11-25 PROCEDURE — 80048 BASIC METABOLIC PNL TOTAL CA: CPT

## 2021-11-25 PROCEDURE — 6360000002 HC RX W HCPCS: Performed by: SURGERY

## 2021-11-25 PROCEDURE — C9113 INJ PANTOPRAZOLE SODIUM, VIA: HCPCS | Performed by: SURGERY

## 2021-11-25 PROCEDURE — 93010 ELECTROCARDIOGRAM REPORT: CPT | Performed by: INTERNAL MEDICINE

## 2021-11-25 PROCEDURE — 6370000000 HC RX 637 (ALT 250 FOR IP): Performed by: FAMILY MEDICINE

## 2021-11-25 PROCEDURE — 2580000003 HC RX 258: Performed by: SURGERY

## 2021-11-25 RX ORDER — AMOXICILLIN AND CLAVULANATE POTASSIUM 500; 125 MG/1; MG/1
1 TABLET, FILM COATED ORAL 2 TIMES DAILY
Qty: 14 TABLET | Refills: 0 | Status: SHIPPED | OUTPATIENT
Start: 2021-11-25 | End: 2021-12-02

## 2021-11-25 RX ORDER — BLOOD SUGAR DIAGNOSTIC
STRIP MISCELLANEOUS
Qty: 200 EACH | Refills: 5 | Status: SHIPPED | OUTPATIENT
Start: 2021-11-25 | End: 2021-11-25 | Stop reason: SDUPTHER

## 2021-11-25 RX ORDER — BLOOD SUGAR DIAGNOSTIC
STRIP MISCELLANEOUS
Qty: 200 EACH | Refills: 5 | Status: SHIPPED | OUTPATIENT
Start: 2021-11-25 | End: 2021-12-09 | Stop reason: SDUPTHER

## 2021-11-25 RX ORDER — PANTOPRAZOLE SODIUM 40 MG/1
40 TABLET, DELAYED RELEASE ORAL
Qty: 30 TABLET | Refills: 5 | Status: SHIPPED | OUTPATIENT
Start: 2021-11-25 | End: 2021-11-25 | Stop reason: SDUPTHER

## 2021-11-25 RX ORDER — PANTOPRAZOLE SODIUM 40 MG/1
40 TABLET, DELAYED RELEASE ORAL
Qty: 30 TABLET | Refills: 5 | Status: SHIPPED | OUTPATIENT
Start: 2021-11-25 | End: 2022-09-26

## 2021-11-25 RX ORDER — AMOXICILLIN AND CLAVULANATE POTASSIUM 500; 125 MG/1; MG/1
1 TABLET, FILM COATED ORAL 2 TIMES DAILY
Qty: 14 TABLET | Refills: 0 | Status: SHIPPED | OUTPATIENT
Start: 2021-11-25 | End: 2021-11-25 | Stop reason: SDUPTHER

## 2021-11-25 RX ADMIN — LINEZOLID 600 MG: 600 INJECTION, SOLUTION INTRAVENOUS at 00:40

## 2021-11-25 RX ADMIN — ACETAMINOPHEN 650 MG: 325 TABLET ORAL at 00:41

## 2021-11-25 RX ADMIN — PIPERACILLIN AND TAZOBACTAM 3375 MG: 3; .375 INJECTION, POWDER, LYOPHILIZED, FOR SOLUTION INTRAVENOUS at 09:39

## 2021-11-25 RX ADMIN — PANTOPRAZOLE SODIUM 40 MG: 40 INJECTION, POWDER, FOR SOLUTION INTRAVENOUS at 09:32

## 2021-11-25 RX ADMIN — SODIUM CHLORIDE, PRESERVATIVE FREE 10 ML: 5 INJECTION INTRAVENOUS at 09:32

## 2021-11-25 RX ADMIN — PIPERACILLIN AND TAZOBACTAM 3375 MG: 3; .375 INJECTION, POWDER, LYOPHILIZED, FOR SOLUTION INTRAVENOUS at 02:04

## 2021-11-25 RX ADMIN — LINEZOLID 600 MG: 600 INJECTION, SOLUTION INTRAVENOUS at 10:20

## 2021-11-25 RX ADMIN — LOSARTAN POTASSIUM 100 MG: 100 TABLET, FILM COATED ORAL at 09:31

## 2021-11-25 RX ADMIN — INSULIN LISPRO 6 UNITS: 100 INJECTION, SOLUTION INTRAVENOUS; SUBCUTANEOUS at 12:26

## 2021-11-25 RX ADMIN — LEVOTHYROXINE SODIUM 25 MCG: 0.03 TABLET ORAL at 07:22

## 2021-11-25 RX ADMIN — ACETAMINOPHEN 650 MG: 325 TABLET ORAL at 07:21

## 2021-11-25 ASSESSMENT — PAIN DESCRIPTION - DESCRIPTORS
DESCRIPTORS: ACHING
DESCRIPTORS: ACHING

## 2021-11-25 ASSESSMENT — PAIN SCALES - GENERAL
PAINLEVEL_OUTOF10: 2
PAINLEVEL_OUTOF10: 0
PAINLEVEL_OUTOF10: 2
PAINLEVEL_OUTOF10: 2

## 2021-11-25 ASSESSMENT — PAIN DESCRIPTION - PAIN TYPE
TYPE: ACUTE PAIN
TYPE: ACUTE PAIN

## 2021-11-25 ASSESSMENT — PAIN DESCRIPTION - LOCATION
LOCATION: GENERALIZED
LOCATION: GENERALIZED

## 2021-11-25 NOTE — PROGRESS NOTES
Mykel Bertrand is a 62 y.o. female patient.     Current Facility-Administered Medications   Medication Dose Route Frequency Provider Last Rate Last Admin    phenol 1.4 % mouth spray 1 spray  1 spray Mouth/Throat Q2H PRN CORDELL Aguilar CNP   1 spray at 11/24/21 0214    sodium chloride flush 0.9 % injection 5-40 mL  5-40 mL IntraVENous PRN Raul Gomez MD   10 mL at 11/24/21 0725    0.9 % sodium chloride infusion   IntraVENous Continuous Katie Zuñiga MD   Stopped at 11/23/21 1454    potassium chloride (KLOR-CON M) extended release tablet 40 mEq  40 mEq Oral PRN Shilpi Bridges MD        Or   [address] [place] [address] potassium bicarb-citric acid (EFFER-K) effervescent tablet 40 mEq  40 mEq Oral PRN Shilpi Bridges MD        Or    potassium chloride 10 mEq/100 mL IVPB (Peripheral Line)  10 mEq IntraVENous PRN Shilpi Bridges MD        magnesium sulfate 1000 mg in dextrose 5% 100 mL IVPB  1,000 mg IntraVENous PRN Shilpi Bridges MD        sodium phosphate 19.17 mmol in dextrose 5 % 250 mL IVPB  0.16 mmol/kg IntraVENous PRN Shilpi Bridges MD        Or    sodium phosphate 38.37 mmol in dextrose 5 % 250 mL IVPB  0.32 mmol/kg IntraVENous PRN Shilpi Bridges MD        ibuprofen (ADVIL;MOTRIN) tablet 600 mg  600 mg Oral Once Shilpi Bridges MD        linezolid (ZYVOX) IVPB 600 mg  600 mg IntraVENous Q12H Raul Gomez  mL/hr at 11/25/21 1020 600 mg at 11/25/21 1020    pantoprazole (PROTONIX) injection 40 mg  40 mg IntraVENous BID Raul Gomez MD   40 mg at 11/25/21 0932    0.9 % sodium chloride infusion   IntraVENous Continuous Carmen Alvarado  mL/hr at 11/24/21 0451 New Bag at 11/24/21 0451    atorvastatin (LIPITOR) tablet 10 mg  10 mg Oral Nightly Carmen Alvarado MD   10 mg at 11/24/21 9598    morphine injection 4 mg  4 mg IntraVENous Q4H PRN Carmen Alvarado MD        levothyroxine (SYNTHROID) tablet 25 mcg  25 mcg Oral QAM AC Carmen Alvarado MD   25 mcg at 11/25/21 2837  losartan (COZAAR) tablet 100 mg  100 mg Oral Daily Leighann Meza MD   100 mg at 11/25/21 0931    hydrALAZINE (APRESOLINE) injection 10 mg  10 mg IntraVENous Q4H PRN Leighann Meza MD   10 mg at 11/21/21 2356    glucose (GLUTOSE) 40 % oral gel 15 g  15 g Oral PRN Leighann Meza MD        dextrose 50 % IV solution  12.5 g IntraVENous PRN Leighann Meza MD        glucagon (rDNA) injection 1 mg  1 mg IntraMUSCular PRN Leighann Meza MD        dextrose 5 % solution  100 mL/hr IntraVENous PRN Leighann Meza MD        sodium chloride flush 0.9 % injection 5-40 mL  5-40 mL IntraVENous 2 times per day Leighann Meza MD   10 mL at 11/25/21 0932    sodium chloride flush 0.9 % injection 5-40 mL  5-40 mL IntraVENous PRN Leighann Meza MD   10 mL at 11/22/21 0430    0.9 % sodium chloride infusion  25 mL IntraVENous PRN Leighann Meza  mL/hr at 11/22/21 2241 25 mL at 11/22/21 2241    ondansetron (ZOFRAN-ODT) disintegrating tablet 4 mg  4 mg Oral Q8H PRN Leighann Meza MD   4 mg at 11/23/21 0555    Or    ondansetron (ZOFRAN) injection 4 mg  4 mg IntraVENous Q6H PRN Leighann Meza MD   4 mg at 11/22/21 0429    polyethylene glycol (GLYCOLAX) packet 17 g  17 g Oral Daily PRN Leighann Meza MD        acetaminophen (TYLENOL) tablet 650 mg  650 mg Oral Q6H PRN Leighann Meza MD   650 mg at 11/25/21 0373    Or    acetaminophen (TYLENOL) suppository 650 mg  650 mg Rectal Q6H PRN Leighann Meza MD        insulin lispro (1 Unit Dial) 0-12 Units  0-12 Units SubCUTAneous TID WC Leighann Meza MD   2 Units at 11/24/21 1720    insulin lispro (1 Unit Dial) 0-6 Units  0-6 Units SubCUTAneous Nightly Leighann Meza MD   1 Units at 11/24/21 2147    piperacillin-tazobactam (ZOSYN) 3,375 mg in dextrose 5 % 50 mL IVPB extended infusion (mini-bag)  3,375 mg IntraVENous Q8H Ira Underwood MD 12.5 mL/hr at 11/25/21 0939 3,375 mg at 11/25/21 0939     No Known Allergies  Active Problems:    Postoperative fever  Resolved Problems:    * No resolved hospital problems. *    Blood pressure 124/75, pulse 79, temperature 98.6 °F (37 °C), temperature source Oral, resp. rate 16, height 5' 5\" (1.651 m), weight 259 lb 6.4 oz (117.7 kg), last menstrual period 08/16/2010, SpO2 94 %. Subjective:  Symptoms:  Stable. Diet:  Adequate intake. Activity level: Normal.    Pain:  She complains of pain that is mild. Objective:  General Appearance:  Comfortable. Vital signs: (most recent): Blood pressure 124/75, pulse 79, temperature 98.6 °F (37 °C), temperature source Oral, resp. rate 16, height 5' 5\" (1.651 m), weight 259 lb 6.4 oz (117.7 kg), last menstrual period 08/16/2010, SpO2 94 %. Output: Producing urine and producing stool. Lungs:  Normal effort and normal respiratory rate. Abdomen: Abdomen is soft and non-distended. (Abdomen appropriately tender). Neurological: Patient is alert and oriented to person, place and time. Skin:  Warm. Assessment & Plan 60-year-old female status post recent ventral hernia repair with mesh. She is status post aspiration of of abdominal wall fluid collection. There are no acute surgical issues at this point time. The incision is progressing adequately. Okay for discharge home from surgical perspective. Follow-up with Dr. Gaurav Denney as previously scheduled. Case discussed with Cari Clay.     Kendall Fiore MD  11/25/2021

## 2021-11-25 NOTE — PLAN OF CARE
Problem: Falls - Risk of:  Goal: Will remain free from falls  Description: Will remain free from falls  Outcome: Ongoing     Problem: Falls - Risk of:  Goal: Absence of physical injury  Description: Absence of physical injury  Outcome: Ongoing     Problem: Infection:  Goal: Will remain free from infection  Description: Will remain free from infection  Outcome: Ongoing     Problem: Daily Care:  Goal: Daily care needs are met  Description: Daily care needs are met  Outcome: Ongoing     Problem: Pain:  Goal: Patient's pain/discomfort is manageable  Description: Patient's pain/discomfort is manageable  Outcome: Ongoing     Problem: Skin Integrity:  Goal: Skin integrity will stabilize  Description: Skin integrity will stabilize  Outcome: Ongoing     Problem: Discharge Planning:  Goal: Patients continuum of care needs are met  Description: Patients continuum of care needs are met  Outcome: Ongoing

## 2021-11-25 NOTE — DISCHARGE SUMMARY
[facility] DISCHARGE SUMMARY    Patient Demographics    Patient. Heather Reynoso 89  Date of Birth. 1960  MRN. [id number]     Primary care provider. Grazyna Alcazar  (Tel: [phone])    Admit date: 11/21/2021    Discharge date 11/25/2021  Note Date: 11/25/2021     Reason for Hospitalization. Chief Complaint   Patient presents with    Emesis     abd pain/+n/v/fever at home/recent surgery         Significant Findings. Active Problems:    Postoperative fever  Resolved Problems:    * No resolved hospital problems. *       Problems and results from this hospitalization that need follow up. 1. Follow up with Dr Adam Lopez in 1 week     Significant test results and incidental findings. Blood cultures with no growth  No significant growth from IR drainage    CT abd/pelvis  1. Ventral pelvic wall hematoma formation extending intra-extraperitoneal and   superficially along the abdominal/pelvic wall musculature as well. Anterior   extraperitoneal abdominal fluid and gas collection abdomen and pelvis   compatible with recent surgery, admixed with some areas hematoma. 2. Ventral abdominal and pelvic wall fluid/hematoma collections with   associated gas which may be recent postsurgical or could be related to   abscess. 3. Prominent peristomal hernia at the right lower quadrant ostomy site   containing several small bowel loops without obstruction evident. 4. Cholelithiasis without CT findings of acute cholecystitis. 5. Hepatic steatosis.      CXR  Findings most consistent with mild CHF with cardiomegaly, and perihilar   edema.  Mild right basilar atelectasis      egd with large and smaller duodenal bulb ulcers, nonbleeding.   Rec;  protonix 40 mg bid  call office 1 week for biopsy results  repeat egd 8 weeks  ok for diet when ok with surg team (other imaging/hida etc pending)  Avoid nonessential nsaids  otherwise will sign off, call if needed.      Hida:  No acute cholecystitis. Gallbladder ejection fraction is low, which can be seen in the setting of   functional gallbladder disorder in the appropriate clinical setting. Invasive procedures and treatments. [facility] Course. The patient had just been d/c home following an extensive surgery for a complex hernia repair when she developed a fever of 103 and right sided abd pain. She then returned to the ED. She was admitted and followed closely by surgery. She was treated for the followin. Right sided abd pain/  US with stones. HIDA showed low EF but no acute cholecystitis. Surgery followed closely and anticipates need for gallbladder surgery in the future after she has healed from hernia repair. 2. Non bleeding duodenal ulcers per EGD :  she was started on protonix and will avoid NSAIDS , needs follow up with GI in 6 weeks   3. Fever/ Sepsis: This was resolved prior to d/c home. Source was not clearly identified. BC with no growth and chest xray was clear. She did have CT guided drainage/ aspiration of lower abdominal wall fluid collection, however cultures did not yield much data. During her stay she was treated with zosyn and zyvox. She was transitioned to 7 days of augmentin at discharge. Queta Crumble COVID testing was negative   4. S/P open repair of complex incarcerated incision hernia with extensive CHARLEEN with mesh placement for hernia repair on 21 General surgery is followed closely and will see pt in 1 week for staple removal    She was tolerating a diet and ambulating in the room at the time of d/c. She was eager to be released               Consults. IP CONSULT TO GENERAL SURGERY  IP CONSULT TO GI    Physical examination on discharge day.    /75   Pulse 79   Temp 98.6 °F (37 °C) (Oral)   Resp 16   Ht 5' 5\" (1.651 m)   Wt 259 lb 6.4 oz (117.7 kg)   LMP 2010   SpO2 94%   BMI 43.17 kg/m² SYNTHROID  TAKE 1 TABLET BY MOUTH EVERY DAY     losartan 100 MG tablet  Commonly known as: COZAAR     MAGNESIUM PO     metFORMIN 500 MG tablet  Commonly known as: GLUCOPHAGE  TAKE 1 TABLET BY MOUTH EVERY 24 HOURS     MULTIVITAMIN ADULT PO     ONE TOUCH ULTRASOFT LANCETS Misc     PEN NEEDLES 31GX5/16\" 31G X 8 MM Misc  Use to inject insulin twice daily. pioglitazone 15 MG tablet  Commonly known as: ACTOS  TAKE 1 TABLET BY MOUTH EVERY DAY     POTASSIUM PO     SINGULAIR PO     TRIAMCINOLONE ACETONIDE NA     Vitamin D3 1.25 MG (45222 UT) Caps        STOP taking these medications    HYDROcodone-acetaminophen 5-325 MG per tablet  Commonly known as: Norco     sulfamethoxazole-trimethoprim 800-160 MG per tablet  Commonly known as: BACTRIM DS;SEPTRA DS           Where to Get Your Medications      These medications were sent to [facility]/pharmacy #[id number] [place], [place] - [address] [phone] Ari Perez [phone]  [place] [id number] [address]    Hours: 24-hours Phone: [phone]   · amoxicillin-clavulanate 500-125 MG per tablet  · OneTouch Verio strip  · pantoprazole 40 MG tablet         Discharge recommendations given to patient. Follow Up. in 1 week   Disposition. Home   Activity as tolerated   Diet: ADULT DIET; Regular; Low Fiber      Spent > 30 minutes in discharge process.     Signed:  CORDELL Roa CNP     11/25/2021 9:53 AM

## 2021-12-02 ENCOUNTER — OFFICE VISIT (OUTPATIENT)
Dept: SURGERY | Age: 61
End: 2021-12-02

## 2021-12-02 VITALS — DIASTOLIC BLOOD PRESSURE: 76 MMHG | BODY MASS INDEX: 40.6 KG/M2 | WEIGHT: 244 LBS | SYSTOLIC BLOOD PRESSURE: 120 MMHG

## 2021-12-02 DIAGNOSIS — K56.609 SMALL BOWEL OBSTRUCTION (HCC): Primary | ICD-10-CM

## 2021-12-02 PROCEDURE — 99024 POSTOP FOLLOW-UP VISIT: CPT | Performed by: SURGERY

## 2021-12-02 NOTE — PROGRESS NOTES
[facility]              PATIENT NAME: Davide Pradhan     TODAY'S DATE: 12/2/2021    SUBJECTIVE:      Pt. returns to the office today following a complex ventral incisional hernia repair with mesh. Had acute SBO with large complex recurrent hernia. She had surgery at [facility], [place]. Also had readmit with large DU, Epigastric RUQ pain and gallstones. Better on atbx and PPI.           OBJECTIVE:   VITALS:  Wt 244 lb (110.7 kg)   LMP 08/16/2010   BMI 40.60 kg/m²                                  CONSTITUTIONAL:  awake and alert  LUNGS:  clear to auscultation  ABDOMEN:   Hernia repair is intact, normal bowel sounds, soft, non-distended, non-tender   INCISION: clean, dry, no drainage        ASSESSMENT AND PLAN:  62 y.o. female status post ventral incisional hernia repair  Pain better  Her recovery is progressing uneventfully  Remove staples today, SS placed  Francisco in January  Cont PPI  EGD in February  See about progress and possible lap raman in the future  May need peristomal hernia repair at some point as well, but all stable now      Joy Parada MD

## 2021-12-08 ENCOUNTER — PATIENT MESSAGE (OUTPATIENT)
Dept: ENDOCRINOLOGY | Age: 61
End: 2021-12-08

## 2021-12-08 DIAGNOSIS — Z79.4 CONTROLLED TYPE 2 DIABETES MELLITUS WITHOUT COMPLICATION, WITH LONG-TERM CURRENT USE OF INSULIN (HCC): Primary | ICD-10-CM

## 2021-12-08 DIAGNOSIS — E11.9 CONTROLLED TYPE 2 DIABETES MELLITUS WITHOUT COMPLICATION, WITH LONG-TERM CURRENT USE OF INSULIN (HCC): Primary | ICD-10-CM

## 2021-12-09 DIAGNOSIS — E11.9 CONTROLLED TYPE 2 DIABETES MELLITUS WITHOUT COMPLICATION, WITH LONG-TERM CURRENT USE OF INSULIN (HCC): ICD-10-CM

## 2021-12-09 DIAGNOSIS — Z79.4 CONTROLLED TYPE 2 DIABETES MELLITUS WITHOUT COMPLICATION, WITH LONG-TERM CURRENT USE OF INSULIN (HCC): ICD-10-CM

## 2021-12-09 RX ORDER — BLOOD SUGAR DIAGNOSTIC
STRIP MISCELLANEOUS
Qty: 200 EACH | Refills: 5 | Status: SHIPPED | OUTPATIENT
Start: 2021-12-09 | End: 2021-12-20

## 2021-12-20 RX ORDER — BLOOD SUGAR DIAGNOSTIC
STRIP MISCELLANEOUS
Qty: 150 EACH | Refills: 5 | Status: SHIPPED | OUTPATIENT
Start: 2021-12-20 | End: 2022-10-20 | Stop reason: SDUPTHER

## 2021-12-20 NOTE — TELEPHONE ENCOUNTER
From: Brit Salter  Sent: 12/17/2021 4:09 PM EST  To: [facility] Clinical Staff  Subject: need refill for one touch ultra test strips    Hi Doctor Martha Jimenez went o [facility] pharmacy yesterday and they said they had my prescription for One Touch Ultra test strips but it was not filled yet. I returned today and they said you never called. They refused to call your office. Can you please contact [facility] at [address] again and put in the refill order? I am so sorry to bother you and your staff so much.      Thank You    All Fletcher

## 2021-12-26 ENCOUNTER — PATIENT MESSAGE (OUTPATIENT)
Dept: ENDOCRINOLOGY | Age: 61
End: 2021-12-26

## 2021-12-26 DIAGNOSIS — Z79.4 CONTROLLED TYPE 2 DIABETES MELLITUS WITHOUT COMPLICATION, WITH LONG-TERM CURRENT USE OF INSULIN (HCC): ICD-10-CM

## 2021-12-26 DIAGNOSIS — E11.9 CONTROLLED TYPE 2 DIABETES MELLITUS WITHOUT COMPLICATION, WITH LONG-TERM CURRENT USE OF INSULIN (HCC): ICD-10-CM

## 2021-12-28 RX ORDER — PIOGLITAZONEHYDROCHLORIDE 15 MG/1
TABLET ORAL
Qty: 90 TABLET | Refills: 1 | Status: SHIPPED | OUTPATIENT
Start: 2021-12-28 | End: 2022-09-26

## 2021-12-28 NOTE — TELEPHONE ENCOUNTER
From: Barbara Salter  To: Dr. Nata Bryan  Sent: 12/26/2021 10:40 PM EST  Subject: need for refills    My I have refills for metformin and Pioglitazone at [facility] pharmacy [address]

## 2022-01-06 ENCOUNTER — OFFICE VISIT (OUTPATIENT)
Dept: SURGERY | Age: 62
End: 2022-01-06

## 2022-01-06 VITALS — BODY MASS INDEX: 40.6 KG/M2 | SYSTOLIC BLOOD PRESSURE: 115 MMHG | DIASTOLIC BLOOD PRESSURE: 68 MMHG | WEIGHT: 244 LBS

## 2022-01-06 DIAGNOSIS — Z09 POSTOP CHECK: Primary | ICD-10-CM

## 2022-01-06 PROCEDURE — 99024 POSTOP FOLLOW-UP VISIT: CPT | Performed by: SURGERY

## 2022-01-06 NOTE — PROGRESS NOTES
[facility]              PATIENT NAME: Ana Marcelo     TODAY'S DATE: 1/6/2022    SUBJECTIVE:      Pt. returns to the office today following a ventral incisional hernia repair with mesh. She had surgery at [facility]. She has been recovering well to date, with progression towards normal activity and diet. She has no complaints today. Feeling better slowly, stomal function is improved, fit is better. Pain in ulcer area feels better. OBJECTIVE:   VITALS:  Wt 244 lb (110.7 kg)   LMP 08/16/2010   BMI 40.60 kg/m²                                  CONSTITUTIONAL:  awake and alert  LUNGS:  clear to auscultation  ABDOMEN:   Hernia repair is intact, normal bowel sounds, soft, non-distended, non-tender   INCISION: clean, dry, no drainage, healed, stoma in place in right abdomen      ASSESSMENT AND PLAN:  62 y.o. female status post massive ventral incisional hernia repair with acute SBO.     Doing well from surgery    Had readmit with large DU after repair completed  EGD follow up planned 2/13/22 with Dr. Giselle Schaeffer  See me later in February  Possible GB surgery thereafter as long as healing continues to progress      Candie Harris MD

## 2022-01-10 NOTE — PROGRESS NOTES
Patient ___ reached   _X____not reached-preop instructions left on voice xskd__[phone]___________      DATE_1/18/22_______ TIME__0830______ARRIVAL__0700  FEC_______      Nothing to eat or drink after midnight the night before,except for what the prep instructions call for. If you do not have the instructions or do not understand them please contact your doctors office. Follow any instructions your doctors office has given you including what medications to take the AM of your procedure and which ones to hold. You may use your inhalers. If you take a long acting insulin the regla prior please cut the dose in half and take no diabetic medications that AM.Follow specific doctors office instructions regarding blood thinners and if they want you to hold and for how long. If you are on a blood thinner and have no instructions please contact the office and ask. Dress comfortably,bring your insurance card,picture ID,and a complete list of medications, including supplements. You must have a responsible adult to stay with you during the procedure,drive you home and stay with you. [facility] phone number [phone] for any questions. OTHER INTRUCTIONS(if applicable)_________________________________________________________          COVID TESTING          _X__ Covid test to be done 3-5 days prior to scheduled surgery -patient aware they are REQUIRED to bring a copy of the negative result DOS-if they receive a positive result to notify their surgeon. If known- indicate where patient is getting covid test done________________________________________________________________________    ___ Rapid - DOS    ___ Other _____________________________________      Perlita Felix POLICY(subject to change)    There is a one visitor policy at [facility] for all surgeries and endoscopies. Whether the visitor can stay or will be asked to wait in the car will depend on the current policy and if social distancing can be maintained. The policy is subject to change at any time. Please make sure the visitor has a cell phone that is on,charged and able to accept calls, as this may be the way that the staff communicates with them. Pain management is NO VISITOR policyThe patients ride is expected to remain in the car with a cell phone for communication. If the ride is leaving the hospital grounds please make sure they are back in time for pickup. Have the patient inform the staff on arrival what their rides plans are while the patient is in the facility. At the MAIN there is one visitor allowed. Please note that the visitor policy is subject to change.

## 2022-01-17 ENCOUNTER — TELEPHONE (OUTPATIENT)
Dept: ENDOCRINOLOGY | Age: 62
End: 2022-01-17

## 2022-01-17 DIAGNOSIS — E11.9 CONTROLLED TYPE 2 DIABETES MELLITUS WITHOUT COMPLICATION, WITH LONG-TERM CURRENT USE OF INSULIN (HCC): Primary | ICD-10-CM

## 2022-01-17 DIAGNOSIS — Z79.4 CONTROLLED TYPE 2 DIABETES MELLITUS WITHOUT COMPLICATION, WITH LONG-TERM CURRENT USE OF INSULIN (HCC): Primary | ICD-10-CM

## 2022-01-18 ENCOUNTER — ANESTHESIA (OUTPATIENT)
Dept: ENDOSCOPY | Age: 62
End: 2022-01-18
Payer: COMMERCIAL

## 2022-01-18 ENCOUNTER — HOSPITAL ENCOUNTER (OUTPATIENT)
Age: 62
Setting detail: OUTPATIENT SURGERY
Discharge: HOME | End: 2022-01-18
Attending: INTERNAL MEDICINE | Admitting: INTERNAL MEDICINE
Payer: COMMERCIAL

## 2022-01-18 ENCOUNTER — ANESTHESIA EVENT (OUTPATIENT)
Dept: ENDOSCOPY | Age: 62
End: 2022-01-18
Payer: COMMERCIAL

## 2022-01-18 VITALS
DIASTOLIC BLOOD PRESSURE: 83 MMHG | OXYGEN SATURATION: 98 % | RESPIRATION RATE: 24 BRPM | SYSTOLIC BLOOD PRESSURE: 137 MMHG

## 2022-01-18 VITALS
SYSTOLIC BLOOD PRESSURE: 130 MMHG | WEIGHT: 250 LBS | BODY MASS INDEX: 41.65 KG/M2 | RESPIRATION RATE: 16 BRPM | OXYGEN SATURATION: 99 % | DIASTOLIC BLOOD PRESSURE: 70 MMHG | TEMPERATURE: 97.2 F | HEART RATE: 80 BPM | HEIGHT: 65 IN

## 2022-01-18 LAB
GLUCOSE BLD-MCNC: 106 MG/DL (ref 70–99)
GLUCOSE BLD-MCNC: 86 MG/DL (ref 70–99)
PERFORMED ON: ABNORMAL
PERFORMED ON: NORMAL

## 2022-01-18 PROCEDURE — 3700000000 HC ANESTHESIA ATTENDED CARE: Performed by: INTERNAL MEDICINE

## 2022-01-18 PROCEDURE — 2580000003 HC RX 258: Performed by: ANESTHESIOLOGY

## 2022-01-18 PROCEDURE — 7100000010 HC PHASE II RECOVERY - FIRST 15 MIN: Performed by: INTERNAL MEDICINE

## 2022-01-18 PROCEDURE — 3609017100 HC EGD: Performed by: INTERNAL MEDICINE

## 2022-01-18 PROCEDURE — 7100000011 HC PHASE II RECOVERY - ADDTL 15 MIN: Performed by: INTERNAL MEDICINE

## 2022-01-18 PROCEDURE — 2709999900 HC NON-CHARGEABLE SUPPLY: Performed by: INTERNAL MEDICINE

## 2022-01-18 PROCEDURE — 2500000003 HC RX 250 WO HCPCS: Performed by: NURSE ANESTHETIST, CERTIFIED REGISTERED

## 2022-01-18 PROCEDURE — 6360000002 HC RX W HCPCS: Performed by: NURSE ANESTHETIST, CERTIFIED REGISTERED

## 2022-01-18 RX ORDER — LIDOCAINE HYDROCHLORIDE 20 MG/ML
INJECTION, SOLUTION EPIDURAL; INFILTRATION; INTRACAUDAL; PERINEURAL PRN
Status: DISCONTINUED | OUTPATIENT
Start: 2022-01-18 | End: 2022-01-18 | Stop reason: SDUPTHER

## 2022-01-18 RX ORDER — PROPOFOL 10 MG/ML
INJECTION, EMULSION INTRAVENOUS PRN
Status: DISCONTINUED | OUTPATIENT
Start: 2022-01-18 | End: 2022-01-18 | Stop reason: SDUPTHER

## 2022-01-18 RX ORDER — PROPOFOL 10 MG/ML
INJECTION, EMULSION INTRAVENOUS CONTINUOUS PRN
Status: DISCONTINUED | OUTPATIENT
Start: 2022-01-18 | End: 2022-01-18 | Stop reason: SDUPTHER

## 2022-01-18 RX ORDER — SODIUM CHLORIDE 9 MG/ML
INJECTION, SOLUTION INTRAVENOUS CONTINUOUS
Status: DISCONTINUED | OUTPATIENT
Start: 2022-01-18 | End: 2022-01-18 | Stop reason: HOSPADM

## 2022-01-18 RX ADMIN — PROPOFOL 50 MG: 10 INJECTION, EMULSION INTRAVENOUS at 09:43

## 2022-01-18 RX ADMIN — PROPOFOL 130 MCG/KG/MIN: 10 INJECTION, EMULSION INTRAVENOUS at 09:42

## 2022-01-18 RX ADMIN — SODIUM CHLORIDE: 9 INJECTION, SOLUTION INTRAVENOUS at 07:46

## 2022-01-18 RX ADMIN — PROPOFOL 50 MG: 10 INJECTION, EMULSION INTRAVENOUS at 09:42

## 2022-01-18 RX ADMIN — LIDOCAINE HYDROCHLORIDE 50 MG: 20 INJECTION, SOLUTION EPIDURAL; INFILTRATION; INTRACAUDAL; PERINEURAL at 09:42

## 2022-01-18 ASSESSMENT — PAIN - FUNCTIONAL ASSESSMENT: PAIN_FUNCTIONAL_ASSESSMENT: 0-10

## 2022-01-18 ASSESSMENT — ENCOUNTER SYMPTOMS: SHORTNESS OF BREATH: 0

## 2022-01-18 NOTE — ANESTHESIA POSTPROCEDURE EVALUATION
Department of Anesthesiology  Postprocedure Note    Patient: Stacy Kim  MRN: [id number]  YOB: 1960  Date of evaluation: 1/18/2022  Time:  9:59 AM     Procedure Summary     Date: 01/18/22 Room / Location: [address] [place] [address]    Anesthesia Start: 8126 Anesthesia Stop: 3239    Procedure: EGD DIAGNOSTIC ONLY (N/A Abdomen) Diagnosis: (GASTRIC ULCER K25.9)    Surgeons: Marco Lopez MD Responsible Provider: Sera Lewis MD    Anesthesia Type: MAC ASA Status: 3          Anesthesia Type: MAC    Ruchi Phase I: Ruchi Score: 10    Ruchi Phase II:      Last vitals: Reviewed and per EMR flowsheets. Anesthesia Post Evaluation    Patient location during evaluation: PACU  Level of consciousness: awake  Airway patency: patent  Complications: no  Cardiovascular status: hemodynamically stable  Respiratory status: acceptable  There was medical reason for not using a multimodal analgesia pain management approach.

## 2022-01-18 NOTE — H&P
Gastroenterology Note                 Pre-operative History and Physical    Patient: Farheen Lomeli  : 1960  CSN:     History Obtained From:   Patient or guardian. HISTORY OF PRESENT ILLNESS:    The patient is a 62 y.o. female here for Endoscopy. Past Medical History:    Past Medical History:   Diagnosis Date    Asthma     Diabetes mellitus ([facility]     GERD (gastroesophageal reflux disease)     Hyperlipidemia     Hypertension     Ileostomy care ([facility] 3/17/2020    Iritis     PCOS (polycystic ovarian syndrome)     Pyoderma     Thyroid disease     Ulcerative colitis     Ulcerative colitis ([facility]      Past Surgical History:    Past Surgical History:   Procedure Laterality Date    ABDOMEN SURGERY      COLON RESECTION FOR ULCERATIVE COLITIS THEN HAD ANUS REMOVED    HERNIA REPAIR      ILEOSTOMY OR JEJUNOSTOMY      PERMANENT    UPPER GASTROINTESTINAL ENDOSCOPY N/A 2021    EGD BIOPSY performed by Tristian Sousa MD at [place] [address] N/A 2021    OPEN REPAIR INCISIONAL HERNIA WITH MESH, LYSIS OF ADHESIONS performed by Wendi Hensley MD at [address] [place] [address]     Medications Prior to Admission:   No current facility-administered medications on file prior to encounter. Current Outpatient Medications on File Prior to Encounter   Medication Sig Dispense Refill    pantoprazole (PROTONIX) 40 MG tablet Take 1 tablet by mouth every morning (before breakfast) 30 tablet 5    LANTUS SOLOSTAR 100 UNIT/ML injection pen INJECT 50 UNITS INTO THE SKIN TWICE DAILY. 5 pen 3    levothyroxine (SYNTHROID) 25 MCG tablet TAKE 1 TABLET BY MOUTH EVERY DAY (Patient taking differently: 300 mcg ) 90 tablet 1    Multiple Vitamin (MULTIVITAMIN ADULT PO) Take by mouth      Ferrous Sulfate (IRON PO) Take by mouth daily       COENZYME Q10 PO Take by mouth      Insulin Pen Needle (PEN NEEDLES 31GX5/16\") 31G X 8 MM MISC Use to inject insulin twice daily.  100 each 5  ONE TOUCH ULTRASOFT LANCETS MISC USE TO TEST BLOOD GLUCOSE 4 TIMES DAILY      POTASSIUM PO Take by mouth daily       MAGNESIUM PO Take by mouth daily       Montelukast Sodium (SINGULAIR PO) Take by mouth      Cholecalciferol (VITAMIN D3) 1.25 MG (85767 UT) CAPS Take by mouth once a week      TRIAMCINOLONE ACETONIDE NA by Nasal route as needed       BD INSULIN SYRINGE U/F 30G X 1/2\" 0.3 ML MISC USE WITH INSULIN TWICE A  each 5    losartan (COZAAR) 100 MG tablet Take 100 mg by mouth daily      Omega-3 Fatty Acids (FISH OIL) 1000 MG CAPS Take 3,000 mg by mouth daily      atorvastatin (LIPITOR) 10 MG tablet Take 10 mg by mouth daily      CRANBERRY PO Take by mouth daily      acetaminophen (TYLENOL) 500 MG tablet Take 500 mg by mouth every 6 hours as needed for Pain      Loratadine (CLARITIN) 10 MG CAPS Take by mouth daily       ALBUTEROL IN Inhale into the lungs      fluticasone-vilanterol (BREO ELLIPTA) 100-25 MCG/INH AEPB inhaler Inhale 1 puff into the lungs as needed           Allergies:  Patient has no known allergies. Social History:   Social History     Tobacco Use    Smoking status: Never Smoker    Smokeless tobacco: Never Used   Substance Use Topics    Alcohol use: No     Family History:   Family History   Problem Relation Age of Onset    Cancer Mother         uterine    No Known Problems Father        PHYSICAL EXAM:      /63   Pulse 80   Temp 97.2 °F (36.2 °C) (Temporal)   Resp 16   Ht 5' 5\" (1.651 m)   Wt 250 lb (113.4 kg)   LMP 08/16/2010   SpO2 97%   BMI 41.60 kg/m²  I        Heart:  RRR, normal s1s2    Lungs:  CTA and normal effort    Abdomen:   Soft, nt nd. ASSESSMENT AND PLAN:    1. Patient is a 62 y.o. female here for endoscopy with MAC sedation. 2.  Procedure options, risks and benefits reviewed with patient and/or guardian. They express understanding.

## 2022-01-18 NOTE — ANESTHESIA PRE PROCEDURE
Department of Anesthesiology  Preprocedure Note       Name:  Aravind Hutchins   Age:  62 y.o.  :  1960                                          MRN:  [id number]         Date:  2022      Surgeon: Manolo Arrieta):  Verenice Carrera MD    Procedure: Procedure(s):  EGD DIAGNOSTIC ONLY    Medications prior to admission:   Prior to Admission medications    Medication Sig Start Date End Date Taking? Authorizing Provider   metFORMIN (GLUCOPHAGE) 500 MG tablet TAKE 1 TABLET BY MOUTH EVERY 24 HOURS 21   Mihaela Moreno MD   pioglitazone (ACTOS) 15 MG tablet TAKE 1 TABLET BY MOUTH EVERY DAY 21   Mihaela Moreno MD   blood glucose test strips (ONETOUCH ULTRA) strip Test 4 times daily. 21   Mihaela Moreno MD   insulin lispro (HUMALOG) 100 UNIT/ML injection vial INJECT 25 UNITS INTO THE SKIN ONCE DAILY. 21   Mihaela Moreno MD   pantoprazole (PROTONIX) 40 MG tablet Take 1 tablet by mouth every morning (before breakfast) 21   Freida Quiroz, APRN - [facility]   LANTUS SOLOSTAR 100 UNIT/ML injection pen INJECT 50 UNITS INTO THE SKIN TWICE DAILY. 21   Mihaela Moreno MD   levothyroxine (SYNTHROID) 25 MCG tablet TAKE 1 TABLET BY MOUTH EVERY DAY  Patient taking differently: 300 mcg  21   Mihaela Moreno MD   ALBUTEROL IN Inhale into the lungs    Historical Provider, MD   Multiple Vitamin (MULTIVITAMIN ADULT PO) Take by mouth    Historical Provider, MD   Ferrous Sulfate (IRON PO) Take by mouth daily     Historical Provider, MD   COENZYME Q10 PO Take by mouth    Historical Provider, MD   Insulin Pen Needle (PEN NEEDLES 31GX5/16\") 31G X 8 MM MISC Use to inject insulin twice daily.  21   Mihaela Moreno MD   ONE TOUCH ULTRASOFT LANCETS MISC USE TO TEST BLOOD GLUCOSE 4 TIMES DAILY 20   Historical Provider, MD   POTASSIUM PO Take by mouth daily     Historical Provider, MD   MAGNESIUM PO Take by mouth daily     Historical Provider, MD   Montelukast Sodium (SINGULAIR PO) Take by mouth    Historical Provider, MD   Cholecalciferol (VITAMIN D3) 1.25 MG (11711 UT) CAPS Take by mouth once a week    Historical Provider, MD   TRIAMCINOLONE ACETONIDE NA by Nasal route as needed     Historical Provider, MD   BD INSULIN SYRINGE U/F 30G X 1/2\" 0.3 ML MISC USE WITH INSULIN TWICE A DAY 12/10/20   Kay Payton MD   fluticasone-vilanterol (BREO ELLIPTA) 100-25 MCG/INH AEPB inhaler Inhale 1 puff into the lungs as needed     Historical Provider, MD   losartan (COZAAR) 100 MG tablet Take 100 mg by mouth daily    Historical Provider, MD   Omega-3 Fatty Acids (FISH OIL) 1000 MG CAPS Take 3,000 mg by mouth daily    Historical Provider, MD   atorvastatin (LIPITOR) 10 MG tablet Take 10 mg by mouth daily    Historical Provider, MD   CRANBERRY PO Take by mouth daily    Historical Provider, MD   acetaminophen (TYLENOL) 500 MG tablet Take 500 mg by mouth every 6 hours as needed for Pain    Historical Provider, MD   Loratadine (CLARITIN) 10 MG CAPS Take by mouth daily     Historical Provider, MD       Current medications:    No current facility-administered medications for this visit. No current outpatient medications on file.      Facility-Administered Medications Ordered in Other Visits   Medication Dose Route Frequency Provider Last Rate Last Admin    0.9 % sodium chloride infusion   IntraVENous Continuous Pippa Adams  mL/hr at 01/18/22 0746 New Bag at 01/18/22 0746       Allergies:  No Known Allergies    Problem List:    Patient Active Problem List   Diagnosis Code    Ileostomy care [facility]) Z43.2    Uncontrolled type 2 diabetes mellitus with complication, with long-term current use of insulin ([facility] E11.8, E11.65, Z79.4    Hypothyroidism E03.9    Essential hypertension I10    Mixed hyperlipidemia E78.2    Ulcerative (chronic) enterocolitis, unspecified complication ([facility] Q89.346    Small bowel obstruction ([facility] K61.56    Incarcerated incisional hernia K43.0    Generalized abdominal pain R10.84    Postoperative fever R50.82       Past Medical History:        Diagnosis Date    Asthma     Diabetes mellitus (HCC)     GERD (gastroesophageal reflux disease)     Hyperlipidemia     Hypertension     Ileostomy care ([facility] 3/17/2020    Iritis     PCOS (polycystic ovarian syndrome)     Pyoderma     Thyroid disease     Ulcerative colitis     Ulcerative colitis ([facility]        Past Surgical History:        Procedure Laterality Date    ABDOMEN SURGERY      COLON RESECTION FOR ULCERATIVE COLITIS THEN HAD ANUS REMOVED    HERNIA REPAIR      ILEOSTOMY OR JEJUNOSTOMY      PERMANENT    UPPER GASTROINTESTINAL ENDOSCOPY N/A 11/23/2021    EGD BIOPSY performed by Hubert Mcrae MD at [place] [address] N/A 11/16/2021    OPEN REPAIR INCISIONAL HERNIA WITH MESH, LYSIS OF ADHESIONS performed by Carlos Simmons MD at [address] [place] [address] History:    Social History     Tobacco Use    Smoking status: Never Smoker    Smokeless tobacco: Never Used   Substance Use Topics    Alcohol use: No                                Counseling given: Not Answered      Vital Signs (Current): There were no vitals filed for this visit.                                            BP Readings from Last 3 Encounters:   01/18/22 132/63   01/06/22 115/68   12/02/21 120/76       NPO Status:                                                                                 BMI:   Wt Readings from Last 3 Encounters:   01/18/22 250 lb (113.4 kg)   01/06/22 244 lb (110.7 kg)   12/02/21 244 lb (110.7 kg)     There is no height or weight on file to calculate BMI.    CBC:   Lab Results   Component Value Date    WBC 3.6 11/25/2021    RBC 3.26 11/25/2021    HGB 9.0 11/25/2021    HCT 27.2 11/25/2021    MCV 83.4 11/25/2021    RDW 14.4 11/25/2021     11/25/2021       CMP:   Lab Results   Component Value Date     11/25/2021    K 3.5 11/25/2021    K 3.7 11/21/2021     11/25/2021    CO2 24 11/25/2021    BUN 5 11/25/2021    CREATININE <0.5 11/25/2021    GFRAA >60 11/25/2021    GFRAA >60 03/22/2013    AGRATIO 0.9 11/22/2021    LABGLOM >60 11/25/2021    GLUCOSE 143 11/25/2021    PROT 5.8 11/22/2021    PROT 7.8 12/22/2012    CALCIUM 8.1 11/25/2021    BILITOT 0.5 11/22/2021    ALKPHOS 128 11/22/2021    AST 27 11/22/2021    ALT 23 11/22/2021       POC Tests:   Recent Labs     01/18/22  0751   POCGLU 106*       Coags:   Lab Results   Component Value Date    PROTIME 14.2 11/22/2021    INR 1.25 11/22/2021    APTT 28.6 11/22/2021       HCG (If Applicable): No results found for: PREGTESTUR, PREGSERUM, HCG, HCGQUANT     ABGs: No results found for: PHART, PO2ART, RZQ6XLG, MIE5LFT, BEART, E7XYSBJI     Type & Screen (If Applicable):  No results found for: LABABO, LABRH    Drug/Infectious Status (If Applicable):  No results found for: HIV, HEPCAB    COVID-19 Screening (If Applicable):   Lab Results   Component Value Date    COVID19 Not Detected 11/15/2021           Anesthesia Evaluation  Patient summary reviewed and Nursing notes reviewed history of anesthetic complications (low bp during surgery in past):   Airway: Mallampati: I  TM distance: >3 FB   Neck ROM: full  Mouth opening: > = 3 FB Dental: normal exam         Pulmonary:   (+) asthma (only uses inhalers in Aug, Sept): seasonal asthma,     (-) shortness of breath                           Cardiovascular:    (+) hypertension:,     (-) CABG/stent, dysrhythmias and  angina             ROS comment: FINAL INTERPRETATION   There is normal myocardial perfusion at a heart rate of 160 beats/minute.  Overall study quality is excellent.  Scan significance indicates low cardiac risk. PERFUSION FINDINGS   There is normal left ventricular perfusion with no visual evidence of transient dilation and a normal stress/rest left ventricular volume ratio of 0.96. The sum stress score is 0 (normal: less than 4).        The right ventricle is normal.     FUNCTION FINDINGS   The resting left ventricular ejection fraction is normal at 67% with an end diastolic volume of 93 mL and end systolic volume of 31 mL. The post-stress left ventricular ejection fraction is normal at 69% with an end diastolic volume of 84 mL and end systolic volume of 26 mL. Left ventricular regional wall motion is normal.     TREADMILL EXERCISE TECHNIQUE   Indication: The patient is referred for the evaluation of chest pain. The patient was not experiencing chest pain at the time of the study on 12/11/15.  Following the intravenous administration of 50.2 millicuries of RB-80N tetrofosmin with the patient at rest (heart rate 86, blood pressure 124/100), cardiac SPECT imaging was performed one hour following injection at 0855. Subsequent treadmill exercise according to the Gavin protocol was performed for 4 minutes to a blood pressure of 222/100 and 96% MPHR.  The patient received an additional intravenous injection of 38 millicuries of NK-93J tetrofosmin and cardiac SPECT imaging was performed 30 minutes following injection at 1120.  The patient experienced no chest pain during the test.       ECG DATA     The stress ECG is reported separately. Participating fellow: Carolyn Hamilton MD and Kristin Bishop MD     Please refer to the FINAL SCAN INTERPRETATION at the top of this report. Neuro/Psych:      (-) seizures, TIA and CVA           GI/Hepatic/Renal:   (+) GERD:, PUD, morbid obesity     (-) liver disease      ROS comment: SBO  NGT in place  Ventral hernia. Endo/Other:    (+) DiabetesType II DM, , hypothyroidism::., .                 Abdominal:             Vascular:     - PVD. Other Findings:               Anesthesia Plan      MAC     ASA 3       Induction: intravenous. Anesthetic plan and risks discussed with patient. Plan discussed with CRNA.                   Candi Stanford MD   1/18/2022

## 2022-01-25 ENCOUNTER — HOSPITAL ENCOUNTER (OUTPATIENT)
Age: 62
Discharge: HOME | End: 2022-01-25
Payer: COMMERCIAL

## 2022-01-25 LAB
A/G RATIO: 1.1 (ref 1.1–2.2)
ALBUMIN SERPL-MCNC: 3.9 G/DL (ref 3.4–5)
ALP BLD-CCNC: 104 U/L (ref 40–129)
ALT SERPL-CCNC: 13 U/L (ref 10–40)
ANION GAP SERPL CALCULATED.3IONS-SCNC: 15 MMOL/L (ref 3–16)
AST SERPL-CCNC: 16 U/L (ref 15–37)
BILIRUB SERPL-MCNC: 0.4 MG/DL (ref 0–1)
BUN BLDV-MCNC: 11 MG/DL (ref 7–20)
CALCIUM SERPL-MCNC: 9.2 MG/DL (ref 8.3–10.6)
CHLORIDE BLD-SCNC: 100 MMOL/L (ref 99–110)
CHOLESTEROL, TOTAL: 120 MG/DL (ref 0–199)
CO2: 23 MMOL/L (ref 21–32)
CREAT SERPL-MCNC: 0.7 MG/DL (ref 0.6–1.2)
CREATININE URINE: 183.1 MG/DL (ref 28–259)
ESTIMATED AVERAGE GLUCOSE: 139.9 MG/DL
GFR AFRICAN AMERICAN: >60
GFR NON-AFRICAN AMERICAN: >60
GLUCOSE BLD-MCNC: 130 MG/DL (ref 70–99)
HBA1C MFR BLD: 6.5 %
HDLC SERPL-MCNC: 38 MG/DL (ref 40–60)
LDL CHOLESTEROL CALCULATED: 56 MG/DL
MICROALBUMIN UR-MCNC: <1.2 MG/DL
MICROALBUMIN/CREAT UR-RTO: NORMAL MG/G (ref 0–30)
POTASSIUM SERPL-SCNC: 3.9 MMOL/L (ref 3.5–5.1)
SODIUM BLD-SCNC: 138 MMOL/L (ref 136–145)
TOTAL PROTEIN: 7.4 G/DL (ref 6.4–8.2)
TRIGL SERPL-MCNC: 129 MG/DL (ref 0–150)
TSH REFLEX: 2.86 UIU/ML (ref 0.27–4.2)
VLDLC SERPL CALC-MCNC: 26 MG/DL

## 2022-01-25 PROCEDURE — 80053 COMPREHEN METABOLIC PANEL: CPT

## 2022-01-25 PROCEDURE — 82570 ASSAY OF URINE CREATININE: CPT

## 2022-01-25 PROCEDURE — 83036 HEMOGLOBIN GLYCOSYLATED A1C: CPT

## 2022-01-25 PROCEDURE — 36415 COLL VENOUS BLD VENIPUNCTURE: CPT

## 2022-01-25 PROCEDURE — 84443 ASSAY THYROID STIM HORMONE: CPT

## 2022-01-25 PROCEDURE — 80061 LIPID PANEL: CPT

## 2022-01-25 PROCEDURE — 82043 UR ALBUMIN QUANTITATIVE: CPT

## 2022-01-27 ENCOUNTER — OFFICE VISIT (OUTPATIENT)
Dept: ENDOCRINOLOGY | Age: 62
End: 2022-01-27
Payer: COMMERCIAL

## 2022-01-27 VITALS
DIASTOLIC BLOOD PRESSURE: 73 MMHG | SYSTOLIC BLOOD PRESSURE: 120 MMHG | WEIGHT: 246 LBS | HEART RATE: 84 BPM | RESPIRATION RATE: 16 BRPM | BODY MASS INDEX: 40.98 KG/M2 | HEIGHT: 65 IN

## 2022-01-27 DIAGNOSIS — E03.9 HYPOTHYROIDISM, UNSPECIFIED TYPE: ICD-10-CM

## 2022-01-27 DIAGNOSIS — I10 ESSENTIAL HYPERTENSION: ICD-10-CM

## 2022-01-27 PROCEDURE — 99214 OFFICE O/P EST MOD 30 MIN: CPT | Performed by: INTERNAL MEDICINE

## 2022-01-27 RX ORDER — DULAGLUTIDE 0.75 MG/.5ML
INJECTION, SOLUTION SUBCUTANEOUS
Qty: 2 PEN | Refills: 0 | COMMUNITY
Start: 2022-01-27 | End: 2022-09-26

## 2022-01-27 NOTE — PROGRESS NOTES
Jeanette Rain is a 62 y.o. female is seen for management of uncontrolled Type 2  Diabetes and Hypothyroidism. Patient has complex medical history inclusive of hypertension hyperlipidemia, asthma, PCOD, ulcerative colitis status post total colectomy and now has a colectomy bag. Jeanette Rain was diagnosed with Diabetes mellitus at age 28   Diabetes was diagnosed at routine screening . Phuong Kayla Rain got diabetic education in the past.  Comorbid conditions: Neuropathy    Hypothyroidism diagnosed in 2015 which was diagnosed in 2015 due to her abnormal thyroid fx test   She also has MNG, which was considered stable as per previous imaging and there were no compressive symptoms  She has hypertension and hyperlipidemia   She has Ulcerative coliitis diagnosed  at age 11 she had colectomy in 2004 , in 2006 she had rectal surgery due to UC she has colectomy bag     INTERIM:    Diabetes  She presents for her initial diabetic visit. She has type 2 diabetes mellitus. No MedicAlert identification noted. The initial diagnosis of diabetes was made 29 years ago. Her disease course has been improving. Hypoglycemia symptoms include hunger and sweats. Associated symptoms include foot paresthesias. Pertinent negatives for diabetes include no weight loss. There are no hypoglycemic complications. Symptoms are improving. Risk factors for coronary artery disease include dyslipidemia and obesity. Current diabetic treatment includes insulin injections. She is compliant with treatment most of the time. Her breakfast blood glucose is taken between 7-8 am. Her breakfast blood glucose range is generally 70-90 mg/dl.     she has lost a few lbs ---she was in the hospital in Nov 2021 for MESH repair for a hernia from previous surgery   She stopped taking Metformin and trulicity in Oct as she was having GI issues   She has been taking lantus 50 units BID fasting glucose less than 130   She takes Humalog with the meals approximately 0---15 units units she bases it on carbohydrate content  She has been noticing flare up of Asthma     Past Medical History:   Diagnosis Date    Asthma     Diabetes mellitus ([facility]     GERD (gastroesophageal reflux disease)     Hyperlipidemia     Hypertension     Ileostomy care ([facility] 3/17/2020    Iritis     PCOS (polycystic ovarian syndrome)     Pyoderma     Thyroid disease     Ulcerative colitis     Ulcerative colitis ([facility]       Patient Active Problem List   Diagnosis    Ileostomy care ([facility]    Uncontrolled type 2 diabetes mellitus with complication, with long-term current use of insulin ([facility]    Hypothyroidism    Essential hypertension    Mixed hyperlipidemia    Ulcerative (chronic) enterocolitis, unspecified complication (HCC)    Small bowel obstruction ([facility]    Incarcerated incisional hernia    Generalized abdominal pain    Postoperative fever     Past Surgical History:   Procedure Laterality Date    ABDOMEN SURGERY      COLON RESECTION FOR ULCERATIVE COLITIS THEN HAD ANUS REMOVED    HERNIA REPAIR      ILEOSTOMY OR JEJUNOSTOMY      PERMANENT    UPPER GASTROINTESTINAL ENDOSCOPY N/A 11/23/2021    EGD BIOPSY performed by Niko Wynn MD at [address] [place] [address] ENDOSCOPY N/A 1/18/2022    EGD DIAGNOSTIC ONLY performed by Niko Wynn MD at [place] [address] N/A 11/16/2021    OPEN REPAIR INCISIONAL HERNIA WITH MESH, LYSIS OF ADHESIONS performed by Machelle Capellan MD at [facility] History     Socioeconomic History    Marital status: Single     Spouse name: Not on file    Number of children: Not on file    Years of education: Not on file    Highest education level: Not on file   Occupational History    Not on file   Tobacco Use    Smoking status: Never Smoker    Smokeless tobacco: Never Used   Vaping Use    Vaping Use: Never used   Substance and Sexual Activity    Alcohol use: No    Drug use: No    Sexual activity: Not on file   Other Topics Concern    Not on file   Social History Narrative    Not on file     Social Determinants of Health     Financial Resource Strain:     Difficulty of Paying Living Expenses: Not on file   Food Insecurity:     Worried About [address] in the Last Year: Not on file    Gomez of Food in the Last Year: Not on file   Transportation Needs:     Lack of Transportation (Medical): Not on file    Lack of Transportation (Non-Medical): Not on file   Physical Activity:     Days of Exercise per Week: Not on file    Minutes of Exercise per Session: Not on file   Stress:     Feeling of Stress : Not on file   Social Connections:     Frequency of Communication with Friends and Family: Not on file    Frequency of Social Gatherings with Friends and Family: Not on file    Attends Mosque Services: Not on file    Active Member of [address] [place] [address] or Organizations: Not on file    Attends Club or Organization Meetings: Not on file    Marital Status: Not on file   Intimate Partner Violence:     Fear of Current or Ex-Partner: Not on file    Emotionally Abused: Not on file    Physically Abused: Not on file    Sexually Abused: Not on file   Housing Stability:     Unable to Pay for Housing in the Last Year: Not on file    Number of [place] in the Last Year: Not on file    Unstable Housing in the Last Year: Not on file     Family History   Problem Relation Age of Onset    Cancer Mother         uterine    No Known Problems Father      Current Outpatient Medications   Medication Sig Dispense Refill    insulin aspart (NOVOLOG) 100 UNIT/ML injection vial Inject 25 units into the skin daily 10 mL 3    blood glucose test strips (ONETOUCH ULTRA) strip Test 4 times daily. 150 each 5    pantoprazole (PROTONIX) 40 MG tablet Take 1 tablet by mouth every morning (before breakfast) 30 tablet 5    LANTUS SOLOSTAR 100 UNIT/ML injection pen INJECT 50 UNITS INTO THE SKIN TWICE DAILY.  5 pen 3    levothyroxine (SYNTHROID) 25 MCG tablet TAKE 1 TABLET BY MOUTH EVERY DAY (Patient taking differently: 300 mcg ) 90 tablet 1    ALBUTEROL IN Inhale into the lungs      Multiple Vitamin (MULTIVITAMIN ADULT PO) Take by mouth      Ferrous Sulfate (IRON PO) Take by mouth daily       COENZYME Q10 PO Take by mouth      Insulin Pen Needle (PEN NEEDLES 31GX5/16\") 31G X 8 MM MISC Use to inject insulin twice daily. 100 each 5    ONE TOUCH ULTRASOFT LANCETS MISC USE TO TEST BLOOD GLUCOSE 4 TIMES DAILY      POTASSIUM PO Take by mouth daily       MAGNESIUM PO Take by mouth daily       Montelukast Sodium (SINGULAIR PO) Take by mouth      Cholecalciferol (VITAMIN D3) 1.25 MG (27015 UT) CAPS Take by mouth once a week      TRIAMCINOLONE ACETONIDE NA by Nasal route as needed       BD INSULIN SYRINGE U/F 30G X 1/2\" 0.3 ML MISC USE WITH INSULIN TWICE A  each 5    fluticasone-vilanterol (BREO ELLIPTA) 100-25 MCG/INH AEPB inhaler Inhale 1 puff into the lungs as needed       losartan (COZAAR) 100 MG tablet Take 100 mg by mouth daily      Omega-3 Fatty Acids (FISH OIL) 1000 MG CAPS Take 3,000 mg by mouth daily      atorvastatin (LIPITOR) 10 MG tablet Take 10 mg by mouth daily      CRANBERRY PO Take by mouth daily      acetaminophen (TYLENOL) 500 MG tablet Take 500 mg by mouth every 6 hours as needed for Pain      Loratadine (CLARITIN) 10 MG CAPS Take by mouth daily       metFORMIN (GLUCOPHAGE) 500 MG tablet TAKE 1 TABLET BY MOUTH EVERY 24 HOURS (Patient not taking: Reported on 2022) 60 tablet 3    pioglitazone (ACTOS) 15 MG tablet TAKE 1 TABLET BY MOUTH EVERY DAY (Patient not taking: Reported on 2022) 90 tablet 1     No current facility-administered medications for this visit. No Known Allergies  Family Status   Relation Name Status    Mother      Father       ROS  I have reviewed the review of system questionnaire filled by the patient .   Patient was advised to contact PCP for non endocrine signs and symptoms       OBJECTIVE:  /73   Pulse 84   Resp 16   Ht 5' 5\" (1.651 m)   Wt 246 lb (111.6 kg)   LMP 08/16/2010   BMI 40.94 kg/m²    Wt Readings from Last 3 Encounters:   01/27/22 246 lb (111.6 kg)   01/18/22 250 lb (113.4 kg)   01/06/22 244 lb (110.7 kg)     Constitutional: no acute distress, well appearing, well nourished  Psychiatric: oriented to person, place and time, judgement, insight and normal, recent and remote memory and intact and mood, affect are normal  Skin: skin and subcutaneous tissue is normal without mass,   Head and Face: examination of head and face revealed no abnormalities  Eyes: no lid or conjunctival swelling, no erythema or discharge, pupils are normal,   Ears/Nose: external inspection of ears and nose revealed no abnormalities, hearing is grossly normal  Oropharynx/Mouth/Face: lips, tongue and gums are normal with no lesions, the voice quality was normal  Neck: neck is supple and symmetric, with midline trachea and no masses, thyroid is normal    Pulmonary: no increased work of breathing or signs of respiratory distress, lungs are clear to auscultation  Cardiovascular: normal heart rate and rhythm, normal S1 and S2,   Musculoskeletal: normal gait and station,   Neurological: normal coordination, normal general cortical function      Foot exam with PCP in sept 2020    Lab Results   Component Value Date    LABA1C 6.5 01/25/2022    LABA1C 7.7 11/21/2021    LABA1C 8.1 11/15/2021         ASSESSMENT/PLAN:    1. Controlled type 2 diabetes mellitus without complication, with long-term current use of insulin     aic 7.9     Saige Heath was advised that lifestyle modification is the key to better control of her Diabetes. We discussed carbohydrate restriction in detail.   Fasting glucose 130 or above   lantus 50 units BID   She takes humalog with lunch and dinner and sometimes skips this is due to her work, she was advised to be more compliant with it  Now she lives with her sister   ---trulicity 1.5 mg weekly ---stopped due to nausea in Nov 2021 she will resume her Trulicity   Gave samples of 0.75 mg weekly to restart   farxiga gave multiple yeast infection   ---Metformin 500  Mg ,Stopped due to GI SE   ---stopped Actos due to swelling     Patient was offered to get a continuous glucose sensor she is concerned that her skin is sensitive and she might get issues with the sensor as she is currently experiencing with her colectomy bag. Hypoglycemia protocol reviewed in detail with patient Patient was advised to carry glucose tablets    Patient was advised that sending of her fingerstick blood glucose logs is crucial in management of her  diabetes. I will adjust the  doses of diabetic medications  according to sent data. Health Maintenance       Last Eye Exam: advised to have annual dilated eye exam. her last eye exam was in 2020  Last Podiatry Exam:  Last foot exam was in sept 2020 with PCP   Lipids: Last LDL level was at a level of 58 in May 2020  Microalbumin/Creatinine Ratio: I have ordered MA with next lab work     . Education: Reviewed ABCs of diabetes management (respective goals in parentheses): A1C (<7), blood pressure (<130/80), and cholesterol (LDL <100). 2. Acquired Hypothyroidism, ---thyroid Ab negative April 2021   On review of chart notes from previous endocrinologist at [facility] and the [address] visits at [facility] shows that she has a history of Hashimoto's hypothyroidism and apparently in 2010 her thyroid antibodies were positive. I do not have access to those labs I will repeat her thyroid antibodies and will continue on 25 mcg of levothyroxine as patient symptomatically feels improved at this dose. She feels her sleep got better when she takes this medication. 3. Essential hypertension  Blood pressure control is adequate historically and she is on losartan 100 mg daily    4.  Mixed hyperlipidemia  Last LDL was at target in April 2021 she is currently on Lipitor 10 mg daily    5. Ulcerative (chronic) enterocolitis, status post colectomy and rectal surgery she has a colectomy bag now  Symptoms are now stable    Reviewed and/or ordered clinical lab results yes   Reviewed and/or ordered radiology tests Yes  Reviewed and/or ordered other diagnostic tests yes   Made a decision to obtain old records yes   Reviewed and summarized old records yes     Stacy Kim was counseled regarding symptoms of current diagnosis, course and complications of disease if inadequately treated, side effects of medications, diagnosis, treatment options, and prognosis, risks, benefits, complications, and alternatives of treatment, labs, imaging and other studies and treatment targets and goals. She understands instructions and counseling. Return in about 3 months (around 4/27/2022). Please note that some or all of this report was generated using voice recognition software. Please notify me in case of any questions about the content of this document, as some errors in transcription may have occurred .

## 2022-02-22 ENCOUNTER — OFFICE VISIT (OUTPATIENT)
Dept: SURGERY | Age: 62
End: 2022-02-22
Payer: COMMERCIAL

## 2022-02-22 VITALS — WEIGHT: 246 LBS | SYSTOLIC BLOOD PRESSURE: 115 MMHG | DIASTOLIC BLOOD PRESSURE: 72 MMHG | BODY MASS INDEX: 40.94 KG/M2

## 2022-02-22 DIAGNOSIS — K80.20 SYMPTOMATIC CHOLELITHIASIS: Primary | ICD-10-CM

## 2022-02-22 PROCEDURE — 99213 OFFICE O/P EST LOW 20 MIN: CPT | Performed by: SURGERY

## 2022-02-22 NOTE — PROGRESS NOTES
[facility] GENERAL AND LAPAROSCOPIC SURGERY          PATIENT NAME: Dolly Salter     TODAY'S DATE: 2/22/2022    SUBJECTIVE:  CC Abd pain  Pt with completed bowel obstruction recovery. Had EGD - ulcers better. Cont PPI, considering H2 change. Does have stable weight. Some nausea, food intolerance an crampy / bloating after fast food / fatty meals. Normal stomal outputs.      OBJECTIVE:  VITALS:  Wt 246 lb (111.6 kg)   LMP 08/16/2010   BMI 40.94 kg/m²     CONSTITUTIONAL:  awake and alert  LUNGS:  clear to auscultation  ABDOMEN:  normal bowel sounds, soft, non-distended, non-tender incision healed, stoma normal     Data:    Radiology Review:  None      ASSESSMENT AND PLAN:  Symptomatic cholelithiasis  Ulcers healed  Bowel obstruction and hernia better    Will see in 3 mos and consider lap raman, possible open at that time    25 minutes spent    Parish Mehta MD

## 2022-03-10 DIAGNOSIS — E11.9 CONTROLLED TYPE 2 DIABETES MELLITUS WITHOUT COMPLICATION, WITH LONG-TERM CURRENT USE OF INSULIN (HCC): ICD-10-CM

## 2022-03-10 DIAGNOSIS — Z79.4 CONTROLLED TYPE 2 DIABETES MELLITUS WITHOUT COMPLICATION, WITH LONG-TERM CURRENT USE OF INSULIN (HCC): ICD-10-CM

## 2022-03-10 RX ORDER — DULAGLUTIDE 1.5 MG/.5ML
INJECTION, SOLUTION SUBCUTANEOUS
Refills: 1 | OUTPATIENT
Start: 2022-03-10

## 2022-03-15 DIAGNOSIS — Z79.4 CONTROLLED TYPE 2 DIABETES MELLITUS WITHOUT COMPLICATION, WITH LONG-TERM CURRENT USE OF INSULIN (HCC): ICD-10-CM

## 2022-03-15 DIAGNOSIS — E11.9 CONTROLLED TYPE 2 DIABETES MELLITUS WITHOUT COMPLICATION, WITH LONG-TERM CURRENT USE OF INSULIN (HCC): ICD-10-CM

## 2022-03-16 RX ORDER — INSULIN GLARGINE 100 [IU]/ML
INJECTION, SOLUTION SUBCUTANEOUS
Qty: 30 PEN | Refills: 1 | Status: SHIPPED | OUTPATIENT
Start: 2022-03-16 | End: 2022-06-02 | Stop reason: SDUPTHER

## 2022-03-16 RX ORDER — PEN NEEDLE, DIABETIC 31 GX5/16"
NEEDLE, DISPOSABLE MISCELLANEOUS
Qty: 100 EACH | Refills: 5 | Status: SHIPPED | OUTPATIENT
Start: 2022-03-16 | End: 2022-06-02 | Stop reason: SDUPTHER

## 2022-03-16 NOTE — TELEPHONE ENCOUNTER
2 faxes from [facility]    Sarah Rivers is not cov'd send alternative or do Prior Auth    Humalog is not cov'd please do PA or send alternative

## 2022-03-17 ENCOUNTER — PATIENT MESSAGE (OUTPATIENT)
Dept: ENDOCRINOLOGY | Age: 62
End: 2022-03-17

## 2022-03-18 NOTE — TELEPHONE ENCOUNTER
From: Zach Salter  To: Dr. Nico Calderon  Sent: 3/17/2022 10:13 PM EDT  Subject: Lantus refill for Cleatus Nap Dr. Aureliano Benites. Can you call a refill in for the Lantus?  [facility] [address] [place] [address] [place] 3 Phone [phone]      Thank Burton Nolbe

## 2022-03-18 NOTE — TELEPHONE ENCOUNTER
Submitted PA for Lantus SoloStar 100UNIT/ML pen-injectors Key: [id number] - PA Case ID: [id number]   Via CMM STATUS:Approvaed   quantity up to 30 per 30 days, under the pharmacy benefit. The drug has been approved from 03/21/2022 to 03/21/2023. Generic substitution required when available.     I need an update rx for humalog  Please advise

## 2022-03-25 NOTE — TELEPHONE ENCOUNTER
Will make adjustments based on blood glucose. May need increase Novolin N dose. Let me know in the beginning of next week how she is doing.

## 2022-04-29 ENCOUNTER — TELEPHONE (OUTPATIENT)
Dept: ENDOCRINOLOGY | Age: 62
End: 2022-04-29

## 2022-04-29 NOTE — TELEPHONE ENCOUNTER
Fax from [facility]  Refill request for Azelastine spray    LOV    1-27-22  FOV    6-2-22

## 2022-05-10 ENCOUNTER — OFFICE VISIT (OUTPATIENT)
Dept: SURGERY | Age: 62
End: 2022-05-10
Payer: COMMERCIAL

## 2022-05-10 VITALS — WEIGHT: 244 LBS | DIASTOLIC BLOOD PRESSURE: 67 MMHG | BODY MASS INDEX: 40.6 KG/M2 | SYSTOLIC BLOOD PRESSURE: 121 MMHG

## 2022-05-10 DIAGNOSIS — K80.20 SYMPTOMATIC CHOLELITHIASIS: Primary | ICD-10-CM

## 2022-05-10 PROCEDURE — 99213 OFFICE O/P EST LOW 20 MIN: CPT | Performed by: SURGERY

## 2022-05-10 ASSESSMENT — ENCOUNTER SYMPTOMS
EYES NEGATIVE: 1
ALLERGIC/IMMUNOLOGIC NEGATIVE: 1
ABDOMINAL PAIN: 1
RESPIRATORY NEGATIVE: 1

## 2022-05-10 NOTE — PROGRESS NOTES
[facility]      PATIENT NAME: Reba Garcia      TODAY'S DATE: 5/10/2022    Reason for Visit:  abd pain      HISTORY OF PRESENT ILLNESS:              The patient is a 62 y.o. female who presents with prior recurrent abd pain, Right upper region a couple mos ago. Has improved and now normal / back to baseling. The pt has had symptoms c/w GB issues and did have gallstones and abnormal HIDA in the past. Has recovered well from most recent laparotomy. She is beginning new job. Past Medical History:        Diagnosis Date    Asthma     Diabetes mellitus ([address]     GERD (gastroesophageal reflux disease)     Hyperlipidemia     Hypertension     [facility] care ([facility] 3/17/2020    Iritis     PCOS (polycystic ovarian syndrome)     Pyoderma     Thyroid disease     Ulcerative colitis     Ulcerative colitis ([facility]        Past Surgical History:        Procedure Laterality Date    ABDOMEN SURGERY      COLON RESECTION FOR ULCERATIVE COLITIS THEN HAD ANUS REMOVED    HERNIA REPAIR      ILEOSTOMY OR JEJUNOSTOMY      PERMANENT    UPPER GASTROINTESTINAL ENDOSCOPY N/A 11/23/2021    EGD BIOPSY performed by Ranjit Moya MD at [address] [place] [address] N/A 1/18/2022    EGD DIAGNOSTIC ONLY performed by Ranjit Moya MD at [place] [address] N/A 11/16/2021    OPEN REPAIR INCISIONAL HERNIA WITH MESH, LYSIS OF ADHESIONS performed by Chris Aguirre MD at [address] [place] [address]       Current Medications:   No current facility-administered medications for this visit. Prior to Admission medications    Medication Sig Start Date End Date Taking? Authorizing Provider   LANTUS SOLOSTAR 100 UNIT/ML injection pen INJECT 50 UNITS INTO THE SKIN TWICE DAILY.  3/16/22  Yes Ashley Matt MD   Insulin Pen Needle (B-D ULTRAFINE III SHORT PEN) 31G X 8 MM MISC USE TO INJECT INSULIN TWICE DAILY 3/16/22  Yes Ashley Matt MD   Dulaglutide (TRULICITY) 5.52 MG/0.5ML SOPN Inject 0.75 mg once weekly. 1/27/22  Yes Aidan Jensen MD   insulin aspart (NOVOLOG) 100 UNIT/ML injection vial Inject 25 units into the skin daily 1/18/22  Yes Aidan Jensen MD   metFORMIN (GLUCOPHAGE) 500 MG tablet TAKE 1 TABLET BY MOUTH EVERY 24 HOURS 12/28/21  Yes Aidan Jensen MD   pioglitazone (ACTOS) 15 MG tablet TAKE 1 TABLET BY MOUTH EVERY DAY 12/28/21  Yes Aidan Jensen MD   blood glucose test strips (ONETOUCH ULTRA) strip Test 4 times daily.  12/20/21  Yes Aidan Jensen MD   pantoprazole (PROTONIX) 40 MG tablet Take 1 tablet by mouth every morning (before breakfast) 11/25/21  Yes CORDELL Allen - CNP   levothyroxine (SYNTHROID) 25 MCG tablet TAKE 1 TABLET BY MOUTH EVERY DAY  Patient taking differently: 300 mcg  11/1/21  Yes Aidan Jensen MD   ALBUTEROL IN Inhale into the lungs   Yes Historical Provider, MD   Multiple Vitamin (MULTIVITAMIN ADULT PO) Take by mouth   Yes Historical Provider, MD   Ferrous Sulfate (IRON PO) Take by mouth daily    Yes Historical Provider, MD   COENZYME Q10 PO Take by mouth   Yes Historical Provider, MD   ONE TOUCH ULTRASOFT LANCETS MISC USE TO TEST BLOOD GLUCOSE 4 TIMES DAILY 11/7/20  Yes Historical Provider, MD   POTASSIUM PO Take by mouth daily    Yes Historical Provider, MD   MAGNESIUM PO Take by mouth daily    Yes Historical Provider, MD   Montelukast Sodium (SINGULAIR PO) Take by mouth   Yes Historical Provider, MD   Cholecalciferol (VITAMIN D3) 1.25 MG (38992 UT) CAPS Take by mouth once a week   Yes Historical Provider, MD   TRIAMCINOLONE ACETONIDE NA by Nasal route as needed    Yes Historical Provider, MD   BD INSULIN SYRINGE U/F 30G X 1/2\" 0.3 ML MISC USE WITH INSULIN TWICE A DAY 12/10/20  Yes Aidan Jensen MD   fluticasone-vilanterol (BREO ELLIPTA) 100-25 MCG/INH AEPB inhaler Inhale 1 puff into the lungs as needed    Yes Historical Provider, MD   losartan (COZAAR) 100 MG tablet Take 100 mg by mouth daily   Yes Historical Provider, MD   Omega-3 Fatty Acids (FISH OIL) 1000 MG CAPS Take 3,000 mg by mouth daily   Yes Historical Provider, MD   atorvastatin (LIPITOR) 10 MG tablet Take 10 mg by mouth daily   Yes Historical Provider, MD   CRANBERRY PO Take by mouth daily   Yes Historical Provider, MD   acetaminophen (TYLENOL) 500 MG tablet Take 500 mg by mouth every 6 hours as needed for Pain   Yes Historical Provider, MD   Loratadine (CLARITIN) 10 MG CAPS Take by mouth daily    Yes Historical Provider, MD        Allergies:  Patient has no known allergies. Social History:    reports that she has never smoked. She has never used smokeless tobacco. She reports that she does not drink alcohol and does not use drugs. Family History:        Problem Relation Age of Onset    Cancer Mother         uterine    No Known Problems Father        REVIEW OF SYSTEMS:  Review of Systems   Constitutional: Negative. HENT: Negative. Eyes: Negative. Respiratory: Negative. Cardiovascular: Negative. Gastrointestinal: Positive for abdominal pain. Endocrine: Negative. Genitourinary: Negative. Musculoskeletal: Negative. Skin: Negative. Allergic/Immunologic: Negative. Neurological: Negative. Hematological: Negative. Psychiatric/Behavioral: Negative.         PHYSICAL EXAM:  VITALS:  Wt 244 lb (110.7 kg)   LMP 08/16/2010   BMI 40.60 kg/m²     CONSTITUTIONAL:  alert, no apparent distress and morbidly obese  EYES:  sclera clear  ENT:  normocepalic, without obvious abnormality  NECK:  supple, symmetrical, trachea midline  LUNGS:  clear to auscultation  CARDIOVASCULAR:  regular rate and rhythm  ABDOMEN:  scars noted large midline scar, normal bowel sounds, soft, non-distended, non-tender, voluntary guarding absent, no masses palpated, no hepatosplenomegally and hernia absent, stoma on right  MUSCULOSKELETAL:  0+ pitting edema lower extremities  NEUROLOGIC:  Mental Status Exam:  Level of Alertness:   awake  Orientation:   person, place, time  SKIN: no bruising or bleeding, normal skin color, texture, turgor and no redness, warmth, or swelling    DATA:    No new studies    IMPRESSION/RECOMMENDATIONS:    Symptomatic cholelithiasis with chronic cholecystitis    Stable and improved symptoms at this time  Will hold off on surgery for now per pt's preferences, and she will plan return visit 1/2023 at this point unless problems return    25 minutes spent      Wil Reese MD

## 2022-06-02 ENCOUNTER — OFFICE VISIT (OUTPATIENT)
Dept: ENDOCRINOLOGY | Age: 62
End: 2022-06-02
Payer: COMMERCIAL

## 2022-06-02 VITALS
RESPIRATION RATE: 14 BRPM | HEART RATE: 100 BPM | SYSTOLIC BLOOD PRESSURE: 116 MMHG | TEMPERATURE: 98 F | HEIGHT: 65 IN | WEIGHT: 246 LBS | BODY MASS INDEX: 40.98 KG/M2 | DIASTOLIC BLOOD PRESSURE: 63 MMHG

## 2022-06-02 DIAGNOSIS — Z79.4 CONTROLLED TYPE 2 DIABETES MELLITUS WITHOUT COMPLICATION, WITH LONG-TERM CURRENT USE OF INSULIN (HCC): ICD-10-CM

## 2022-06-02 DIAGNOSIS — E03.9 HYPOTHYROIDISM, UNSPECIFIED TYPE: ICD-10-CM

## 2022-06-02 DIAGNOSIS — E11.9 CONTROLLED TYPE 2 DIABETES MELLITUS WITHOUT COMPLICATION, WITH LONG-TERM CURRENT USE OF INSULIN (HCC): ICD-10-CM

## 2022-06-02 PROCEDURE — 95251 CONT GLUC MNTR ANALYSIS I&R: CPT | Performed by: INTERNAL MEDICINE

## 2022-06-02 PROCEDURE — 3044F HG A1C LEVEL LT 7.0%: CPT | Performed by: INTERNAL MEDICINE

## 2022-06-02 PROCEDURE — 99214 OFFICE O/P EST MOD 30 MIN: CPT | Performed by: INTERNAL MEDICINE

## 2022-06-02 RX ORDER — INSULIN ASPART 100 [IU]/ML
INJECTION, SOLUTION INTRAVENOUS; SUBCUTANEOUS
Qty: 15 PEN | Refills: 1 | Status: SHIPPED | OUTPATIENT
Start: 2022-06-02 | End: 2022-10-26

## 2022-06-02 RX ORDER — LEVOTHYROXINE SODIUM 300 UG/1
TABLET ORAL
Qty: 90 TABLET | Refills: 1 | Status: SHIPPED | OUTPATIENT
Start: 2022-06-02 | End: 2022-10-26

## 2022-06-02 RX ORDER — FLASH GLUCOSE SCANNING READER
EACH MISCELLANEOUS
Qty: 1 EACH | Refills: 0 | Status: CANCELLED | OUTPATIENT
Start: 2022-06-02

## 2022-06-02 RX ORDER — PEN NEEDLE, DIABETIC 31 GX5/16"
NEEDLE, DISPOSABLE MISCELLANEOUS
Qty: 360 EACH | Refills: 1 | Status: SHIPPED | OUTPATIENT
Start: 2022-06-02 | End: 2022-10-26

## 2022-06-02 RX ORDER — FLASH GLUCOSE SENSOR
KIT MISCELLANEOUS
Qty: 6 EACH | Refills: 1 | Status: SHIPPED | OUTPATIENT
Start: 2022-06-02 | End: 2022-10-27 | Stop reason: SDUPTHER

## 2022-06-02 RX ORDER — INSULIN GLARGINE 100 [IU]/ML
INJECTION, SOLUTION SUBCUTANEOUS
Qty: 21 PEN | Refills: 1 | Status: SHIPPED | OUTPATIENT
Start: 2022-06-02 | End: 2022-06-13 | Stop reason: SDUPTHER

## 2022-06-02 NOTE — PROGRESS NOTES
Vasu Hung is a 62 y.o. female is seen for management of uncontrolled Type 2  Diabetes and Hypothyroidism. Patient has complex medical history inclusive of hypertension hyperlipidemia, asthma, PCOD, ulcerative colitis status post total colectomy and now has a colectomy bag. Vasu Hung was diagnosed with Diabetes mellitus at age 28   Diabetes was diagnosed at routine screening . Charanjit Garland Vasu Hung got diabetic education in the past.  Comorbid conditions: Neuropathy    Hypothyroidism diagnosed in 2015 which was diagnosed in 2015 due to her abnormal thyroid fx test   She also has MNG, which was considered stable as per previous imaging and there were no compressive symptoms  She has hypertension and hyperlipidemia   She has Ulcerative coliitis diagnosed  at age 11 she had colectomy in 2004 , in 2006 she had rectal surgery due to UC she has colectomy bag     INTERIM:    Diabetes  She presents for her follow-up diabetic visit. She has type 2 diabetes mellitus. No MedicAlert identification noted. The initial diagnosis of diabetes was made 29 years ago. Her disease course has been improving. Hypoglycemia symptoms include hunger and sweats. Associated symptoms include foot paresthesias. Pertinent negatives for diabetes include no weight loss. There are no hypoglycemic complications. Symptoms are improving. Risk factors for coronary artery disease include dyslipidemia and obesity. Current diabetic treatment includes insulin injections. She is compliant with treatment most of the time. Her breakfast blood glucose is taken between 7-8 am. Her breakfast blood glucose range is generally 70-90 mg/dl. She stopped taking Metformin and trulicity in Oct as she was having GI issues   She switched to N + R as she was having trouble affording her regular insulin now she has joined a new job and has been insurance and would like to go back to her previous basal bolus insulin regimen.   She was previously taking  lantus 50 units BID fasting glucose less than 130       Past Medical History:   Diagnosis Date    Asthma     Diabetes mellitus ([facility]     GERD (gastroesophageal reflux disease)     Hyperlipidemia     Hypertension     [facility] 3/17/2020    Iritis     PCOS (polycystic ovarian syndrome)     Pyoderma     Thyroid disease     Ulcerative colitis     Ulcerative colitis ([facility]       Patient Active Problem List   Diagnosis    [facility] care ([facility]    Uncontrolled type 2 diabetes mellitus with complication, with long-term current use of insulin ([facility]    Hypothyroidism    Essential hypertension    Mixed hyperlipidemia    Ulcerative (chronic) enterocolitis, unspecified complication (HCC)    Small bowel obstruction (HCC)    Incarcerated incisional hernia    Generalized abdominal pain    Postoperative fever     Past Surgical History:   Procedure Laterality Date    ABDOMEN SURGERY      COLON RESECTION FOR ULCERATIVE COLITIS THEN HAD ANUS REMOVED    HERNIA REPAIR      ILEOSTOMY OR JEJUNOSTOMY      PERMANENT    UPPER GASTROINTESTINAL ENDOSCOPY N/A 11/23/2021    EGD BIOPSY performed by Shiela Turner MD at [address] [place] [address] ENDOSCOPY N/A 1/18/2022    EGD DIAGNOSTIC ONLY performed by Shiela Turner MD at [place] [address] N/A 11/16/2021    OPEN REPAIR INCISIONAL HERNIA WITH MESH, LYSIS OF ADHESIONS performed by Machelle Mcdonald MD at [address] [place] [address] History     Socioeconomic History    Marital status: Single     Spouse name: Not on file    Number of children: Not on file    Years of education: Not on file    Highest education level: Not on file   Occupational History    Not on file   Tobacco Use    Smoking status: Never Smoker    Smokeless tobacco: Never Used   Vaping Use    Vaping Use: Never used   Substance and Sexual Activity    Alcohol use: No    Drug use: No    Sexual activity: Not on file   Other Topics Concern    Not on file   Social History Narrative    Not on file     Social Determinants of Health     Financial Resource Strain:     Difficulty of Paying Living Expenses: Not on file   Food Insecurity:     Worried About Running Out of Food in the Last Year: Not on file    Gomez of Food in the Last Year: Not on file   Transportation Needs:     Lack of Transportation (Medical): Not on file    Lack of Transportation (Non-Medical):  Not on file   Physical Activity:     Days of Exercise per Week: Not on file    Minutes of Exercise per Session: Not on file   Stress:     Feeling of Stress : Not on file   Social Connections:     Frequency of Communication with Friends and Family: Not on file    Frequency of Social Gatherings with Friends and Family: Not on file    Attends Muslim Services: Not on file    Active Member of [address] [place] [address] [employer] or Organizations: Not on file    Attends Club or Organization Meetings: Not on file    Marital Status: Not on file   Intimate Partner Violence:     Fear of Current or Ex-Partner: Not on file    Emotionally Abused: Not on file    Physically Abused: Not on file    Sexually Abused: Not on file   Housing Stability:     Unable to Pay for Housing in the Last Year: Not on file    Number of [place] in the Last Year: Not on file    Unstable Housing in the Last Year: Not on file     Family History   Problem Relation Age of Onset    Cancer Mother         uterine    No Known Problems Father      Current Outpatient Medications   Medication Sig Dispense Refill    OMEPRAZOLE PO Take by mouth OTC daily      insulin aspart (NOVOLOG FLEXPEN) 100 UNIT/ML injection pen 25 units bid 15 pen 1    Insulin Pen Needle (B-D ULTRAFINE III SHORT PEN) 31G X 8 MM MISC USE TO INJECT INSULIN FOUR TIMES DAILY 360 each 1    insulin glargine (LANTUS SOLOSTAR) 100 UNIT/ML injection pen 35 UNITS TID 21 pen 1    levothyroxine (SYNTHROID) 300 MCG tablet 300 MCG DAILY 90 tablet 1    Continuous Blood Gluc Sensor tablet Take 1 tablet by mouth every morning (before breakfast) (Patient not taking: Reported on 2022) 30 tablet 5     No current facility-administered medications for this visit. No Known Allergies  Family Status   Relation Name Status    Mother      Father       ROS  I have reviewed the review of system questionnaire filled by the patient . Patient was advised to contact PCP for non endocrine signs and symptoms       OBJECTIVE:  /63   Pulse 100   Temp 98 °F (36.7 °C)   Resp 14   Ht 5' 5\" (1.651 m)   Wt 246 lb (111.6 kg)   LMP 2010   BMI 40.94 kg/m²    Wt Readings from Last 3 Encounters:   22 246 lb (111.6 kg)   05/10/22 244 lb (110.7 kg)   22 246 lb (111.6 kg)     Constitutional: no acute distress, well appearing, well nourished  Psychiatric: oriented to person, place and time, judgement, insight and normal, recent and remote memory and intact and mood, affect are normal  Skin: skin and subcutaneous tissue is normal without mass,   Head and Face: examination of head and face revealed no abnormalities  Eyes: no lid or conjunctival swelling, no erythema or discharge, pupils are normal,   Ears/Nose: external inspection of ears and nose revealed no abnormalities, hearing is grossly normal  Oropharynx/Mouth/Face: lips, tongue and gums are normal with no lesions, the voice quality was normal  Neck: neck is supple and symmetric, with midline trachea and no masses, thyroid is normal    Pulmonary: no increased work of breathing or signs of respiratory distress, lungs are clear to auscultation  Cardiovascular: normal heart rate and rhythm, normal S1 and S2,   Musculoskeletal: normal gait and station,   Neurological: normal coordination, normal general cortical function  Lab Results   Component Value Date    LABA1C 6.5 2022    LABA1C 7.7 2021    LABA1C 8.1 11/15/2021         ASSESSMENT/PLAN:    1.  Controlled type 2 diabetes mellitus without complication, with long-term current use of insulin       aic 7.9 >6.5  I reviewed her freestyle [employer] with her in detail this was given to her by one of her nieces and she is noted to have postprandial hyperglycemia  Yani Golden was advised that lifestyle modification is the key to better control of her Diabetes. We discussed carbohydrate restriction in detail. Currently taking Humulin and NR. She will stop taking Humulin and plus R  She will resume lantus 50 units BID prescription sent to the pharmacy  She will take NovoLog with each meal based on carbohydrate contents  ---trulicity 1.5 mg weekly ---stopped due to nausea in Nov 2021.  ---Federica Calles gave multiple yeast infection   ---Metformin 500  Mg ,Stopped due to GI SE   ---stopped Actos due to swelling   Will recheck labs in 3 months    Hypoglycemia protocol reviewed in detail with patient Patient was advised to carry glucose tablets    Patient was advised that sending of her fingerstick blood glucose logs is crucial in management of her  diabetes. I will adjust the  doses of diabetic medications  according to sent data. Health Maintenance       Last Eye Exam: advised to have annual dilated eye exam. her last eye exam was in 2020  Last Podiatry Exam: Discussed foot care  Lipids: Last LDL level was at a level of 58 in May 2020  Microalbumin/Creatinine Ratio: I have ordered MA with next lab work     . Education: Reviewed ABCs of diabetes management (respective goals in parentheses): A1C (<7), blood pressure (<130/80), and cholesterol (LDL <100). 2. Acquired Hypothyroidism, ---thyroid Ab negative April 2021   On review of chart notes from previous endocrinologist at [facility] and the [address] visits at [facility] shows that she has a history of Hashimoto's hypothyroidism and apparently in 2010 her thyroid antibodies were positive.   I do not have access to those labs I will repeat her thyroid antibodies and will continue on 25 mcg of levothyroxine as patient symptomatically feels improved at this dose. She feels her sleep got better when she takes this medication. She has been taking 300 mcg of levothyroxine    3. Essential hypertension  Blood pressure control is adequate historically and she is on losartan 100 mg daily    4. Mixed hyperlipidemia  Last LDL was at target in April 2021 she is currently on Lipitor 10 mg daily    5. Ulcerative (chronic) enterocolitis, status post colectomy and rectal surgery she has a colectomy bag now  Symptoms are now stable    Reviewed and/or ordered clinical lab results yes   Reviewed and/or ordered radiology tests Yes  Reviewed and/or ordered other diagnostic tests yes   Made a decision to obtain old records yes   Reviewed and summarized old records yes     Ajith Baca was counseled regarding symptoms of current diagnosis, course and complications of disease if inadequately treated, side effects of medications, diagnosis, treatment options, and prognosis, risks, benefits, complications, and alternatives of treatment, labs, imaging and other studies and treatment targets and goals. She understands instructions and counseling. Return in about 3 months (around 9/2/2022). Please note that some or all of this report was generated using voice recognition software. Please notify me in case of any questions about the content of this document, as some errors in transcription may have occurred . Diabetes Continuous Glucose Monitoring Report         Reason for Study:     - improve diabetic control without risk of hypoglycemia       Current Medication regimen:   Humulin pen and or     CGMS Report     CGMS data collection was performed on June 2, 2022 patient provided information on her  diet, activities and insulin dosing  during this period.    Data was available for 14 days     Sensor Data Report:   - 12 AM to 6 AM: Overnight blood glucose pattern shows elevated glucose  - 6   AM to 10 AM:  Post breakfast significant

## 2022-06-07 ENCOUNTER — PATIENT MESSAGE (OUTPATIENT)
Dept: ENDOCRINOLOGY | Age: 62
End: 2022-06-07

## 2022-06-08 NOTE — TELEPHONE ENCOUNTER
From: Allison Salter  To: Dr. Stuart Finley  Sent: 6/7/2022 9:55 PM EDT  Subject: [address] requests more information    Hello,    If your office already responded to this I apologize. I received an email 6/7/2022 that My Doctor needs to contact [facility] and my prescriptions are on hold. I am sorry for the inconvenience. Thank You for your time and consideration of this matter.     Reyes Pagan

## 2022-06-10 ENCOUNTER — TELEPHONE (OUTPATIENT)
Dept: ENDOCRINOLOGY | Age: 62
End: 2022-06-10

## 2022-06-10 DIAGNOSIS — E11.9 CONTROLLED TYPE 2 DIABETES MELLITUS WITHOUT COMPLICATION, WITH LONG-TERM CURRENT USE OF INSULIN (HCC): ICD-10-CM

## 2022-06-10 DIAGNOSIS — Z79.4 CONTROLLED TYPE 2 DIABETES MELLITUS WITHOUT COMPLICATION, WITH LONG-TERM CURRENT USE OF INSULIN (HCC): ICD-10-CM

## 2022-06-10 NOTE — TELEPHONE ENCOUNTER
Regarding:     RX for lantus solo star pre filled pens. The directions and quantity need to be clarified. Please call [phone] to clarify. Thanks.

## 2022-06-11 ENCOUNTER — HOSPITAL ENCOUNTER (OUTPATIENT)
Age: 62
Discharge: HOME | End: 2022-06-11
Payer: COMMERCIAL

## 2022-06-11 DIAGNOSIS — Z79.4 CONTROLLED TYPE 2 DIABETES MELLITUS WITHOUT COMPLICATION, WITH LONG-TERM CURRENT USE OF INSULIN (HCC): ICD-10-CM

## 2022-06-11 DIAGNOSIS — E11.9 CONTROLLED TYPE 2 DIABETES MELLITUS WITHOUT COMPLICATION, WITH LONG-TERM CURRENT USE OF INSULIN (HCC): ICD-10-CM

## 2022-06-11 DIAGNOSIS — E03.9 HYPOTHYROIDISM, UNSPECIFIED TYPE: ICD-10-CM

## 2022-06-11 LAB
A/G RATIO: 1 (ref 1.1–2.2)
ALBUMIN SERPL-MCNC: 3.8 G/DL (ref 3.4–5)
ALP BLD-CCNC: 116 U/L (ref 40–129)
ALT SERPL-CCNC: 20 U/L (ref 10–40)
ANION GAP SERPL CALCULATED.3IONS-SCNC: 13 MMOL/L (ref 3–16)
ANTI-THYROGLOB ABS: 16 IU/ML
AST SERPL-CCNC: 20 U/L (ref 15–37)
BILIRUB SERPL-MCNC: 0.4 MG/DL (ref 0–1)
BUN BLDV-MCNC: 8 MG/DL (ref 7–20)
CALCIUM SERPL-MCNC: 9.4 MG/DL (ref 8.3–10.6)
CHLORIDE BLD-SCNC: 103 MMOL/L (ref 99–110)
CHOLESTEROL, TOTAL: 127 MG/DL (ref 0–199)
CO2: 25 MMOL/L (ref 21–32)
CREAT SERPL-MCNC: 0.6 MG/DL (ref 0.6–1.2)
CREATININE URINE: 176.9 MG/DL (ref 28–259)
GFR AFRICAN AMERICAN: >60
GFR NON-AFRICAN AMERICAN: >60
GLUCOSE BLD-MCNC: 141 MG/DL (ref 70–99)
HDLC SERPL-MCNC: 39 MG/DL (ref 40–60)
LDL CHOLESTEROL CALCULATED: 59 MG/DL
MICROALBUMIN UR-MCNC: <1.2 MG/DL
MICROALBUMIN/CREAT UR-RTO: NORMAL MG/G (ref 0–30)
POTASSIUM SERPL-SCNC: 4.2 MMOL/L (ref 3.5–5.1)
SODIUM BLD-SCNC: 141 MMOL/L (ref 136–145)
THYROID PEROXIDASE (TPO) ABS: 10 IU/ML
TOTAL CK: 78 U/L (ref 26–192)
TOTAL PROTEIN: 7.7 G/DL (ref 6.4–8.2)
TRIGL SERPL-MCNC: 147 MG/DL (ref 0–150)
TSH SERPL DL<=0.05 MIU/L-ACNC: 2.3 UIU/ML (ref 0.27–4.2)
VITAMIN D 25-HYDROXY: 43.2 NG/ML
VLDLC SERPL CALC-MCNC: 29 MG/DL

## 2022-06-11 PROCEDURE — 80061 LIPID PANEL: CPT

## 2022-06-11 PROCEDURE — 82570 ASSAY OF URINE CREATININE: CPT

## 2022-06-11 PROCEDURE — 82306 VITAMIN D 25 HYDROXY: CPT

## 2022-06-11 PROCEDURE — 36415 COLL VENOUS BLD VENIPUNCTURE: CPT

## 2022-06-11 PROCEDURE — 86800 THYROGLOBULIN ANTIBODY: CPT

## 2022-06-11 PROCEDURE — 84443 ASSAY THYROID STIM HORMONE: CPT

## 2022-06-11 PROCEDURE — 82550 ASSAY OF CK (CPK): CPT

## 2022-06-11 PROCEDURE — 83036 HEMOGLOBIN GLYCOSYLATED A1C: CPT

## 2022-06-11 PROCEDURE — 86376 MICROSOMAL ANTIBODY EACH: CPT

## 2022-06-11 PROCEDURE — 82043 UR ALBUMIN QUANTITATIVE: CPT

## 2022-06-11 PROCEDURE — 80053 COMPREHEN METABOLIC PANEL: CPT

## 2022-06-12 LAB
ESTIMATED AVERAGE GLUCOSE: 177.2 MG/DL
HBA1C MFR BLD: 7.8 %

## 2022-06-13 ENCOUNTER — TELEPHONE (OUTPATIENT)
Dept: ENDOCRINOLOGY | Age: 62
End: 2022-06-13

## 2022-06-13 RX ORDER — INSULIN GLARGINE 100 [IU]/ML
INJECTION, SOLUTION SUBCUTANEOUS
Qty: 30 PEN | Refills: 1 | Status: SHIPPED | OUTPATIENT
Start: 2022-06-13

## 2022-06-13 NOTE — TELEPHONE ENCOUNTER
Patient stated she has not started any of the new medications. She should be receiving them in the mail today. The Freestyle [place] is not covered so we will try a PA.

## 2022-06-13 NOTE — TELEPHONE ENCOUNTER
Calling regarding patient's [facility]; a fax was sent regarding a PA and is missing clinical notes/documentation. Requesting missing info to be faxed.

## 2022-06-16 NOTE — TELEPHONE ENCOUNTER
Patient made aware. She states she has had some sugars in the 50's so she will bring in her  when she gets time and we will download to send to her insurance.

## 2022-06-16 NOTE — TELEPHONE ENCOUNTER
Fax from [facility] w/ denial for [facility] 2 sensor.  The info submitted doesn't meet [facility] medical necessity guidelines

## 2022-08-30 ENCOUNTER — OFFICE VISIT (OUTPATIENT)
Dept: SURGERY | Age: 62
End: 2022-08-30
Payer: COMMERCIAL

## 2022-08-30 VITALS — BODY MASS INDEX: 40.27 KG/M2 | DIASTOLIC BLOOD PRESSURE: 70 MMHG | WEIGHT: 242 LBS | SYSTOLIC BLOOD PRESSURE: 119 MMHG

## 2022-08-30 DIAGNOSIS — R10.9 LEFT LATERAL ABDOMINAL PAIN: Primary | ICD-10-CM

## 2022-08-30 PROCEDURE — 99213 OFFICE O/P EST LOW 20 MIN: CPT | Performed by: SURGERY

## 2022-08-30 NOTE — PROGRESS NOTES
[facility] GENERAL AND LAPAROSCOPIC SURGERY          PATIENT NAME: Elizabeth Salter     TODAY'S DATE: 8/30/2022    CC abd pain  SUBJECTIVE:    Pt lifted cat litter, had pain and swelling in left abdomen. Had area focally tender at that time, improved towards baseline now, except mild soreness in the area. Normal stomal fxn, using a lot of wafers however. No F.C, no assoc N/V.     OBJECTIVE:  VITALS:  Wt 242 lb (109.8 kg)   LMP 08/16/2010   BMI 40.27 kg/m²     CONSTITUTIONAL:  awake and alert  LUNGS:  clear to auscultation  HEART: RRR  ABDOMEN:  normal bowel sounds, firm, non-distended, tenderness noted left lateral, obese, difficult to tell if hernia present vs muscle strain     Data:  CBC: No results for input(s): WBC, HGB, HCT, PLT in the last 72 hours. BMP:  No results for input(s): NA, K, CL, CO2, BUN, CREATININE, GLUCOSE in the last 72 hours. Hepatic: No results for input(s): AST, ALT, ALB, BILITOT, ALKPHOS in the last 72 hours. Mag:    No results for input(s): MG in the last 72 hours. Phos:   No results for input(s): PHOS in the last 72 hours. INR: No results for input(s): INR in the last 72 hours. Radiology Review:  None      ASSESSMENT AND PLAN:  Left lateral abd pain, following lifting  Has had major / multiple prior abd operations, with ileostomy, hernia repairs.   Plan for CT scan to evaluate, contact pt after    22 minutes spent    Kayla Swain MD

## 2022-09-10 ENCOUNTER — HOSPITAL ENCOUNTER (OUTPATIENT)
Dept: CT IMAGING | Age: 62
Discharge: HOME | End: 2022-09-10
Payer: COMMERCIAL

## 2022-09-10 DIAGNOSIS — R10.9 LEFT LATERAL ABDOMINAL PAIN: ICD-10-CM

## 2022-09-10 LAB
CREAT SERPL-MCNC: <0.5 MG/DL (ref 0.6–1.2)
GFR AFRICAN AMERICAN: >60
GFR NON-AFRICAN AMERICAN: >60

## 2022-09-10 PROCEDURE — 6360000004 HC RX CONTRAST MEDICATION: Performed by: SURGERY

## 2022-09-10 PROCEDURE — 36415 COLL VENOUS BLD VENIPUNCTURE: CPT

## 2022-09-10 PROCEDURE — 74177 CT ABD & PELVIS W/CONTRAST: CPT

## 2022-09-10 PROCEDURE — 82565 ASSAY OF CREATININE: CPT

## 2022-09-10 RX ADMIN — IOPAMIDOL 75 ML: 755 INJECTION, SOLUTION INTRAVENOUS at 12:50

## 2022-09-10 RX ADMIN — DIATRIZOATE MEGLUMINE AND DIATRIZOATE SODIUM 20 ML: 660; 100 LIQUID ORAL; RECTAL at 12:51

## 2022-09-15 ENCOUNTER — TELEPHONE (OUTPATIENT)
Dept: SURGERY | Age: 62
End: 2022-09-15

## 2022-09-22 ENCOUNTER — OFFICE VISIT (OUTPATIENT)
Dept: SURGERY | Age: 62
End: 2022-09-22
Payer: COMMERCIAL

## 2022-09-22 VITALS — DIASTOLIC BLOOD PRESSURE: 69 MMHG | WEIGHT: 240 LBS | BODY MASS INDEX: 39.94 KG/M2 | SYSTOLIC BLOOD PRESSURE: 125 MMHG

## 2022-09-22 DIAGNOSIS — S30.1XXA ABDOMINAL WALL SEROMA, INITIAL ENCOUNTER: Primary | ICD-10-CM

## 2022-09-22 PROCEDURE — 99212 OFFICE O/P EST SF 10 MIN: CPT | Performed by: SURGERY

## 2022-09-22 NOTE — PROGRESS NOTES
[facility] GENERAL AND LAPAROSCOPIC SURGERY          PATIENT NAME: Afshan Salter     TODAY'S DATE: 9/22/2022    SUBJECTIVE:    Pt with large seroma.      OBJECTIVE:  VITALS:  Wt 240 lb (108.9 kg)   LMP 08/16/2010   BMI 39.94 kg/m²     CONSTITUTIONAL:  awake and alert  LUNGS:  clear to auscultation  ABDOMEN:  normal bowel sounds, soft, non-distended, non-tender, lower seroma palpable     Data:    Radiology Review:  CT scan      ASSESSMENT AND PLAN:    Seroma / old hematoma from strain  Attempted aspiration in office, area too deep and fluid thick    15 minutes spent    Vonnie Canales MD

## 2022-09-26 NOTE — PROGRESS NOTES
Name_______________________________________Printed:____________________  Date and time of surgery___9/30/2022____0730_________________Arrival Time:______0600__________   1. The instructions given regarding when and if a patient needs to stop oral intake prior to surgery varies. Follow the specific instructions you were given                  __x_Nothing to eat or to drink after Midnight the night before.                   ____Carbo loading or ERAS instructions will be given to select patients-if you have been given those instructions -please do the following                           The evening before your surgery after dinner before midnight drink 40 ounces of gatorade. If you are diabetic use sugar free. The morning of surgery drink 40 ounces of water. This needs to be finished 3 hours prior to your surgery start time. 2. Take the following pills with a small sip of water on the morning of surgery___levothyroxine omeprazole________________________________________________                  Do not take blood pressure medications ending in pril or sartan the regla prior to surgery or the morning of surgery_   3. Aspirin, Ibuprofen, Advil, Naproxen, Vitamin E and other Anti-inflammatory products and supplements should be stopped for 5 -7days before surgery or as directed by your physician. 4. Check with your Doctor regarding stopping Plavix, Coumadin,Eliquis, Lovenox,Effient,Pradaxa,Xarelto, Fragmin or other blood thinners and follow their instructions. 5. Do not smoke, and do not drink any alcoholic beverages 24 hours prior to surgery. This includes NA Beer. Refrain from the usage of any recreational drugs. 6. You may brush your teeth and gargle the morning of surgery. DO NOT SWALLOW WATER   7. You MUST make arrangements for a responsible adult to stay on site while you are here and take you home after your surgery. You will not be allowed to leave alone or drive yourself home.   It is strongly suggested someone stay with you the first 24 hrs. Your surgery will be cancelled if you do not have a ride home. 8. A parent/legal guardian must accompany a child scheduled for surgery and plan to stay at the hospital until the child is discharged. Please do not bring other children with you. 9. Please wear simple, loose fitting clothing to the hospital.  Ata Fried not bring valuables (money, credit cards, checkbooks, etc.) Do not wear any makeup (including no eye makeup) or nail polish on your fingers or toes. 10. DO NOT wear any jewelry or piercings on day of surgery. All body piercing jewelry must be removed. 11. If you have ___dentures, they will be removed before going to the OR; we will provide you a container. If you wear ___contact lenses or ___glasses, they will be removed; please bring a case for them. 12. Please see your family doctor/pediatrician for a history & physical and/or concerning medications. Bring any test results/reports from your physician's office. PCP__________________Phone___________H&P Appt. Date________             13 If you  have a Living Will and Durable Power of  for Healthcare, please bring in a copy. 15. Notify your Surgeon if you develop any illness between now and surgery  time, cough, cold, fever, sore throat, nausea, vomiting, etc.  Please notify your surgeon if you experience dizziness, shortness of breath or blurred vision between now & the time of your surgery             15. DO NOT shave your operative site 96 hours prior to surgery. For face & neck surgery, men may use an electric razor 48 hours prior to surgery. 16. Shower the night before or morning of surgery using an antibacterial soap or as you have been instructed. 17. To provide excellent care visitors will be limited to one in the room at any given time. 18.  Please bring picture ID and insurance card.              19.  Visit our web site for additional information:  [email or web address]              20.During flu season no children under the age of 15 are permitted in the hospital for the safety of all patients. 21. If you take a long acting insulin in the evening only  take half of your usual  dose the night  before your procedure              22. If you use a c-pap please bring DOS if staying overnight,             23.For your convenience [facility] has a pharmacy on site to fill your prescriptions. 24. If you use oxygen and have a portable tank please bring it  with you the DOS             25. Bring a complete list of all your medications with name and dose include any supplements. 26. Other__________________________________________   *Please call pre admission testing if you any further questions   [address]  [phone]   [address] [place] [address]       VISITOR POLICY(subject to change)    Current policy is 2 visitors per patient. No children. A mask is required. Visiting hours are 8a-8p. Overnight visitors will be at the discretion of the nurse. All above information reviewed with patient in person or by phone. Patient verbalizes understanding. All questions and concerns addressed.                                                                                                  Patient/Rep_patient___________________                                                                                                                                    PRE OP INSTRUCTIONS

## 2022-09-30 ENCOUNTER — HOSPITAL ENCOUNTER (OUTPATIENT)
Age: 62
Setting detail: OUTPATIENT SURGERY
Discharge: HOME | End: 2022-09-30
Attending: SURGERY | Admitting: SURGERY
Payer: COMMERCIAL

## 2022-09-30 ENCOUNTER — ANESTHESIA EVENT (OUTPATIENT)
Dept: OPERATING ROOM | Age: 62
End: 2022-09-30
Payer: COMMERCIAL

## 2022-09-30 ENCOUNTER — ANESTHESIA (OUTPATIENT)
Dept: OPERATING ROOM | Age: 62
End: 2022-09-30
Payer: COMMERCIAL

## 2022-09-30 VITALS
DIASTOLIC BLOOD PRESSURE: 58 MMHG | SYSTOLIC BLOOD PRESSURE: 115 MMHG | WEIGHT: 240 LBS | BODY MASS INDEX: 39.99 KG/M2 | HEIGHT: 65 IN | OXYGEN SATURATION: 92 % | HEART RATE: 92 BPM | RESPIRATION RATE: 22 BRPM | TEMPERATURE: 97.2 F

## 2022-09-30 DIAGNOSIS — S30.1XXA ABDOMINAL WALL SEROMA, INITIAL ENCOUNTER: Primary | ICD-10-CM

## 2022-09-30 LAB
ANION GAP SERPL CALCULATED.3IONS-SCNC: 12 MMOL/L (ref 3–16)
BUN BLDV-MCNC: 10 MG/DL (ref 7–20)
CALCIUM SERPL-MCNC: 9.3 MG/DL (ref 8.3–10.6)
CHLORIDE BLD-SCNC: 105 MMOL/L (ref 99–110)
CO2: 22 MMOL/L (ref 21–32)
CREAT SERPL-MCNC: 0.5 MG/DL (ref 0.6–1.2)
GFR AFRICAN AMERICAN: >60
GFR NON-AFRICAN AMERICAN: >60
GLUCOSE BLD-MCNC: 115 MG/DL (ref 70–99)
GLUCOSE BLD-MCNC: 120 MG/DL (ref 70–99)
GLUCOSE BLD-MCNC: 130 MG/DL (ref 70–99)
HCT VFR BLD CALC: 33.9 % (ref 36–48)
HEMOGLOBIN: 11.3 G/DL (ref 12–16)
MCH RBC QN AUTO: 26.2 PG (ref 26–34)
MCHC RBC AUTO-ENTMCNC: 33.3 G/DL (ref 31–36)
MCV RBC AUTO: 78.6 FL (ref 80–100)
PDW BLD-RTO: 14.9 % (ref 12.4–15.4)
PERFORMED ON: ABNORMAL
PERFORMED ON: ABNORMAL
PLATELET # BLD: 260 K/UL (ref 135–450)
PMV BLD AUTO: 6.8 FL (ref 5–10.5)
POTASSIUM SERPL-SCNC: 3.8 MMOL/L (ref 3.5–5.1)
RBC # BLD: 4.32 M/UL (ref 4–5.2)
SODIUM BLD-SCNC: 139 MMOL/L (ref 136–145)
WBC # BLD: 6.2 K/UL (ref 4–11)

## 2022-09-30 PROCEDURE — 3600000012 HC SURGERY LEVEL 2 ADDTL 15MIN: Performed by: SURGERY

## 2022-09-30 PROCEDURE — 6360000002 HC RX W HCPCS: Performed by: NURSE ANESTHETIST, CERTIFIED REGISTERED

## 2022-09-30 PROCEDURE — 7100000011 HC PHASE II RECOVERY - ADDTL 15 MIN: Performed by: SURGERY

## 2022-09-30 PROCEDURE — 6360000002 HC RX W HCPCS: Performed by: SURGERY

## 2022-09-30 PROCEDURE — 2709999900 HC NON-CHARGEABLE SUPPLY: Performed by: SURGERY

## 2022-09-30 PROCEDURE — 85027 COMPLETE CBC AUTOMATED: CPT

## 2022-09-30 PROCEDURE — 2500000003 HC RX 250 WO HCPCS: Performed by: NURSE ANESTHETIST, CERTIFIED REGISTERED

## 2022-09-30 PROCEDURE — 2580000003 HC RX 258: Performed by: NURSE ANESTHETIST, CERTIFIED REGISTERED

## 2022-09-30 PROCEDURE — 3700000001 HC ADD 15 MINUTES (ANESTHESIA): Performed by: SURGERY

## 2022-09-30 PROCEDURE — 3600000002 HC SURGERY LEVEL 2 BASE: Performed by: SURGERY

## 2022-09-30 PROCEDURE — 3700000000 HC ANESTHESIA ATTENDED CARE: Performed by: SURGERY

## 2022-09-30 PROCEDURE — 7100000010 HC PHASE II RECOVERY - FIRST 15 MIN: Performed by: SURGERY

## 2022-09-30 PROCEDURE — 7100000001 HC PACU RECOVERY - ADDTL 15 MIN: Performed by: SURGERY

## 2022-09-30 PROCEDURE — C1729 CATH, DRAINAGE: HCPCS | Performed by: SURGERY

## 2022-09-30 PROCEDURE — 2580000003 HC RX 258: Performed by: SURGERY

## 2022-09-30 PROCEDURE — 7100000000 HC PACU RECOVERY - FIRST 15 MIN: Performed by: SURGERY

## 2022-09-30 PROCEDURE — 80048 BASIC METABOLIC PNL TOTAL CA: CPT

## 2022-09-30 PROCEDURE — 10140 I&D HMTMA SEROMA/FLUID COLLJ: CPT | Performed by: SURGERY

## 2022-09-30 PROCEDURE — A4217 STERILE WATER/SALINE, 500 ML: HCPCS | Performed by: SURGERY

## 2022-09-30 RX ORDER — LABETALOL HYDROCHLORIDE 5 MG/ML
5 INJECTION, SOLUTION INTRAVENOUS
Status: DISCONTINUED | OUTPATIENT
Start: 2022-09-30 | End: 2022-09-30 | Stop reason: HOSPADM

## 2022-09-30 RX ORDER — LIDOCAINE HYDROCHLORIDE 10 MG/ML
1 INJECTION, SOLUTION EPIDURAL; INFILTRATION; INTRACAUDAL; PERINEURAL
Status: DISCONTINUED | OUTPATIENT
Start: 2022-09-30 | End: 2022-09-30 | Stop reason: HOSPADM

## 2022-09-30 RX ORDER — SODIUM CHLORIDE 9 MG/ML
INJECTION, SOLUTION INTRAVENOUS CONTINUOUS PRN
Status: DISCONTINUED | OUTPATIENT
Start: 2022-09-30 | End: 2022-09-30 | Stop reason: SDUPTHER

## 2022-09-30 RX ORDER — MAGNESIUM HYDROXIDE 1200 MG/15ML
LIQUID ORAL CONTINUOUS PRN
Status: COMPLETED | OUTPATIENT
Start: 2022-09-30 | End: 2022-09-30

## 2022-09-30 RX ORDER — BUPIVACAINE HYDROCHLORIDE AND EPINEPHRINE 5; 5 MG/ML; UG/ML
INJECTION, SOLUTION PERINEURAL
Status: COMPLETED | OUTPATIENT
Start: 2022-09-30 | End: 2022-09-30

## 2022-09-30 RX ORDER — HYDROMORPHONE HCL 110MG/55ML
0.25 PATIENT CONTROLLED ANALGESIA SYRINGE INTRAVENOUS EVERY 5 MIN PRN
Status: DISCONTINUED | OUTPATIENT
Start: 2022-09-30 | End: 2022-09-30 | Stop reason: HOSPADM

## 2022-09-30 RX ORDER — SODIUM CHLORIDE 9 MG/ML
INJECTION, SOLUTION INTRAVENOUS CONTINUOUS
Status: DISCONTINUED | OUTPATIENT
Start: 2022-09-30 | End: 2022-09-30 | Stop reason: HOSPADM

## 2022-09-30 RX ORDER — KETOROLAC TROMETHAMINE 30 MG/ML
INJECTION, SOLUTION INTRAMUSCULAR; INTRAVENOUS PRN
Status: DISCONTINUED | OUTPATIENT
Start: 2022-09-30 | End: 2022-09-30 | Stop reason: SDUPTHER

## 2022-09-30 RX ORDER — ROCURONIUM BROMIDE 10 MG/ML
INJECTION, SOLUTION INTRAVENOUS PRN
Status: DISCONTINUED | OUTPATIENT
Start: 2022-09-30 | End: 2022-09-30 | Stop reason: SDUPTHER

## 2022-09-30 RX ORDER — MAGNESIUM SULFATE HEPTAHYDRATE 500 MG/ML
INJECTION, SOLUTION INTRAMUSCULAR; INTRAVENOUS PRN
Status: DISCONTINUED | OUTPATIENT
Start: 2022-09-30 | End: 2022-09-30 | Stop reason: SDUPTHER

## 2022-09-30 RX ORDER — FENTANYL CITRATE 50 UG/ML
INJECTION, SOLUTION INTRAMUSCULAR; INTRAVENOUS PRN
Status: DISCONTINUED | OUTPATIENT
Start: 2022-09-30 | End: 2022-09-30 | Stop reason: SDUPTHER

## 2022-09-30 RX ORDER — PROPOFOL 10 MG/ML
INJECTION, EMULSION INTRAVENOUS PRN
Status: DISCONTINUED | OUTPATIENT
Start: 2022-09-30 | End: 2022-09-30 | Stop reason: SDUPTHER

## 2022-09-30 RX ORDER — DEXAMETHASONE SODIUM PHOSPHATE 4 MG/ML
INJECTION, SOLUTION INTRA-ARTICULAR; INTRALESIONAL; INTRAMUSCULAR; INTRAVENOUS; SOFT TISSUE PRN
Status: DISCONTINUED | OUTPATIENT
Start: 2022-09-30 | End: 2022-09-30 | Stop reason: SDUPTHER

## 2022-09-30 RX ORDER — KETAMINE HCL IN NACL, ISO-OSM 100MG/10ML
SYRINGE (ML) INJECTION PRN
Status: DISCONTINUED | OUTPATIENT
Start: 2022-09-30 | End: 2022-09-30 | Stop reason: SDUPTHER

## 2022-09-30 RX ORDER — HYDROCODONE BITARTRATE AND ACETAMINOPHEN 5; 325 MG/1; MG/1
1 TABLET ORAL EVERY 4 HOURS PRN
Qty: 10 TABLET | Refills: 0 | Status: SHIPPED | OUTPATIENT
Start: 2022-09-30 | End: 2022-10-07

## 2022-09-30 RX ORDER — ONDANSETRON 2 MG/ML
INJECTION INTRAMUSCULAR; INTRAVENOUS PRN
Status: DISCONTINUED | OUTPATIENT
Start: 2022-09-30 | End: 2022-09-30 | Stop reason: SDUPTHER

## 2022-09-30 RX ORDER — MIDAZOLAM HYDROCHLORIDE 1 MG/ML
INJECTION INTRAMUSCULAR; INTRAVENOUS PRN
Status: DISCONTINUED | OUTPATIENT
Start: 2022-09-30 | End: 2022-09-30 | Stop reason: SDUPTHER

## 2022-09-30 RX ORDER — ONDANSETRON 2 MG/ML
4 INJECTION INTRAMUSCULAR; INTRAVENOUS
Status: DISCONTINUED | OUTPATIENT
Start: 2022-09-30 | End: 2022-09-30 | Stop reason: HOSPADM

## 2022-09-30 RX ORDER — OXYCODONE HYDROCHLORIDE 5 MG/1
5 TABLET ORAL
Status: DISCONTINUED | OUTPATIENT
Start: 2022-09-30 | End: 2022-09-30 | Stop reason: HOSPADM

## 2022-09-30 RX ORDER — SUCCINYLCHOLINE CHLORIDE 20 MG/ML
INJECTION INTRAMUSCULAR; INTRAVENOUS PRN
Status: DISCONTINUED | OUTPATIENT
Start: 2022-09-30 | End: 2022-09-30 | Stop reason: SDUPTHER

## 2022-09-30 RX ORDER — HYDROMORPHONE HCL 110MG/55ML
0.5 PATIENT CONTROLLED ANALGESIA SYRINGE INTRAVENOUS EVERY 5 MIN PRN
Status: DISCONTINUED | OUTPATIENT
Start: 2022-09-30 | End: 2022-09-30 | Stop reason: HOSPADM

## 2022-09-30 RX ADMIN — ONDANSETRON 4 MG: 2 INJECTION INTRAMUSCULAR; INTRAVENOUS at 08:00

## 2022-09-30 RX ADMIN — MAGNESIUM SULFATE HEPTAHYDRATE 1 G: 500 INJECTION, SOLUTION INTRAMUSCULAR; INTRAVENOUS at 07:42

## 2022-09-30 RX ADMIN — KETOROLAC TROMETHAMINE 30 MG: 30 INJECTION, SOLUTION INTRAMUSCULAR at 08:00

## 2022-09-30 RX ADMIN — MIDAZOLAM 1 MG: 1 INJECTION INTRAMUSCULAR; INTRAVENOUS at 07:30

## 2022-09-30 RX ADMIN — Medication 50 MG: at 07:44

## 2022-09-30 RX ADMIN — SUGAMMADEX 200 MG: 100 INJECTION, SOLUTION INTRAVENOUS at 08:15

## 2022-09-30 RX ADMIN — FENTANYL CITRATE 50 MCG: 50 INJECTION, SOLUTION INTRAMUSCULAR; INTRAVENOUS at 07:54

## 2022-09-30 RX ADMIN — ROCURONIUM BROMIDE 10 MG: 10 INJECTION, SOLUTION INTRAVENOUS at 07:37

## 2022-09-30 RX ADMIN — SODIUM CHLORIDE: 9 INJECTION, SOLUTION INTRAVENOUS at 07:30

## 2022-09-30 RX ADMIN — CEFAZOLIN 2000 MG: 2 INJECTION, POWDER, FOR SOLUTION INTRAMUSCULAR; INTRAVENOUS at 07:28

## 2022-09-30 RX ADMIN — PROPOFOL 20 MG: 10 INJECTION, EMULSION INTRAVENOUS at 08:19

## 2022-09-30 RX ADMIN — ROCURONIUM BROMIDE 30 MG: 10 INJECTION, SOLUTION INTRAVENOUS at 07:44

## 2022-09-30 RX ADMIN — MIDAZOLAM 1 MG: 1 INJECTION INTRAMUSCULAR; INTRAVENOUS at 07:35

## 2022-09-30 RX ADMIN — DEXAMETHASONE SODIUM PHOSPHATE 4 MG: 4 INJECTION, SOLUTION INTRAMUSCULAR; INTRAVENOUS at 07:46

## 2022-09-30 RX ADMIN — FENTANYL CITRATE 50 MCG: 50 INJECTION, SOLUTION INTRAMUSCULAR; INTRAVENOUS at 07:37

## 2022-09-30 RX ADMIN — PROPOFOL 250 MG: 10 INJECTION, EMULSION INTRAVENOUS at 07:37

## 2022-09-30 RX ADMIN — SODIUM CHLORIDE: 9 INJECTION, SOLUTION INTRAVENOUS at 08:26

## 2022-09-30 RX ADMIN — SUCCINYLCHOLINE CHLORIDE 120 MG: 20 INJECTION, SOLUTION INTRAMUSCULAR; INTRAVENOUS at 07:38

## 2022-09-30 ASSESSMENT — PAIN - FUNCTIONAL ASSESSMENT: PAIN_FUNCTIONAL_ASSESSMENT: 0-10

## 2022-09-30 NOTE — PROGRESS NOTES
Discharge instructions and new medication prescription reviewed with pt and pts brother at bedside, both verbalized understanding. Pt safely dressed and transferred self to wheelchair, IV removed without complications. Pt discharged in wheelchair with all belongings to car by [place]. Pt tolerated well.

## 2022-09-30 NOTE — OP NOTE
[facility]                     [facility], [address]                                OPERATIVE REPORT    PATIENT NAME: Aldo Chen                   :        1960  MED REC NO:   [id number]                          ROOM:  ACCOUNT NO:   [de-identified]                           ADMIT DATE: 2022  PROVIDER:     Celso Staley MD    DATE OF PROCEDURE:  2022    PREOPERATIVE DIAGNOSIS:  Abdominal wall seroma. POSTOPERATIVE DIAGNOSIS:  Abdominal wall seroma. PROCEDURE:  Incision and drainage of abdominal wall seroma. SURGEON:  Celso Staley MD    ANESTHESIA:  General plus local.    ESTIMATED BLOOD LOSS:  Minimal.    COMPLICATIONS:  None. DETAILS OF THE SURGERY:  The patient is a 62-year-old female presenting  with large abdominal wall seroma. In the distant past, she has had a  large incisional hernia repair and in this old field of surgery, has a  large seroma evacuated today. ADDITIONAL DETAILS OF SURGERY:  The patient is brought to the operating  room, placed on the operative table in supine position. Abdominal wall  was prepped and draped sterilely keeping the patient's ileostomy well  clear and free of surgical site. Time-out was done. A lower midline  incision was made in the skin few centimeters in length. Dissection was  carried down through skin, subcutaneous tissue, scar area and then to  the pseudocapsule around the seroma area. Fluid began draining out  immediately and overall, 2 liters of fluid was evacuated from this large  seroma. Once this was complete, we placed a 19 round Gabe drain  through the incision and out of the left abdomen. This was secured with  a 2-0 silk suture. Incision was closed with interrupted 3-0 Vicryl in  the pseudocapsule, 3-0 Vicryl in the subdermal plane, 4-0 Monocryl for  the skin and Dermabond glue.   The patient was taken to recovery room in  stable condition postop.         Betty Thomas MD    D: 09/30/2022 8:32:37       T: 09/30/2022 8:36:15     [email or web address]  Job#: [id number]     Doc#: [id number]    CC:

## 2022-09-30 NOTE — ANESTHESIA POSTPROCEDURE EVALUATION
Department of Anesthesiology  Postprocedure Note    Patient: Alysas Cohen  MRN: [id number]  YOB: 1960  Date of evaluation: 9/30/2022      Procedure Summary     Date: 09/30/22 Room / Location: [address]    Anesthesia Start: 0730 Anesthesia Stop: 3152    Procedure: INCISION AND DRAINAGE OF ABDOMINAL WALL SEROMA (50805) (Abdomen) Diagnosis:       Abdominal wall seroma, initial encounter      (Abdominal wall seroma, initial encounter [S30.1XXA])    Surgeons: Onel Chau MD Responsible Provider: Vin Arias MD    Anesthesia Type: general ASA Status: 3          Anesthesia Type: No value filed.     Ruchi Phase I: Ruchi Score: 8    Ruchi Phase II:        Anesthesia Post Evaluation    Patient location during evaluation: PACU  Patient participation: complete - patient participated  Level of consciousness: awake and alert  Pain score: 2  Airway patency: patent  Nausea & Vomiting: no vomiting  Complications: no  Cardiovascular status: blood pressure returned to baseline  Respiratory status: acceptable  Hydration status: euvolemic  Multimodal analgesia pain management approach

## 2022-09-30 NOTE — BRIEF OP NOTE
Brief Postoperative Note      Patient: Janay Schwartz  YOB: 1960  MRN: [id number]    Date of Procedure: 9/30/2022    Pre-Op Diagnosis: Abdominal wall seroma, initial encounter [S30.1XXA]    Post-Op Diagnosis: Same       Procedure(s):  INCISION AND DRAINAGE OF ABDOMINAL WALL SEROMA (61488)    Surgeon(s):  Pepito Garcia MD    Assistant:  Surgical Assistant: Justine Jay    Anesthesia: General    Estimated Blood Loss (mL): Minimal    Complications: None    Specimens:   * No specimens in log *    Implants:  * No implants in log *      Drains:   Closed/Suction Drain Left LLQ Bulb (Active)       Ileostomy Ileostomy RLQ (Active)       [REMOVED] Negative Pressure Wound Therapy Abdomen Medial;Upper (Removed)       Findings: Large 2 L seroma drained    Electronically signed by Pepito Garcia MD on 9/30/2022 at 8:16 AM

## 2022-09-30 NOTE — PROGRESS NOTES
Pt arrived from OR to PACU, awakens to voice, denies pain at this time. VSS, O2 sats 93% on 6 L simple mask. Incision to abdomen dry and intact, URMILA drain in place with small amount of dark red sanguinous drainage. Abdominal binder and existing ileostomy in place. Will monitor.

## 2022-09-30 NOTE — PROGRESS NOTES
CLINICAL PHARMACY NOTE: MEDS TO BEDS    Total # of Prescriptions Filled: 1   The following medications were delivered to the patient:  Norco 5-325 mg    Additional Documentation:  Delivered to Patient brother=Signed  Seng Kunz CPhT

## 2022-09-30 NOTE — ANESTHESIA PRE PROCEDURE
Department of Anesthesiology  Preprocedure Note       Name:  Cristobal Long   Age:  65 y.o.  :  1960                                          MRN:  [id number]         Date:  2022      Surgeon: Alfonso Michelle):  Bushra Arnold MD    Procedure: Procedure(s):  INCISION AND DRAINAGE OF ABDOMINAL WALL SEROMA (89971)    Medications prior to admission:   Prior to Admission medications    Medication Sig Start Date End Date Taking? Authorizing Provider   insulin glargine (LANTUS SOLOSTAR) 100 UNIT/ML injection pen Inject 50 units into the skin BID. 22   Stanley Soliman MD   OMEPRAZOLE PO Take by mouth OTC daily    Historical Provider, MD   insulin aspart (NOVOLOG FLEXPEN) 100 UNIT/ML injection pen 25 units bid 22   Stanley Soliman MD   Insulin Pen Needle (B-D ULTRAFINE III SHORT PEN) 31G X 8 MM MISC USE TO INJECT INSULIN FOUR TIMES DAILY 22   Stanley Soliman MD   levothyroxine (SYNTHROID) 300 MCG tablet 300 MCG DAILY 22   Stanley Soliman MD   Continuous Blood Gluc Sensor (FREESTYLE EDY 2 SENSOR) MISC CHANGE EVERY 14 DAYS TO MONITOR BS 22   Stanley Soliman MD   insulin aspart (NOVOLOG) 100 UNIT/ML injection vial Inject 25 units into the skin daily  Patient taking differently: Inject 25 units into the skin twice a day- using generic store brand due to cost 22   Stanley Soliman MD   blood glucose test strips (ONETOUCH ULTRA) strip Test 4 times daily.  21   Stanley Soliman MD   ALBUTEROL IN Inhale into the lungs    Historical Provider, MD   Multiple Vitamin (MULTIVITAMIN ADULT PO) Take by mouth    Historical Provider, MD   Ferrous Sulfate (IRON PO) Take by mouth daily     Miles Provider, MD   COENZYME Q10 PO Take by mouth    Historical MD Anais   ONE TOUCH ULTRASOFT LANCETS MISC USE TO TEST BLOOD GLUCOSE 4 TIMES DAILY 20   Miles Manzo MD   POTASSIUM PO Take by mouth daily     Historical Provider, MD   MAGNESIUM PO Take by mouth daily     Historical Provider, MD   Montelukast Sodium (SINGULAIR PO) Take by mouth    Historical Provider, MD   Cholecalciferol (VITAMIN D3) 1.25 MG (86280 UT) CAPS Take by mouth once a week    Historical Provider, MD   TRIAMCINOLONE ACETONIDE NA by Nasal route as needed     Historical Provider, MD   BD INSULIN SYRINGE U/F 30G X 1/2\" 0.3 ML MISC USE WITH INSULIN TWICE A DAY 12/10/20   Marlo Mcgowan MD   fluticasone-vilanterol (BREO ELLIPTA) 100-25 MCG/INH AEPB inhaler Inhale 1 puff into the lungs as needed     Historical Provider, MD   losartan (COZAAR) 100 MG tablet Take 100 mg by mouth daily    Historical Provider, MD   Omega-3 Fatty Acids (FISH OIL) 1000 MG CAPS Take 3,000 mg by mouth daily    Historical Provider, MD   atorvastatin (LIPITOR) 10 MG tablet Take 10 mg by mouth daily    Historical Provider, MD   CRANBERRY PO Take by mouth daily    Historical Provider, MD   acetaminophen (TYLENOL) 500 MG tablet Take 500 mg by mouth every 6 hours as needed for Pain    Historical Provider, MD   loratadine (CLARITIN) 10 MG capsule Take by mouth daily     Historical Provider, MD       Current medications:    Current Facility-Administered Medications   Medication Dose Route Frequency Provider Last Rate Last Admin    dextrose 5 % infusion                Allergies:  No Known Allergies    Problem List:    Patient Active Problem List   Diagnosis Code    Ileostomy care ([facility] Z43.2    Uncontrolled type 2 diabetes mellitus with complication, with long-term current use of insulin ([facility] E11.8, E11.65, Z79.4    Hypothyroidism E03.9    Essential hypertension I10    Mixed hyperlipidemia E78.2    Ulcerative (chronic) enterocolitis, unspecified complication ([facility] P57.605    Small bowel obstruction ([facility] K56.609    Incarcerated incisional hernia K43.0    Generalized abdominal pain R10.84    Postoperative fever R50.82       Past Medical History:        Diagnosis Date    Asthma     Diabetes mellitus ([facility]     GERD (gastroesophageal reflux disease)     Hyperlipidemia     Hypertension     Ileostomy care [facility]) 3/17/2020    Iritis     PCOS (polycystic ovarian syndrome)     Pyoderma     Thyroid disease     Ulcerative colitis     Ulcerative colitis ([facility]        Past Surgical History:        Procedure Laterality Date    ABDOMEN SURGERY      COLON RESECTION FOR ULCERATIVE COLITIS THEN HAD ANUS REMOVED    HERNIA REPAIR      ILEOSTOMY OR JEJUNOSTOMY      PERMANENT    UPPER GASTROINTESTINAL ENDOSCOPY N/A 11/23/2021    EGD BIOPSY performed by Ike Delgado MD at [address] [place] [address] N/A 1/18/2022    EGD DIAGNOSTIC ONLY performed by Ike Delgado MD at [place] [address] N/A 11/16/2021    OPEN REPAIR INCISIONAL HERNIA WITH MESH, LYSIS OF ADHESIONS performed by Amina Crawley MD at [address] [place] [address] History:    Social History     Tobacco Use    Smoking status: Never    Smokeless tobacco: Never   Substance Use Topics    Alcohol use: No                                Counseling given: Not Answered      Vital Signs (Current):   Vitals:    09/30/22 0616 09/30/22 0641   BP: (!) 170/73 (!) 119/45   Pulse: 96    Resp: 20    Temp: 97.1 °F (36.2 °C)    TempSrc: Temporal    SpO2: 98%    Weight: 240 lb (108.9 kg)    Height: 5' 5\" (1.651 m)                                               BP Readings from Last 3 Encounters:   09/30/22 (!) 119/45   09/22/22 125/69   08/30/22 119/70       NPO Status: Time of last liquid consumption: 0000                        Time of last solid consumption: 0000                        Date of last liquid consumption: 09/30/22                        Date of last solid food consumption: 09/30/22    BMI:   Wt Readings from Last 3 Encounters:   09/30/22 240 lb (108.9 kg)   09/22/22 240 lb (108.9 kg)   08/30/22 242 lb (109.8 kg)     Body mass index is 39.94 kg/m².     CBC:   Lab Results   Component Value Date/Time    WBC 6.2 09/30/2022 06:34 AM    RBC 4.32 09/30/2022 06:34 AM    HGB 11.3 09/30/2022 06:34 AM    HCT 33.9 09/30/2022 06:34 AM    MCV 78.6 09/30/2022 06:34 AM    RDW 14.9 09/30/2022 06:34 AM     09/30/2022 06:34 AM       CMP:   Lab Results   Component Value Date/Time     09/30/2022 06:34 AM    K 3.8 09/30/2022 06:34 AM    K 3.7 11/21/2021 01:07 PM     09/30/2022 06:34 AM    CO2 22 09/30/2022 06:34 AM    BUN 10 09/30/2022 06:34 AM    CREATININE 0.5 09/30/2022 06:34 AM    GFRAA >60 09/30/2022 06:34 AM    GFRAA >60 03/22/2013 12:10 PM    AGRATIO 1.0 06/11/2022 11:13 AM    LABGLOM >60 09/30/2022 06:34 AM    GLUCOSE 130 09/30/2022 06:34 AM    PROT 7.7 06/11/2022 11:13 AM    PROT 7.8 12/22/2012 12:12 PM    CALCIUM 9.3 09/30/2022 06:34 AM    BILITOT 0.4 06/11/2022 11:13 AM    ALKPHOS 116 06/11/2022 11:13 AM    AST 20 06/11/2022 11:13 AM    ALT 20 06/11/2022 11:13 AM       POC Tests:   Recent Labs     09/30/22  0638   POCGLU 115*       Coags:   Lab Results   Component Value Date/Time    PROTIME 14.2 11/22/2021 01:01 AM    INR 1.25 11/22/2021 01:01 AM    APTT 28.6 11/22/2021 01:01 AM       HCG (If Applicable): No results found for: PREGTESTUR, PREGSERUM, HCG, HCGQUANT     ABGs: No results found for: PHART, PO2ART, TIB2JLO, GXE5BOU, BEART, P8TYELZO     Type & Screen (If Applicable):  No results found for: LABABO, LABRH    Drug/Infectious Status (If Applicable):  No results found for: HIV, HEPCAB    COVID-19 Screening (If Applicable):   Lab Results   Component Value Date/Time    COVID19 Not Detected 11/15/2021 05:55 AM           Anesthesia Evaluation  Patient summary reviewed and Nursing notes reviewed no history of anesthetic complications:   Airway: Mallampati: II  TM distance: >3 FB   Neck ROM: full  Mouth opening: > = 3 FB   Dental: normal exam         Pulmonary:   (+) sleep apnea (skin rxn to cpap mask): on noncompliant,  asthma (last inhaler use August): seasonal asthma,     (-) rhonchi and wheezes                           Cardiovascular:    (+) hypertension:,     (-) CABG/stent, dysrhythmias and  angina      Rhythm: regular  Rate: normal                    Neuro/Psych:      (-) seizures, TIA and CVA           GI/Hepatic/Renal:   (+) GERD: well controlled, PUD,          ROS comment: Denies gerd sx or n/v today. Endo/Other:    (+) Diabetes, hypothyroidism::., .                  ROS comment: PCOS Abdominal:   (+) obese,           Vascular:     - DVT and PE. Other Findings:           Anesthesia Plan      general     ASA 3       Induction: intravenous. MIPS: Postoperative opioids intended and Prophylactic antiemetics administered. Anesthetic plan and risks discussed with patient. Plan discussed with CRNA.                     Gurpreet Powers MD   9/30/2022

## 2022-09-30 NOTE — DISCHARGE INSTRUCTIONS
Geneva Trevino M.D.    [phone]                                                     [address] [facility] [address] [facility] [address], [address] [facility]    Excision discharge instructions    Resume regular diet  May drive tomorrow  Keep drain area closed over with gauze and tape, keep area dry. Empty URMILA bulb and record outputs from drain twice daily  No heavy lifting for 24 hours, wear binder when up and active  May use pain meds and ice on the surgical site as needed  Call the office as needed for any post op questions and for follow up in one week       Dermabond TOPICAL SKIN ADHESIVE CARE    9/30/2022    Dermabond is a clear, sterile liquid skin adhesive that holds wound/incision edges together. The adhesive will usually remain in place for 5 to 10 days, then naturally wear or slough off your skin. Do not scratch, rub, or pick at the Skin Affix adhesive film. Do not apply liquid or ointment medications, soap, powders or lotions to the incision while the Dermabond is in place. You may shower 24 hours after your surgery and briefly wet the Dermabond. Do not sit in a tub of water or swim. Do not soak or scrub your incision. After showering, gently blot your incision dry. No tape or any type of bandage/covering is required over the Dermabond. ANESTHESIA DISCHARGE INSTRUCTIONS    Wear your seatbelt home. You are under the influence of drugs-do not drink alcohol, drive, operate machinery, make any important decisions or sign any legal documents for 24 hours. Children should not ride bikes, Ruso or play on gym sets for 24 hours after surgery. A responsible adult needs to be with you for 24 hours. You may experience lightheadedness, dizziness, or sleepiness following surgery. Rest at home today- increase activity as tolerated.   Progress slowly to a regular diet unless your physician has instructed you otherwise. Drink plenty of water. If persistent nausea and vomiting becomes a problem, call your physician. Coughing, sore throat and muscle aches are other side effects of anesthesia, and should improve with time. Do not drive or operate machinery while taking narcotics. Females of childbearing potential and on hormonal birth control, should use two forms of contraception following procedure if given a medication called sugammadex and/or emend. Additional contraception should be used for 7 days for sugammadex and/or 28 days for emend. These medications have a potential to reduce the effectiveness of hormonal birth control.

## 2022-09-30 NOTE — H&P
[facility] and Laparoscopic Surgery      I have reviewed the history and physical and examined the patient and find no relevant changes. HEART: RRR  LUNGS: CLEAR  ABD: firm, stoma on right, mid abd bulge    I have reviewed with the patient and/or family the risks, benefits, and alternatives to the procedure.     Gee Calderon MD  9/30/2022

## 2022-09-30 NOTE — PROGRESS NOTES
Pt resting quietly in bed, awake, denies pain. VSS, O2 sats 94% on room air. Dressing to URMILA site dry and intact, incision to abdomen dry and intact, abdominal binder remains in place. Pt seen by anesthesia, phase 1 criteria met. Will discharge pt from PACU.

## 2022-10-06 ENCOUNTER — OFFICE VISIT (OUTPATIENT)
Dept: SURGERY | Age: 62
End: 2022-10-06

## 2022-10-06 VITALS — SYSTOLIC BLOOD PRESSURE: 128 MMHG | DIASTOLIC BLOOD PRESSURE: 63 MMHG | BODY MASS INDEX: 38.77 KG/M2 | WEIGHT: 233 LBS

## 2022-10-06 DIAGNOSIS — Z09 POSTOP CHECK: Primary | ICD-10-CM

## 2022-10-06 PROCEDURE — 99024 POSTOP FOLLOW-UP VISIT: CPT | Performed by: SURGERY

## 2022-10-06 NOTE — PROGRESS NOTES
[facility] GENERAL AND LAPAROSCOPIC SURGERY          PATIENT NAME: Urbano Salter     TODAY'S DATE: 10/6/2022    SUBJECTIVE:    Pt returns following seroma drainage. OBJECTIVE:  VITALS:  Wt 233 lb (105.7 kg)   LMP 08/16/2010   BMI 38.77 kg/m²     CONSTITUTIONAL:  awake and alert  LUNGS:  clear to auscultation  ABDOMEN:  normal bowel sounds, soft, non-distended, non-tender, incision healing, drain in place     Data:      Radiology Review:  None      ASSESSMENT AND PLAN:  S/P seroma drainage  Has about 75 ml / day still output.  Will wait until next week to remove    Kaila Hardy MD

## 2022-10-10 ENCOUNTER — TELEPHONE (OUTPATIENT)
Dept: SURGERY | Age: 62
End: 2022-10-10

## 2022-10-11 ENCOUNTER — OFFICE VISIT (OUTPATIENT)
Dept: SURGERY | Age: 62
End: 2022-10-11

## 2022-10-11 VITALS — BODY MASS INDEX: 38.77 KG/M2 | WEIGHT: 233 LBS | SYSTOLIC BLOOD PRESSURE: 112 MMHG | DIASTOLIC BLOOD PRESSURE: 60 MMHG

## 2022-10-11 DIAGNOSIS — Z09 POSTOP CHECK: Primary | ICD-10-CM

## 2022-10-11 PROCEDURE — 99024 POSTOP FOLLOW-UP VISIT: CPT | Performed by: SURGERY

## 2022-10-11 RX ORDER — SULFAMETHOXAZOLE AND TRIMETHOPRIM 800; 160 MG/1; MG/1
1 TABLET ORAL 2 TIMES DAILY
Qty: 14 TABLET | Refills: 1 | Status: ON HOLD | OUTPATIENT
Start: 2022-10-11 | End: 2022-10-15 | Stop reason: HOSPADM

## 2022-10-11 NOTE — PROGRESS NOTES
[facility] GENERAL AND LAPAROSCOPIC SURGERY          PATIENT NAME: Huamira Salter     TODAY'S DATE: 10/11/2022    SUBJECTIVE:    Pt here for drain check, still some output, no swelling or pain. Stoma bag fitting better with seroma drained.      OBJECTIVE:  VITALS:  Wt 233 lb (105.7 kg)   LMP 08/16/2010   BMI 38.77 kg/m²     CONSTITUTIONAL:  awake and alert    ABDOMEN:  normal bowel sounds, soft, obese, non-distended, non-tender, drain in left abdomen     Data:      Radiology Review:  No new studies      ASSESSMENT AND PLAN:  Remove drain today  Follow up again in a couple weeks  If area increases in size again then would do drainage with VAC placement for continuous suction    Trisha Pires MD

## 2022-10-12 ENCOUNTER — APPOINTMENT (OUTPATIENT)
Dept: CT IMAGING | Age: 62
DRG: 854 | End: 2022-10-12
Payer: COMMERCIAL

## 2022-10-12 ENCOUNTER — HOSPITAL ENCOUNTER (INPATIENT)
Age: 62
LOS: 3 days | Discharge: HOME | DRG: 854 | End: 2022-10-15
Attending: EMERGENCY MEDICINE | Admitting: INTERNAL MEDICINE
Payer: COMMERCIAL

## 2022-10-12 DIAGNOSIS — S30.1XXA ABDOMINAL WALL SEROMA, INITIAL ENCOUNTER: Primary | ICD-10-CM

## 2022-10-12 DIAGNOSIS — L02.211 ABDOMINAL WALL ABSCESS: ICD-10-CM

## 2022-10-12 LAB
A/G RATIO: 0.9 (ref 1.1–2.2)
ALBUMIN SERPL-MCNC: 3.4 G/DL (ref 3.4–5)
ALP BLD-CCNC: 122 U/L (ref 40–129)
ALT SERPL-CCNC: 18 U/L (ref 10–40)
ANION GAP SERPL CALCULATED.3IONS-SCNC: 13 MMOL/L (ref 3–16)
AST SERPL-CCNC: 17 U/L (ref 15–37)
BASOPHILS ABSOLUTE: 0 K/UL (ref 0–0.2)
BASOPHILS RELATIVE PERCENT: 0.2 %
BILIRUB SERPL-MCNC: 0.6 MG/DL (ref 0–1)
BUN BLDV-MCNC: 10 MG/DL (ref 7–20)
C-REACTIVE PROTEIN: 79 MG/L (ref 0–5.1)
CALCIUM SERPL-MCNC: 9.8 MG/DL (ref 8.3–10.6)
CHLORIDE BLD-SCNC: 103 MMOL/L (ref 99–110)
CO2: 18 MMOL/L (ref 21–32)
CREAT SERPL-MCNC: 0.6 MG/DL (ref 0.6–1.2)
EOSINOPHILS ABSOLUTE: 0 K/UL (ref 0–0.6)
EOSINOPHILS RELATIVE PERCENT: 0 %
GFR AFRICAN AMERICAN: >60
GFR NON-AFRICAN AMERICAN: >60
GLUCOSE BLD-MCNC: 204 MG/DL (ref 70–99)
GLUCOSE BLD-MCNC: 213 MG/DL (ref 70–99)
HCT VFR BLD CALC: 36.4 % (ref 36–48)
HEMOGLOBIN: 12 G/DL (ref 12–16)
LIPASE: 43 U/L (ref 13–60)
LYMPHOCYTES ABSOLUTE: 0.9 K/UL (ref 1–5.1)
LYMPHOCYTES RELATIVE PERCENT: 8.4 %
MCH RBC QN AUTO: 25.8 PG (ref 26–34)
MCHC RBC AUTO-ENTMCNC: 33 G/DL (ref 31–36)
MCV RBC AUTO: 78.2 FL (ref 80–100)
MONOCYTES ABSOLUTE: 0.3 K/UL (ref 0–1.3)
MONOCYTES RELATIVE PERCENT: 2.7 %
NEUTROPHILS ABSOLUTE: 9.7 K/UL (ref 1.7–7.7)
NEUTROPHILS RELATIVE PERCENT: 88.7 %
PDW BLD-RTO: 15.1 % (ref 12.4–15.4)
PERFORMED ON: ABNORMAL
PLATELET # BLD: 306 K/UL (ref 135–450)
PMV BLD AUTO: 6.9 FL (ref 5–10.5)
POTASSIUM SERPL-SCNC: 4.7 MMOL/L (ref 3.5–5.1)
PROCALCITONIN: 0.19 NG/ML (ref 0–0.15)
RBC # BLD: 4.66 M/UL (ref 4–5.2)
SEDIMENTATION RATE, ERYTHROCYTE: 118 MM/HR (ref 0–30)
SODIUM BLD-SCNC: 134 MMOL/L (ref 136–145)
TOTAL PROTEIN: 7.3 G/DL (ref 6.4–8.2)
TROPONIN: <0.01 NG/ML
WBC # BLD: 10.9 K/UL (ref 4–11)

## 2022-10-12 PROCEDURE — 84145 PROCALCITONIN (PCT): CPT

## 2022-10-12 PROCEDURE — 1200000000 HC SEMI PRIVATE

## 2022-10-12 PROCEDURE — 84484 ASSAY OF TROPONIN QUANT: CPT

## 2022-10-12 PROCEDURE — 2580000003 HC RX 258: Performed by: INTERNAL MEDICINE

## 2022-10-12 PROCEDURE — 80053 COMPREHEN METABOLIC PANEL: CPT

## 2022-10-12 PROCEDURE — 6370000000 HC RX 637 (ALT 250 FOR IP): Performed by: INTERNAL MEDICINE

## 2022-10-12 PROCEDURE — 85652 RBC SED RATE AUTOMATED: CPT

## 2022-10-12 PROCEDURE — 6370000000 HC RX 637 (ALT 250 FOR IP)

## 2022-10-12 PROCEDURE — 74177 CT ABD & PELVIS W/CONTRAST: CPT

## 2022-10-12 PROCEDURE — 6360000004 HC RX CONTRAST MEDICATION: Performed by: PHYSICIAN ASSISTANT

## 2022-10-12 PROCEDURE — 6360000002 HC RX W HCPCS: Performed by: EMERGENCY MEDICINE

## 2022-10-12 PROCEDURE — 2580000003 HC RX 258: Performed by: EMERGENCY MEDICINE

## 2022-10-12 PROCEDURE — 6360000002 HC RX W HCPCS: Performed by: PHYSICIAN ASSISTANT

## 2022-10-12 PROCEDURE — 96365 THER/PROPH/DIAG IV INF INIT: CPT

## 2022-10-12 PROCEDURE — 6370000000 HC RX 637 (ALT 250 FOR IP): Performed by: PHYSICIAN ASSISTANT

## 2022-10-12 PROCEDURE — 2500000003 HC RX 250 WO HCPCS: Performed by: INTERNAL MEDICINE

## 2022-10-12 PROCEDURE — 96372 THER/PROPH/DIAG INJ SC/IM: CPT

## 2022-10-12 PROCEDURE — 86140 C-REACTIVE PROTEIN: CPT

## 2022-10-12 PROCEDURE — 2580000003 HC RX 258: Performed by: PHYSICIAN ASSISTANT

## 2022-10-12 PROCEDURE — 93005 ELECTROCARDIOGRAM TRACING: CPT | Performed by: PHYSICIAN ASSISTANT

## 2022-10-12 PROCEDURE — 83690 ASSAY OF LIPASE: CPT

## 2022-10-12 PROCEDURE — 99285 EMERGENCY DEPT VISIT HI MDM: CPT

## 2022-10-12 PROCEDURE — 85025 COMPLETE CBC W/AUTO DIFF WBC: CPT

## 2022-10-12 RX ORDER — ONDANSETRON 4 MG/1
4 TABLET, ORALLY DISINTEGRATING ORAL EVERY 8 HOURS PRN
Status: DISCONTINUED | OUTPATIENT
Start: 2022-10-12 | End: 2022-10-15 | Stop reason: HOSPADM

## 2022-10-12 RX ORDER — ONDANSETRON 2 MG/ML
4 INJECTION INTRAMUSCULAR; INTRAVENOUS EVERY 6 HOURS PRN
Status: DISCONTINUED | OUTPATIENT
Start: 2022-10-12 | End: 2022-10-15 | Stop reason: HOSPADM

## 2022-10-12 RX ORDER — INSULIN GLARGINE 100 [IU]/ML
20 INJECTION, SOLUTION SUBCUTANEOUS 2 TIMES DAILY
Status: DISCONTINUED | OUTPATIENT
Start: 2022-10-12 | End: 2022-10-12

## 2022-10-12 RX ORDER — INSULIN LISPRO 100 [IU]/ML
10 INJECTION, SOLUTION INTRAVENOUS; SUBCUTANEOUS
Status: DISCONTINUED | OUTPATIENT
Start: 2022-10-13 | End: 2022-10-15 | Stop reason: HOSPADM

## 2022-10-12 RX ORDER — 0.9 % SODIUM CHLORIDE 0.9 %
1000 INTRAVENOUS SOLUTION INTRAVENOUS ONCE
Status: DISCONTINUED | OUTPATIENT
Start: 2022-10-12 | End: 2022-10-12

## 2022-10-12 RX ORDER — LOSARTAN POTASSIUM 100 MG/1
100 TABLET ORAL DAILY
Status: DISCONTINUED | OUTPATIENT
Start: 2022-10-13 | End: 2022-10-15 | Stop reason: HOSPADM

## 2022-10-12 RX ORDER — PROMETHAZINE HYDROCHLORIDE 25 MG/ML
6.25 INJECTION, SOLUTION INTRAMUSCULAR; INTRAVENOUS EVERY 6 HOURS PRN
Status: DISCONTINUED | OUTPATIENT
Start: 2022-10-12 | End: 2022-10-15 | Stop reason: HOSPADM

## 2022-10-12 RX ORDER — ENOXAPARIN SODIUM 100 MG/ML
40 INJECTION SUBCUTANEOUS DAILY
Status: DISCONTINUED | OUTPATIENT
Start: 2022-10-13 | End: 2022-10-15 | Stop reason: HOSPADM

## 2022-10-12 RX ORDER — INSULIN LISPRO 100 [IU]/ML
0-4 INJECTION, SOLUTION INTRAVENOUS; SUBCUTANEOUS NIGHTLY
Status: DISCONTINUED | OUTPATIENT
Start: 2022-10-12 | End: 2022-10-15 | Stop reason: HOSPADM

## 2022-10-12 RX ORDER — ONDANSETRON 8 MG/1
8 TABLET, ORALLY DISINTEGRATING ORAL ONCE
Status: DISCONTINUED | OUTPATIENT
Start: 2022-10-12 | End: 2022-10-12

## 2022-10-12 RX ORDER — SODIUM CHLORIDE 0.9 % (FLUSH) 0.9 %
5-40 SYRINGE (ML) INJECTION EVERY 12 HOURS SCHEDULED
Status: DISCONTINUED | OUTPATIENT
Start: 2022-10-12 | End: 2022-10-15 | Stop reason: HOSPADM

## 2022-10-12 RX ORDER — ACETAMINOPHEN 325 MG/1
650 TABLET ORAL ONCE
Status: DISCONTINUED | OUTPATIENT
Start: 2022-10-12 | End: 2022-10-12

## 2022-10-12 RX ORDER — CETIRIZINE HYDROCHLORIDE 10 MG/1
10 TABLET ORAL DAILY PRN
Status: DISCONTINUED | OUTPATIENT
Start: 2022-10-13 | End: 2022-10-15 | Stop reason: HOSPADM

## 2022-10-12 RX ORDER — PANTOPRAZOLE SODIUM 40 MG/1
40 TABLET, DELAYED RELEASE ORAL
Status: DISCONTINUED | OUTPATIENT
Start: 2022-10-13 | End: 2022-10-15 | Stop reason: HOSPADM

## 2022-10-12 RX ORDER — PROMETHAZINE HYDROCHLORIDE 25 MG/ML
25 INJECTION, SOLUTION INTRAMUSCULAR; INTRAVENOUS ONCE
Status: COMPLETED | OUTPATIENT
Start: 2022-10-12 | End: 2022-10-12

## 2022-10-12 RX ORDER — INSULIN LISPRO 100 [IU]/ML
0-8 INJECTION, SOLUTION INTRAVENOUS; SUBCUTANEOUS
Status: DISCONTINUED | OUTPATIENT
Start: 2022-10-13 | End: 2022-10-15 | Stop reason: HOSPADM

## 2022-10-12 RX ORDER — ENOXAPARIN SODIUM 100 MG/ML
30 INJECTION SUBCUTANEOUS 2 TIMES DAILY
Status: DISCONTINUED | OUTPATIENT
Start: 2022-10-13 | End: 2022-10-12

## 2022-10-12 RX ORDER — ATORVASTATIN CALCIUM 10 MG/1
10 TABLET, FILM COATED ORAL NIGHTLY
Status: DISCONTINUED | OUTPATIENT
Start: 2022-10-12 | End: 2022-10-15 | Stop reason: HOSPADM

## 2022-10-12 RX ORDER — MORPHINE SULFATE 4 MG/ML
4 INJECTION, SOLUTION INTRAMUSCULAR; INTRAVENOUS ONCE
Status: DISCONTINUED | OUTPATIENT
Start: 2022-10-12 | End: 2022-10-12 | Stop reason: HOSPADM

## 2022-10-12 RX ORDER — LEVOTHYROXINE SODIUM 0.15 MG/1
300 TABLET ORAL DAILY
Status: DISCONTINUED | OUTPATIENT
Start: 2022-10-13 | End: 2022-10-15 | Stop reason: HOSPADM

## 2022-10-12 RX ORDER — METRONIDAZOLE 500 MG/100ML
500 INJECTION, SOLUTION INTRAVENOUS EVERY 8 HOURS
Status: DISCONTINUED | OUTPATIENT
Start: 2022-10-12 | End: 2022-10-14

## 2022-10-12 RX ORDER — MORPHINE SULFATE 4 MG/ML
4 INJECTION, SOLUTION INTRAMUSCULAR; INTRAVENOUS EVERY 4 HOURS PRN
Status: DISCONTINUED | OUTPATIENT
Start: 2022-10-12 | End: 2022-10-15 | Stop reason: HOSPADM

## 2022-10-12 RX ORDER — INSULIN GLARGINE 100 [IU]/ML
20 INJECTION, SOLUTION SUBCUTANEOUS 2 TIMES DAILY
Status: DISCONTINUED | OUTPATIENT
Start: 2022-10-12 | End: 2022-10-15 | Stop reason: HOSPADM

## 2022-10-12 RX ORDER — 0.9 % SODIUM CHLORIDE 0.9 %
1000 INTRAVENOUS SOLUTION INTRAVENOUS ONCE
Status: COMPLETED | OUTPATIENT
Start: 2022-10-12 | End: 2022-10-12

## 2022-10-12 RX ORDER — POLYETHYLENE GLYCOL 3350 17 G/17G
17 POWDER, FOR SOLUTION ORAL DAILY PRN
Status: DISCONTINUED | OUTPATIENT
Start: 2022-10-12 | End: 2022-10-15 | Stop reason: HOSPADM

## 2022-10-12 RX ORDER — HYDROCODONE BITARTRATE AND ACETAMINOPHEN 5; 325 MG/1; MG/1
1 TABLET ORAL ONCE
Status: DISCONTINUED | OUTPATIENT
Start: 2022-10-12 | End: 2022-10-12 | Stop reason: HOSPADM

## 2022-10-12 RX ORDER — SODIUM CHLORIDE, SODIUM LACTATE, POTASSIUM CHLORIDE, CALCIUM CHLORIDE 600; 310; 30; 20 MG/100ML; MG/100ML; MG/100ML; MG/100ML
INJECTION, SOLUTION INTRAVENOUS CONTINUOUS
Status: DISCONTINUED | OUTPATIENT
Start: 2022-10-12 | End: 2022-10-15 | Stop reason: HOSPADM

## 2022-10-12 RX ORDER — ACETAMINOPHEN 650 MG/1
650 SUPPOSITORY RECTAL EVERY 6 HOURS PRN
Status: DISCONTINUED | OUTPATIENT
Start: 2022-10-12 | End: 2022-10-15 | Stop reason: HOSPADM

## 2022-10-12 RX ORDER — DEXTROSE MONOHYDRATE 100 MG/ML
INJECTION, SOLUTION INTRAVENOUS CONTINUOUS PRN
Status: DISCONTINUED | OUTPATIENT
Start: 2022-10-12 | End: 2022-10-15 | Stop reason: HOSPADM

## 2022-10-12 RX ORDER — ACETAMINOPHEN 325 MG/1
650 TABLET ORAL ONCE
Status: COMPLETED | OUTPATIENT
Start: 2022-10-12 | End: 2022-10-12

## 2022-10-12 RX ORDER — ACETAMINOPHEN 325 MG/1
650 TABLET ORAL EVERY 6 HOURS PRN
Status: DISCONTINUED | OUTPATIENT
Start: 2022-10-12 | End: 2022-10-15 | Stop reason: HOSPADM

## 2022-10-12 RX ORDER — SODIUM CHLORIDE 0.9 % (FLUSH) 0.9 %
5-40 SYRINGE (ML) INJECTION PRN
Status: DISCONTINUED | OUTPATIENT
Start: 2022-10-12 | End: 2022-10-15 | Stop reason: HOSPADM

## 2022-10-12 RX ORDER — SODIUM CHLORIDE 9 MG/ML
INJECTION, SOLUTION INTRAVENOUS PRN
Status: DISCONTINUED | OUTPATIENT
Start: 2022-10-12 | End: 2022-10-15 | Stop reason: HOSPADM

## 2022-10-12 RX ORDER — ONDANSETRON 4 MG/1
8 TABLET, ORALLY DISINTEGRATING ORAL ONCE
Status: COMPLETED | OUTPATIENT
Start: 2022-10-12 | End: 2022-10-12

## 2022-10-12 RX ORDER — ACETAMINOPHEN 325 MG/1
TABLET ORAL
Status: COMPLETED
Start: 2022-10-12 | End: 2022-10-12

## 2022-10-12 RX ADMIN — IOPAMIDOL 75 ML: 755 INJECTION, SOLUTION INTRAVENOUS at 16:31

## 2022-10-12 RX ADMIN — ACETAMINOPHEN 650 MG: 325 TABLET ORAL at 21:57

## 2022-10-12 RX ADMIN — SODIUM CHLORIDE, POTASSIUM CHLORIDE, SODIUM LACTATE AND CALCIUM CHLORIDE: 600; 310; 30; 20 INJECTION, SOLUTION INTRAVENOUS at 22:27

## 2022-10-12 RX ADMIN — METRONIDAZOLE 500 MG: 500 INJECTION, SOLUTION INTRAVENOUS at 22:28

## 2022-10-12 RX ADMIN — ONDANSETRON 8 MG: 4 TABLET, ORALLY DISINTEGRATING ORAL at 13:49

## 2022-10-12 RX ADMIN — SODIUM CHLORIDE 1000 ML: 9 INJECTION, SOLUTION INTRAVENOUS at 18:11

## 2022-10-12 RX ADMIN — VANCOMYCIN HYDROCHLORIDE 2000 MG: 1 INJECTION, POWDER, LYOPHILIZED, FOR SOLUTION INTRAVENOUS at 19:51

## 2022-10-12 RX ADMIN — INSULIN GLARGINE 20 UNITS: 100 INJECTION, SOLUTION SUBCUTANEOUS at 22:19

## 2022-10-12 RX ADMIN — PROMETHAZINE HYDROCHLORIDE 25 MG: 25 INJECTION INTRAMUSCULAR; INTRAVENOUS at 19:23

## 2022-10-12 RX ADMIN — Medication 10 ML: at 22:28

## 2022-10-12 RX ADMIN — CEFEPIME 2000 MG: 2 INJECTION, POWDER, FOR SOLUTION INTRAVENOUS at 19:17

## 2022-10-12 RX ADMIN — ACETAMINOPHEN 650 MG: 325 TABLET ORAL at 13:48

## 2022-10-12 RX ADMIN — ATORVASTATIN CALCIUM 10 MG: 10 TABLET, FILM COATED ORAL at 22:19

## 2022-10-12 ASSESSMENT — PAIN SCALES - GENERAL
PAINLEVEL_OUTOF10: 0
PAINLEVEL_OUTOF10: 2

## 2022-10-12 ASSESSMENT — PAIN DESCRIPTION - LOCATION: LOCATION: ABDOMEN

## 2022-10-12 ASSESSMENT — PAIN - FUNCTIONAL ASSESSMENT: PAIN_FUNCTIONAL_ASSESSMENT: NONE - DENIES PAIN

## 2022-10-12 NOTE — ED PROVIDER NOTES
I have seen and evaluated this patient with my supervising physician Liane Carbone DO. Chief Complaint:   Chief Complaint   Patient presents with    Generalized Body Aches     Pt arrived via squad. Aches and pains for 1 week. Started zpak on Sunday. Fever 102. N/v started today. ED Course & Medical Decision Making  65 y.o. female presenting with fevers, abdominal pain.    -cbc/chem: not concerning  -Trop: negative x1  -LFT/Lipase: normal   -CRP: 79  -Procal 0.19  -      -Vancomycin/cefepime    -Ct abd pelvis w/: seroma reduced in size, but now with thickening and air, concerning for infection    MDM: This is 58-year-old female has an extensive abdominal surgical history. Her seroma has reduced in size, but is now apparently infected. Started on broad-spectrum antibiotics, and recheck to general surgery. General surgery will see the patient tomorrow morning. Plan on admitting to hospitalist.        Final Clinical Impression & Plan:  Infected seroma  - Admit      ---------------------------------------------------------------------------------------------------------------  HPI:  PMH significant for ulcerative colitis, status post colectomy. Seroma, stoma    Presenting with fevers, chills, muscle aches. Reports nausea and vomiting starting 2 days ago. Gradually worsening over the past week. Had a seroma drained, recent removal of drain. Abdominal pain is new as of today. Worse on the right side of the abdomen. She reports that old drain site is clean/dry/intact. No falls, shortness of breath, headache. No back pain. Is this patient to be included in the SEP-1 Core Measure due to severe sepsis or septic shock?    No   Exclusion criteria - the patient is NOT to be included for SEP-1 Core Measure due to:  2+ SIRS criteria are not met      Medical Hx: Past medical history reviewed, and pertinent for:     Past Medical History:   Diagnosis Date    Asthma     Diabetes mellitus ([facility]     GERD (gastroesophageal reflux disease)     Hyperlipidemia     Hypertension     Ileostomy care ([facility] [address] 3/17/2020    Iritis     PCOS (polycystic ovarian syndrome)     Pyoderma     Thyroid disease     Ulcerative colitis     Ulcerative colitis ([facility] [address]        Patient Active Problem List   Diagnosis    Ileostomy care [facility])    Uncontrolled type 2 diabetes mellitus with complication, with long-term current use of insulin    Hypothyroidism    Essential hypertension    Mixed hyperlipidemia    Ulcerative (chronic) enterocolitis, unspecified complication (HCC)    Small bowel obstruction (HCC)    Incarcerated incisional hernia    Generalized abdominal pain    Postoperative fever    Abdominal wall seroma         Surgical Hx:   Past surgical history reviewed, and pertinent for:      Past Surgical History:   Procedure Laterality Date    ABDOMEN SURGERY      COLON RESECTION FOR ULCERATIVE COLITIS THEN HAD ANUS REMOVED    ABDOMEN SURGERY N/A 9/30/2022    INCISION AND DRAINAGE OF ABDOMINAL WALL SEROMA (08770) performed by William Lozano MD at [address] [place] [address] ENDOSCOPY N/A 11/23/2021    EGD BIOPSY performed by Panchito Marie MD at [address] [place] [address] N/A 1/18/2022    EGD DIAGNOSTIC ONLY performed by Panchito Marie MD at [place] [address] N/A 11/16/2021    OPEN REPAIR INCISIONAL HERNIA WITH MESH, LYSIS OF ADHESIONS performed by William Lozano MD at [address] [place] [address] Hx:       Social History     Socioeconomic History    Marital status: Single     Spouse name: Not on file    Number of children: Not on file    Years of education: Not on file    Highest education level: Not on file   Occupational History    Not on file   Tobacco Use    Smoking status: Never    Smokeless tobacco: Never   Vaping Use    Vaping Use: Never used   Substance and Sexual Activity    Alcohol use: No    Drug use: No    Sexual activity: Not on file   Other Topics Concern    Not on file   Social History Narrative    Not on file     Social Determinants of Health     Financial Resource Strain: Not on file   Food Insecurity: Not on file   Transportation Needs: Not on file   Physical Activity: Not on file   Stress: Not on file   Social Connections: Not on file   Intimate Partner Violence: Not on file   Housing Stability: Not on file         Medications:  Previous Medications    ACETAMINOPHEN (TYLENOL) 500 MG TABLET    Take 500 mg by mouth every 6 hours as needed for Pain    ALBUTEROL IN    Inhale into the lungs    ATORVASTATIN (LIPITOR) 10 MG TABLET    Take 10 mg by mouth daily    BD INSULIN SYRINGE U/F 30G X 1/2\" 0.3 ML MISC    USE WITH INSULIN TWICE A DAY    BLOOD GLUCOSE TEST STRIPS (ONETOUCH ULTRA) STRIP    Test 4 times daily. CHOLECALCIFEROL (VITAMIN D3) 1.25 MG (27616 UT) CAPS    Take by mouth once a week    COENZYME Q10 PO    Take by mouth    CONTINUOUS BLOOD GLUC SENSOR (FREESTYLE EDY 2 SENSOR) MISC    CHANGE EVERY 14 DAYS TO MONITOR BS    CRANBERRY PO    Take by mouth daily    FERROUS SULFATE (IRON PO)    Take by mouth daily     FLUTICASONE-VILANTEROL (BREO ELLIPTA) 100-25 MCG/INH AEPB INHALER    Inhale 1 puff into the lungs as needed     INSULIN ASPART (NOVOLOG FLEXPEN) 100 UNIT/ML INJECTION PEN    25 units bid    INSULIN ASPART (NOVOLOG) 100 UNIT/ML INJECTION VIAL    Inject 25 units into the skin daily    INSULIN GLARGINE (LANTUS SOLOSTAR) 100 UNIT/ML INJECTION PEN    Inject 50 units into the skin BID.     INSULIN PEN NEEDLE (B-D ULTRAFINE III SHORT PEN) 31G X 8 MM MISC    USE TO INJECT INSULIN FOUR TIMES DAILY    LEVOTHYROXINE (SYNTHROID) 300 MCG TABLET    300 MCG DAILY    LORATADINE (CLARITIN) 10 MG CAPSULE    Take by mouth daily     LOSARTAN (COZAAR) 100 MG TABLET    Take 100 mg by mouth daily    MAGNESIUM PO    Take by mouth daily     MONTELUKAST SODIUM (SINGULAIR PO)    Take by mouth    MULTIPLE VITAMIN (MULTIVITAMIN ADULT PO)    Take by mouth    OMEGA-3 FATTY ACIDS (FISH OIL) 1000 MG CAPS    Take 3,000 mg by mouth daily    OMEPRAZOLE PO    Take by mouth OTC daily    ONE TOUCH ULTRASOFT LANCETS MISC    USE TO TEST BLOOD GLUCOSE 4 TIMES DAILY    POTASSIUM PO    Take by mouth daily     SULFAMETHOXAZOLE-TRIMETHOPRIM (BACTRIM DS) 800-160 MG PER TABLET    Take 1 tablet by mouth 2 times daily for 7 days    TRIAMCINOLONE ACETONIDE NA    by Nasal route as needed        Allergies:  Patient has no known allergies. ROS:  10pt review of systems was performed and was negative except as noted in HPI     Imaging:  CT ABDOMEN PELVIS W IV CONTRAST Additional Contrast? None   Final Result   The previously demonstrated collection within the subcutaneous tissues of the   anterior abdomen and pelvis has decreased in size though has developed mural   thickening and internal foci of air, most suggestive of abscess. Findings   are also suspicious for cutaneous fistulas extending anteriorly from the   collection. Parastomal hernia containing loops of small bowel though without evidence of   bowel dilatation to suggest obstruction. Cholelithiasis. There is also endothelial enhancement of the common bile   duct which could be inflammatory or infectious. Clinical correlation   suggested.              Labs:  Results for orders placed or performed during the hospital encounter of 10/12/22   CBC with Auto Differential   Result Value Ref Range    WBC 10.9 4.0 - 11.0 K/uL    RBC 4.66 4.00 - 5.20 M/uL    Hemoglobin 12.0 12.0 - 16.0 g/dL    Hematocrit 36.4 36.0 - 48.0 %    MCV 78.2 (L) 80.0 - 100.0 fL    MCH 25.8 (L) 26.0 - 34.0 pg    MCHC 33.0 31.0 - 36.0 g/dL    RDW 15.1 12.4 - 15.4 %    Platelets 640 544 - 342 K/uL    MPV 6.9 5.0 - 10.5 fL    Neutrophils % 88.7 %    Lymphocytes % 8.4 %    Monocytes % 2.7 %    Eosinophils % 0.0 %    Basophils % 0.2 %    Neutrophils Absolute 9.7 (H) 1.7 - 7.7 K/uL    Lymphocytes Absolute 0.9 (L) 1.0 - 5.1 K/uL    Monocytes Absolute 0.3 0.0 - 1.3 K/uL    Eosinophils Absolute 0.0 0.0 - 0.6 K/uL    Basophils Absolute 0.0 0.0 - 0.2 K/uL   Comprehensive Metabolic Panel   Result Value Ref Range    Sodium 134 (L) 136 - 145 mmol/L    Potassium 4.7 3.5 - 5.1 mmol/L    Chloride 103 99 - 110 mmol/L    CO2 18 (L) 21 - 32 mmol/L    Anion Gap 13 3 - 16    Glucose 204 (H) 70 - 99 mg/dL    BUN 10 7 - 20 mg/dL    Creatinine 0.6 0.6 - 1.2 mg/dL    GFR Non-African American >60 >60    GFR African American >60 >60    Calcium 9.8 8.3 - 10.6 mg/dL    Total Protein 7.3 6.4 - 8.2 g/dL    Albumin 3.4 3.4 - 5.0 g/dL    Albumin/Globulin Ratio 0.9 (L) 1.1 - 2.2    Total Bilirubin 0.6 0.0 - 1.0 mg/dL    Alkaline Phosphatase 122 40 - 129 U/L    ALT 18 10 - 40 U/L    AST 17 15 - 37 U/L   Troponin   Result Value Ref Range    Troponin <0.01 <0.01 ng/mL   Lipase   Result Value Ref Range    Lipase 43.0 13.0 - 60.0 U/L   C-Reactive Protein   Result Value Ref Range    CRP 79.0 (H) 0.0 - 5.1 mg/L   Procalcitonin   Result Value Ref Range    Procalcitonin 0.19 (H) 0.00 - 0.15 ng/mL   Sedimentation Rate   Result Value Ref Range    Sed Rate 118 (H) 0 - 30 mm/Hr   EKG 12 Lead   Result Value Ref Range    Ventricular Rate 121 BPM    Atrial Rate 121 BPM    P-R Interval 136 ms    QRS Duration 78 ms    Q-T Interval 312 ms    QTc Calculation (Bazett) 443 ms    P Axis 15 degrees    R Axis -13 degrees    T Axis 15 degrees    Diagnosis       Sinus tachycardiaPossible Anterior infarct , age undetermined       Screenings:     [place] Coma Scale  Eye Opening: Spontaneous  Best Verbal Response: Oriented  Best Motor Response: Obeys commands  [place] Coma Scale Score: 15              Physical Exam:  Vitals: BP (!) 140/61   Pulse (!) 111   Temp 97.8 °F (36.6 °C) (Oral)   Resp 18   Ht 5' 5\" (1.651 m)   Wt 230 lb (104.3 kg)   LMP 08/16/2010   SpO2 92%   BMI 38.27 kg/m²    General: awake, alert, no apparent distress  Pupils: equal, reactive  Eyes: EOM intact, conjunctiva clear, no discharge  Head: Non-traumatic  Neck: Supple  Mouth: Moist, no oral lesions, no tonsillar enlargement  Heart: Rate as noted, regular rhythm, no murmur or rubs. Chest/Lungs: CTAB, no wheezes or crackles  Abdomen: soft, nondistended, R sided abd pain. Stoma on Right side. Seroma drain glued, but not draining. Back: No midline tenderness. No CVA tenderness  Extremities:  cap refill <2 UE/LE, no tenderness of calves, no edema  Neuro: Moving all extremities, no facial droop, no slurred speech, answers questions appropriately. Skin: Warm.  No visible rash, lesions, or bruising           CONSTANCE Draper        [place], [place]  10/12/22 1932

## 2022-10-12 NOTE — ED PROVIDER NOTES
In addition to the advanced practice provider, I personally saw Roz Hadley and performed a substantive portion of the visit including all aspects of the medical decision making. Briefly, this is a 65 y.o. female here for aching abdominal pain. Associated with nausea and subjective fevers. Patient with complicated abdominal surgical history, underwent Manolo-Joseph drain placement for abdominal wall seroma, drain was recently removed. On exam, patient afebrile, mildly ill-appearing however nontoxic. [place] Luster No distress. Heart tachycardic, regular rhythm. Lungs CTAB. Abdomen soft, nondistended, mild tenderness palpation in periumbilical region. No rebound, no rigidity, no guarding. No overlying cellulitis of abdominal wall clinically apparent. EKG  EKG was reviewed by emergency department physician in the absence of a cardiologist    Narrow complex sinus rhythm, rate 121, normal axis, normal IN and QRS intervals, normal Qtc, no ST elevations or depressions, normal t-wave morphology, impression sinus tachycardia, no STEMI      Screenings   [place] Coma Scale  Eye Opening: Spontaneous  Best Verbal Response: Oriented  Best Motor Response: Obeys commands  [place] Coma Scale Score: 15      Is this patient to be included in the SEP-1 Core Measure due to severe sepsis or septic shock? No   Exclusion criteria - the patient is NOT to be included for SEP-1 Core Measure due to:  May have criteria for sepsis, but does not meet criteria for severe sepsis or septic shock      MDM    Patient afebrile, mildly ill-appearing however nontoxic. She is in no cuco respiratory or painful distress. No peritoneal signs on abdominal exam.  CT imaging demonstrates decreased size of known abdominal wall seroma, however now with inflammatory changes and gas concerning for abscess formation. No findings to suggest intraperitoneal involvement. Will place on broad-spectrum antibiotics.   Suspect patient's fever and abdominal tenderness are secondary to infected seroma. Case discussed with Dr. Kathrine Garner for general surgery who agrees with management plan and admission. Case discussed with internal medicine team who will admit. Patient remained alert and hemodynamically stable over her emergency department course, meets criteria for sepsis however is not in shock. I Dr. Itzel Morgan am the primary clinician of record. Patient Referrals:  No follow-up provider specified. Discharge Medications:  New Prescriptions    No medications on file       FINAL IMPRESSION  1. Infected abdominal wall seroma, initial encounter        Blood pressure (!) 140/61, pulse (!) 111, temperature 97.8 °F (36.6 °C), temperature source Oral, resp. rate 18, height 5' 5\" (1.651 m), weight 230 lb (104.3 kg), last menstrual period 08/16/2010, SpO2 92 %. For further details of [address] [place] [address] emergency department encounter, please see documentation by advanced practice provider, CONSTANCE Cooper.     Timo De La Garza DO (electronically signed)  Attending Emergency Physician       Timo De La Garza DO  10/13/22 0270

## 2022-10-13 ENCOUNTER — ANESTHESIA EVENT (OUTPATIENT)
Dept: OPERATING ROOM | Age: 62
DRG: 854 | End: 2022-10-13
Payer: COMMERCIAL

## 2022-10-13 LAB
A/G RATIO: 0.8 (ref 1.1–2.2)
ALBUMIN SERPL-MCNC: 2.8 G/DL (ref 3.4–5)
ALP BLD-CCNC: 97 U/L (ref 40–129)
ALT SERPL-CCNC: 15 U/L (ref 10–40)
ANION GAP SERPL CALCULATED.3IONS-SCNC: 9 MMOL/L (ref 3–16)
AST SERPL-CCNC: 14 U/L (ref 15–37)
BASOPHILS ABSOLUTE: 0 K/UL (ref 0–0.2)
BASOPHILS RELATIVE PERCENT: 0.2 %
BILIRUB SERPL-MCNC: 0.4 MG/DL (ref 0–1)
BUN BLDV-MCNC: 9 MG/DL (ref 7–20)
CALCIUM SERPL-MCNC: 8.9 MG/DL (ref 8.3–10.6)
CHLORIDE BLD-SCNC: 105 MMOL/L (ref 99–110)
CO2: 21 MMOL/L (ref 21–32)
CREAT SERPL-MCNC: <0.5 MG/DL (ref 0.6–1.2)
EKG ATRIAL RATE: 121 BPM
EKG DIAGNOSIS: NORMAL
EKG P AXIS: 15 DEGREES
EKG P-R INTERVAL: 136 MS
EKG Q-T INTERVAL: 312 MS
EKG QRS DURATION: 78 MS
EKG QTC CALCULATION (BAZETT): 443 MS
EKG R AXIS: -13 DEGREES
EKG T AXIS: 15 DEGREES
EKG VENTRICULAR RATE: 121 BPM
EOSINOPHILS ABSOLUTE: 0 K/UL (ref 0–0.6)
EOSINOPHILS RELATIVE PERCENT: 0.1 %
ESTIMATED AVERAGE GLUCOSE: 168.6 MG/DL
GFR AFRICAN AMERICAN: >60
GFR NON-AFRICAN AMERICAN: >60
GLUCOSE BLD-MCNC: 110 MG/DL (ref 70–99)
GLUCOSE BLD-MCNC: 161 MG/DL (ref 70–99)
GLUCOSE BLD-MCNC: 162 MG/DL (ref 70–99)
GLUCOSE BLD-MCNC: 169 MG/DL (ref 70–99)
GLUCOSE BLD-MCNC: 176 MG/DL (ref 70–99)
HBA1C MFR BLD: 7.5 %
HCT VFR BLD CALC: 31.8 % (ref 36–48)
HEMOGLOBIN: 10.3 G/DL (ref 12–16)
INR BLD: 1.23 (ref 0.87–1.14)
LACTIC ACID: 1 MMOL/L (ref 0.4–2)
LYMPHOCYTES ABSOLUTE: 0.4 K/UL (ref 1–5.1)
LYMPHOCYTES RELATIVE PERCENT: 7.8 %
MAGNESIUM: 1.7 MG/DL (ref 1.8–2.4)
MCH RBC QN AUTO: 25.5 PG (ref 26–34)
MCHC RBC AUTO-ENTMCNC: 32.4 G/DL (ref 31–36)
MCV RBC AUTO: 78.9 FL (ref 80–100)
MONOCYTES ABSOLUTE: 0.6 K/UL (ref 0–1.3)
MONOCYTES RELATIVE PERCENT: 10.9 %
NEUTROPHILS ABSOLUTE: 4.2 K/UL (ref 1.7–7.7)
NEUTROPHILS RELATIVE PERCENT: 81 %
PDW BLD-RTO: 14.9 % (ref 12.4–15.4)
PERFORMED ON: ABNORMAL
PHOSPHORUS: 2.7 MG/DL (ref 2.5–4.9)
PLATELET # BLD: 256 K/UL (ref 135–450)
PMV BLD AUTO: 6.9 FL (ref 5–10.5)
POTASSIUM SERPL-SCNC: 3.9 MMOL/L (ref 3.5–5.1)
PREALBUMIN: 9.9 MG/DL (ref 20–40)
PROCALCITONIN: 0.26 NG/ML (ref 0–0.15)
PROTHROMBIN TIME: 15.4 SEC (ref 11.7–14.5)
RBC # BLD: 4.03 M/UL (ref 4–5.2)
SODIUM BLD-SCNC: 135 MMOL/L (ref 136–145)
TOTAL PROTEIN: 6.1 G/DL (ref 6.4–8.2)
VANCOMYCIN RANDOM: 7.6 UG/ML
WBC # BLD: 5.2 K/UL (ref 4–11)

## 2022-10-13 PROCEDURE — 84100 ASSAY OF PHOSPHORUS: CPT

## 2022-10-13 PROCEDURE — 94761 N-INVAS EAR/PLS OXIMETRY MLT: CPT

## 2022-10-13 PROCEDURE — 93010 ELECTROCARDIOGRAM REPORT: CPT | Performed by: INTERNAL MEDICINE

## 2022-10-13 PROCEDURE — 83605 ASSAY OF LACTIC ACID: CPT

## 2022-10-13 PROCEDURE — 83735 ASSAY OF MAGNESIUM: CPT

## 2022-10-13 PROCEDURE — 87641 MR-STAPH DNA AMP PROBE: CPT

## 2022-10-13 PROCEDURE — 6360000002 HC RX W HCPCS: Performed by: INTERNAL MEDICINE

## 2022-10-13 PROCEDURE — 2580000003 HC RX 258: Performed by: INTERNAL MEDICINE

## 2022-10-13 PROCEDURE — 6360000002 HC RX W HCPCS: Performed by: PHYSICIAN ASSISTANT

## 2022-10-13 PROCEDURE — 99222 1ST HOSP IP/OBS MODERATE 55: CPT | Performed by: SURGERY

## 2022-10-13 PROCEDURE — 85610 PROTHROMBIN TIME: CPT

## 2022-10-13 PROCEDURE — 6370000000 HC RX 637 (ALT 250 FOR IP): Performed by: INTERNAL MEDICINE

## 2022-10-13 PROCEDURE — 85025 COMPLETE CBC W/AUTO DIFF WBC: CPT

## 2022-10-13 PROCEDURE — 84134 ASSAY OF PREALBUMIN: CPT

## 2022-10-13 PROCEDURE — 83036 HEMOGLOBIN GLYCOSYLATED A1C: CPT

## 2022-10-13 PROCEDURE — 80202 ASSAY OF VANCOMYCIN: CPT

## 2022-10-13 PROCEDURE — 1200000000 HC SEMI PRIVATE

## 2022-10-13 PROCEDURE — 80053 COMPREHEN METABOLIC PANEL: CPT

## 2022-10-13 PROCEDURE — 2500000003 HC RX 250 WO HCPCS: Performed by: INTERNAL MEDICINE

## 2022-10-13 PROCEDURE — 94640 AIRWAY INHALATION TREATMENT: CPT

## 2022-10-13 PROCEDURE — 36415 COLL VENOUS BLD VENIPUNCTURE: CPT

## 2022-10-13 PROCEDURE — 84145 PROCALCITONIN (PCT): CPT

## 2022-10-13 PROCEDURE — 2580000003 HC RX 258: Performed by: PHYSICIAN ASSISTANT

## 2022-10-13 RX ADMIN — Medication 2 PUFF: at 21:23

## 2022-10-13 RX ADMIN — ENOXAPARIN SODIUM 40 MG: 100 INJECTION SUBCUTANEOUS at 08:32

## 2022-10-13 RX ADMIN — SODIUM CHLORIDE, POTASSIUM CHLORIDE, SODIUM LACTATE AND CALCIUM CHLORIDE: 600; 310; 30; 20 INJECTION, SOLUTION INTRAVENOUS at 14:00

## 2022-10-13 RX ADMIN — CEFEPIME 2000 MG: 2 INJECTION, POWDER, FOR SOLUTION INTRAVENOUS at 06:28

## 2022-10-13 RX ADMIN — METRONIDAZOLE 500 MG: 500 INJECTION, SOLUTION INTRAVENOUS at 07:06

## 2022-10-13 RX ADMIN — CEFEPIME 2000 MG: 2 INJECTION, POWDER, FOR SOLUTION INTRAVENOUS at 17:58

## 2022-10-13 RX ADMIN — ACETAMINOPHEN 650 MG: 325 TABLET ORAL at 20:41

## 2022-10-13 RX ADMIN — ONDANSETRON 4 MG: 4 TABLET, ORALLY DISINTEGRATING ORAL at 18:48

## 2022-10-13 RX ADMIN — ATORVASTATIN CALCIUM 10 MG: 10 TABLET, FILM COATED ORAL at 20:42

## 2022-10-13 RX ADMIN — PANTOPRAZOLE SODIUM 40 MG: 40 TABLET, DELAYED RELEASE ORAL at 06:25

## 2022-10-13 RX ADMIN — VANCOMYCIN HYDROCHLORIDE 1500 MG: 10 INJECTION, POWDER, LYOPHILIZED, FOR SOLUTION INTRAVENOUS at 20:46

## 2022-10-13 RX ADMIN — INSULIN LISPRO 10 UNITS: 100 INJECTION, SOLUTION INTRAVENOUS; SUBCUTANEOUS at 17:58

## 2022-10-13 RX ADMIN — LEVOTHYROXINE SODIUM 300 MCG: 0.15 TABLET ORAL at 06:24

## 2022-10-13 RX ADMIN — VANCOMYCIN HYDROCHLORIDE 1500 MG: 10 INJECTION, POWDER, LYOPHILIZED, FOR SOLUTION INTRAVENOUS at 08:41

## 2022-10-13 RX ADMIN — INSULIN GLARGINE 20 UNITS: 100 INJECTION, SOLUTION SUBCUTANEOUS at 08:34

## 2022-10-13 RX ADMIN — Medication 2 PUFF: at 09:08

## 2022-10-13 RX ADMIN — METRONIDAZOLE 500 MG: 500 INJECTION, SOLUTION INTRAVENOUS at 23:50

## 2022-10-13 RX ADMIN — METRONIDAZOLE 500 MG: 500 INJECTION, SOLUTION INTRAVENOUS at 14:02

## 2022-10-13 RX ADMIN — Medication 10 ML: at 09:37

## 2022-10-13 RX ADMIN — ACETAMINOPHEN 650 MG: 325 TABLET ORAL at 06:24

## 2022-10-13 RX ADMIN — INSULIN GLARGINE 20 UNITS: 100 INJECTION, SOLUTION SUBCUTANEOUS at 20:40

## 2022-10-13 RX ADMIN — LOSARTAN POTASSIUM 100 MG: 100 TABLET, FILM COATED ORAL at 08:33

## 2022-10-13 ASSESSMENT — PAIN SCALES - GENERAL: PAINLEVEL_OUTOF10: 1

## 2022-10-13 NOTE — ANESTHESIA PRE PROCEDURE
Department of Anesthesiology  Preprocedure Note       Name:  Min Brian   Age:  65 y.o.  :  1960                                          MRN:  [id number]         Date:  10/13/2022      Surgeon: Latha Yeung):  Cheng Saldana MD    Procedure: Procedure(s):  DRAINAGE OF ABDOMINAL WALL ABSCESS , POSSIBLE PLACEMENT OF WOUND VAC    Medications prior to admission:   Prior to Admission medications    Medication Sig Start Date End Date Taking? Authorizing Provider   sulfamethoxazole-trimethoprim (BACTRIM DS) 800-160 MG per tablet Take 1 tablet by mouth 2 times daily for 7 days 10/11/22 10/18/22  Cheng Saldana MD   insulin glargine (LANTUS SOLOSTAR) 100 UNIT/ML injection pen Inject 50 units into the skin BID. 22   Silvano Sykes MD   OMEPRAZOLE PO Take by mouth OTC daily    Historical Provider, MD   insulin aspart (NOVOLOG FLEXPEN) 100 UNIT/ML injection pen 25 units bid 22   Silvano Sykes MD   Insulin Pen Needle (B-D ULTRAFINE III SHORT PEN) 31G X 8 MM MISC USE TO INJECT INSULIN FOUR TIMES DAILY 22   Silvano Sykes MD   levothyroxine (SYNTHROID) 300 MCG tablet 300 MCG DAILY 22   Silvano Sykes MD   Continuous Blood Gluc Sensor (FREESTYLE EDY 2 SENSOR) MISC CHANGE EVERY 14 DAYS TO MONITOR BS 22   Silvano Sykes MD   insulin aspart (NOVOLOG) 100 UNIT/ML injection vial Inject 25 units into the skin daily  Patient taking differently: Inject 25 units into the skin twice a day- using generic store brand due to cost 22   Silvano Sykes MD   blood glucose test strips (ONETOUCH ULTRA) strip Test 4 times daily.  21   Silvano Sykes MD   ALBUTEROL IN Inhale into the lungs    Historical Provider, MD   Multiple Vitamin (MULTIVITAMIN ADULT PO) Take by mouth    Historical Provider, MD   Ferrous Sulfate (IRON PO) Take by mouth daily     Historical Provider, MD   COENZYME Q10 PO Take by mouth    Historical Provider, MD   ONE TOUCH ULTRASOFT LANCETS MISC USE TO TEST BLOOD GLUCOSE 4 TIMES DAILY 11/7/20   Historical Provider, MD   POTASSIUM PO Take by mouth daily     Historical Provider, MD   MAGNESIUM PO Take by mouth daily     Historical Provider, MD   Montelukast Sodium (SINGULAIR PO) Take by mouth    Historical Provider, MD   Cholecalciferol (VITAMIN D3) 1.25 MG (74874 UT) CAPS Take by mouth once a week    Historical Provider, MD   TRIAMCINOLONE ACETONIDE NA by Nasal route as needed     Historical Provider, MD   BD INSULIN SYRINGE U/F 30G X 1/2\" 0.3 ML MISC USE WITH INSULIN TWICE A DAY 12/10/20   Yamilex Barajas MD   fluticasone-vilanterol (BREO ELLIPTA) 100-25 MCG/INH AEPB inhaler Inhale 1 puff into the lungs as needed     Historical Provider, MD   losartan (COZAAR) 100 MG tablet Take 100 mg by mouth daily    Historical Provider, MD   Omega-3 Fatty Acids (FISH OIL) 1000 MG CAPS Take 3,000 mg by mouth daily    Historical Provider, MD   atorvastatin (LIPITOR) 10 MG tablet Take 10 mg by mouth daily    Historical Provider, MD   CRANBERRY PO Take by mouth daily    Historical Provider, MD   acetaminophen (TYLENOL) 500 MG tablet Take 500 mg by mouth every 6 hours as needed for Pain    Historical Provider, MD   loratadine (CLARITIN) 10 MG capsule Take by mouth daily     Historical Provider, MD       Current medications:    No current facility-administered medications for this visit. No current outpatient medications on file.      Facility-Administered Medications Ordered in Other Visits   Medication Dose Route Frequency Provider Last Rate Last Admin    cefepime (MAXIPIME) 2000 mg IVPB minibag  2,000 mg IntraVENous Q12H CONSTANCE Blum   Stopped at 10/13/22 0705    sodium chloride flush 0.9 % injection 5-40 mL  5-40 mL IntraVENous 2 times per day Becki Valentine MD   10 mL at 10/13/22 0937    sodium chloride flush 0.9 % injection 5-40 mL  5-40 mL IntraVENous PRN Becki Valentine MD        0.9 % sodium chloride infusion   IntraVENous PRN Becki Valentine MD        ondansetron (ZOFRAN-ODT) disintegrating tablet 4 mg  4 mg Oral Q8H PRN Alyssia Velazquez MD        Or    ondansetron (ZOFRAN) injection 4 mg  4 mg IntraVENous Q6H CROWN Alyssia Velazquez MD        polyethylene glycol (GLYCOLAX) packet 17 g  17 g Oral Daily PRN Alyssia Velazquez MD        acetaminophen (TYLENOL) tablet 650 mg  650 mg Oral Q6H PRN Alyssia Velazquez MD   650 mg at 10/13/22 8985    Or    acetaminophen (TYLENOL) suppository 650 mg  650 mg Rectal Q6H PRN Alyssia Velazquez MD        pantoprazole (PROTONIX) tablet 40 mg  40 mg Oral QAM AC Alyssia Velazquez MD   40 mg at 10/13/22 8981    levothyroxine (SYNTHROID) tablet 300 mcg  300 mcg Oral Daily Alyssia Velazquez MD   300 mcg at 10/13/22 6647    losartan (COZAAR) tablet 100 mg  100 mg Oral Daily Alyssia Velazquez MD   100 mg at 10/13/22 8932    atorvastatin (LIPITOR) tablet 10 mg  10 mg Oral Nightly Alyssia Velazquez MD   10 mg at 10/12/22 2219    cetirizine (ZYRTEC) tablet 10 mg  10 mg Oral Daily PRN Alyssia Velazquez MD        mometasone-formoterol (DULERA) 100-5 MCG/ACT inhaler 2 puff  2 puff Inhalation BID Alyssia Velazquez MD   2 puff at 10/13/22 0908    dextrose bolus 10% 125 mL  125 mL IntraVENous PRRINA Velazquez MD        Or    dextrose bolus 10% 250 mL  250 mL IntraVENous PRN Alyssia Velazquez MD        glucagon (rDNA) injection 1 mg  1 mg SubCUTAneous PRN Alyssia Velazquez MD        dextrose 10 % infusion   IntraVENous Continuous MARIA ELENA Velazquez MD        insulin lispro (HUMALOG) injection vial 10 Units  10 Units SubCUTAneous TID TAWANNA Velazquez MD        insulin lispro (HUMALOG) injection vial 0-8 Units  0-8 Units SubCUTAneous TID TAWANNA Velazquez MD        insulin lispro (HUMALOG) injection vial 0-4 Units  0-4 Units SubCUTAneous Nightly Alyssia Velazquez MD        metronidazole (FLAGYL) 500 mg in 0.9% NaCl 100 mL IVPB premix  500 mg IntraVENous Hubert Cook MD   Stopped at 10/13/22 0824    promethazine (PHENERGAN) injection 6.25 mg  6.25 mg IntraMUSCular Q6H PRN MD Nini Garcia Abts insulin glargine (LANTUS) injection vial 20 Units  20 Units SubCUTAneous BID Jared Chiu MD   20 Units at 10/13/22 0834    enoxaparin (LOVENOX) injection 40 mg  40 mg SubCUTAneous Daily Jared Chiu MD   40 mg at 10/13/22 1515    lactated ringers infusion   IntraVENous Continuous Jared Chiu  mL/hr at 10/12/22 2227 New Bag at 10/12/22 2227    morphine injection 4 mg  4 mg IntraVENous Q4H PRN Jared Chiu MD        vancomycin (VANCOCIN) 1,500 mg in dextrose 5 % 250 mL IVPB  1,500 mg IntraVENous Q12H Jared Chiu .7 mL/hr at 10/13/22 0841 1,500 mg at 10/13/22 0841       Allergies:  No Known Allergies    Problem List:    Patient Active Problem List   Diagnosis Code    Ileostomy care ([facility] Z43.2    Diabetes mellitus, type II, insulin dependent ([facility] E11.9, Z79.4    Hypothyroidism E03.9    Essential hypertension I10    Mixed hyperlipidemia E78.2    Ulcerative (chronic) enterocolitis, unspecified complication ([facility] A12.926    Small bowel obstruction ([facility] K56.609    Incarcerated incisional hernia K43.0    Generalized abdominal pain R10.84    Postoperative fever R50.82    Abdominal wall seroma S30. 1XXA    Abdominal wall seroma, initial encounter S30. 1XXA       Past Medical History:        Diagnosis Date    Asthma     Diabetes mellitus ([facility]     GERD (gastroesophageal reflux disease)     Hyperlipidemia     Hypertension     Ileostomy care ([facility] 3/17/2020    Iritis     PCOS (polycystic ovarian syndrome)     Pyoderma     Thyroid disease     Ulcerative colitis     Ulcerative colitis ([facility]        Past Surgical History:        Procedure Laterality Date    ABDOMEN SURGERY      COLON RESECTION FOR ULCERATIVE COLITIS THEN HAD ANUS REMOVED    ABDOMEN SURGERY N/A 9/30/2022    INCISION AND DRAINAGE OF ABDOMINAL WALL SEROMA (84452) performed by Rocío Guzmán MD at [facility] N/A 11/23/2021    EGD BIOPSY performed by Leobardo Rivera MD at [facility] N/A 1/18/2022    EGD DIAGNOSTIC ONLY performed by Leobardo Rivera MD at [place] [address] N/A 11/16/2021    OPEN REPAIR INCISIONAL HERNIA WITH MESH, LYSIS OF ADHESIONS performed by Casey Manzano MD at [address] [place] [address] History:    Social History     Tobacco Use    Smoking status: Never    Smokeless tobacco: Never   Substance Use Topics    Alcohol use: No                                Counseling given: Not Answered      Vital Signs (Current): There were no vitals filed for this visit.                                            BP Readings from Last 3 Encounters:   10/13/22 99/63   10/11/22 112/60   10/06/22 128/63       NPO Status:                                                                                 BMI:   Wt Readings from Last 3 Encounters:   10/12/22 230 lb (104.3 kg)   10/11/22 233 lb (105.7 kg)   10/06/22 233 lb (105.7 kg)     There is no height or weight on file to calculate BMI.    CBC:   Lab Results   Component Value Date/Time    WBC 5.2 10/13/2022 04:59 AM    RBC 4.03 10/13/2022 04:59 AM    HGB 10.3 10/13/2022 04:59 AM    HCT 31.8 10/13/2022 04:59 AM    MCV 78.9 10/13/2022 04:59 AM    RDW 14.9 10/13/2022 04:59 AM     10/13/2022 04:59 AM       CMP:   Lab Results   Component Value Date/Time     10/13/2022 04:59 AM    K 3.9 10/13/2022 04:59 AM    K 3.7 11/21/2021 01:07 PM     10/13/2022 04:59 AM    CO2 21 10/13/2022 04:59 AM    BUN 9 10/13/2022 04:59 AM    CREATININE <0.5 10/13/2022 04:59 AM    GFRAA >60 10/13/2022 04:59 AM    GFRAA >60 03/22/2013 12:10 PM    AGRATIO 0.8 10/13/2022 04:59 AM    LABGLOM >60 10/13/2022 04:59 AM    GLUCOSE 162 10/13/2022 04:59 AM    PROT 6.1 10/13/2022 04:59 AM    PROT 7.8 12/22/2012 12:12 PM    CALCIUM 8.9 10/13/2022 04:59 AM    BILITOT 0.4 10/13/2022 04:59 AM    ALKPHOS 97 10/13/2022 04:59 AM    AST 14 10/13/2022 04:59 AM    ALT 15 10/13/2022 04:59 AM       POC Tests:   Recent Labs     10/13/22  1134   POCGLU 176*       Coags:   Lab Results   Component Value Date/Time    PROTIME 15.4 10/13/2022 04:59 AM    INR 1.23 10/13/2022 04:59 AM    APTT 28.6 11/22/2021 01:01 AM       HCG (If Applicable): No results found for: PREGTESTUR, PREGSERUM, HCG, HCGQUANT     ABGs: No results found for: PHART, PO2ART, YJL6FDR, NBN7PGW, BEART, N9JOUVHE     Type & Screen (If Applicable):  No results found for: LABABO, LABRH    Drug/Infectious Status (If Applicable):  No results found for: HIV, HEPCAB    COVID-19 Screening (If Applicable):   Lab Results   Component Value Date/Time    COVID19 Not Detected 11/15/2021 05:55 AM           Anesthesia Evaluation  Patient summary reviewed and Nursing notes reviewed no history of anesthetic complications:   Airway: Mallampati: II  TM distance: >3 FB   Neck ROM: full  Mouth opening: > = 3 FB   Dental: normal exam         Pulmonary:   (+) sleep apnea (skin rxn to cpap mask): on noncompliant,  asthma (last inhaler use August): seasonal asthma,     (-) rhonchi and wheezes                           Cardiovascular:    (+) hypertension:,     (-) CABG/stent, dysrhythmias and  angina      Rhythm: regular  Rate: normal                    Neuro/Psych:      (-) seizures, TIA and CVA           GI/Hepatic/Renal:   (+) GERD: well controlled, PUD,          ROS comment: Denies gerd sx or n/v today. Endo/Other:    (+) Diabetes, hypothyroidism::., .                  ROS comment: PCOS Abdominal:   (+) obese,           Vascular:     - DVT and PE. Other Findings:             Anesthesia Plan      general     ASA 3       Induction: intravenous. Plan discussed with attending.                     CORDELL Boone - CRNA   10/13/2022

## 2022-10-13 NOTE — PROGRESS NOTES
Clinical Pharmacy Note: Pharmacy to Dose Vancomycin    Mary Banuelos is a 65 y.o. female started on Vancomycin for infected seroma; consult received from Dr. Torsten Boston to manage therapy. Also receiving the following antibiotics: cefepime+Flagyl. Goal AUC: 400-600 mg/L*hr  Goal Trough Level: 10-20 mcg/mL    Assessment/Plan:  A 2000 mg loading dose was given on 10/12/2022 at [address] vancomycin 1500 mg IV every 12 hours. Bayesian modeling predicts an AUC of 552 mg/L*hr and a trough of 17.1 mcg/mL at steady state concentration. A vancomycin random level has been ordered for 10/13/2022 at 0600  Changes in regimen will be determined based on culture results, renal function, and clinical response. Pharmacy will continue to monitor and adjust regimen as necessary. Allergies:  Patient has no known allergies. Recent Labs     10/12/22  1339   CREATININE 0.6       Recent Labs     10/12/22  1339   WBC 10.9       Ht Readings from Last 1 Encounters:   10/12/22 5' 5\" (1.651 m)        Wt Readings from Last 1 Encounters:   10/12/22 230 lb (104.3 kg)         Estimated Creatinine Clearance: 118 mL/min (based on SCr of 0.6 mg/dL).       Thank you for the consult,    Agusto Sherwood, PharmD, [facility]

## 2022-10-13 NOTE — CONSULTS
[facility] GENERAL AND LAPAROSCOPIC SURGERY                       PATIENT NAME: Shantelle Salter     ADMISSION DATE: 10/12/2022  1:00 PM      TODAY'S DATE: 10/13/2022    Reason for Consult:  N/V    Requesting Physician:  Dr. Jeff Patel:              The patient is a 65 y.o. female who presents with abd aching, N/V. Began one day ago, felt ill at home, had severe N/V and came to ER. Also had associated fever. Temps down now, no emesis inpt overnight. Does have gallstones, but no concern of epigastric or RUQ pain.     Past Medical History:        Diagnosis Date    Asthma     Diabetes mellitus ([facility] [address]     GERD (gastroesophageal reflux disease)     Hyperlipidemia     Hypertension     Ileostomy care ([facility] [address] 3/17/2020    Iritis     PCOS (polycystic ovarian syndrome)     Pyoderma     Thyroid disease     Ulcerative colitis     Ulcerative colitis ([facility] [address]        Past Surgical History:        Procedure Laterality Date    ABDOMEN SURGERY      COLON RESECTION FOR ULCERATIVE COLITIS THEN HAD ANUS REMOVED    ABDOMEN SURGERY N/A 9/30/2022    INCISION AND DRAINAGE OF ABDOMINAL WALL SEROMA (27764) performed by Melo Maurer MD at [address] [place] [address] N/A 11/23/2021    EGD BIOPSY performed by Berry Alva MD at [facility] 1/18/2022    EGD DIAGNOSTIC ONLY performed by Berry Alva MD at [place] [address] N/A 11/16/2021    OPEN REPAIR INCISIONAL HERNIA WITH MESH, LYSIS OF ADHESIONS performed by Melo Maurer MD at [address] [place] [address]       Current Medications:   Current Facility-Administered Medications: cefepime (MAXIPIME) 2000 mg IVPB minibag, 2,000 mg, IntraVENous, Q12H  sodium chloride flush 0.9 % injection 5-40 mL, 5-40 mL, IntraVENous, 2 times per day  sodium chloride flush 0.9 % injection 5-40 mL, 5-40 mL, IntraVENous, PRN  0.9 % sodium chloride infusion, , IntraVENous, PRN  ondansetron (ZOFRAN-ODT) disintegrating tablet 4 mg, 4 mg, Oral, Q8H PRN **OR** ondansetron (ZOFRAN) injection 4 mg, 4 mg, IntraVENous, Q6H PRN  polyethylene glycol (GLYCOLAX) packet 17 g, 17 g, Oral, Daily PRN  acetaminophen (TYLENOL) tablet 650 mg, 650 mg, Oral, Q6H PRN **OR** acetaminophen (TYLENOL) suppository 650 mg, 650 mg, Rectal, Q6H PRN  pantoprazole (PROTONIX) tablet 40 mg, 40 mg, Oral, QAM AC  levothyroxine (SYNTHROID) tablet 300 mcg, 300 mcg, Oral, Daily  losartan (COZAAR) tablet 100 mg, 100 mg, Oral, Daily  atorvastatin (LIPITOR) tablet 10 mg, 10 mg, Oral, Nightly  cetirizine (ZYRTEC) tablet 10 mg, 10 mg, Oral, Daily PRN  mometasone-formoterol (DULERA) 100-5 MCG/ACT inhaler 2 puff, 2 puff, Inhalation, BID  dextrose bolus 10% 125 mL, 125 mL, IntraVENous, PRN **OR** dextrose bolus 10% 250 mL, 250 mL, IntraVENous, PRN  glucagon (rDNA) injection 1 mg, 1 mg, SubCUTAneous, PRN  dextrose 10 % infusion, , IntraVENous, Continuous PRN  insulin lispro (HUMALOG) injection vial 10 Units, 10 Units, SubCUTAneous, TID WC  insulin lispro (HUMALOG) injection vial 0-8 Units, 0-8 Units, SubCUTAneous, TID WC  insulin lispro (HUMALOG) injection vial 0-4 Units, 0-4 Units, SubCUTAneous, Nightly  metronidazole (FLAGYL) 500 mg in 0.9% NaCl 100 mL IVPB premix, 500 mg, IntraVENous, Q8H  promethazine (PHENERGAN) injection 6.25 mg, 6.25 mg, IntraMUSCular, Q6H PRN  insulin glargine (LANTUS) injection vial 20 Units, 20 Units, SubCUTAneous, BID  enoxaparin (LOVENOX) injection 40 mg, 40 mg, SubCUTAneous, Daily  lactated ringers infusion, , IntraVENous, Continuous  morphine injection 4 mg, 4 mg, IntraVENous, Q4H PRN  vancomycin (VANCOCIN) 1,500 mg in dextrose 5 % 250 mL IVPB, 1,500 mg, IntraVENous, Q12H  Prior to Admission medications    Medication Sig Start Date End Date Taking?  Authorizing Provider   sulfamethoxazole-trimethoprim (BACTRIM DS) 800-160 MG per tablet Take 1 tablet by mouth 2 times daily for 7 days 10/11/22 10/18/22  Mino Leija MD   insulin glargine (LANTUS SOLOSTAR) 100 UNIT/ML injection pen Inject 50 units into the skin BID. 6/13/22   Brii Love MD   OMEPRAZOLE PO Take by mouth OTC daily    Historical Provider, MD   insulin aspart (NOVOLOG FLEXPEN) 100 UNIT/ML injection pen 25 units bid 6/2/22   Brii Love MD   Insulin Pen Needle (B-D ULTRAFINE III SHORT PEN) 31G X 8 MM MISC USE TO INJECT INSULIN FOUR TIMES DAILY 6/2/22   Brii Love MD   levothyroxine (SYNTHROID) 300 MCG tablet 300 MCG DAILY 6/2/22   Brii Love MD   Continuous Blood Gluc Sensor (FREESTYLE EDY 2 SENSOR) MISC CHANGE EVERY 14 DAYS TO MONITOR BS 6/2/22   Brii Love MD   insulin aspart (NOVOLOG) 100 UNIT/ML injection vial Inject 25 units into the skin daily  Patient taking differently: Inject 25 units into the skin twice a day- using generic store brand due to cost 1/18/22   Brii Love MD   blood glucose test strips (ONETOUCH ULTRA) strip Test 4 times daily.  12/20/21   Brii Love MD   ALBUTEROL IN Inhale into the lungs    Historical Provider, MD   Multiple Vitamin (MULTIVITAMIN ADULT PO) Take by mouth    Historical Provider, MD   Ferrous Sulfate (IRON PO) Take by mouth daily     Historical Provider, MD   COENZYME Q10 PO Take by mouth    Historical Provider, MD   ONE TOUCH ULTRASOFT LANCETS MISC USE TO TEST BLOOD GLUCOSE 4 TIMES DAILY 11/7/20   Historical Provider, MD   POTASSIUM PO Take by mouth daily     Historical Provider, MD   MAGNESIUM PO Take by mouth daily     Historical Provider, MD   Montelukast Sodium (SINGULAIR PO) Take by mouth    Historical Provider, MD   Cholecalciferol (VITAMIN D3) 1.25 MG (83664 UT) CAPS Take by mouth once a week    Historical Provider, MD   TRIAMCINOLONE ACETONIDE NA by Nasal route as needed     Historical Provider, MD   BD INSULIN SYRINGE U/F 30G X 1/2\" 0.3 ML MISC USE WITH INSULIN TWICE A DAY 12/10/20   Brii Love MD   fluticasone-vilanterol [facility] ELLIPTA) 100-25 MCG/INH AEPB inhaler Inhale 1 puff into the lungs as needed     Historical Provider, MD   losartan (COZAAR) 100 MG tablet Take 100 mg by mouth daily    Historical Provider, MD   Omega-3 Fatty Acids (FISH OIL) 1000 MG CAPS Take 3,000 mg by mouth daily    Historical Provider, MD   atorvastatin (LIPITOR) 10 MG tablet Take 10 mg by mouth daily    Historical Provider, MD   CRANBERRY PO Take by mouth daily    Historical Provider, MD   acetaminophen (TYLENOL) 500 MG tablet Take 500 mg by mouth every 6 hours as needed for Pain    Historical Provider, MD   loratadine (CLARITIN) 10 MG capsule Take by mouth daily     Historical Provider, MD        Allergies:  Patient has no known allergies. Social History:    reports that she has never smoked. She has never used smokeless tobacco. She reports that she does not drink alcohol and does not use drugs.     Family History:        Problem Relation Age of Onset    Cancer Mother         uterine    No Known Problems Father        REVIEW OF SYSTEMS:  CONSTITUTIONAL:  positive for  fatigue and malaise  HEENT:  negative  RESPIRATORY:  negative  CARDIOVASCULAR:  negative  GASTROINTESTINAL:  negative except for nausea, vomiting, and abdominal pain  GENITOURINARY:  negative  HEMATOLOGIC/LYMPHATIC:  negative  NEUROLOGICAL:  negative  SKIN: negative    PHYSICAL EXAM:  VITALS:  BP 99/63   Pulse 80   Temp 98.4 °F (36.9 °C) (Oral)   Resp 16   Ht 5' 5\" (1.651 m)   Wt 230 lb (104.3 kg)   LMP 08/16/2010   SpO2 93%   BMI 38.27 kg/m²   24HR INTAKE/OUTPUT:  No intake or output data in the 24 hours ending 10/13/22 1238  DRAIN/TUBE OUTPUT:     CONSTITUTIONAL:  alert, no apparent distress and morbidly obese  EYES:  sclera clear  ENT:  normocepalic, without obvious abnormality  NECK:  supple, symmetrical, trachea midline and no carotid bruits  LUNGS:  clear to auscultation  CARDIOVASCULAR:  regular rate and rhythm and no murmur noted  ABDOMEN:  scars noted and right abd wall stoma site are healed, normal bowel sounds, soft, non-distended, non-tender, voluntary guarding absent, no masses palpated, no hepatosplenomegally and hernia absent. Smaller seroma noted in left abd  MUSCULOSKELETAL:  0+ pitting edema lower extremities  NEUROLOGIC:  Mental Status Exam:  Level of Alertness:   awake  Orientation:   person, place, time  SKIN:  no bruising or bleeding and normal skin color, texture, turgor    DATA:    CBC:   Recent Labs     10/12/22  1339 10/13/22  0459   WBC 10.9 5.2   HGB 12.0 10.3*   HCT 36.4 31.8*    256     BMP:    Recent Labs     10/12/22  1339 10/13/22  0459   * 135*   K 4.7 3.9    105   CO2 18* 21   BUN 10 9   CREATININE 0.6 <0.5*   GLUCOSE 204* 162*     Hepatic:   Recent Labs     10/12/22  1339 10/13/22  0459   AST 17 14*   ALT 18 15   BILITOT 0.6 0.4   ALKPHOS 122 97     Mag:      Recent Labs     10/13/22  0459   MG 1.70*      Phos:     Recent Labs     10/13/22  0459   PHOS 2.7      INR:   Recent Labs     10/13/22  0459   INR 1.23*     LIPASE:   Recent Labs     10/12/22  1339   LIPASE 43.0      AMYLASE: No results for input(s): AMYLASE in the last 72 hours. Radiology Review:      CT ABDOMEN PELVIS W IV CONTRAST Additional Contrast? None    Result Date: 10/12/2022  EXAMINATION: CT OF THE ABDOMEN AND PELVIS WITH CONTRAST 10/12/2022 4:31 pm TECHNIQUE: CT of the abdomen and pelvis was performed with the administration of intravenous contrast. Multiplanar reformatted images are provided for review. Automated exposure control, iterative reconstruction, and/or weight based adjustment of the mA/kV was utilized to reduce the radiation dose to as low as reasonably achievable. COMPARISON: 09/10/2022 HISTORY: ORDERING SYSTEM PROVIDED HISTORY: Multiple abd surgeries in past year. RLQ abd pain, n/v/, fevers. TECHNOLOGIST PROVIDED HISTORY: Reason for exam:->Multiple abd surgeries in past year. RLQ abd pain, n/v/, fevers.  Additional Contrast?->None Decision Support Exception - unselect if not a suspected or confirmed emergency medical condition->Emergency Medical Condition (MA) Reason for Exam: Multiple abd surgeries in past year. RLQ abd pain, n/v/, fevers. Generalized Body Aches (Pt arrived via squad. Aches and pains for 1 week. Started zpak on Sunday. Fever 102. N/v started today.). FINDINGS: Lower Chest: Bibasilar atelectasis. Calcified granulomas within the right lower lobe. Organs: Liver is fatty. Focal fat along the falciform. No intrahepatic ductal dilatation. Cholelithiasis. There is enhancement of the endothelium of the common bile duct. Left hepatic lobe blends imperceptibly with the spleen. Several small low-density lesions are again seen within the spleen inferiorly, too small to characterize. Stomach is decompressed. No pancreatic ductal dilatation or stranding. Adrenal glands are within normal limits. No hydronephrosis. Nonobstructing left nephrolithiasis. Calcification of the abdominal aorta. Subcentimeter short axis retroperitoneal lymph nodes. GI/Bowel: Status post colectomy. Right lower quadrant ostomy is noted. Peristomal hernia is seen containing multiple nondilated small bowel loops. Small bowel is nondilated. Mesh is seen along the anterior abdominal wall. Pelvis: Uterus is present. Bladder is decompressed. Pelvic phleboliths. No inguinal adenopathy. Peritoneum/Retroperitoneum: No free air. Bones/Soft Tissues:  A large collection is demonstrated within the subcutaneous fat of the anterior wall of the lower abdomen and pelvis, measuring approximately 12.0 x 7.4 cm, previously 20 x 14 cm. Currently, there is a greater degree of wall thickening and new multiple foci of air are seen within the collection. Linear tracks are seen anteriorly at the midline and within the left hemiabdomen extending from the collection to the skin. Trabeculated lesion within L5 vertebral body, similar to prior, likely hemangioma. Degenerative change of the spine. The previously demonstrated collection within the subcutaneous tissues of the anterior abdomen and pelvis has decreased in size though has developed mural thickening and internal foci of air, most suggestive of abscess. Findings are also suspicious for cutaneous fistulas extending anteriorly from the collection. Parastomal hernia containing loops of small bowel though without evidence of bowel dilatation to suggest obstruction. Cholelithiasis. There is also endothelial enhancement of the common bile duct which could be inflammatory or infectious. Clinical correlation suggested. IMPRESSION/RECOMMENDATIONS:    Abd wall seroma, possible abscess  Fever, with N/V at home  Air noted on the CT is from prior URMILA drain in place, no fistulas are present    Pt nontender over the area and no cellulitis, and it remains fairly well drained with prior surgery  Has had fever tho, and nothing else acute going on. Old stoma and UC issues are all stable. Will go ahead with additional open drainage in OR tomorrow, maybe place VAC as well to try to do continued suction and close space down better. On antibiotics - continue same. Will do a culture in the OR tomorrow  Diet as tolerated today, NPO after MN.     Thank you,    Jaky Urrutia MD

## 2022-10-13 NOTE — PROGRESS NOTES
[facility] PROGRESS NOTE    10/13/2022 8:28 AM        Name: Lucero Delong . Admitted: 10/12/2022  Primary Care Provider: Candie Bowen (Tel: [phone])      Subjective:  . Admitted with abdominal pain and previous nausea and vomiting.   She has hx of multiple abd surgeries with ileostomy   Abd drain removed on Oct 11th  , was draining about 100 cc per day   T max 101.8     Feels \"better\" this am     Reviewed interval ancillary notes    Current Medications  cefepime (MAXIPIME) 2000 mg IVPB minibag, Q12H  sodium chloride flush 0.9 % injection 5-40 mL, 2 times per day  sodium chloride flush 0.9 % injection 5-40 mL, PRN  0.9 % sodium chloride infusion, PRN  ondansetron (ZOFRAN-ODT) disintegrating tablet 4 mg, Q8H PRN   Or  ondansetron (ZOFRAN) injection 4 mg, Q6H PRN  polyethylene glycol (GLYCOLAX) packet 17 g, Daily PRN  acetaminophen (TYLENOL) tablet 650 mg, Q6H PRN   Or  acetaminophen (TYLENOL) suppository 650 mg, Q6H PRN  pantoprazole (PROTONIX) tablet 40 mg, QAM AC  levothyroxine (SYNTHROID) tablet 300 mcg, Daily  losartan (COZAAR) tablet 100 mg, Daily  atorvastatin (LIPITOR) tablet 10 mg, Nightly  cetirizine (ZYRTEC) tablet 10 mg, Daily PRN  mometasone-formoterol (DULERA) 100-5 MCG/ACT inhaler 2 puff, BID  dextrose bolus 10% 125 mL, PRN   Or  dextrose bolus 10% 250 mL, PRN  glucagon (rDNA) injection 1 mg, PRN  dextrose 10 % infusion, Continuous PRN  insulin lispro (HUMALOG) injection vial 10 Units, TID WC  insulin lispro (HUMALOG) injection vial 0-8 Units, TID WC  insulin lispro (HUMALOG) injection vial 0-4 Units, Nightly  metronidazole (FLAGYL) 500 mg in 0.9% NaCl 100 mL IVPB premix, Q8H  promethazine (PHENERGAN) injection 6.25 mg, Q6H PRN  insulin glargine (LANTUS) injection vial 20 Units, BID  enoxaparin (LOVENOX) injection 40 mg, Daily  lactated ringers infusion, Continuous  morphine injection 4 mg, Q4H PRN  vancomycin (VANCOCIN) 1,500 mg in dextrose 5 % 250 mL IVPB, Q12H        Objective:  /61   Pulse 95   Temp 99.1 °F (37.3 °C) (Oral)   Resp 16   Ht 5' 5\" (1.651 m)   Wt 230 lb (104.3 kg)   LMP 08/16/2010   SpO2 93%   BMI 38.27 kg/m²   No intake or output data in the 24 hours ending 10/13/22 0828   Wt Readings from Last 3 Encounters:   10/12/22 230 lb (104.3 kg)   10/11/22 233 lb (105.7 kg)   10/06/22 233 lb (105.7 kg)       General appearance:  Appears comfortable, she is alert and pleasant   Eyes: Sclera clear. Pupils equal.  ENT: Moist oral mucosa. Trachea midline, no adenopathy. Cardiovascular: Regular rhythm, normal S1, S2. No murmur. No edema in lower extremities  Respiratory: Not using accessory muscles. Good inspiratory effort. Clear to auscultation bilaterally, no wheeze or crackles. GI: Abdomen soft with active bowel sounds. No current tenderness. Previous drain site from Left lower abd is unremarkable. Ileostomy with liquid output noted. Musculoskeletal: No cyanosis in digits, neck supple  Neurology: CN 2-12 grossly intact. No speech or motor deficits  Psych: Normal affect. Alert and oriented in time, place and person  Skin: Warm, dry, normal turgor    Labs and Tests:  CBC:   Recent Labs     10/12/22  1339 10/13/22  0459   WBC 10.9 5.2   HGB 12.0 10.3*    256     BMP:    Recent Labs     10/12/22  1339 10/13/22  0459   * 135*   K 4.7 3.9    105   CO2 18* 21   BUN 10 9   CREATININE 0.6 <0.5*   GLUCOSE 204* 162*     Hepatic:   Recent Labs     10/12/22  1339 10/13/22  0459   AST 17 14*   ALT 18 15   BILITOT 0.6 0.4   ALKPHOS 122 80     CT ABDOMEN PELVIS W IV CONTRAST Additional Contrast? None   Final Result   The previously demonstrated collection within the subcutaneous tissues of the   anterior abdomen and pelvis has decreased in size though has developed mural   thickening and internal foci of air, most suggestive of abscess.   Findings   are also suspicious for cutaneous fistulas extending anteriorly from the   collection. Parastomal hernia containing loops of small bowel though without evidence of   bowel dilatation to suggest obstruction. Cholelithiasis. There is also endothelial enhancement of the common bile   duct which could be inflammatory or infectious. Clinical correlation   suggested. Latest Reference Range & Units Most Recent 10/12/22 22:17 10/13/22 07:57   POC Glucose 70 - 99 mg/dl 169 (H)  10/13/22 07:57 213 (H) 169 (H)         Mag 1.7  Pro Luis Miguel 0.26   CRP 76     Problem List  Principal Problem:    Abdominal wall seroma, initial encounter  Active Problems:    Ileostomy care ([facility] [address]    Diabetes mellitus, type II, insulin dependent (HCC)    Hypothyroidism    Essential hypertension    Ulcerative (chronic) enterocolitis, unspecified complication (HCC)    Generalized abdominal pain  Resolved Problems:    * No resolved hospital problems. *       Assessment & Plan:   Previous Abdominal pain:  likely due to  abd wall seroma , possible abscess:  on IV Cefepime, Flagyl and Vanc. General surgery to follow  [facility] pending . Clinically she does not looks sickly   Sepsis: due to above,  continue with IV hydration. T max 101.8   T2DM:  on bid lantus with humalog correction.   Hold prandial insulin while NPO , will follow closely   Low Magnesium:  will replace       Diet: Diet NPO Exceptions are: Sips of Water with Meds, Sips of Clear Liquids  Code:Full Code  DVT PPX      CORDELL Sweet CNP   10/13/2022 8:28 AM

## 2022-10-13 NOTE — PROGRESS NOTES
Nutrition Note    RECOMMENDATIONS  PO Diet: Full liquid  ONS: Start Glucerna BID    NUTRITION ASSESSMENT   Positive screen for weight loss. Pt with ileostomy on full liquid diet with intake less than 50% of meals. Poor intake PTA d/t pain. S/p abdominal seroma drainage 10/4. Pt has noticed significant weight loss, wt hx shows loss of 8% in two months. States she is feeling better today which is improving her appetite. Agreeable to start Glucerna BID. Nutrition Related Findings: Last BM 10/12; lactated ringers 100 mL/hr; glucose 176; Mg 1.7  Wounds: Surgical Incision  Nutrition Education:  Education not indicated   Nutrition Goals: PO intake 50% or greater     MALNUTRITION ASSESSMENT   Acute Illness  Malnutrition Status: At risk for malnutrition (Comment)  Findings of the 6 clinical characteristics of malnutrition:  Energy Intake:  Mild decrease in energy intake (Comment)  Weight Loss:  Greater than 7.5% over 3 months     Body Fat Loss:  No significant body fat loss     Muscle Mass Loss:  No significant muscle mass loss    Fluid Accumulation:  No significant fluid accumulation     Strength:  Not Performed      NUTRITION DIAGNOSIS   Inadequate oral intake related to pain as evidenced by intake 26-50%      CURRENT NUTRITION THERAPIES  ADULT DIET; Full Liquid  Diet NPO Exceptions are: Sips of Water with Meds     PO Intake: 26-50%, 51-75%   PO Supplement Intake:None Ordered      ANTHROPOMETRICS  Current Height: 5' 5\" (165.1 cm)  Current Weight: 230 lb (104.3 kg)    Ideal Body Weight (IBW): 125 lbs  (57 kg)    Usual Bodyweight 250 lb (113.4 kg)       BMI: 38.3      COMPARATIVE STANDARDS  Energy (kcal):  834-1565 kcal     Protein (g):   g       Fluid (mL/day):  834-1565 mL    The patient will be monitored per nutrition standards of care. Consult dietitian if additional nutrition interventions are needed prior to RD reassessment.      [facility], [address],     Contact: [phone]

## 2022-10-13 NOTE — CARE COORDINATION
Discharge Planning Note:    Chart reviewed and it appears that patient has minimal needs for discharge at this time. Discussed with patient and requested that case management be notified if discharge needs are identified.     - Current discharge plan is for the patient to return home. - PCP: Marybel Donaldson    - Possible need for a wound vac. - Possible need for Home IV ABX. Case management will continue to follow progress and update discharge plan as needed.       Risk of Readmission Score: 14%    AMBER He RN    [facility]  Phone: [phone]

## 2022-10-13 NOTE — H&P
Hospital Medicine History & Physical      PCP: Veldon Eisenmenger BELL-WILLIS    Date of Admission: 10/12/2022    Date of Service: Pt seen/examined on 10/12/22 and Admitted to Inpatient with expected LOS greater than two midnights due to medical therapy. Chief Complaint: Abdominal pain and generalized body ache      History Of Present Illness:      Roz Hadley is 65 y.o. female with history of ulcerative colitis complicated by multiple abdominal surgeries including bowel resections  She has ileostomy.    She had a drain for abdominal wall seroma but according to patient it had very high output  Therefore according to patient, it was removed on October 4, 2022  Since then she has been feeling very poorly without any localizing symptoms including nausea  She now presents to the ER with complaint of abdominal pain associated with generalized body ache  Her symptoms are constant, moderate to severe and nothing makes it better or worse  In the ED she had a temperature of 101.8 and tachycardic with heart rate 111  CT of the abdomen pelvis shows decreased size and abdominal wall seroma but concern now is it is infected      Past Medical History:          Diagnosis Date    Asthma     Diabetes mellitus ([address]     GERD (gastroesophageal reflux disease)     Hyperlipidemia     Hypertension     Ileostomy care ([address] 3/17/2020    Iritis     PCOS (polycystic ovarian syndrome)     Pyoderma     Thyroid disease     Ulcerative colitis     Ulcerative colitis ([address]        Past Surgical History:          Procedure Laterality Date    ABDOMEN SURGERY      COLON RESECTION FOR ULCERATIVE COLITIS THEN HAD ANUS REMOVED    ABDOMEN SURGERY N/A 9/30/2022    INCISION AND DRAINAGE OF ABDOMINAL WALL SEROMA ([address]65) performed by Sybil Radford MD at [address] ENDOSCOPY N/A 11/23/2021    EGD BIOPSY performed by Geena Nieto MD at [address] UPPER GASTROINTESTINAL ENDOSCOPY N/A 1/18/2022    EGD DIAGNOSTIC ONLY performed by Jennifer Munoz MD at [place] [address] N/A 11/16/2021    OPEN REPAIR INCISIONAL HERNIA WITH MESH, LYSIS OF ADHESIONS performed by Nai Vegas MD at [address] [place] [address]       Medications Prior to Admission:      Prior to Admission medications    Medication Sig Start Date End Date Taking? Authorizing Provider   sulfamethoxazole-trimethoprim (BACTRIM DS) 800-160 MG per tablet Take 1 tablet by mouth 2 times daily for 7 days 10/11/22 10/18/22  Nai Vegas MD   insulin glargine (LANTUS SOLOSTAR) 100 UNIT/ML injection pen Inject 50 units into the skin BID. 6/13/22   Traci Ordonez MD   OMEPRAZOLE PO Take by mouth OTC daily    Historical Provider, MD   insulin aspart (NOVOLOG FLEXPEN) 100 UNIT/ML injection pen 25 units bid 6/2/22   Traci Ordonez MD   Insulin Pen Needle (B-D ULTRAFINE III SHORT PEN) 31G X 8 MM MISC USE TO INJECT INSULIN FOUR TIMES DAILY 6/2/22   Traci Ordonez MD   levothyroxine (SYNTHROID) 300 MCG tablet 300 MCG DAILY 6/2/22   Traci Ordonez MD   Continuous Blood Gluc Sensor (FREESTYLE EDY 2 SENSOR) MISC CHANGE EVERY 14 DAYS TO MONITOR BS 6/2/22   Traci Ordonez MD   insulin aspart (NOVOLOG) 100 UNIT/ML injection vial Inject 25 units into the skin daily  Patient taking differently: Inject 25 units into the skin twice a day- using generic store brand due to cost 1/18/22   Traci Ordonez MD   blood glucose test strips (ONETOUCH ULTRA) strip Test 4 times daily.  12/20/21   Traci Ordonez MD   ALBUTEROL IN Inhale into the lungs    Historical Provider, MD   Multiple Vitamin (MULTIVITAMIN ADULT PO) Take by mouth    Historical Provider, MD   Ferrous Sulfate (IRON PO) Take by mouth daily     Historical Provider, MD   COENZYME Q10 PO Take by mouth    Historical Provider, MD   ONE TOUCH ULTRASOFT LANCETS MISC USE TO TEST BLOOD GLUCOSE 4 TIMES DAILY 11/7/20   Miles Provider, MD   POTASSIUM PO Take by mouth daily     Historical Provider, MD   MAGNESIUM PO Take by mouth daily     Historical Provider, MD   Montelukast Sodium (SINGULAIR PO) Take by mouth    Historical Provider, MD   Cholecalciferol (VITAMIN D3) 1.25 MG (42458 UT) CAPS Take by mouth once a week    Historical Provider, MD   TRIAMCINOLONE ACETONIDE NA by Nasal route as needed     Historical Provider, MD   BD INSULIN SYRINGE U/F 30G X 1/2\" 0.3 ML MISC USE WITH INSULIN TWICE A DAY 12/10/20   Dealj Mera MD   fluticasone-vilanterol (BREO ELLIPTA) 100-25 MCG/INH AEPB inhaler Inhale 1 puff into the lungs as needed     Historical Provider, MD   losartan (COZAAR) 100 MG tablet Take 100 mg by mouth daily    Historical Provider, MD   Omega-3 Fatty Acids (FISH OIL) 1000 MG CAPS Take 3,000 mg by mouth daily    Historical Provider, MD   atorvastatin (LIPITOR) 10 MG tablet Take 10 mg by mouth daily    Historical Provider, MD   CRANBERRY PO Take by mouth daily    Historical Provider, MD   acetaminophen (TYLENOL) 500 MG tablet Take 500 mg by mouth every 6 hours as needed for Pain    Historical Provider, MD   loratadine (CLARITIN) 10 MG capsule Take by mouth daily     Historical Provider, MD       Allergies:  Patient has no known allergies. Social History:      The patient currently lives at home    TOBACCO:   reports that she has never smoked. She has never used smokeless tobacco.  ETOH:   reports no history of alcohol use. E-cigarette/Vaping       Questions Responses    E-cigarette/Vaping Use Never User    Start Date     Passive Exposure     Quit Date     Counseling Given     Comments               Family History:      Reviewed and negative in regards to presenting illness/complaint. Problem Relation Age of Onset    Cancer Mother         uterine    No Known Problems Father        REVIEW OF SYSTEMS COMPLETED:   Pertinent positives as noted in the HPI. All other systems reviewed and negative.     PHYSICAL EXAM PERFORMED:    /71   Pulse (!) 108 Temp (!) 101.8 °F (38.8 °C) (Oral)   Resp 16   Ht 5' 5\" (1.651 m)   Wt 230 lb (104.3 kg)   LMP 08/16/2010   SpO2 98%   BMI 38.27 kg/m²     General appearance:  No apparent distress, appears stated age and cooperative. HEENT:  Normal cephalic, atraumatic without obvious deformity. Pupils equal, round, and reactive to light. Extra ocular muscles intact. Conjunctivae/corneas clear. Neck: Supple, with full range of motion. No jugular venous distention. Trachea midline. Respiratory:  Normal respiratory effort. Clear to auscultation, bilaterally without Rales/Wheezes/Rhonchi. Cardiovascular:  Regular rate and rhythm with normal S1/S2 without murmurs, rubs or gallops. Abdomen: Soft, non-tender, non-distended with normal bowel sounds. + Ileostomy and mid abdomen surgical scar  Musculoskeletal:  No clubbing, cyanosis or edema bilaterally. Full range of motion without deformity. Skin: Skin color, texture, turgor normal.  No rashes or lesions. Neurologic:  Neurovascularly intact without any focal sensory/motor deficits. Cranial nerves: II-XII intact, grossly non-focal.  Psychiatric:  Alert and oriented, thought content appropriate, normal insight  Capillary Refill: Brisk,3 seconds, normal  Peripheral Pulses: +2 palpable, equal bilaterally       Labs:     Recent Labs     10/12/22  1339   WBC 10.9   HGB 12.0   HCT 36.4        Recent Labs     10/12/22  1339   *   K 4.7      CO2 18*   BUN 10   CREATININE 0.6   CALCIUM 9.8     Recent Labs     10/12/22  1339   AST 17   ALT 18   BILITOT 0.6   ALKPHOS 122     No results for input(s): INR in the last 72 hours.   Recent Labs     10/12/22  1339   TROPONINI <0.01       Urinalysis:      Lab Results   Component Value Date/Time    NITRU Negative 11/21/2021 02:45 PM    WBCUA 6-10 07/23/2011 09:30 AM    BACTERIA 2+ 07/23/2011 09:30 AM    RBCUA 3-5 07/23/2011 09:30 AM    BLOODU Negative 11/21/2021 02:45 PM    SPECGRAV >1.030 11/21/2021 02:45 PM    GLUCOSEU Negative 11/21/2021 02:45 PM    GLUCOSEU NEGATIVE 07/23/2011 09:30 AM       Radiology:     CXR: I have reviewed the CXR with the following interpretation:   EKG:  I have reviewed the EKG with the following interpretation:     CT ABDOMEN PELVIS W IV CONTRAST Additional Contrast? None   Final Result   The previously demonstrated collection within the subcutaneous tissues of the   anterior abdomen and pelvis has decreased in size though has developed mural   thickening and internal foci of air, most suggestive of abscess. Findings   are also suspicious for cutaneous fistulas extending anteriorly from the   collection. Parastomal hernia containing loops of small bowel though without evidence of   bowel dilatation to suggest obstruction. Cholelithiasis. There is also endothelial enhancement of the common bile   duct which could be inflammatory or infectious. Clinical correlation   suggested. Consults:    IP CONSULT TO GENERAL SURGERY  IP CONSULT TO PHARMACY    ASSESSMENT:    Sepsis - present prior to admission. Meets SIRS criteria (temp 101.8 degrees Fahrenheit and heart rate 111)  Likely source is intra-abdominal infected seroma  Abdominal wall seroma -   Concerning that it is infected with abscess   Also concern for newly formed cutaneous fistula extending anteriorly from the abscess-like collection  Generalized abdominal pain  Ulcerative colitis -  Complicated by multiple surgeries in the past including bowel resection  Ileostomy present  Diabetes mellitus, type II, insulin dependent   Hypothyroidism  Essential hypertension      PLAN:    Admit to [facility] general surgery - she is well-known to Dr. Stephanie Cartagena  May need to open abdominal irrigation followed by a wound VAC  Keep NPO.   IV fluid is ordered for hydration  Send blood culture x 2  Broad-spectrum IV antibiotics with cefepime, vancomycin and metronidazole as ordered  Titrate insulin to keep glucose less than 180  Monitor closely to avoid iatrogenic hypotension  Continue usual home meds including levothyroxine, losartan and atorvastatin  IV morphine as as needed is ordered for pain control    DVT Prophylaxis: Lovenox  Diet: Diet NPO Exceptions are: Sips of Water with Meds, Sips of Clear Liquids  Code Status: Full Code    PT/OT Eval Status: PT/OT consult is not ordered    Dispo - admit as inpatient       Cherry Barajas MD    Thank you Rubi Larson for the opportunity to be involved in this patient's care. If you have any questions or concerns please feel free to contact me at [phone].

## 2022-10-14 ENCOUNTER — ANESTHESIA (OUTPATIENT)
Dept: OPERATING ROOM | Age: 62
DRG: 854 | End: 2022-10-14
Payer: COMMERCIAL

## 2022-10-14 PROBLEM — L02.211 ABDOMINAL WALL ABSCESS: Status: ACTIVE | Noted: 2022-10-14

## 2022-10-14 LAB
A/G RATIO: 0.9 (ref 1.1–2.2)
ALBUMIN SERPL-MCNC: 2.9 G/DL (ref 3.4–5)
ALP BLD-CCNC: 107 U/L (ref 40–129)
ALT SERPL-CCNC: 15 U/L (ref 10–40)
ANION GAP SERPL CALCULATED.3IONS-SCNC: 11 MMOL/L (ref 3–16)
AST SERPL-CCNC: 16 U/L (ref 15–37)
BASOPHILS ABSOLUTE: 0 K/UL (ref 0–0.2)
BASOPHILS RELATIVE PERCENT: 0.5 %
BILIRUB SERPL-MCNC: 0.3 MG/DL (ref 0–1)
BUN BLDV-MCNC: 6 MG/DL (ref 7–20)
CALCIUM SERPL-MCNC: 9.1 MG/DL (ref 8.3–10.6)
CHLORIDE BLD-SCNC: 106 MMOL/L (ref 99–110)
CO2: 21 MMOL/L (ref 21–32)
CREAT SERPL-MCNC: 0.5 MG/DL (ref 0.6–1.2)
EOSINOPHILS ABSOLUTE: 0 K/UL (ref 0–0.6)
EOSINOPHILS RELATIVE PERCENT: 0.1 %
GFR AFRICAN AMERICAN: >60
GFR NON-AFRICAN AMERICAN: >60
GLUCOSE BLD-MCNC: 141 MG/DL (ref 70–99)
GLUCOSE BLD-MCNC: 146 MG/DL (ref 70–99)
GLUCOSE BLD-MCNC: 159 MG/DL (ref 70–99)
GLUCOSE BLD-MCNC: 175 MG/DL (ref 70–99)
GLUCOSE BLD-MCNC: 229 MG/DL (ref 70–99)
GLUCOSE BLD-MCNC: 288 MG/DL (ref 70–99)
HCT VFR BLD CALC: 31.9 % (ref 36–48)
HEMOGLOBIN: 10.5 G/DL (ref 12–16)
LYMPHOCYTES ABSOLUTE: 0.5 K/UL (ref 1–5.1)
LYMPHOCYTES RELATIVE PERCENT: 11.2 %
MAGNESIUM: 1.8 MG/DL (ref 1.8–2.4)
MCH RBC QN AUTO: 25.8 PG (ref 26–34)
MCHC RBC AUTO-ENTMCNC: 32.7 G/DL (ref 31–36)
MCV RBC AUTO: 78.7 FL (ref 80–100)
MONOCYTES ABSOLUTE: 0.5 K/UL (ref 0–1.3)
MONOCYTES RELATIVE PERCENT: 11.6 %
MRSA SCREEN RT-PCR: NORMAL
NEUTROPHILS ABSOLUTE: 3.4 K/UL (ref 1.7–7.7)
NEUTROPHILS RELATIVE PERCENT: 76.6 %
PDW BLD-RTO: 14.9 % (ref 12.4–15.4)
PERFORMED ON: ABNORMAL
PLATELET # BLD: 248 K/UL (ref 135–450)
PMV BLD AUTO: 6.4 FL (ref 5–10.5)
POTASSIUM SERPL-SCNC: 3.6 MMOL/L (ref 3.5–5.1)
PROCALCITONIN: 0.17 NG/ML (ref 0–0.15)
RBC # BLD: 4.06 M/UL (ref 4–5.2)
SODIUM BLD-SCNC: 138 MMOL/L (ref 136–145)
TOTAL PROTEIN: 6.2 G/DL (ref 6.4–8.2)
WBC # BLD: 4.5 K/UL (ref 4–11)

## 2022-10-14 PROCEDURE — 6370000000 HC RX 637 (ALT 250 FOR IP): Performed by: SURGERY

## 2022-10-14 PROCEDURE — 94640 AIRWAY INHALATION TREATMENT: CPT

## 2022-10-14 PROCEDURE — 6360000002 HC RX W HCPCS: Performed by: NURSE ANESTHETIST, CERTIFIED REGISTERED

## 2022-10-14 PROCEDURE — 6360000002 HC RX W HCPCS: Performed by: INTERNAL MEDICINE

## 2022-10-14 PROCEDURE — 3600000012 HC SURGERY LEVEL 2 ADDTL 15MIN: Performed by: SURGERY

## 2022-10-14 PROCEDURE — 3600000002 HC SURGERY LEVEL 2 BASE: Performed by: SURGERY

## 2022-10-14 PROCEDURE — 87205 SMEAR GRAM STAIN: CPT

## 2022-10-14 PROCEDURE — 2580000003 HC RX 258: Performed by: PHYSICIAN ASSISTANT

## 2022-10-14 PROCEDURE — 36415 COLL VENOUS BLD VENIPUNCTURE: CPT

## 2022-10-14 PROCEDURE — 85025 COMPLETE CBC W/AUTO DIFF WBC: CPT

## 2022-10-14 PROCEDURE — 2709999900 HC NON-CHARGEABLE SUPPLY: Performed by: SURGERY

## 2022-10-14 PROCEDURE — 6370000000 HC RX 637 (ALT 250 FOR IP): Performed by: NURSE PRACTITIONER

## 2022-10-14 PROCEDURE — 3700000001 HC ADD 15 MINUTES (ANESTHESIA): Performed by: SURGERY

## 2022-10-14 PROCEDURE — 2580000003 HC RX 258: Performed by: INTERNAL MEDICINE

## 2022-10-14 PROCEDURE — 97606 NEG PRS WND THER DME>50 SQCM: CPT | Performed by: SURGERY

## 2022-10-14 PROCEDURE — 7100000001 HC PACU RECOVERY - ADDTL 15 MIN: Performed by: SURGERY

## 2022-10-14 PROCEDURE — 6360000002 HC RX W HCPCS: Performed by: PHYSICIAN ASSISTANT

## 2022-10-14 PROCEDURE — 10180 I&D COMPLEX PO WOUND INFCTJ: CPT | Performed by: SURGERY

## 2022-10-14 PROCEDURE — 7100000000 HC PACU RECOVERY - FIRST 15 MIN: Performed by: SURGERY

## 2022-10-14 PROCEDURE — 1200000000 HC SEMI PRIVATE

## 2022-10-14 PROCEDURE — 87070 CULTURE OTHR SPECIMN AEROBIC: CPT

## 2022-10-14 PROCEDURE — 87186 SC STD MICRODIL/AGAR DIL: CPT

## 2022-10-14 PROCEDURE — 84145 PROCALCITONIN (PCT): CPT

## 2022-10-14 PROCEDURE — 83735 ASSAY OF MAGNESIUM: CPT

## 2022-10-14 PROCEDURE — 6370000000 HC RX 637 (ALT 250 FOR IP): Performed by: INTERNAL MEDICINE

## 2022-10-14 PROCEDURE — 2500000003 HC RX 250 WO HCPCS: Performed by: NURSE ANESTHETIST, CERTIFIED REGISTERED

## 2022-10-14 PROCEDURE — 2500000003 HC RX 250 WO HCPCS: Performed by: INTERNAL MEDICINE

## 2022-10-14 PROCEDURE — 87075 CULTR BACTERIA EXCEPT BLOOD: CPT

## 2022-10-14 PROCEDURE — 3700000000 HC ANESTHESIA ATTENDED CARE: Performed by: SURGERY

## 2022-10-14 PROCEDURE — APPNB30 APP NON BILLABLE TIME 0-30 MINS: Performed by: NURSE PRACTITIONER

## 2022-10-14 PROCEDURE — 80053 COMPREHEN METABOLIC PANEL: CPT

## 2022-10-14 PROCEDURE — 87077 CULTURE AEROBIC IDENTIFY: CPT

## 2022-10-14 PROCEDURE — 0JB80ZZ EXCISION OF ABDOMEN SUBCUTANEOUS TISSUE AND FASCIA, OPEN APPROACH: ICD-10-PCS | Performed by: SURGERY

## 2022-10-14 RX ORDER — DEXAMETHASONE SODIUM PHOSPHATE 4 MG/ML
INJECTION, SOLUTION INTRA-ARTICULAR; INTRALESIONAL; INTRAMUSCULAR; INTRAVENOUS; SOFT TISSUE PRN
Status: DISCONTINUED | OUTPATIENT
Start: 2022-10-14 | End: 2022-10-14 | Stop reason: SDUPTHER

## 2022-10-14 RX ORDER — ONDANSETRON 2 MG/ML
INJECTION INTRAMUSCULAR; INTRAVENOUS PRN
Status: DISCONTINUED | OUTPATIENT
Start: 2022-10-14 | End: 2022-10-14 | Stop reason: SDUPTHER

## 2022-10-14 RX ORDER — ONDANSETRON 2 MG/ML
4 INJECTION INTRAMUSCULAR; INTRAVENOUS
Status: DISCONTINUED | OUTPATIENT
Start: 2022-10-14 | End: 2022-10-14 | Stop reason: HOSPADM

## 2022-10-14 RX ORDER — OXYCODONE HYDROCHLORIDE 5 MG/1
5 TABLET ORAL
Status: DISCONTINUED | OUTPATIENT
Start: 2022-10-14 | End: 2022-10-14 | Stop reason: HOSPADM

## 2022-10-14 RX ORDER — LIDOCAINE HYDROCHLORIDE 10 MG/ML
1 INJECTION, SOLUTION EPIDURAL; INFILTRATION; INTRACAUDAL; PERINEURAL
Status: DISCONTINUED | OUTPATIENT
Start: 2022-10-14 | End: 2022-10-14 | Stop reason: HOSPADM

## 2022-10-14 RX ORDER — PROPOFOL 10 MG/ML
INJECTION, EMULSION INTRAVENOUS PRN
Status: DISCONTINUED | OUTPATIENT
Start: 2022-10-14 | End: 2022-10-14 | Stop reason: SDUPTHER

## 2022-10-14 RX ORDER — AMOXICILLIN AND CLAVULANATE POTASSIUM 875; 125 MG/1; MG/1
1 TABLET, FILM COATED ORAL EVERY 12 HOURS SCHEDULED
Status: DISCONTINUED | OUTPATIENT
Start: 2022-10-14 | End: 2022-10-15 | Stop reason: HOSPADM

## 2022-10-14 RX ORDER — HYDROMORPHONE HCL 110MG/55ML
0.5 PATIENT CONTROLLED ANALGESIA SYRINGE INTRAVENOUS EVERY 5 MIN PRN
Status: DISCONTINUED | OUTPATIENT
Start: 2022-10-14 | End: 2022-10-14 | Stop reason: HOSPADM

## 2022-10-14 RX ORDER — PROCHLORPERAZINE EDISYLATE 5 MG/ML
5 INJECTION INTRAMUSCULAR; INTRAVENOUS
Status: DISCONTINUED | OUTPATIENT
Start: 2022-10-14 | End: 2022-10-14 | Stop reason: HOSPADM

## 2022-10-14 RX ORDER — SUCCINYLCHOLINE/SOD CL,ISO/PF 200MG/10ML
SYRINGE (ML) INTRAVENOUS PRN
Status: DISCONTINUED | OUTPATIENT
Start: 2022-10-14 | End: 2022-10-14 | Stop reason: SDUPTHER

## 2022-10-14 RX ORDER — LABETALOL HYDROCHLORIDE 5 MG/ML
10 INJECTION, SOLUTION INTRAVENOUS
Status: DISCONTINUED | OUTPATIENT
Start: 2022-10-14 | End: 2022-10-14 | Stop reason: HOSPADM

## 2022-10-14 RX ORDER — FENTANYL CITRATE 50 UG/ML
INJECTION, SOLUTION INTRAMUSCULAR; INTRAVENOUS PRN
Status: DISCONTINUED | OUTPATIENT
Start: 2022-10-14 | End: 2022-10-14 | Stop reason: SDUPTHER

## 2022-10-14 RX ORDER — HYDRALAZINE HYDROCHLORIDE 20 MG/ML
10 INJECTION INTRAMUSCULAR; INTRAVENOUS
Status: DISCONTINUED | OUTPATIENT
Start: 2022-10-14 | End: 2022-10-14 | Stop reason: HOSPADM

## 2022-10-14 RX ORDER — FENTANYL CITRATE 50 UG/ML
25 INJECTION, SOLUTION INTRAMUSCULAR; INTRAVENOUS EVERY 5 MIN PRN
Status: DISCONTINUED | OUTPATIENT
Start: 2022-10-14 | End: 2022-10-14 | Stop reason: HOSPADM

## 2022-10-14 RX ADMIN — INSULIN LISPRO 10 UNITS: 100 INJECTION, SOLUTION INTRAVENOUS; SUBCUTANEOUS at 16:26

## 2022-10-14 RX ADMIN — ONDANSETRON 4 MG: 2 INJECTION INTRAMUSCULAR; INTRAVENOUS at 07:43

## 2022-10-14 RX ADMIN — Medication 140 MG: at 07:38

## 2022-10-14 RX ADMIN — SODIUM CHLORIDE, POTASSIUM CHLORIDE, SODIUM LACTATE AND CALCIUM CHLORIDE: 600; 310; 30; 20 INJECTION, SOLUTION INTRAVENOUS at 07:25

## 2022-10-14 RX ADMIN — LOSARTAN POTASSIUM 100 MG: 100 TABLET, FILM COATED ORAL at 09:48

## 2022-10-14 RX ADMIN — INSULIN LISPRO 4 UNITS: 100 INJECTION, SOLUTION INTRAVENOUS; SUBCUTANEOUS at 16:26

## 2022-10-14 RX ADMIN — FENTANYL CITRATE 100 MCG: 50 INJECTION, SOLUTION INTRAMUSCULAR; INTRAVENOUS at 07:37

## 2022-10-14 RX ADMIN — PROPOFOL 200 MG: 10 INJECTION, EMULSION INTRAVENOUS at 07:38

## 2022-10-14 RX ADMIN — DEXAMETHASONE SODIUM PHOSPHATE 10 MG: 4 INJECTION, SOLUTION INTRAMUSCULAR; INTRAVENOUS at 07:43

## 2022-10-14 RX ADMIN — AMOXICILLIN AND CLAVULANATE POTASSIUM 1 TABLET: 875; 125 TABLET, FILM COATED ORAL at 20:54

## 2022-10-14 RX ADMIN — INSULIN GLARGINE 20 UNITS: 100 INJECTION, SOLUTION SUBCUTANEOUS at 09:50

## 2022-10-14 RX ADMIN — PROPOFOL 100 MG: 10 INJECTION, EMULSION INTRAVENOUS at 08:13

## 2022-10-14 RX ADMIN — ENOXAPARIN SODIUM 40 MG: 100 INJECTION SUBCUTANEOUS at 09:48

## 2022-10-14 RX ADMIN — Medication 2 PUFF: at 19:42

## 2022-10-14 RX ADMIN — LEVOTHYROXINE SODIUM 300 MCG: 0.15 TABLET ORAL at 05:51

## 2022-10-14 RX ADMIN — SODIUM CHLORIDE, POTASSIUM CHLORIDE, SODIUM LACTATE AND CALCIUM CHLORIDE: 600; 310; 30; 20 INJECTION, SOLUTION INTRAVENOUS at 09:42

## 2022-10-14 RX ADMIN — CEFEPIME 2000 MG: 2 INJECTION, POWDER, FOR SOLUTION INTRAVENOUS at 05:54

## 2022-10-14 RX ADMIN — INSULIN GLARGINE 20 UNITS: 100 INJECTION, SOLUTION SUBCUTANEOUS at 20:55

## 2022-10-14 RX ADMIN — PANTOPRAZOLE SODIUM 40 MG: 40 TABLET, DELAYED RELEASE ORAL at 05:51

## 2022-10-14 RX ADMIN — FENTANYL CITRATE 100 MCG: 50 INJECTION, SOLUTION INTRAMUSCULAR; INTRAVENOUS at 08:15

## 2022-10-14 RX ADMIN — Medication 10 ML: at 09:44

## 2022-10-14 RX ADMIN — ATORVASTATIN CALCIUM 10 MG: 10 TABLET, FILM COATED ORAL at 20:54

## 2022-10-14 RX ADMIN — VANCOMYCIN HYDROCHLORIDE 1500 MG: 10 INJECTION, POWDER, LYOPHILIZED, FOR SOLUTION INTRAVENOUS at 09:49

## 2022-10-14 RX ADMIN — METRONIDAZOLE 500 MG: 500 INJECTION, SOLUTION INTRAVENOUS at 06:59

## 2022-10-14 ASSESSMENT — PAIN SCALES - GENERAL: PAINLEVEL_OUTOF10: 0

## 2022-10-14 NOTE — BRIEF OP NOTE
Brief Postoperative Note      Patient: Andrew Hutson  YOB: 1960  MRN: [id number]    Date of Procedure: 10/14/2022    Pre-Op Diagnosis: Abdominal Wall Abscess    Post-Op Diagnosis: Same       Procedure(s):  DRAINAGE OF ABDOMINAL WALL ABSCESS WITH PLACEMENT OF WOUND VAC    Surgeon(s):  Vanessa Madrid MD    Assistant:  First Assistant: Keesha Weeks    Anesthesia: General    Estimated Blood Loss (mL): Minimal    Complications: None    Specimens:   ID Type Source Tests Collected by Time Destination   1 : 1) CULTURE FROM ABDOMINAL WALL ABSCESS  Body Fluid Fluid CULTURE, SURGICAL Vanessa Madrid MD 10/14/2022 0759        Implants:  * No implants in log *      Drains:   Negative Pressure Wound Therapy Abdomen Left; Lower (Active)       Ileostomy Ileostomy RLQ (Active)   Stomal Appliance 2 piece 10/13/22 2123   Stoma  Assessment Dobbins;Moist 10/13/22 2123   Peristomal Assessment Clean, dry & intact 10/13/22 2123   Treatment Other (Comment) 10/12/22 2120       [REMOVED] Closed/Suction Drain Left LLQ Bulb (Removed)   Site Description Unable to view 09/30/22 1009   Dressing Status Clean, dry & intact 09/30/22 1009   Drainage Appearance Dark Red 09/30/22 1009   Drain Status To bulb suction 09/30/22 1009       [REMOVED] Negative Pressure Wound Therapy Abdomen Medial;Upper (Removed)       Findings: Fluid evacuated and old pseudocapsule debrided, VAC placed.  Cx's done    Electronically signed by Vanessa Madrid MD on 10/14/2022 at 8:35 AM

## 2022-10-14 NOTE — PROGRESS NOTES
Shift assessment completed, pt is alert and oriented, VSS, independent in room, call light within reach, denies any needs at this time, see flowsheets and MAR, will monitor pt. Pt is aware of NPO status after midnight, The care plan and education has been reviewed and mutually agreed upon with the patient.

## 2022-10-14 NOTE — PROGRESS NOTES
[facility]    10/14/2022 8:22 AM        Name: Deandra Kirby . Admitted: 10/12/2022  Primary Care Provider: Rubi Larson (Tel: [phone])      Subjective:  . Admitted with abdominal pain and previous nausea and vomiting.   She has hx of multiple abd surgeries with ileostomy   Abd drain removed on Oct 11th  , was draining about 100 cc per day   T max 101.8 upon admission  No fevers since admission      Seen this am following DRAINAGE OF ABDOMINAL WALL ABSCESS WITH PLACEMENT OF WOUND VAC     Feels better each day  Hungry at the present time ( was unable to eat yesterday )    Reviewed interval ancillary notes    Current Medications  cefepime (MAXIPIME) 2000 mg IVPB minibag, Q12H  sodium chloride flush 0.9 % injection 5-40 mL, 2 times per day  sodium chloride flush 0.9 % injection 5-40 mL, PRN  0.9 % sodium chloride infusion, PRN  ondansetron (ZOFRAN-ODT) disintegrating tablet 4 mg, Q8H PRN   Or  ondansetron (ZOFRAN) injection 4 mg, Q6H PRN  polyethylene glycol (GLYCOLAX) packet 17 g, Daily PRN  acetaminophen (TYLENOL) tablet 650 mg, Q6H PRN   Or  acetaminophen (TYLENOL) suppository 650 mg, Q6H PRN  pantoprazole (PROTONIX) tablet 40 mg, QAM AC  levothyroxine (SYNTHROID) tablet 300 mcg, Daily  losartan (COZAAR) tablet 100 mg, Daily  atorvastatin (LIPITOR) tablet 10 mg, Nightly  cetirizine (ZYRTEC) tablet 10 mg, Daily PRN  mometasone-formoterol (DULERA) 100-5 MCG/ACT inhaler 2 puff, BID  dextrose bolus 10% 125 mL, PRN   Or  dextrose bolus 10% 250 mL, PRN  glucagon (rDNA) injection 1 mg, PRN  dextrose 10 % infusion, Continuous PRN  insulin lispro (HUMALOG) injection vial 10 Units, TID WC  insulin lispro (HUMALOG) injection vial 0-8 Units, TID WC  insulin lispro (HUMALOG) injection vial 0-4 Units, Nightly  metronidazole (FLAGYL) 500 mg in 0.9% NaCl 100 mL IVPB premix, Q8H  promethazine (PHENERGAN) injection 6.25 mg, Q6H PRN  insulin glargine (LANTUS) injection vial 20 Units, BID  enoxaparin (LOVENOX) injection 40 mg, Daily  lactated ringers infusion, Continuous  morphine injection 4 mg, Q4H PRN  vancomycin (VANCOCIN) 1,500 mg in dextrose 5 % 250 mL IVPB, Q12H    propofol injection, PRN  succinylcholine (ANECTINE) injection, PRN  fentaNYL (SUBLIMAZE) injection, PRN  Dexamethasone Sodium Phosphate injection, PRN  ondansetron (ZOFRAN) injection, PRN      Objective:  BP (!) 111/54   Pulse 88   Temp 99.2 °F (37.3 °C) (Temporal)   Resp 16   Ht 5' 5\" (1.651 m)   Wt 230 lb (104.3 kg)   LMP 08/16/2010   SpO2 97%   BMI 38.27 kg/m²   No intake or output data in the 24 hours ending 10/14/22 0822   Wt Readings from Last 3 Encounters:   10/12/22 230 lb (104.3 kg)   10/11/22 233 lb (105.7 kg)   10/06/22 233 lb (105.7 kg)       General appearance:  Appears comfortable, she is alert and pleasant   Eyes: Sclera clear. Pupils equal.  ENT: Moist oral mucosa. Trachea midline, no adenopathy. Cardiovascular: Regular rhythm, normal S1, S2. No murmur. No edema in lower extremities  Respiratory: Not using accessory muscles. Good inspiratory effort. Clear to auscultation bilaterally, no wheeze or crackles. GI: Abdomen soft with active bowel sounds. No current tenderness. Previous drain site from Left lower abd is unremarkable. Ileostomy with liquid output noted. Midline abd wound vac in place with scant red drainage noted   Musculoskeletal: No cyanosis in digits, neck supple  Neurology: CN 2-12 grossly intact. No speech or motor deficits  Psych: Normal affect.  Alert and oriented in time, place and person  Skin: Warm, dry, normal turgor    Labs and Tests:  CBC:   Recent Labs     10/12/22  1339 10/13/22  0459 10/14/22  0515   WBC 10.9 5.2 4.5   HGB 12.0 10.3* 10.5*    256 248       BMP:    Recent Labs     10/12/22  1339 10/13/22  0459 10/14/22  0515   * 135* 138   K 4.7 3.9 3.6    105 106   CO2 18* 21 21 BUN 10 9 6*   CREATININE 0.6 <0.5* 0.5*   GLUCOSE 204* 162* 159*       Hepatic:   Recent Labs     10/12/22  1339 10/13/22  0459 10/14/22  0515   AST 17 14* 16   ALT 18 15 15   BILITOT 0.6 0.4 0.3   ALKPHOS 122 97 107       CT ABDOMEN PELVIS W IV CONTRAST Additional Contrast? None   Final Result   The previously demonstrated collection within the subcutaneous tissues of the   anterior abdomen and pelvis has decreased in size though has developed mural   thickening and internal foci of air, most suggestive of abscess. Findings   are also suspicious for cutaneous fistulas extending anteriorly from the   collection. Parastomal hernia containing loops of small bowel though without evidence of   bowel dilatation to suggest obstruction. Cholelithiasis. There is also endothelial enhancement of the common bile   duct which could be inflammatory or infectious. Clinical correlation   suggested. Latest Reference Range & Units Most Recent 10/14/22 07:01 10/14/22 09:08 10/14/22 10:59   POC Glucose 70 - 99 mg/dl 175 (H)  10/14/22 10:59 146 (H) 141 (H) 175 (H)       Mag 1.7  Pro Luis Miguel 0.26 - .17   CRP 76     Problem List  Principal Problem:    Abdominal wall seroma, initial encounter  Active Problems:    Ileostomy care ([facility]    Diabetes mellitus, type II, insulin dependent (HCC)    Hypothyroidism    Essential hypertension    Ulcerative (chronic) enterocolitis, unspecified complication (HCC)    Generalized abdominal pain  Resolved Problems:    * No resolved hospital problems. *       Assessment & Plan:   S/p drainage of Abd wall abscess. Wound vac now in place. Cultures are pending. Continue with IV Cefepime, Flagyl and Vanc. General surgery to following. BC pending . Clinically she looks well  Sepsis: due to above,  resolved   T2DM: BG values in range on current therapy.    Low Magnesium:  will replace   Discussed with Dr Allison Gibbs,  anticipate likely d/c home in the am on 7 days of augmentin       Diet: ADULT DIET;  Regular  ADULT ORAL NUTRITION SUPPLEMENT; Lunch; Diabetic Oral Supplement  Code:Full Code  DVT PPX      CORDELL Stevens CNP   10/14/2022 8:22 AM

## 2022-10-14 NOTE — CARE COORDINATION
Discharge Planning Note:    - Patient will be discharged on oral ABX treatment. - Patient will need a Wound Vac upon discharge. Referral:      - Patient will need HHC. [facility] will be following the patient upon discharge. Please notify Juan Singh, once discharge is in place. Will continue to follow.     AMBER Fishman RN    [facility]  Phone: [phone]

## 2022-10-14 NOTE — OP NOTE
[facility]                     [facility], [facility]                                OPERATIVE REPORT    PATIENT NAME: Dorota Monterroso                   :        1960  MED REC NO:   [id number]                          ROOM:       5527  ACCOUNT NO:   [de-identified]                           ADMIT DATE: 10/12/2022  PROVIDER:     Zion St MD    DATE OF PROCEDURE:  10/14/2022    PREOPERATIVE DIAGNOSIS:  Abdominal wall fluid collection, possible  abscess. POSTOPERATIVE DIAGNOSIS:  Abdominal wall fluid collection, possible  abscess. PROCEDURE:  Incision and drainage of abdominal wall seroma and fluid  collection, debridement of pseudocapsule, and placement of open wound  VAC. SURGEON:  Zion St MD    ANESTHESIA:  General.    ESTIMATED BLOOD LOSS:  Minimal.    COMPLICATIONS:  None. SPECIMENS:  Wound cultures. DETAILS OF SURGERY:  The patient returns to the OR today with a  persistent fluid collection and an old seroma. She previously had a  drainage of this and has decreased in size significantly, but some fluid  remains within the cavity and the patient did have a fever concerning  for a possible infection in this area. The patient has not had any pain  or cellulitis in this region, but has no other source of possible  infection. So open drainage and further treatment is being done today. ADDITIONAL DETAILS OF SURGERY:  The patient brought to the operating  room, placed on the operative table in supine position. Compression  boots were placed. General anesthetic was administered. The abdomen  was prepped and draped sterilely. Her chronic ileostomy in the right  abdomen was prepped out of the field.     After a time-out was done, a transverse incision was done on the left  abdomen overlying the area of the old URMILA drain site and dissected and  carried down into the soft tissue through the pseudocapsule and to the  seroma fluid space. This was evacuated fully and the wound was extended  medially and laterally with excisional debridement done of the skin,  subcutaneous tissue, and the pseudocapsule over the length of the area  of 15 cm long by 5 cm vertically to open the space up widely. There was  some chronic inflammatory debris in the area of the pseudocapsule and  this was all irrigated thoroughly with a couple liters of saline  solution. The wound site was rendered hemostatic with Bovie  electrocautery and a couple of 3-0 Vicryl ties and sutures for  subcutaneous vessels. The evacuation of all the fluids was complete. At this point, we decided to place a wound VAC in the region of the  wound. The deeper part of the wound was packed with a 10 cm x 15 cm  black sponge, subcutaneous tissue was packed with a second sponge, cut  in an elliptical shape approximately 10 cm by 4 cm in size. The  overlying wound VAC dressing was placed and secured to suction. The  patient tolerated the procedure well and was taken to recovery room in  stable condition postop.         Sahra Worrell MD    D: 10/14/2022 8:52:08       T: 10/14/2022 8:56:51     [email or web address]  Job#: [id number]     Doc#: [id number]    CC:

## 2022-10-14 NOTE — PROGRESS NOTES
Patient awake, alert, VSS, denies pain, abdominal dressing/wound vac CDI, phase I discharge criteria met, will transfer back to t.

## 2022-10-14 NOTE — PLAN OF CARE
Problem: Discharge Planning  Goal: Discharge to home or other facility with appropriate resources  Outcome: Progressing     Problem: Nutrition Deficit:  Goal: Optimize nutritional status  Outcome: Progressing     Problem: Chronic Conditions and Co-morbidities  Goal: Patient's chronic conditions and co-morbidity symptoms are monitored and maintained or improved  Outcome: Progressing

## 2022-10-14 NOTE — H&P
[facility] and Laparoscopic Surgery      I have reviewed the history and physical and examined the patient and find no relevant changes. I have reviewed with the patient and/or family the risks, benefits, and alternatives to the procedure.     Efrain Urbina MD  10/14/2022

## 2022-10-14 NOTE — PROGRESS NOTES
Physician Progress Note      Yogi Oh  CSN #:                  [id number]  :                       1960  ADMIT DATE:       10/12/2022 1:00 PM  [address] [place] DATE:  RESPONDING  PROVIDER #:        [facility] L DIAZ Cortes CNP          QUERY TEXT:    Pt admitted with Sepsis -Likely source is intra-abdominal infected seroma. and   underwent incision and drainage of abdominal wall seroma on 2022.? If   possible, please document in progress notes and/or discharge summary   whether. .. The medical record reflects the following:  Risk Factors: Hx. multiple surgeries  Clinical Indicators: Per H&P: \"Abdominal wall seroma:  Concerning that it is infected with abscess  Also concern for newly formed cutaneous fistula extending anteriorly from the   abscess-like collection\"  Treatment: Surgery consult, Broad-spectrum IV antibiotics with cefepime,   vancomycin and metronidazole  Options provided:  -- Seroma?is due to the procedure  -- Seroma is not due to the procedure  -- Other - I will add my own diagnosis  -- Disagree - Not applicable / Not valid  -- Disagree - Clinically unable to determine / Unknown  -- Refer to Clinical Documentation Reviewer    PROVIDER RESPONSE TEXT:    Provider is clinically unable to determine a response to this query.     Query created by: Domingo Metz on 10/14/2022 4:30 PM      Electronically signed by:  Debi Cortes CNP 10/14/2022 7:38   PM

## 2022-10-14 NOTE — CARE COORDINATION
Wound Care Information:    Wound Vac Referral:    [facility] EDDIE: [phone]  Fax: [phone]    Contacted Dr. Ledy Tate concerning the wound dimensions and settings.:    Length - 15 cm  Width - 10 cm  Depth - 5 cm    Wound Vac settin mmHg    Black foam

## 2022-10-14 NOTE — PROGRESS NOTES
Pt admitted to PACU, awakening and following commands, vss, O2 saturations stable on simple mask 6L, weaned to RA quickly d/t productive forceful cough. Colin Broderick used for [employer]. Per pt \" has lots of post nasal drainage\" Wound vac in place. NSR on tele.

## 2022-10-14 NOTE — ANESTHESIA PRE PROCEDURE
Department of Anesthesiology  Preprocedure Note       Name:  Mely Ramos   Age:  65 y.o.  :  1960                                          MRN:  [id number]         Date:  10/14/2022      Surgeon: Sarah Ellis):  Tarah Beckford MD    Procedure: Procedure(s):  DRAINAGE OF ABDOMINAL WALL ABSCESS , POSSIBLE PLACEMENT OF WOUND VAC    Medications prior to admission:   Prior to Admission medications    Medication Sig Start Date End Date Taking? Authorizing Provider   sulfamethoxazole-trimethoprim (BACTRIM DS) 800-160 MG per tablet Take 1 tablet by mouth 2 times daily for 7 days 10/11/22 10/18/22  Tarah Beckford MD   insulin glargine (LANTUS SOLOSTAR) 100 UNIT/ML injection pen Inject 50 units into the skin BID. 22   Romana Roads, MD   OMEPRAZOLE PO Take by mouth OTC daily    Historical Provider, MD   insulin aspart (NOVOLOG FLEXPEN) 100 UNIT/ML injection pen 25 units bid 22   Romana Roads, MD   Insulin Pen Needle (B-D ULTRAFINE III SHORT PEN) 31G X 8 MM MISC USE TO INJECT INSULIN FOUR TIMES DAILY 22   Romana Roads, MD   levothyroxine (SYNTHROID) 300 MCG tablet 300 MCG DAILY 22   Romana Roads, MD   Continuous Blood Gluc Sensor (FREESTYLE EDY 2 SENSOR) MISC CHANGE EVERY 14 DAYS TO MONITOR BS 22   Romana Roads, MD   insulin aspart (NOVOLOG) 100 UNIT/ML injection vial Inject 25 units into the skin daily  Patient taking differently: Inject 25 units into the skin twice a day- using generic store brand due to cost 22   Romana Roads, MD   blood glucose test strips (ONETOUCH ULTRA) strip Test 4 times daily.  21   Romana Roads, MD   ALBUTEROL IN Inhale into the lungs    Historical Provider, MD   Multiple Vitamin (MULTIVITAMIN ADULT PO) Take by mouth    Historical Provider, MD   Ferrous Sulfate (IRON PO) Take by mouth daily     Historical Provider, MD   COENZYME Q10 PO Take by mouth    Historical Provider, MD   ONE TOUCH ULTRASOFT LANCETS MISC USE TO TEST BLOOD GLUCOSE 4 TIMES DAILY 11/7/20   Historical Provider, MD   POTASSIUM PO Take by mouth daily     Historical Provider, MD   MAGNESIUM PO Take by mouth daily     Historical Provider, MD   Montelukast Sodium (SINGULAIR PO) Take by mouth    Historical Provider, MD   Cholecalciferol (VITAMIN D3) 1.25 MG (40956 UT) CAPS Take by mouth once a week    Historical Provider, MD   TRIAMCINOLONE ACETONIDE NA by Nasal route as needed     Historical Provider, MD   BD INSULIN SYRINGE U/F 30G X 1/2\" 0.3 ML MISC USE WITH INSULIN TWICE A DAY 12/10/20   Ning Quintero MD   fluticasone-vilanterol (BREO ELLIPTA) 100-25 MCG/INH AEPB inhaler Inhale 1 puff into the lungs as needed     Historical Provider, MD   losartan (COZAAR) 100 MG tablet Take 100 mg by mouth daily    Historical Provider, MD   Omega-3 Fatty Acids (FISH OIL) 1000 MG CAPS Take 3,000 mg by mouth daily    Historical Provider, MD   atorvastatin (LIPITOR) 10 MG tablet Take 10 mg by mouth daily    Historical Provider, MD   CRANBERRY PO Take by mouth daily    Historical Provider, MD   acetaminophen (TYLENOL) 500 MG tablet Take 500 mg by mouth every 6 hours as needed for Pain    Historical Provider, MD   loratadine (CLARITIN) 10 MG capsule Take by mouth daily     Historical Provider, MD       Current medications:    No current facility-administered medications for this visit. No current outpatient medications on file.      Facility-Administered Medications Ordered in Other Visits   Medication Dose Route Frequency Provider Last Rate Last Admin    cefepime (MAXIPIME) 2000 mg IVPB minibag  2,000 mg IntraVENous Q12H CONSTANCE Grier 100 mL/hr at 10/14/22 0554 2,000 mg at 10/14/22 0554    sodium chloride flush 0.9 % injection 5-40 mL  5-40 mL IntraVENous 2 times per day Maria E Marcelino MD   10 mL at 10/13/22 0937    sodium chloride flush 0.9 % injection 5-40 mL  5-40 mL IntraVENous PRN Maria E Marcelino MD        0.9 % sodium chloride infusion   IntraVENous PRN Maria E Marcelino MD        ondansetron (ZOFRAN-ODT) disintegrating tablet 4 mg  4 mg Oral Q8H PRN Ирина Maldonado MD   4 mg at 10/13/22 1848    Or    ondansetron (ZOFRAN) injection 4 mg  4 mg IntraVENous Q6H PRN Ирина Maldonado MD        polyethylene glycol (GLYCOLAX) packet 17 g  17 g Oral Daily PRN Ирина Maldonado MD        acetaminophen (TYLENOL) tablet 650 mg  650 mg Oral Q6H PRN Ирина Maldonado MD   650 mg at 10/13/22 2041    Or    acetaminophen (TYLENOL) suppository 650 mg  650 mg Rectal Q6H PRN Ирина Maldonado MD        pantoprazole (PROTONIX) tablet 40 mg  40 mg Oral QAM AC Ирина Maldonado MD   40 mg at 10/14/22 0551    levothyroxine (SYNTHROID) tablet 300 mcg  300 mcg Oral Daily Ирина Maldonado MD   300 mcg at 10/14/22 0551    losartan (COZAAR) tablet 100 mg  100 mg Oral Daily Ирина Maldonado MD   100 mg at 10/13/22 7372    atorvastatin (LIPITOR) tablet 10 mg  10 mg Oral Nightly Ирина Maldonado MD   10 mg at 10/13/22 2042    cetirizine (ZYRTEC) tablet 10 mg  10 mg Oral Daily PRN Ирина Maldonado MD        mometasone-formoterol (DULERA) 100-5 MCG/ACT inhaler 2 puff  2 puff Inhalation BID Ирина Maldonado MD   2 puff at 10/13/22 2123    dextrose bolus 10% 125 mL  125 mL IntraVENous PRN Ирина Maldonado MD        Or    dextrose bolus 10% 250 mL  250 mL IntraVENous PRN Ирина Maldonado MD        glucagon (rDNA) injection 1 mg  1 mg SubCUTAneous PRN Ирина Maldonado MD        dextrose 10 % infusion   IntraVENous Continuous PRN Ирина Maldonado MD        insulin lispro (HUMALOG) injection vial 10 Units  10 Units SubCUTAneous TID TAWANNA Maldonado MD   10 Units at 10/13/22 1758    insulin lispro (HUMALOG) injection vial 0-8 Units  0-8 Units SubCUTAneous TID TAWANNA Maldonado MD        insulin lispro (HUMALOG) injection vial 0-4 Units  0-4 Units SubCUTAneous Nightly Ирина Maldonado MD        metronidazole (FLAGYL) 500 mg in 0.9% NaCl 100 mL IVPB premix  500 mg IntraVENous [place] Meeter, MD   Stopped at 10/14/22 0132    promethazine (PHENERGAN) injection 6.25 mg  6.25 mg IntraMUSCular Q6H PRN Jazz Martin MD        insulin glargine (LANTUS) injection vial 20 Units  20 Units SubCUTAneous BID Jazz Martin MD   20 Units at 10/13/22 2040    enoxaparin (LOVENOX) injection 40 mg  40 mg SubCUTAneous Daily Jazz Martin MD   40 mg at 10/13/22 1077    lactated ringers infusion   IntraVENous Continuous Jazz Martin  mL/hr at 10/13/22 1400 New Bag at 10/13/22 1400    morphine injection 4 mg  4 mg IntraVENous Q4H PRN Jazz Martin MD        vancomycin (VANCOCIN) 1,500 mg in dextrose 5 % 250 mL IVPB  1,500 mg IntraVENous Q12H Jazz Martin MD   Stopped at 10/13/22 2548       Allergies:  No Known Allergies    Problem List:    Patient Active Problem List   Diagnosis Code    [facility] Z43.2    Diabetes mellitus, type II, insulin dependent ([facility] E11.9, Z79.4    Hypothyroidism E03.9    Essential hypertension I10    Mixed hyperlipidemia E78.2    Ulcerative (chronic) enterocolitis, unspecified complication ([facility] U74.179    Small bowel obstruction ([address] K56.609    Incarcerated incisional hernia K43.0    Generalized abdominal pain R10.84    Postoperative fever R50.82    Abdominal wall seroma S30. 1XXA    Abdominal wall seroma, initial encounter S30. 1XXA       Past Medical History:        Diagnosis Date    Asthma     Diabetes mellitus ([address]     GERD (gastroesophageal reflux disease)     Hyperlipidemia     Hypertension     Ileostomy care ([facility] 3/17/2020    Iritis     PCOS (polycystic ovarian syndrome)     Pyoderma     Thyroid disease     Ulcerative colitis     Ulcerative colitis ([address]        Past Surgical History:        Procedure Laterality Date    ABDOMEN SURGERY      COLON RESECTION FOR ULCERATIVE COLITIS THEN HAD ANUS REMOVED    ABDOMEN SURGERY N/A 9/30/2022    INCISION AND DRAINAGE OF ABDOMINAL WALL SEROMA (64981) performed by Terri Camacho MD at [address] [place] [address] PERMANENT    UPPER GASTROINTESTINAL ENDOSCOPY N/A 11/23/2021    EGD BIOPSY performed by Janis Clifford MD at [facility] N/A 1/18/2022    EGD DIAGNOSTIC ONLY performed by Janis Clifford MD at [place] [address] N/A 11/16/2021    OPEN REPAIR INCISIONAL HERNIA WITH MESH, LYSIS OF ADHESIONS performed by Jordan Kimbrough MD at [address] [place] [address] History:    Social History     Tobacco Use    Smoking status: Never    Smokeless tobacco: Never   Substance Use Topics    Alcohol use: No                                Counseling given: Not Answered      Vital Signs (Current): There were no vitals filed for this visit.                                            BP Readings from Last 3 Encounters:   10/14/22 116/63   10/11/22 112/60   10/06/22 128/63       NPO Status:                                                                                 BMI:   Wt Readings from Last 3 Encounters:   10/12/22 230 lb (104.3 kg)   10/11/22 233 lb (105.7 kg)   10/06/22 233 lb (105.7 kg)     There is no height or weight on file to calculate BMI.    CBC:   Lab Results   Component Value Date/Time    WBC 4.5 10/14/2022 05:15 AM    RBC 4.06 10/14/2022 05:15 AM    HGB 10.5 10/14/2022 05:15 AM    HCT 31.9 10/14/2022 05:15 AM    MCV 78.7 10/14/2022 05:15 AM    RDW 14.9 10/14/2022 05:15 AM     10/14/2022 05:15 AM       CMP:   Lab Results   Component Value Date/Time     10/14/2022 05:15 AM    K 3.6 10/14/2022 05:15 AM    K 3.7 11/21/2021 01:07 PM     10/14/2022 05:15 AM    CO2 21 10/14/2022 05:15 AM    BUN 6 10/14/2022 05:15 AM    CREATININE 0.5 10/14/2022 05:15 AM    GFRAA >60 10/14/2022 05:15 AM    GFRAA >60 03/22/2013 12:10 PM    AGRATIO 0.9 10/14/2022 05:15 AM    LABGLOM >60 10/14/2022 05:15 AM    GLUCOSE 159 10/14/2022 05:15 AM    PROT 6.2 10/14/2022 05:15 AM    PROT 7.8 12/22/2012 12:12 PM    CALCIUM 9.1 10/14/2022 05:15 AM    BILITOT 0.3 10/14/2022 05:15 AM    ALKPHOS 107 10/14/2022 05:15 AM    AST 16 10/14/2022 05:15 AM    ALT 15 10/14/2022 05:15 AM       POC Tests:   Recent Labs     10/14/22  0701   POCGLU 146*       Coags:   Lab Results   Component Value Date/Time    PROTIME 15.4 10/13/2022 04:59 AM    INR 1.23 10/13/2022 04:59 AM    APTT 28.6 11/22/2021 01:01 AM       HCG (If Applicable): No results found for: PREGTESTUR, PREGSERUM, HCG, HCGQUANT     ABGs: No results found for: PHART, PO2ART, FQF1SSB, MLJ8WNU, BEART, K5MABGML     Type & Screen (If Applicable):  No results found for: LABABO, LABRH    Drug/Infectious Status (If Applicable):  No results found for: HIV, HEPCAB    COVID-19 Screening (If Applicable):   Lab Results   Component Value Date/Time    COVID19 Not Detected 11/15/2021 05:55 AM           Anesthesia Evaluation  Patient summary reviewed and Nursing notes reviewed no history of anesthetic complications:   Airway: Mallampati: II  TM distance: >3 FB   Neck ROM: full  Mouth opening: > = 3 FB   Dental: normal exam         Pulmonary:   (+) sleep apnea (skin rxn to cpap mask): on noncompliant,  asthma (last inhaler use August): seasonal asthma,     (-) rhonchi and wheezes                           Cardiovascular:    (+) hypertension:,     (-) CABG/stent, dysrhythmias and  angina      Rhythm: regular  Rate: normal                    Neuro/Psych:      (-) seizures, TIA and CVA           GI/Hepatic/Renal:   (+) GERD: well controlled, PUD,          ROS comment: Denies gerd sx or n/v today. Endo/Other:    (+) DiabetesType II DM, , hypothyroidism::., .                  ROS comment: PCOS Abdominal:   (+) obese,           Vascular:     - DVT and PE. Other Findings:             Anesthesia Plan      general     ASA 3       Induction: intravenous. MIPS: Postoperative opioids intended and Prophylactic antiemetics administered. Anesthetic plan and risks discussed with patient. Use of blood products discussed with patient whom.    Plan discussed with CRNA.                     Lam Silverio MD   10/14/2022

## 2022-10-14 NOTE — ANESTHESIA POSTPROCEDURE EVALUATION
Department of Anesthesiology  Postprocedure Note    Patient: Roz Hadley  MRN: [id number]  YOB: 1960  Date of evaluation: 10/14/2022      Procedure Summary     Date: 10/14/22 Room / Location: [address]    Anesthesia Start: 0730 Anesthesia Stop: 0845    Procedure: DRAINAGE OF ABDOMINAL WALL ABSCESS WITH PLACEMENT OF WOUND VAC (Abdomen) Diagnosis:       Abdominal wall abscess      (Abdominal Wall Abscess)    Surgeons: Sybil Radford MD Responsible Provider: Shara Dahl MD    Anesthesia Type: general ASA Status: 3          Anesthesia Type: No value filed.     Ruchi Phase I: Ruchi Score: 10    Ruchi Phase II:        Anesthesia Post Evaluation    Patient location during evaluation: PACU  Patient participation: complete - patient participated  Level of consciousness: awake and alert  Airway patency: patent  Nausea & Vomiting: no nausea and no vomiting  Complications: no  Cardiovascular status: hemodynamically stable  Respiratory status: acceptable  Hydration status: stable  Multimodal analgesia pain management approach

## 2022-10-14 NOTE — CARE COORDINATION
Patient has wound vac to abdominal wound. Plan is for patient to go home with wound vac therapy. Spoke to Marcus Ortega, case management, will work on home vac and home health. Per provider note, patient may go home 10/15/22.  [address] [place], BSN, RN, [address] [place] [address]  [phone]

## 2022-10-15 VITALS
HEIGHT: 65 IN | WEIGHT: 230 LBS | TEMPERATURE: 97.9 F | RESPIRATION RATE: 16 BRPM | BODY MASS INDEX: 38.32 KG/M2 | OXYGEN SATURATION: 95 % | SYSTOLIC BLOOD PRESSURE: 121 MMHG | DIASTOLIC BLOOD PRESSURE: 69 MMHG | HEART RATE: 86 BPM

## 2022-10-15 LAB
A/G RATIO: 0.9 (ref 1.1–2.2)
ALBUMIN SERPL-MCNC: 2.8 G/DL (ref 3.4–5)
ALP BLD-CCNC: 89 U/L (ref 40–129)
ALT SERPL-CCNC: 15 U/L (ref 10–40)
ANION GAP SERPL CALCULATED.3IONS-SCNC: 9 MMOL/L (ref 3–16)
AST SERPL-CCNC: 11 U/L (ref 15–37)
BASOPHILS ABSOLUTE: 0 K/UL (ref 0–0.2)
BASOPHILS RELATIVE PERCENT: 0.1 %
BILIRUB SERPL-MCNC: <0.2 MG/DL (ref 0–1)
BUN BLDV-MCNC: 12 MG/DL (ref 7–20)
CALCIUM SERPL-MCNC: 8.9 MG/DL (ref 8.3–10.6)
CHLORIDE BLD-SCNC: 107 MMOL/L (ref 99–110)
CO2: 22 MMOL/L (ref 21–32)
CREAT SERPL-MCNC: 0.5 MG/DL (ref 0.6–1.2)
EOSINOPHILS ABSOLUTE: 0 K/UL (ref 0–0.6)
EOSINOPHILS RELATIVE PERCENT: 0.1 %
GFR AFRICAN AMERICAN: >60
GFR NON-AFRICAN AMERICAN: >60
GLUCOSE BLD-MCNC: 150 MG/DL (ref 70–99)
GLUCOSE BLD-MCNC: 205 MG/DL (ref 70–99)
GLUCOSE BLD-MCNC: 224 MG/DL (ref 70–99)
GLUCOSE BLD-MCNC: 258 MG/DL (ref 70–99)
HCT VFR BLD CALC: 29.5 % (ref 36–48)
HEMOGLOBIN: 9.5 G/DL (ref 12–16)
LYMPHOCYTES ABSOLUTE: 0.7 K/UL (ref 1–5.1)
LYMPHOCYTES RELATIVE PERCENT: 11.6 %
MCH RBC QN AUTO: 25.2 PG (ref 26–34)
MCHC RBC AUTO-ENTMCNC: 32 G/DL (ref 31–36)
MCV RBC AUTO: 78.7 FL (ref 80–100)
MONOCYTES ABSOLUTE: 0.5 K/UL (ref 0–1.3)
MONOCYTES RELATIVE PERCENT: 9.5 %
NEUTROPHILS ABSOLUTE: 4.4 K/UL (ref 1.7–7.7)
NEUTROPHILS RELATIVE PERCENT: 78.7 %
PDW BLD-RTO: 14.7 % (ref 12.4–15.4)
PERFORMED ON: ABNORMAL
PLATELET # BLD: 241 K/UL (ref 135–450)
PMV BLD AUTO: 6.3 FL (ref 5–10.5)
POTASSIUM SERPL-SCNC: 4.1 MMOL/L (ref 3.5–5.1)
PROCALCITONIN: 0.13 NG/ML (ref 0–0.15)
RBC # BLD: 3.75 M/UL (ref 4–5.2)
SODIUM BLD-SCNC: 138 MMOL/L (ref 136–145)
TOTAL PROTEIN: 5.8 G/DL (ref 6.4–8.2)
WBC # BLD: 5.6 K/UL (ref 4–11)

## 2022-10-15 PROCEDURE — 99024 POSTOP FOLLOW-UP VISIT: CPT | Performed by: SURGERY

## 2022-10-15 PROCEDURE — 6370000000 HC RX 637 (ALT 250 FOR IP): Performed by: SURGERY

## 2022-10-15 PROCEDURE — 85025 COMPLETE CBC W/AUTO DIFF WBC: CPT

## 2022-10-15 PROCEDURE — 80053 COMPREHEN METABOLIC PANEL: CPT

## 2022-10-15 PROCEDURE — 6370000000 HC RX 637 (ALT 250 FOR IP): Performed by: NURSE PRACTITIONER

## 2022-10-15 PROCEDURE — 6360000002 HC RX W HCPCS: Performed by: SURGERY

## 2022-10-15 PROCEDURE — 84145 PROCALCITONIN (PCT): CPT

## 2022-10-15 PROCEDURE — 2580000003 HC RX 258: Performed by: SURGERY

## 2022-10-15 RX ORDER — AMOXICILLIN AND CLAVULANATE POTASSIUM 875; 125 MG/1; MG/1
1 TABLET, FILM COATED ORAL EVERY 12 HOURS SCHEDULED
Qty: 12 TABLET | Refills: 0 | Status: SHIPPED | OUTPATIENT
Start: 2022-10-15 | End: 2022-10-21

## 2022-10-15 RX ADMIN — INSULIN LISPRO 10 UNITS: 100 INJECTION, SOLUTION INTRAVENOUS; SUBCUTANEOUS at 12:00

## 2022-10-15 RX ADMIN — Medication 2 PUFF: at 09:48

## 2022-10-15 RX ADMIN — INSULIN LISPRO 10 UNITS: 100 INJECTION, SOLUTION INTRAVENOUS; SUBCUTANEOUS at 08:00

## 2022-10-15 RX ADMIN — PANTOPRAZOLE SODIUM 40 MG: 40 TABLET, DELAYED RELEASE ORAL at 05:26

## 2022-10-15 RX ADMIN — LEVOTHYROXINE SODIUM 300 MCG: 0.15 TABLET ORAL at 05:26

## 2022-10-15 RX ADMIN — LOSARTAN POTASSIUM 100 MG: 100 TABLET, FILM COATED ORAL at 08:00

## 2022-10-15 RX ADMIN — INSULIN LISPRO 10 UNITS: 100 INJECTION, SOLUTION INTRAVENOUS; SUBCUTANEOUS at 16:38

## 2022-10-15 RX ADMIN — INSULIN LISPRO 2 UNITS: 100 INJECTION, SOLUTION INTRAVENOUS; SUBCUTANEOUS at 16:42

## 2022-10-15 RX ADMIN — INSULIN GLARGINE 20 UNITS: 100 INJECTION, SOLUTION SUBCUTANEOUS at 08:02

## 2022-10-15 RX ADMIN — AMOXICILLIN AND CLAVULANATE POTASSIUM 1 TABLET: 875; 125 TABLET, FILM COATED ORAL at 08:04

## 2022-10-15 RX ADMIN — INSULIN LISPRO 2 UNITS: 100 INJECTION, SOLUTION INTRAVENOUS; SUBCUTANEOUS at 07:59

## 2022-10-15 RX ADMIN — Medication 10 ML: at 08:01

## 2022-10-15 RX ADMIN — ENOXAPARIN SODIUM 40 MG: 100 INJECTION SUBCUTANEOUS at 08:03

## 2022-10-15 NOTE — CARE COORDINATION
Discharge Planning Note:    Received fax overnight from [facility] requesting x2 [employer] forms be signed by MD/NP and faxed back. Signed forms needed to obtain insurance approval for wound vac. Once approval received, CM will be notified and wound vac will be delivered. Forms placed on unit for when Dr. Charlee Tran or Stefanie/CORDELL arrive on unit. Once signed, the forms and the op note meed to be faxed back to [facility] at [phone]. Electronically signed by Preeti Smith RN on 10/15/2022 at 11:24 AM    Update:    BRYN spoke to wound care RN and a True Osler with [facility] regarding pt DC with wound vac. At DC pt is to be disconnected from wound vac and a wet-to-dry bandage be applied. Pt will be re-connected to home wound vac by Mark Duggan RN. Once [facility] forms have been signed by the surgical team and faxed back to [facility] ([phone]) along with the pre-op note, pt can DC home. Please notify Papito Treviño at [facility], [facility] ([phone]) so that RN can see pt tomorrow. Electronically signed by Preeti Smith RN on 10/15/2022 at 2:13 PM    UPDATE:    BRYN confirmed that [facility] received the needed documents and that wound vac will be delivered to the pt home this evening. BRYN called and notified Papito Treviño at [place] [address].      Electronically signed by Preeti Smith RN on 10/15/2022 at 3:10 PM

## 2022-10-15 NOTE — DISCHARGE INSTR - COC
Continuity of Care Form    Patient Name: Ki Alvarez   :  1960  MRN:  [id number]    [facility] date:  10/12/2022  Discharge date:  ***    Code Status Order: Full Code   Advance Directives:   Advance Care Flowsheet Documentation       Date/Time Healthcare Directive Type of Healthcare Directive Copy in [address] Agent's Name Healthcare Agent's Phone Number    10/14/22 0645 No, patient does not have an advance directive for healthcare treatment -- -- -- -- --            Admitting Physician:  Aldo Mcneil MD  PCP: Roger Tee    Discharging Nurse: [facility] Unit/Room#: [id number]  Discharging Unit Phone Number: ***    Emergency Contact:   Extended Emergency Contact Information  Primary Emergency Contact: Ghislaine Caceres, [address] [facility] Phone: [phone]  Mobile Phone: [phone]  Relation: Brother/Sister  Secondary Emergency Contact: Shakir Malhotra Phone: [phone]  Relation: Brother/Sister    Past Surgical History:  Past Surgical History:   Procedure Laterality Date    ABDOMEN SURGERY      COLON RESECTION FOR ULCERATIVE COLITIS THEN HAD ANUS REMOVED    ABDOMEN SURGERY N/A 2022    INCISION AND DRAINAGE OF ABDOMINAL WALL SEROMA (05212) performed by Kathleen Barraza MD at [address] [place] [address] N/A 10/14/2022    DRAINAGE OF ABDOMINAL WALL ABSCESS WITH PLACEMENT OF WOUND VAC performed by Kathleen Barraza MD at [address] [facility] 2021    EGD BIOPSY performed by Faheem Brandon MD at [facility] N/A 2022    EGD DIAGNOSTIC ONLY performed by Faheem Brandon MD at [place] [address] N/A 2021    OPEN [facility], LYSIS OF ADHESIONS performed by Kathleen Barraza MD at [facility]       Immunization History: Immunization History   Administered Date(s) Administered    COVID-19, MODERNA BLUE border, Primary or Immunocompromised, (age 12y+), IM, 100 mcg/0.5mL 01/06/2021, 02/04/2021, 10/28/2021    COVID-19, PFIZER Bivalent BOOSTER, (age 12y+), IM, 30 mcg/0.3 mL dose 09/07/2022    COVID-19, PFIZER GRAY top, DO NOT Dilute, (age 15 y+), IM, 30 mcg/0.3 mL 04/11/2022       Active Problems:  Patient Active Problem List   Diagnosis Code    Ileostomy care ([facility] Z43.2    Diabetes mellitus, type II, insulin dependent ([facility] E11.9, Z79.4    Hypothyroidism E03.9    Essential hypertension I10    Mixed hyperlipidemia E78.2    Ulcerative (chronic) enterocolitis, unspecified complication ([facility] R91.140    Small bowel obstruction ([facility] K56.609    Incarcerated incisional hernia K43.0    Generalized abdominal pain R10.84    Postoperative fever R50.82    Abdominal wall seroma S30. 1XXA    Abdominal wall seroma, initial encounter S30. 1XXA    Abdominal wall abscess L02.211       Isolation/Infection:   Isolation            No Isolation          Patient Infection Status       Infection Onset Added Last Indicated Last Indicated By Review Planned Expiration Resolved Resolved By    None active    Resolved    COVID-19 (Rule Out) 11/22/21 11/22/21 11/22/21 Respiratory Panel, Molecular, with COVID-19 (Restricted: peds pts or suitable admitted adults) (Ordered)   11/23/21 Rule-Out Test Resulted    C-diff Rule Out 11/21/21 11/21/21 11/21/21 Clostridium difficile toxin/antigen (Ordered)   11/22/21 Rule-Out Test Resulted    COVID-19 (Rule Out) 11/15/21 11/15/21 11/15/21 COVID-19, Rapid (Ordered)   11/15/21 Rule-Out Test Resulted            Nurse Assessment:  Last Vital Signs: /62   Pulse 83   Temp 98 °F (36.7 °C) (Oral)   Resp 16   Ht 5' 5\" (1.651 m)   Wt 230 lb (104.3 kg)   LMP 08/16/2010   SpO2 92%   BMI 38.27 kg/m²     Last documented pain score (0-10 scale): Pain Level: 0  Last Weight:   Wt Readings from Last 1 Encounters:   10/12/22 230 lb (104.3 kg)     Mental Status:  {IP PT MENTAL STATUS:20030}    IV Access:  { ZBIGNIEW IV ACCESS:377726276}    Nursing Mobility/ADLs:  Walking   {P DME UDWO:309759356}  Transfer  {P DME PBAD:467475075}  Bathing  {P DME IDRR:628761615}  Dressing  {P DME DCSU:831771674}  Toileting  {P DME RSKI:771747496}  Feeding  {P DME JLUW:086753232}  Med Admin  {P DME CIAU:549159187}  Med Delivery   { ZBIGNIEW MED Delivery:318477525}    Wound Care Documentation and Therapy:  Negative Pressure Wound Therapy Abdomen Left; Lower (Active)   Wound Type Surgical 10/15/22 0800   Unit Type Wound Vac 10/15/22 0800   Dressing Type Black Foam 10/15/22 0800   Cycle Continuous 10/15/22 0800   Target Pressure (mmHg) 125 10/15/22 0800   Canister changed? No 10/15/22 0800   Dressing Status Clean, dry & intact 10/15/22 0800   Drainage Amount None 10/15/22 0800   Wound Assessment Other (Comment) 10/15/22 0800   Amelia-wound Assessment Other (Comment) 10/14/22 0853   Number of days: 1       Incision 09/30/22 Abdomen Medial;Upper (Active)   Dressing Status Clean;Dry; Intact 10/14/22 2202   Dressing/Treatment Surgical glue 09/30/22 1009   Closure Surgical glue 09/30/22 1009   Margins Approximated 09/30/22 1009   Drainage Amount None 10/13/22 2123   Odor None 10/12/22 2120   Number of days: 15       Incision 10/14/22 Abdomen Left; Lower (Active)   Dressing Status Clean;Dry; Intact 10/15/22 0800   Incision Length (cm) 15 10/14/22 0930   Incision Width (cm) 10 cm 10/14/22 0930   Incision Depth (cm) 5 cm 10/14/22 0930   Closure Other (Comment) 10/15/22 0800   Margins Other (Comment) 10/15/22 0800   Incision Assessment Other (Comment) 10/15/22 0800   Odor None 10/15/22 0800   Number of days: 1        Elimination:  Continence:    Bowel: {YES / BP:52467}  Bladder: {YES / KYAW:91592}  Urinary Catheter: {Urinary Catheter:956331700}   Colostomy/Ileostomy/Ileal Conduit: {YES / CL:87216}  Ileostomy Ileostomy RLQ-Stomal Appliance: 2 piece  Ileostomy Ileostomy RLQ-Stoma  Assessment: Pink, Moist  Ileostomy Ileostomy RLQ-Peristomal Assessment: Clean, dry & intact  Ileostomy Ileostomy RLQ-Treatment: Other (Comment) (pt cares for it)  Ileostomy Ileostomy RLQ-Stool Appearance: Loose  Ileostomy Ileostomy RLQ-Stool Color: Green, Yellow  Ileostomy Ileostomy RLQ-Stool Amount: Small    Date of Last BM: ***    Intake/Output Summary (Last 24 hours) at 10/15/2022 1024  Last data filed at 10/15/2022 0800  Gross per 24 hour   Intake 880 ml   Output --   Net 880 ml     I/O last 3 completed shifts: In: 1200 [P.O.:400;  I.V.:800]  Out: -     Safety Concerns:     [address] Safety Concerns:661083839}    Impairments/Disabilities:      [address] Impairments/Disabilities:708135438}    Nutrition Therapy:  Current Nutrition Therapy:   [address] Diet List:785774487}    Routes of Feeding: {CHP DME Other Feedings:523643700}  Liquids: {Slp liquid thickness:48754}  Daily Fluid Restriction: {CHP DME Yes amt example:535253861}  Last Modified Barium Swallow with Video (Video Swallowing Test): {Done Not Done CSSY:678177516}    Treatments at the Time of Hospital Discharge:   Respiratory Treatments: ***  Oxygen Therapy:  {Therapy; copd oxygen:72530}  Ventilator:    { CC Vent SHCM:481499924}    Rehab Therapies: {THERAPEUTIC INTERVENTION:0983266689}  Weight Bearing Status/Restrictions: [address] [employer]  Weight Bearin}  Other Medical Equipment (for information only, NOT a DME order):  {EQUIPMENT:776605081}  Other Treatments: ***    Patient's personal belongings (please select all that are sent with patient):  {[facility] DME Belongings:962810937}    RN SIGNATURE:  {Esignature:392900571}    CASE MANAGEMENT/SOCIAL WORK SECTION    Inpatient Status Date: ***    Readmission Risk Assessment Score:  Readmission Risk              Risk of Unplanned Readmission:  20           Discharging to Facility/ Agency   Name:   Address:  Phone:  Fax:    Dialysis Facility (if applicable)   Name:  Address:  Dialysis Schedule:  Phone:  Fax:    / signature: {Esignature:540420684}    PHYSICIAN SECTION    Prognosis: Good    Condition at Discharge: Stable    Rehab Potential (if transferring to Rehab): Good    Recommended Labs or Other Treatments After Discharge: skilled nursing care,wound vac care    Physician Certification: I certify the above information and transfer of Mariposa Doss  is necessary for the continuing treatment of the diagnosis listed and that she requires Home Care for greater 30 days.      Update Admission H&P: No change in H&P    PHYSICIAN SIGNATURE:  Electronically signed by CORDELL Peacock CNP on 10/15/22 at 10:24 AM EDT

## 2022-10-15 NOTE — PROGRESS NOTES
Shift assessment completed. Neuro WNL. Denies any pain at this time. AM meds administered per MAR. NO surgical incision- dressing in place; wound vac on continuous cycle; dressing CDI. Ileostomy remains in place w/ small bowels. The care plan and education has been reviewed and mutually agreed upon with the patient. Standard safety measures in place. 6:19 PM  IV removed prior dc. The care plan and education has been resolved and completed. Discharge instructions and medications reviewed with patient. Patient voices understanding and denies further concerns at this time. Patient discharged home w/ [facility] per orders with all personal belongings. PCA transported pt in a wheelchair to private transportation home.

## 2022-10-15 NOTE — DISCHARGE SUMMARY
[facility]    Patient Demographics    Patient. Heather Reynoso 89  Date of Birth. 1960  MRN. [id number]     Primary care provider. Shanon Gibson  (Tel: [phone])    Admit date: 10/12/2022    Discharge date 10/15/2022  Note Date: 10/15/2022     Reason for Hospitalization. Chief Complaint   Patient presents with    Generalized Body Aches     Pt arrived via squad. Aches and pains for 1 week. Started zpak on Sunday. Fever 102. N/v started today. Significant Findings. Principal Problem:    Abdominal wall seroma, initial encounter  Active Problems:    Abdominal wall abscess    Ileostomy care ([facility] [address]    Diabetes mellitus, type II, insulin dependent (HCC)    Hypothyroidism    Essential hypertension    Ulcerative (chronic) enterocolitis, unspecified complication (HCC)    Generalized abdominal pain  Resolved Problems:    * No resolved hospital problems. *       Problems and results from this hospitalization that need follow up. Follow up with Dr Donna Dennis in 1 week    Significant test results and incidental findings. CT ABDOMEN PELVIS W IV CONTRAST Additional Contrast? None   Final Result   The previously demonstrated collection within the subcutaneous tissues of the   anterior abdomen and pelvis has decreased in size though has developed mural   thickening and internal foci of air, most suggestive of abscess. Findings   are also suspicious for cutaneous fistulas extending anteriorly from the   collection. Parastomal hernia containing loops of small bowel though without evidence of   bowel dilatation to suggest obstruction. Cholelithiasis. There is also endothelial enhancement of the common bile   duct which could be inflammatory or infectious. Clinical correlation   suggested.        Organism Escherichia coli Abnormal   [facility]   Culture Surgical Light growth  [facility]        Susceptibility    Escherichia coli (1)    Antibiotic Interpretation Microscan  Method Status    ampicillin Sensitive <=2 mcg/mL BACTERIAL SUSCEPTIBILITY PANEL BY NELLY     ampicillin-sulbactam Sensitive <=2 mcg/mL BACTERIAL SUSCEPTIBILITY PANEL BY NELLY     ceFAZolin Sensitive <=4 mcg/mL BACTERIAL SUSCEPTIBILITY PANEL BY NELLY     cefepime Sensitive <=0.12 mcg/mL BACTERIAL SUSCEPTIBILITY PANEL BY NELLY     cefTRIAXone Sensitive <=0.25 mcg/mL BACTERIAL SUSCEPTIBILITY PANEL BY NELLY     ciprofloxacin Sensitive <=0.25 mcg/mL BACTERIAL SUSCEPTIBILITY PANEL BY NELLY     ertapenem Sensitive <=0.12 mcg/mL BACTERIAL SUSCEPTIBILITY PANEL BY NELLY     gentamicin Sensitive <=1 mcg/mL BACTERIAL SUSCEPTIBILITY PANEL BY NELLY     levofloxacin Sensitive <=0.12 mcg/mL BACTERIAL SUSCEPTIBILITY PANEL BY NELLY     piperacillin-tazobactam Sensitive <=4 mcg/mL BACTERIAL SUSCEPTIBILITY PANEL BY NELLY     trimethoprim-sulfamethoxazole Sensitive <=20 mcg/mL BACTERIAL SUSCEPTIBILITY PANEL BY NELLY                    Invasive procedures and treatments. 10/14/22   Incision and drainage of abdominal wall seroma and fluid  collection, debridement of pseudocapsule, and placement of open wound  VAC. [facility] Course. The patient has long standing hx of UC and has had ileostomy for the past 20 years. She recently had a drain placed in her abdomin for a seroma. This drain was removed on 10/11/22. She presented to the ED due to fever, chills and body aches. Her previous drain site was unremarkable. She was admitted and followed closely by surgery, Dr Lolly Carbone. CT imaging done in the ED suggested possible infected seroma. She was admitted and  treated for the following:    S/p drainage of Abd wall fluid collection:  during her stay she was treated with cefepmine, vanc and flagyl. She did not have any leukocytoisis and fever was present only upon admission.   She was taked to the OR for I & D of seroma with placement of wound vac. Cultures are noted above. Sepsis: resolved   T2DM: BG values in range with insulin therapy   Low Magnesium:  will replace     Consults. IP CONSULT TO GENERAL SURGERY    Physical examination on discharge day. /72   Pulse 80   Temp 98 °F (36.7 °C) (Oral)   Resp 16   Ht 5' 5\" (1.651 m)   Wt 230 lb (104.3 kg)   LMP 08/16/2010   SpO2 96%   BMI 38.27 kg/m²   General appearance. Alert. Looks comfortable. HEENT. Sclera clear. Moist mucus membranes. Cardiovascular. Regular rate and rhythm, normal S1, S2. No murmur. Respiratory. Not using accessory muscles. Clear to auscultation bilaterally, no wheeze. Gastrointestinal. Abdomen soft, non-tender, not distended, normal bowel sounds  Neurology. Facial symmetry. No speech deficits. Moving all extremities equally. Extremities. No edema in lower extremities. Skin. Warm, dry, normal turgor    Condition at time of discharge stable    Medication instructions provided to patient at discharge. Medication List        START taking these medications      amoxicillin-clavulanate 875-125 MG per tablet  Commonly known as: AUGMENTIN  Take 1 tablet by mouth every 12 hours for 12 doses            CHANGE how you take these medications      NovoLOG FlexPen 100 UNIT/ML injection pen  Generic drug: insulin aspart  25 units bid  What changed: Another medication with the same name was removed. Continue taking this medication, and follow the directions you see here.             CONTINUE taking these medications      acetaminophen 500 MG tablet  Commonly known as: TYLENOL     ALBUTEROL IN     atorvastatin 10 MG tablet  Commonly known as: LIPITOR     B-D ULTRAFINE III SHORT PEN 31G X 8 MM Misc  Generic drug: Insulin Pen Needle  USE TO INJECT INSULIN FOUR TIMES DAILY     BD Insulin Syringe U/F 30G X 1/2\" 0.3 ML Misc  Generic drug: Insulin Syringe-Needle U-100  USE WITH INSULIN TWICE A DAY     Breo Ellipta 100-25 MCG/INH Aepb inhaler  Generic drug: fluticasone-vilanterol     COENZYME Q10 PO     CRANBERRY PO     fish oil 1000 MG capsule     FreeStyle Claudia 2 Sensor Misc  CHANGE EVERY 14 DAYS TO MONITOR BS     IRON PO     Lantus SoloStar 100 UNIT/ML injection pen  Generic drug: insulin glargine  Inject 50 units into the skin BID.     levothyroxine 300 MCG tablet  Commonly known as: SYNTHROID  300 MCG DAILY     loratadine 10 MG capsule  Commonly known as: CLARITIN     losartan 100 MG tablet  Commonly known as: COZAAR     MAGNESIUM PO     MULTIVITAMIN ADULT PO     OMEPRAZOLE PO     ONE TOUCH ULTRASOFT LANCETS Misc     OneTouch Ultra strip  Generic drug: blood glucose test strips  Test 4 times daily. POTASSIUM PO     SINGULAIR PO     TRIAMCINOLONE ACETONIDE NA     Vitamin D3 1.25 MG (68702 UT) Caps            STOP taking these medications      sulfamethoxazole-trimethoprim 800-160 MG per tablet  Commonly known as: Bactrim DS               Where to Get Your Medications        These medications were sent to [address], [address] [place], [place] [address], [address] [place] [address] Road      Phone: [phone]   amoxicillin-clavulanate 875-125 MG per tablet         Discharge recommendations given to patient. Follow Up. in 1 week   Disposition. home  Activity. activity as tolerated  Diet: ADULT DIET; Regular  ADULT ORAL NUTRITION SUPPLEMENT; Lunch; Diabetic Oral Supplement      Spent 35 minutes in discharge process.     Signed:  CORDELL Garcia CNP     10/15/2022 8:11 AM

## 2022-10-15 NOTE — PROGRESS NOTES
[facility] and Laparoscopic Surgery        Post-op Note    Pt Name: Alvarez Martinez  MRN: [id number]  YOB: 1960  Date of evaluation: 10/15/2022    Post-op Day #1    Subjective:    No acute events overnight  Pain controlled  Tolerating diet  Wound vac in place    Vital Signs:  Patient Vitals for the past 24 hrs:   BP Temp Temp src Pulse Resp SpO2   10/15/22 0800 110/62 98 °F (36.7 °C) Oral 83 16 92 %   10/15/22 0515 114/72 98 °F (36.7 °C) Oral 80 16 96 %   10/15/22 0030 97/60 97.9 °F (36.6 °C) Oral 67 16 95 %   10/14/22 1942 (!) 98/56 97.8 °F (36.6 °C) Oral 84 16 94 %   10/14/22 1614 100/64 98.2 °F (36.8 °C) Oral 80 17 93 %   10/14/22 1445 109/66 98.1 °F (36.7 °C) Oral 88 -- 93 %      TEMPERATURE HISTORY 24H: Temp (24hrs), Av °F (36.7 °C), Min:97.8 °F (36.6 °C), Max:98.2 °F (36.8 °C)    BLOOD PRESSURE HISTORY: Systolic (95SLO), RP , Min:92 , NGX:886    Diastolic (34JKK), PAJ:78, Min:47, Max:72      Intake/Output:    I/O last 3 completed shifts: In: 1200 [P.O.:400; I.V.:800]  Out: -   I/O this shift: In: 480 [P.O.:480]  Out: -   Drain/tube Output:           Physical Exam:  General: awake, alert, oriented to  person, place, time  Abdomen: soft, non-distended, appropriate incisional tenderness, dressing clean dry and intact, wound vac in place with good seal    Labs:  CBC:    Recent Labs     10/13/22  0459 10/14/22  0515 10/15/22  0534   WBC 5.2 4.5 5.6   HGB 10.3* 10.5* 9.5*   HCT 31.8* 31.9* 29.5*    248 241     BMP:    Recent Labs     10/13/22  0459 10/14/22  0515 10/15/22  0534   * 138 138   K 3.9 3.6 4.1    106 107   CO2  21 22   BUN 9 6* 12   CREATININE <0.5* 0.5* 0.5*   GLUCOSE 162* 159* 258*     Hepatic:   Recent Labs     10/13/22  0459 10/14/22  0515 10/15/22  0534   AST 14* 16 11*   ALT 15 15 15   BILITOT 0.4 0.3 <0.2   ALKPHOS 97 107 89     Amylase: No results for input(s): AMYLASE in the last 72 hours.   Lipase:   Recent Labs     10/12/22  9252 LIPASE 43.0     Mag:      Recent Labs     10/13/22  0459 10/14/22  0515   MG 1.70* 1.80      Phos:     Recent Labs     10/13/22  0459   PHOS 2.7      Coags:   Recent Labs     10/13/22  0459   INR 1.23*       Cultures:  Anaerobic culture  No results for input(s): LABANAE in the last 72 hours. Blood culture  No results for input(s): BC in the last 72 hours. Blood culture 2  No results for input(s): BLOODCULT2 in the last 72 hours. Body fluid culture  No results for input(s): BLOODCULT2 in the last 72 hours. Surgical culture  No results for input(s): CXSURG in the last 72 hours. Fecal occult  No results for input(s): OCCULTBLDFEC in the last 72 hours. Gram stain  Recent Labs     10/14/22  0759   LABGRAM 4+ WBC's (Polymorphonuclear)  1+ Gram negative rods  1+ Gram positive cocci         Stool culture 1  No results for input(s): CXST in the last 72 hours. Stool culture 2  No results for input(s): STOOLCULT2 in the last 72 hours. Urine culture  No results for input(s): LABURIN in the last 72 hours. Wound abscess  No results for input(s): WNDABS in the last 72 hours. C Diff   No results for input(s): CDIFFTOXAB in the last 72 hours. Imaging:  I have personally reviewed the following films:    CT ABDOMEN PELVIS W IV CONTRAST Additional Contrast? None    Result Date: 10/12/2022  EXAMINATION: CT OF THE ABDOMEN AND PELVIS WITH CONTRAST 10/12/2022 4:31 pm TECHNIQUE: CT of the abdomen and pelvis was performed with the administration of intravenous contrast. Multiplanar reformatted images are provided for review. Automated exposure control, iterative reconstruction, and/or weight based adjustment of the mA/kV was utilized to reduce the radiation dose to as low as reasonably achievable. COMPARISON: 09/10/2022 HISTORY: ORDERING SYSTEM PROVIDED HISTORY: Multiple abd surgeries in past year. RLQ abd pain, n/v/, fevers.  TECHNOLOGIST PROVIDED HISTORY: Reason for exam:->Multiple abd surgeries in past year. RLQ abd pain, n/v/, fevers. Additional Contrast?->None Decision Support Exception - unselect if not a suspected or confirmed emergency medical condition->Emergency Medical Condition ([place]) Reason for Exam: Multiple abd surgeries in past year. RLQ abd pain, n/v/, fevers. Generalized Body Aches (Pt arrived via squad. Aches and pains for 1 week. Started zpak on Sunday. Fever 102. N/v started today.). FINDINGS: Lower Chest: Bibasilar atelectasis. Calcified granulomas within the right lower lobe. Organs: Liver is fatty. Focal fat along the falciform. No intrahepatic ductal dilatation. Cholelithiasis. There is enhancement of the endothelium of the common bile duct. Left hepatic lobe blends imperceptibly with the spleen. Several small low-density lesions are again seen within the spleen inferiorly, too small to characterize. Stomach is decompressed. No pancreatic ductal dilatation or stranding. Adrenal glands are within normal limits. No hydronephrosis. Nonobstructing left nephrolithiasis. Calcification of the abdominal aorta. Subcentimeter short axis retroperitoneal lymph nodes. GI/Bowel: Status post colectomy. Right lower quadrant ostomy is noted. Peristomal hernia is seen containing multiple nondilated small bowel loops. Small bowel is nondilated. Mesh is seen along the anterior abdominal wall. Pelvis: Uterus is present. Bladder is decompressed. Pelvic phleboliths. No inguinal adenopathy. Peritoneum/Retroperitoneum: No free air. Bones/Soft Tissues:  A large collection is demonstrated within the subcutaneous fat of the anterior wall of the lower abdomen and pelvis, measuring approximately 12.0 x 7.4 cm, previously 20 x 14 cm. Currently, there is a greater degree of wall thickening and new multiple foci of air are seen within the collection. Linear tracks are seen anteriorly at the midline and within the left hemiabdomen extending from the collection to the skin.  Trabeculated lesion within L5 vertebral body, similar to prior, likely hemangioma. Degenerative change of the spine. The previously demonstrated collection within the subcutaneous tissues of the anterior abdomen and pelvis has decreased in size though has developed mural thickening and internal foci of air, most suggestive of abscess. Findings are also suspicious for cutaneous fistulas extending anteriorly from the collection. Parastomal hernia containing loops of small bowel though without evidence of bowel dilatation to suggest obstruction. Cholelithiasis. There is also endothelial enhancement of the common bile duct which could be inflammatory or infectious. Clinical correlation suggested.          Scheduled Meds:   amoxicillin-clavulanate  1 tablet Oral 2 times per day    sodium chloride flush  5-40 mL IntraVENous 2 times per day    pantoprazole  40 mg Oral QAM AC    levothyroxine  300 mcg Oral Daily    losartan  100 mg Oral Daily    atorvastatin  10 mg Oral Nightly    mometasone-formoterol  2 puff Inhalation BID    insulin lispro  10 Units SubCUTAneous TID WC    insulin lispro  0-8 Units SubCUTAneous TID WC    insulin lispro  0-4 Units SubCUTAneous Nightly    insulin glargine  20 Units SubCUTAneous BID    enoxaparin  40 mg SubCUTAneous Daily     Continuous Infusions:   sodium chloride      dextrose      lactated ringers 100 mL/hr at 10/14/22 0942     PRN Meds:.sodium chloride flush, sodium chloride, ondansetron **OR** ondansetron, polyethylene glycol, acetaminophen **OR** acetaminophen, cetirizine, dextrose bolus **OR** dextrose bolus, glucagon (rDNA), dextrose, promethazine, morphine      Assessment:  Patient Active Problem List   Diagnosis    Ileostomy care ([facility] [address]    Diabetes mellitus, type II, insulin dependent ([facility] [address]    Hypothyroidism    Essential hypertension    Mixed hyperlipidemia    Ulcerative (chronic) enterocolitis, unspecified complication (HCC)    Small bowel obstruction (HCC)    Incarcerated incisional hernia    Generalized abdominal pain    Postoperative fever    Abdominal wall seroma    Abdominal wall seroma, initial encounter    Abdominal wall abscess     OR Date 10/14/22, Incision and drainage of abdominal wall seroma and fluid collection, debridement of pseudocapsule, and placement of open wound VAC    Plan:  1. Tolerating diet  2. Passing stool  3. Local wound care with wound vac  4. Antibiotics   5. Pain controlled  6. Defer management of remainder of medical comorbidities to primary and consulting teams  7. Discharge planning, may discharge today from surgical standpoint and follow with Dr. Thomas Valencia in office    Jesus Olmedo.  Wilfredo Adame MD, FACS  10/15/2022  11:55 AM

## 2022-10-15 NOTE — PLAN OF CARE
Problem: Discharge Planning  Goal: Discharge to home or other facility with appropriate resources  10/14/2022 2201 by Akira Coronado RN  Outcome: Progressing  Flowsheets (Taken 10/14/2022 0930 by Garland Carter RN)  Discharge to home or other facility with appropriate resources: Identify barriers to discharge with patient and caregiver     Problem: Nutrition Deficit:  Goal: Optimize nutritional status  10/14/2022 2201 by Akira Coronado RN  Outcome: Progressing  Flowsheets (Taken 10/14/2022 2201)  Nutrient intake appropriate for improving, restoring, or maintaining nutritional needs:   Assess nutritional status and recommend course of action   Recommend appropriate diets, oral nutritional supplements, and vitamin/mineral supplements     Problem: Chronic Conditions and Co-morbidities  Goal: Patient's chronic conditions and co-morbidity symptoms are monitored and maintained or improved  10/14/2022 2201 by Akira Coronado RN  Outcome: Progressing  Flowsheets (Taken 10/14/2022 0930 by Garland Carter RN)  Care Plan - Patient's Chronic Conditions and Co-Morbidity Symptoms are Monitored and Maintained or Improved: Monitor and assess patient's chronic conditions and comorbid symptoms for stability, deterioration, or improvement     Problem: Pain  Goal: Verbalizes/displays adequate comfort level or baseline comfort level  10/14/2022 2201 by Akira Coronado RN  Outcome: Progressing  Flowsheets (Taken 10/14/2022 2201)  Verbalizes/displays adequate comfort level or baseline comfort level:   Encourage patient to monitor pain and request assistance   Assess pain using appropriate pain scale

## 2022-10-17 ENCOUNTER — TELEPHONE (OUTPATIENT)
Dept: SURGERY | Age: 62
End: 2022-10-17

## 2022-10-17 NOTE — LETTER
[address]  [address] [address]  Phone: [phone]  Fax: [phone]    Billie Power MD        October 17, 2022     Patient: Maddie Espitia   YOB: 1960   Date of Visit:        To Whom It May Concern:    Kumar Hutchinson was admitted to the hospital on 10/12/22 and had surgical procedure on 10/14/22. She will need to remain off work until she is seen for her post operative appointment on 10/25/22 where her return to work will be re-evaluated at that time. If you have any questions or concerns, please don't hesitate to call.     Sincerely,        Billie Power MD

## 2022-10-19 LAB
ANAEROBIC CULTURE: ABNORMAL
CULTURE SURGICAL: ABNORMAL
GRAM STAIN RESULT: ABNORMAL
ORGANISM: ABNORMAL

## 2022-10-20 ENCOUNTER — TELEPHONE (OUTPATIENT)
Dept: SURGERY | Age: 62
End: 2022-10-20

## 2022-10-20 ENCOUNTER — PATIENT MESSAGE (OUTPATIENT)
Dept: ENDOCRINOLOGY | Age: 62
End: 2022-10-20

## 2022-10-20 DIAGNOSIS — E11.9 CONTROLLED TYPE 2 DIABETES MELLITUS WITHOUT COMPLICATION, WITH LONG-TERM CURRENT USE OF INSULIN (HCC): ICD-10-CM

## 2022-10-20 DIAGNOSIS — Z79.4 CONTROLLED TYPE 2 DIABETES MELLITUS WITHOUT COMPLICATION, WITH LONG-TERM CURRENT USE OF INSULIN (HCC): ICD-10-CM

## 2022-10-20 RX ORDER — BLOOD SUGAR DIAGNOSTIC
STRIP MISCELLANEOUS
Qty: 150 EACH | Refills: 5 | Status: SHIPPED | OUTPATIENT
Start: 2022-10-20

## 2022-10-20 NOTE — TELEPHONE ENCOUNTER
From: London Salter  To: Dr. Enzo Salas  Sent: 10/20/2022 1:07 PM EDT  Subject: Rashard Clonts glucose monitor patch    Hello,    I am out of strips to test my blood sugar and I was in hospital 10/12/2022 to 10/15/2022 for sepsis. Chau send a prescription to [facility] [address] [facility]    [phone]    I know that my insurance will not cover it, I will pay out of pocket.  Thank You

## 2022-10-20 NOTE — TELEPHONE ENCOUNTER
I called and talked to pt and her sister Trixie. They are very frustrated about the wound vac breaking - will not hold suction. Stated that the home nurse [facility] came and tried to fix it but was not successful. She changed it to wet to dry dressing changes for now until she can get there on Thursday to apply the new vac that the pt is supposed to arrive today. Advised pt to change the dressing as needed (mostly the outside layer) to help keep the skin free of the soaked dressings. Also clean the skin with a warm wash cloth and pat dry before dressing changes.

## 2022-10-25 ENCOUNTER — HOSPITAL ENCOUNTER (OUTPATIENT)
Age: 62
Discharge: HOME | End: 2022-10-25
Payer: COMMERCIAL

## 2022-10-25 ENCOUNTER — OFFICE VISIT (OUTPATIENT)
Dept: SURGERY | Age: 62
End: 2022-10-25

## 2022-10-25 VITALS — DIASTOLIC BLOOD PRESSURE: 66 MMHG | WEIGHT: 230 LBS | BODY MASS INDEX: 38.27 KG/M2 | SYSTOLIC BLOOD PRESSURE: 127 MMHG

## 2022-10-25 DIAGNOSIS — Z09 POSTOP CHECK: Primary | ICD-10-CM

## 2022-10-25 DIAGNOSIS — E03.9 HYPOTHYROIDISM, UNSPECIFIED TYPE: ICD-10-CM

## 2022-10-25 DIAGNOSIS — Z79.4 CONTROLLED TYPE 2 DIABETES MELLITUS WITHOUT COMPLICATION, WITH LONG-TERM CURRENT USE OF INSULIN (HCC): ICD-10-CM

## 2022-10-25 DIAGNOSIS — I10 ESSENTIAL HYPERTENSION: ICD-10-CM

## 2022-10-25 DIAGNOSIS — E11.9 CONTROLLED TYPE 2 DIABETES MELLITUS WITHOUT COMPLICATION, WITH LONG-TERM CURRENT USE OF INSULIN (HCC): ICD-10-CM

## 2022-10-25 LAB
A/G RATIO: 1.1 (ref 1.1–2.2)
ALBUMIN SERPL-MCNC: 3.8 G/DL (ref 3.4–5)
ALP BLD-CCNC: 103 U/L (ref 40–129)
ALT SERPL-CCNC: 28 U/L (ref 10–40)
ANION GAP SERPL CALCULATED.3IONS-SCNC: 15 MMOL/L (ref 3–16)
AST SERPL-CCNC: 23 U/L (ref 15–37)
BILIRUB SERPL-MCNC: 0.3 MG/DL (ref 0–1)
BUN BLDV-MCNC: 11 MG/DL (ref 7–20)
CALCIUM SERPL-MCNC: 9.7 MG/DL (ref 8.3–10.6)
CHLORIDE BLD-SCNC: 102 MMOL/L (ref 99–110)
CHOLESTEROL, TOTAL: 138 MG/DL (ref 0–199)
CO2: 24 MMOL/L (ref 21–32)
CREAT SERPL-MCNC: 0.6 MG/DL (ref 0.6–1.2)
CREATININE URINE: 240.6 MG/DL (ref 28–259)
GFR SERPL CREATININE-BSD FRML MDRD: >60 ML/MIN/{1.73_M2}
GLUCOSE BLD-MCNC: 140 MG/DL (ref 70–99)
HDLC SERPL-MCNC: 42 MG/DL (ref 40–60)
LDL CHOLESTEROL CALCULATED: 68 MG/DL
MICROALBUMIN UR-MCNC: <1.2 MG/DL
MICROALBUMIN/CREAT UR-RTO: NORMAL MG/G (ref 0–30)
POTASSIUM SERPL-SCNC: 4.1 MMOL/L (ref 3.5–5.1)
SODIUM BLD-SCNC: 141 MMOL/L (ref 136–145)
TOTAL PROTEIN: 7.4 G/DL (ref 6.4–8.2)
TRIGL SERPL-MCNC: 139 MG/DL (ref 0–150)
TSH SERPL DL<=0.05 MIU/L-ACNC: <0.01 UIU/ML (ref 0.27–4.2)
VLDLC SERPL CALC-MCNC: 28 MG/DL

## 2022-10-25 PROCEDURE — 83036 HEMOGLOBIN GLYCOSYLATED A1C: CPT

## 2022-10-25 PROCEDURE — 36415 COLL VENOUS BLD VENIPUNCTURE: CPT

## 2022-10-25 PROCEDURE — 99024 POSTOP FOLLOW-UP VISIT: CPT | Performed by: SURGERY

## 2022-10-25 PROCEDURE — 82570 ASSAY OF URINE CREATININE: CPT

## 2022-10-25 PROCEDURE — 84443 ASSAY THYROID STIM HORMONE: CPT

## 2022-10-25 PROCEDURE — 80053 COMPREHEN METABOLIC PANEL: CPT

## 2022-10-25 PROCEDURE — 82043 UR ALBUMIN QUANTITATIVE: CPT

## 2022-10-25 PROCEDURE — 80061 LIPID PANEL: CPT

## 2022-10-25 NOTE — PROGRESS NOTES
[facility] GENERAL AND LAPAROSCOPIC SURGERY          PATIENT NAME: Surinder Salter     TODAY'S DATE: 10/25/2022    SUBJECTIVE:    Pt returns post op  Has VAC in place  Initial trouble with coordinating care now improved, site is draining well.   Finishing antibiotics     OBJECTIVE:  VITALS:  Wt 230 lb (104.3 kg)   LMP 08/16/2010   BMI 38.27 kg/m²     CONSTITUTIONAL:  awake and alert  ABDOMEN:  normal bowel sounds, soft, non-distended, non-tender, VAC in place on left abd area     Data:      Radiology Review:  None      ASSESSMENT AND PLAN:  S/P Wound seroma drainage / VAC placement  Improving  See me again in office next week  Possible RTW the following week      Martell Harris MD

## 2022-10-26 LAB
ESTIMATED AVERAGE GLUCOSE: 182.9 MG/DL
HBA1C MFR BLD: 8 %

## 2022-10-26 RX ORDER — LEVOTHYROXINE SODIUM 300 UG/1
TABLET ORAL
Qty: 90 TABLET | Refills: 1 | Status: SHIPPED | OUTPATIENT
Start: 2022-10-26 | End: 2022-10-27

## 2022-10-26 RX ORDER — INSULIN ASPART 100 [IU]/ML
INJECTION, SOLUTION INTRAVENOUS; SUBCUTANEOUS
Qty: 45 ML | Refills: 3 | Status: SHIPPED | OUTPATIENT
Start: 2022-10-26 | End: 2022-10-27 | Stop reason: SDUPTHER

## 2022-10-26 RX ORDER — PEN NEEDLE, DIABETIC 31 GX5/16"
NEEDLE, DISPOSABLE MISCELLANEOUS
Qty: 400 EACH | Refills: 5 | Status: SHIPPED | OUTPATIENT
Start: 2022-10-26

## 2022-10-27 ENCOUNTER — OFFICE VISIT (OUTPATIENT)
Dept: ENDOCRINOLOGY | Age: 62
End: 2022-10-27
Payer: COMMERCIAL

## 2022-10-27 VITALS
HEIGHT: 65 IN | BODY MASS INDEX: 38.49 KG/M2 | HEART RATE: 93 BPM | SYSTOLIC BLOOD PRESSURE: 117 MMHG | WEIGHT: 231 LBS | DIASTOLIC BLOOD PRESSURE: 62 MMHG | RESPIRATION RATE: 16 BRPM

## 2022-10-27 DIAGNOSIS — Z79.4 CONTROLLED TYPE 2 DIABETES MELLITUS WITHOUT COMPLICATION, WITH LONG-TERM CURRENT USE OF INSULIN (HCC): Primary | ICD-10-CM

## 2022-10-27 DIAGNOSIS — E03.9 HYPOTHYROIDISM, UNSPECIFIED TYPE: ICD-10-CM

## 2022-10-27 DIAGNOSIS — E11.9 CONTROLLED TYPE 2 DIABETES MELLITUS WITHOUT COMPLICATION, WITH LONG-TERM CURRENT USE OF INSULIN (HCC): Primary | ICD-10-CM

## 2022-10-27 DIAGNOSIS — I10 ESSENTIAL HYPERTENSION: ICD-10-CM

## 2022-10-27 DIAGNOSIS — E78.2 MIXED HYPERLIPIDEMIA: ICD-10-CM

## 2022-10-27 PROCEDURE — 3074F SYST BP LT 130 MM HG: CPT | Performed by: INTERNAL MEDICINE

## 2022-10-27 PROCEDURE — 99214 OFFICE O/P EST MOD 30 MIN: CPT | Performed by: INTERNAL MEDICINE

## 2022-10-27 PROCEDURE — 3078F DIAST BP <80 MM HG: CPT | Performed by: INTERNAL MEDICINE

## 2022-10-27 PROCEDURE — 3052F HG A1C>EQUAL 8.0%<EQUAL 9.0%: CPT | Performed by: INTERNAL MEDICINE

## 2022-10-27 RX ORDER — FLASH GLUCOSE SENSOR
KIT MISCELLANEOUS
Qty: 2 EACH | Refills: 5 | Status: SHIPPED | OUTPATIENT
Start: 2022-10-27

## 2022-10-27 RX ORDER — FLASH GLUCOSE SENSOR
KIT MISCELLANEOUS
Qty: 6 EACH | Refills: 1 | Status: SHIPPED | OUTPATIENT
Start: 2022-10-27

## 2022-10-27 RX ORDER — INSULIN ASPART 100 [IU]/ML
INJECTION, SOLUTION INTRAVENOUS; SUBCUTANEOUS
Qty: 45 ML | Refills: 3 | Status: SHIPPED | OUTPATIENT
Start: 2022-10-27

## 2022-10-27 NOTE — PROGRESS NOTES
Blessing Min is a 65 y.o. female is seen for management of uncontrolled Type 2  Diabetes and Hypothyroidism. Patient has complex medical history inclusive of hypertension hyperlipidemia, asthma, PCOD, ulcerative colitis status post total colectomy and now has a colectomy bag. Blessing Min was diagnosed with Diabetes mellitus at age 28   Diabetes was diagnosed at routine screening . Karla Min got diabetic education in the past.  Comorbid conditions: Neuropathy    Hypothyroidism diagnosed in 2015 which was diagnosed in 2015 due to her abnormal thyroid fx test   She also has MNG, which was considered stable as per previous imaging and there were no compressive symptoms  She has hypertension and hyperlipidemia   She has Ulcerative coliitis diagnosed  at age 11 she had colectomy in 2004 , in 2006 she had rectal surgery due to UC she has colectomy bag     INTERIM:    Diabetes  She presents for her follow-up diabetic visit. She has type 2 diabetes mellitus. No MedicAlert identification noted. The initial diagnosis of diabetes was made 29 years ago. Her disease course has been improving. Hypoglycemia symptoms include hunger and sweats. Associated symptoms include foot paresthesias. Pertinent negatives for diabetes include no weight loss. There are no hypoglycemic complications. Symptoms are improving. Risk factors for coronary artery disease include dyslipidemia and obesity. Current diabetic treatment includes insulin injections. She is compliant with treatment most of the time. Her breakfast blood glucose is taken between 7-8 am. Her breakfast blood glucose range is generally 70-90 mg/dl.      --- she ended up with seroma which led to sepsis and she was hospitalized in oct 2022 and now has a wound vac   --she is taking lantus 50 units bid and novolog 25 units with each meal       Past Medical History:   Diagnosis Date    Asthma     Diabetes mellitus ([address]     GERD (gastroesophageal reflux disease) Hyperlipidemia     Hypertension     Ileostomy care ([facility] [facility] 3/17/2020    Iritis     PCOS (polycystic ovarian syndrome)     Pyoderma     Thyroid disease     Ulcerative colitis     Ulcerative colitis ([facility] [address]       Patient Active Problem List   Diagnosis    Ileostomy care ([facility] [address]    Diabetes mellitus, type II, insulin dependent ([facility] [address]    Hypothyroidism    Essential hypertension    Mixed hyperlipidemia    Ulcerative (chronic) enterocolitis, unspecified complication (HCC)    Small bowel obstruction (HCC)    Incarcerated incisional hernia    Generalized abdominal pain    Postoperative fever    Abdominal wall seroma    Abdominal wall seroma, initial encounter    Abdominal wall abscess     Past Surgical History:   Procedure Laterality Date    ABDOMEN SURGERY      COLON RESECTION FOR ULCERATIVE COLITIS THEN HAD ANUS REMOVED    ABDOMEN SURGERY N/A 9/30/2022    INCISION AND DRAINAGE OF ABDOMINAL WALL SEROMA (63899) performed by Donte Vigil MD at [address] [place] [address] N/A 10/14/2022    DRAINAGE OF ABDOMINAL WALL ABSCESS WITH PLACEMENT OF WOUND VAC performed by Donte Vigil MD at [address] [place] [address] N/A 11/23/2021    EGD BIOPSY performed by Sloan Marcano MD at [address] [place] [address] N/A 1/18/2022    EGD DIAGNOSTIC ONLY performed by Sloan Marcano MD at [place] [address] N/A 11/16/2021    OPEN REPAIR INCISIONAL HERNIA WITH MESH, LYSIS OF ADHESIONS performed by Donte Vigil MD at [address] [facility] History     Socioeconomic History    Marital status: Single     Spouse name: Not on file    Number of children: Not on file    Years of education: Not on file    Highest education level: Not on file   Occupational History    Not on file   Tobacco Use    Smoking status: Never    Smokeless tobacco: Never   Vaping Use    Vaping Use: Never used   Substance and Sexual Activity    Alcohol use: No    Drug use: No    Sexual activity: Not on file   Other Topics Concern    Not on file   Social History Narrative    Not on file     Social Determinants of Health     Financial Resource Strain: Not on file   Food Insecurity: Not on file   Transportation Needs: Not on file   Physical Activity: Not on file   Stress: Not on file   Social Connections: Not on file   Intimate Partner Violence: Not on file   Housing Stability: Not on file     Family History   Problem Relation Age of Onset    Cancer Mother         uterine    No Known Problems Father      Current Outpatient Medications   Medication Sig Dispense Refill    Continuous Blood Gluc Sensor (FREESTYLE EDY 2 SENSOR) MISC CHANGE EVERY 14 DAYS TO MONITOR BS 6 each 1    insulin aspart (NOVOLOG FLEXPEN) 100 UNIT/ML injection pen INJECT 25 UNITS SUBCUTANEOUSLY TWICE DAILY 45 mL 3    Continuous Blood Gluc Sensor (FREESTYLE EDY 2 SENSOR) MISC Change every 14 days 2 each 5    Insulin Pen Needle (DROPLET PEN NEEDLES) 31G X 8 MM MISC USE TO INJECT INSULIN FOUR TIMES DAILY 400 each 5    blood glucose test strips (ONETOUCH ULTRA) strip Test 4 times daily. 150 each 5    insulin glargine (LANTUS SOLOSTAR) 100 UNIT/ML injection pen Inject 50 units into the skin BID.  30 pen 1    OMEPRAZOLE PO Take by mouth OTC daily      ALBUTEROL IN Inhale into the lungs      Multiple Vitamin (MULTIVITAMIN ADULT PO) Take by mouth      Ferrous Sulfate (IRON PO) Take by mouth daily       COENZYME Q10 PO Take by mouth      ONE TOUCH ULTRASOFT LANCETS MISC USE TO TEST BLOOD GLUCOSE 4 TIMES DAILY      POTASSIUM PO Take by mouth daily       MAGNESIUM PO Take by mouth daily       Montelukast Sodium (SINGULAIR PO) Take by mouth      Cholecalciferol (VITAMIN D3) 1.25 MG (35432 UT) CAPS Take by mouth once a week      TRIAMCINOLONE ACETONIDE NA by Nasal route as needed       fluticasone-vilanterol (BREO ELLIPTA) 100-25 MCG/INH AEPB inhaler Inhale 1 puff into the lungs as needed losartan (COZAAR) 100 MG tablet Take 100 mg by mouth daily      Omega-3 Fatty Acids (FISH OIL) 1000 MG CAPS Take 3,000 mg by mouth daily      atorvastatin (LIPITOR) 10 MG tablet Take 10 mg by mouth daily      CRANBERRY PO Take by mouth daily      acetaminophen (TYLENOL) 500 MG tablet Take 500 mg by mouth every 6 hours as needed for Pain      loratadine (CLARITIN) 10 MG capsule Take by mouth daily        No current facility-administered medications for this visit. No Known Allergies  Family Status   Relation Name Status    Mother      Father       ROS  I have reviewed the review of system questionnaire filled by the patient .   Patient was advised to contact PCP for non endocrine signs and symptoms       OBJECTIVE:  /62   Pulse 93   Resp 16   Ht 5' 5\" (1.651 m)   Wt 231 lb (104.8 kg)   LMP 2010   BMI 38.44 kg/m²    Wt Readings from Last 3 Encounters:   10/27/22 231 lb (104.8 kg)   10/25/22 230 lb (104.3 kg)   10/12/22 230 lb (104.3 kg)     Constitutional: no acute distress, well appearing, well nourished  Psychiatric: oriented to person, place and time, judgement, insight and normal, recent and remote memory and intact and mood, affect are normal  Skin: skin and subcutaneous tissue is normal without mass,   Head and Face: examination of head and face revealed no abnormalities  Eyes: no lid or conjunctival swelling, no erythema or discharge, pupils are normal,   Ears/Nose: external inspection of ears and nose revealed no abnormalities, hearing is grossly normal  Oropharynx/Mouth/Face: lips, tongue and gums are normal with no lesions, the voice quality was normal  Neck: neck is supple and symmetric, with midline trachea and no masses, thyroid is normal    Pulmonary: no increased work of breathing or signs of respiratory distress, lungs are clear to auscultation  Cardiovascular: normal heart rate and rhythm, normal S1 and S2,   Musculoskeletal: normal gait and station, Neurological: normal coordination, normal general cortical function  Lab Results   Component Value Date/Time    LABA1C 8.0 10/25/2022 01:20 PM    LABA1C 7.5 10/13/2022 04:59 AM    LABA1C 7.8 06/11/2022 11:13 AM         ASSESSMENT/PLAN:    1. Controlled type 2 diabetes mellitus without complication, with long-term current use of insulin       aic 7.9 >6.5>>8.0    Margokayleelawrence Salter was advised that lifestyle modification is the key to better control of her Diabetes. We discussed carbohydrate restriction in detail. She will increase to  lantus 55 units BID   She will take NovoLog with each meal based on carbohydrate contents  ---trulicity 1.5 mg weekly ---stopped due to nausea in Nov 2021.  ---Paola Iglesias gave multiple yeast infection   ---Metformin 500  Mg ,Stopped due to GI SE   ---stopped Actos due to swelling   Will recheck labs in 3 months    Hypoglycemia protocol reviewed in detail with patient Patient was advised to carry glucose tablets    Patient was advised that sending of her fingerstick blood glucose logs is crucial in management of her  diabetes. I will adjust the  doses of diabetic medications  according to sent data. Health Maintenance       Last Eye Exam: advised to have annual dilated eye exam. her last eye exam was in 2020  Last Podiatry Exam: Discussed foot care  Lipids: Last LDL level was at a level of 58 in May 2020  Microalbumin/Creatinine Ratio: I have ordered MA with next lab work     . Education: Reviewed ABCs of diabetes management (respective goals in parentheses): A1C (<7), blood pressure (<130/80), and cholesterol (LDL <100). 2. Acquired Hypothyroidism, ---thyroid Ab negative April 2021   On review of chart notes from previous endocrinologist at [facility] and the [address] visits at [facility] shows that she has a history of Hashimoto's hypothyroidism and apparently in 2010 her thyroid antibodies were positive.   TSH <0.01 stop Lt4 25 mcg daily   She was given 300 mcg in the hospital and was noticing heart palpitation and worsening anxiety. 3. Essential hypertension  Blood pressure control is adequate historically and she is on losartan 100 mg daily    4. Mixed hyperlipidemia  Last LDL was at target in April 2021 she is currently on Lipitor 10 mg daily    5. Ulcerative (chronic) enterocolitis, status post colectomy and rectal surgery she has a colectomy bag now  Symptoms are now stable    Reviewed and/or ordered clinical lab results yes   Reviewed and/or ordered radiology tests Yes  Reviewed and/or ordered other diagnostic tests yes   Made a decision to obtain old records yes   Reviewed and summarized old records yes     Blessing Pako was counseled regarding symptoms of current diagnosis, course and complications of disease if inadequately treated, side effects of medications, diagnosis, treatment options, and prognosis, risks, benefits, complications, and alternatives of treatment, labs, imaging and other studies and treatment targets and goals. She understands instructions and counseling. Return in about 3 months (around 1/27/2023). Please note that some or all of this report was generated using voice recognition software. Please notify me in case of any questions about the content of this document, as some errors in transcription may have occurred .

## 2022-11-03 ENCOUNTER — OFFICE VISIT (OUTPATIENT)
Dept: SURGERY | Age: 62
End: 2022-11-03

## 2022-11-03 VITALS — WEIGHT: 230 LBS | DIASTOLIC BLOOD PRESSURE: 63 MMHG | BODY MASS INDEX: 38.27 KG/M2 | SYSTOLIC BLOOD PRESSURE: 102 MMHG

## 2022-11-03 DIAGNOSIS — Z09 POSTOP CHECK: Primary | ICD-10-CM

## 2022-11-03 PROCEDURE — 99024 POSTOP FOLLOW-UP VISIT: CPT | Performed by: SURGERY

## 2022-11-03 NOTE — PROGRESS NOTES
[facility] GENERAL AND LAPAROSCOPIC SURGERY          PATIENT NAME: London Salter     TODAY'S DATE: 11/3/2022    SUBJECTIVE:    Pt feeling well, VAC in place. No pain, No F/C. Adonay Worthington OBJECTIVE:  VITALS:  Wt 230 lb (104.3 kg)   LMP 08/16/2010   BMI 38.27 kg/m²     CONSTITUTIONAL:  awake and alert  LUNGS:  clear to auscultation  ABDOMEN:  normal bowel sounds, soft, non-distended, non-tender, VAC in left abdomen     Data:  CBC: No results for input(s): WBC, HGB, HCT, PLT in the last 72 hours. BMP:  No results for input(s): NA, K, CL, CO2, BUN, CREATININE, GLUCOSE in the last 72 hours. Hepatic: No results for input(s): AST, ALT, ALB, BILITOT, ALKPHOS in the last 72 hours. Mag:    No results for input(s): MG in the last 72 hours. Phos:   No results for input(s): PHOS in the last 72 hours. INR: No results for input(s): INR in the last 72 hours.     Radiology Review:  None      ASSESSMENT AND PLAN:  Wound Vac in place  Continue the same  See me in 2 weeks    Prieto Suárez MD

## 2022-11-17 ENCOUNTER — OFFICE VISIT (OUTPATIENT)
Dept: SURGERY | Age: 62
End: 2022-11-17

## 2022-11-17 ENCOUNTER — HOSPITAL ENCOUNTER (OUTPATIENT)
Age: 62
Discharge: HOME | End: 2022-11-17
Payer: COMMERCIAL

## 2022-11-17 VITALS — SYSTOLIC BLOOD PRESSURE: 136 MMHG | BODY MASS INDEX: 39.44 KG/M2 | DIASTOLIC BLOOD PRESSURE: 80 MMHG | WEIGHT: 237 LBS

## 2022-11-17 DIAGNOSIS — Z09 POSTOP CHECK: Primary | ICD-10-CM

## 2022-11-17 DIAGNOSIS — Z79.4 CONTROLLED TYPE 2 DIABETES MELLITUS WITHOUT COMPLICATION, WITH LONG-TERM CURRENT USE OF INSULIN (HCC): ICD-10-CM

## 2022-11-17 DIAGNOSIS — E11.9 CONTROLLED TYPE 2 DIABETES MELLITUS WITHOUT COMPLICATION, WITH LONG-TERM CURRENT USE OF INSULIN (HCC): ICD-10-CM

## 2022-11-17 LAB
T4 FREE: 1 NG/DL (ref 0.9–1.8)
TSH SERPL DL<=0.05 MIU/L-ACNC: 3.88 UIU/ML (ref 0.27–4.2)

## 2022-11-17 PROCEDURE — 84439 ASSAY OF FREE THYROXINE: CPT

## 2022-11-17 PROCEDURE — 99024 POSTOP FOLLOW-UP VISIT: CPT | Performed by: SURGERY

## 2022-11-17 PROCEDURE — 36415 COLL VENOUS BLD VENIPUNCTURE: CPT

## 2022-11-17 PROCEDURE — 84443 ASSAY THYROID STIM HORMONE: CPT

## 2022-11-17 NOTE — PROGRESS NOTES
Pt's wound clean, site opened up deep, small amount of fluid present  No pain or cellulitis  Continue VAC, david in 3 weeks

## 2022-11-21 ENCOUNTER — TELEPHONE (OUTPATIENT)
Dept: SURGERY | Age: 62
End: 2022-11-21

## 2022-11-21 NOTE — TELEPHONE ENCOUNTER
I spoke with Nanda Baker, he wants to see pt again in the office tomorrow before making a decision. PRAVIN for [employer] informing her that I'm calling the pt to make an appointment. I called and talked to pt, made her an appointment for tomorrow.

## 2022-11-22 ENCOUNTER — OFFICE VISIT (OUTPATIENT)
Dept: SURGERY | Age: 62
End: 2022-11-22

## 2022-11-22 VITALS — SYSTOLIC BLOOD PRESSURE: 120 MMHG | BODY MASS INDEX: 39.44 KG/M2 | WEIGHT: 237 LBS | DIASTOLIC BLOOD PRESSURE: 66 MMHG

## 2022-11-22 DIAGNOSIS — Z09 POSTOP CHECK: Primary | ICD-10-CM

## 2022-11-22 PROCEDURE — 99024 POSTOP FOLLOW-UP VISIT: CPT | Performed by: SURGERY

## 2022-11-22 RX ORDER — LEVOFLOXACIN 750 MG/1
750 TABLET ORAL DAILY
Qty: 10 TABLET | Refills: 0 | Status: SHIPPED | OUTPATIENT
Start: 2022-11-22 | End: 2022-12-02

## 2022-11-22 NOTE — PROGRESS NOTES
[facility] GENERAL AND LAPAROSCOPIC SURGERY          PATIENT NAME: Neeru Salter     TODAY'S DATE: 11/22/2022    SUBJECTIVE:    Pt here for wound check, odor from wound per VN.      OBJECTIVE:  VITALS:  Wt 237 lb (107.5 kg)   LMP 08/16/2010   BMI 39.44 kg/m²     CONSTITUTIONAL:  awake and alert  LUNGS:  clear to auscultation  ABDOMEN:  normal bowel sounds, soft, non-distended, non-tender, mild skin irritation from VAC foam, no cellulitis, wound with drainage and large old black VAC foam sponge present deep           ASSESSMENT AND PLAN:  Old foam sponge removed completely today, was left in at some point with prior VAC changes  Kerlex wet to dry X 24 hours, resume VAC care at home thereafter  Short course po antibiotics  Follow up wound closely    Carlos Enrique Lagunas MD

## 2022-11-28 ENCOUNTER — TELEPHONE (OUTPATIENT)
Dept: SURGERY | Age: 62
End: 2022-11-28

## 2022-11-28 NOTE — TELEPHONE ENCOUNTER
Prerna Haney called back, informed me she does not have the supplies for pt to do the dressing changes. Also informed me that they do not have any more canisters left to continue the wound vac if pt is to use the vac after her appt on Thursday. Stated that the supplier has not been able to get notes to continue supplies. Informed her that I have not received anything and she stated that she didn't know if they were needing it from the home care. I told her I was more than happy to supply notes from Dr. Perla Vidal, I just need someone to reach out asking for them and she stated \"well I don't know what you need to do. Olivia Rodriguez \".     I called the pt myself and gave her the instructions. We have the supplies that she will need and she is coming to the office for me to take the vac off and apply the wet to dry dressing.

## 2022-11-28 NOTE — TELEPHONE ENCOUNTER
Stop using the [facility] today. Remove and the foam and just do a wet to dry gauze packing loosely in the wound. Can change at home twice daily, will have e fair amount of drainage.

## 2022-11-28 NOTE — TELEPHONE ENCOUNTER
SAW PATIENT TODAY AND REPORTS WOUND HAS HEAVY YELLOW DRAINAGE WITH VERY FOUL ODOR. ALSO HAS POCKETS OF DRAINAGE AND PATIENT COMPLAINING OF TENDERNESS DEEP WITHIN THE WOUND. PLEASE ADVISE.  [phone]

## 2022-12-01 ENCOUNTER — OFFICE VISIT (OUTPATIENT)
Dept: SURGERY | Age: 62
End: 2022-12-01

## 2022-12-01 VITALS — BODY MASS INDEX: 39.11 KG/M2 | SYSTOLIC BLOOD PRESSURE: 121 MMHG | WEIGHT: 235 LBS | DIASTOLIC BLOOD PRESSURE: 60 MMHG

## 2022-12-01 DIAGNOSIS — Z09 POSTOP CHECK: Primary | ICD-10-CM

## 2022-12-01 PROCEDURE — 99024 POSTOP FOLLOW-UP VISIT: CPT | Performed by: SURGERY

## 2022-12-01 RX ORDER — LEVOFLOXACIN 750 MG/1
750 TABLET ORAL DAILY
Qty: 5 TABLET | Refills: 0 | Status: SHIPPED | OUTPATIENT
Start: 2022-12-01 | End: 2022-12-06

## 2022-12-01 NOTE — PROGRESS NOTES
[facility] GENERAL AND LAPAROSCOPIC SURGERY          PATIENT NAME: Humaira Salter     TODAY'S DATE: 12/1/2022    SUBJECTIVE:    Pt feeling better with dressing care.      OBJECTIVE:  VITALS:  LMP 08/16/2010     CONSTITUTIONAL:  awake and alert  LUNGS:  clear to auscultation  ABDOMEN:  normal bowel sounds, soft, non-distended, non-tender, stoma on right, wound on left     Data:      Radiology Review:  None      ASSESSMENT AND PLAN:  Open abd wound  No more VAC, got  contamination with prior retained sponge  Do open wet to dry  Improved  Finish po antibiotics  See back in 2 weeks    Nithya Taylor MD

## 2022-12-02 ENCOUNTER — TELEPHONE (OUTPATIENT)
Dept: SURGERY | Age: 62
End: 2022-12-02

## 2022-12-02 NOTE — TELEPHONE ENCOUNTER
[facility] nurse called and needs the new wound instructions and order at [facility], Fax #  [phone]

## 2022-12-08 ENCOUNTER — OFFICE VISIT (OUTPATIENT)
Dept: SURGERY | Age: 62
End: 2022-12-08

## 2022-12-08 VITALS — DIASTOLIC BLOOD PRESSURE: 60 MMHG | WEIGHT: 235 LBS | SYSTOLIC BLOOD PRESSURE: 121 MMHG | BODY MASS INDEX: 39.11 KG/M2

## 2022-12-08 DIAGNOSIS — Z09 POSTOP CHECK: Primary | ICD-10-CM

## 2022-12-08 PROCEDURE — 99024 POSTOP FOLLOW-UP VISIT: CPT | Performed by: SURGERY

## 2022-12-08 NOTE — PROGRESS NOTES
[facility] GENERAL AND LAPAROSCOPIC SURGERY          PATIENT NAME: Urbano Salter     TODAY'S DATE: 12/8/2022    SUBJECTIVE:    Pt doing dressing care, feeling better at home.      OBJECTIVE:  VITALS:  LMP 08/16/2010     CONSTITUTIONAL:  awake and alert  ABDOMEN:  normal bowel sounds, soft, non-distended, non-tender, wound open and clean     Data:    Radiology Review:  None      ASSESSMENT AND PLAN:  Wound smaller, dressing changed, continue same plan  See in 2 weeks    Kavon Golden MD

## 2022-12-22 ENCOUNTER — OFFICE VISIT (OUTPATIENT)
Dept: SURGERY | Age: 62
End: 2022-12-22

## 2022-12-22 VITALS — SYSTOLIC BLOOD PRESSURE: 120 MMHG | DIASTOLIC BLOOD PRESSURE: 70 MMHG | BODY MASS INDEX: 39.11 KG/M2 | WEIGHT: 235 LBS

## 2022-12-22 DIAGNOSIS — Z09 POSTOP CHECK: Primary | ICD-10-CM

## 2022-12-22 PROCEDURE — 99024 POSTOP FOLLOW-UP VISIT: CPT | Performed by: SURGERY

## 2022-12-22 NOTE — PROGRESS NOTES
[facility] GENERAL AND LAPAROSCOPIC SURGERY          PATIENT NAME: Susi Salter     TODAY'S DATE: 12/22/2022    SUBJECTIVE:    Pt returns for wound check.      OBJECTIVE:  VITALS:  Wt 235 lb (106.6 kg)   LMP 08/16/2010   BMI 39.11 kg/m²     CONSTITUTIONAL:  awake and alert  ABDOMEN:  normal bowel sounds, soft, non-distended, non-tender, left wound open and clean, does still extend deep with serous drainage but overall smaller     Data:      Radiology Review:  None      ASSESSMENT AND PLAN:  Continue daily packing / dressing change  No infection present at this time  Francisco in a few weeks    Alisa Cool MD

## 2023-01-05 ENCOUNTER — OFFICE VISIT (OUTPATIENT)
Dept: SURGERY | Age: 63
End: 2023-01-05

## 2023-01-05 VITALS — BODY MASS INDEX: 39.11 KG/M2 | DIASTOLIC BLOOD PRESSURE: 73 MMHG | SYSTOLIC BLOOD PRESSURE: 127 MMHG | WEIGHT: 235 LBS

## 2023-01-05 DIAGNOSIS — Z09 POSTOP CHECK: Primary | ICD-10-CM

## 2023-01-05 PROCEDURE — 99024 POSTOP FOLLOW-UP VISIT: CPT | Performed by: SURGERY

## 2023-01-05 NOTE — PROGRESS NOTES
[facility] GENERAL AND LAPAROSCOPIC SURGERY          PATIENT NAME: Raya Salter     TODAY'S DATE: 1/5/2023    SUBJECTIVE:    Pt with left abd wound, drainage at site present, no infection.      OBJECTIVE:  VITALS:  Wt 235 lb (106.6 kg)   LMP 08/16/2010   BMI 39.11 kg/m²     CONSTITUTIONAL:  awake and alert    ABDOMEN:  normal bowel sounds, soft, non-distended, non-tender, contracture and open wound on the left, gauze removed, moderate volume of fluid drainage - serous         ASSESSMENT AND PLAN:  Open wound  Decrease packing, see how things progress, david in 2 weeks  Consider OR with excisional debridement and drainage with VAC again if wound does not close over successfully    Vania Jones MD

## 2023-01-06 DIAGNOSIS — E11.9 CONTROLLED TYPE 2 DIABETES MELLITUS WITHOUT COMPLICATION, WITH LONG-TERM CURRENT USE OF INSULIN (HCC): ICD-10-CM

## 2023-01-06 DIAGNOSIS — Z79.4 CONTROLLED TYPE 2 DIABETES MELLITUS WITHOUT COMPLICATION, WITH LONG-TERM CURRENT USE OF INSULIN (HCC): ICD-10-CM

## 2023-01-06 RX ORDER — INSULIN GLARGINE 100 [IU]/ML
INJECTION, SOLUTION SUBCUTANEOUS
Qty: 90 ML | Refills: 1 | Status: SHIPPED | OUTPATIENT
Start: 2023-01-06

## 2023-01-10 ENCOUNTER — HOSPITAL ENCOUNTER (OUTPATIENT)
Age: 63
Discharge: HOME | End: 2023-01-10
Payer: COMMERCIAL

## 2023-01-10 DIAGNOSIS — E78.2 MIXED HYPERLIPIDEMIA: ICD-10-CM

## 2023-01-10 DIAGNOSIS — E11.9 CONTROLLED TYPE 2 DIABETES MELLITUS WITHOUT COMPLICATION, WITH LONG-TERM CURRENT USE OF INSULIN (HCC): ICD-10-CM

## 2023-01-10 DIAGNOSIS — E03.9 HYPOTHYROIDISM, UNSPECIFIED TYPE: ICD-10-CM

## 2023-01-10 DIAGNOSIS — Z79.4 CONTROLLED TYPE 2 DIABETES MELLITUS WITHOUT COMPLICATION, WITH LONG-TERM CURRENT USE OF INSULIN (HCC): ICD-10-CM

## 2023-01-10 LAB
A/G RATIO: 1.2 (ref 1.1–2.2)
ALBUMIN SERPL-MCNC: 4 G/DL (ref 3.4–5)
ALP BLD-CCNC: 106 U/L (ref 40–129)
ALT SERPL-CCNC: 20 U/L (ref 10–40)
ANION GAP SERPL CALCULATED.3IONS-SCNC: 15 MMOL/L (ref 3–16)
AST SERPL-CCNC: 20 U/L (ref 15–37)
BILIRUB SERPL-MCNC: 0.3 MG/DL (ref 0–1)
BUN BLDV-MCNC: 10 MG/DL (ref 7–20)
CALCIUM SERPL-MCNC: 9.5 MG/DL (ref 8.3–10.6)
CHLORIDE BLD-SCNC: 101 MMOL/L (ref 99–110)
CHOLESTEROL, TOTAL: 131 MG/DL (ref 0–199)
CO2: 23 MMOL/L (ref 21–32)
CREAT SERPL-MCNC: 0.6 MG/DL (ref 0.6–1.2)
CREATININE URINE: 211.7 MG/DL (ref 28–259)
ESTIMATED AVERAGE GLUCOSE: 171.4 MG/DL
GFR SERPL CREATININE-BSD FRML MDRD: >60 ML/MIN/{1.73_M2}
GLUCOSE BLD-MCNC: 155 MG/DL (ref 70–99)
HBA1C MFR BLD: 7.6 %
HDLC SERPL-MCNC: 43 MG/DL (ref 40–60)
LDL CHOLESTEROL CALCULATED: 61 MG/DL
MICROALBUMIN UR-MCNC: <1.2 MG/DL
MICROALBUMIN/CREAT UR-RTO: NORMAL MG/G (ref 0–30)
POTASSIUM SERPL-SCNC: 4.6 MMOL/L (ref 3.5–5.1)
SODIUM BLD-SCNC: 139 MMOL/L (ref 136–145)
TOTAL PROTEIN: 7.4 G/DL (ref 6.4–8.2)
TRIGL SERPL-MCNC: 136 MG/DL (ref 0–150)
TSH SERPL DL<=0.05 MIU/L-ACNC: 4.12 UIU/ML (ref 0.27–4.2)
VLDLC SERPL CALC-MCNC: 27 MG/DL

## 2023-01-10 PROCEDURE — 84443 ASSAY THYROID STIM HORMONE: CPT

## 2023-01-10 PROCEDURE — 80053 COMPREHEN METABOLIC PANEL: CPT

## 2023-01-10 PROCEDURE — 36415 COLL VENOUS BLD VENIPUNCTURE: CPT

## 2023-01-10 PROCEDURE — 82570 ASSAY OF URINE CREATININE: CPT

## 2023-01-10 PROCEDURE — 80061 LIPID PANEL: CPT

## 2023-01-10 PROCEDURE — 82043 UR ALBUMIN QUANTITATIVE: CPT

## 2023-01-10 PROCEDURE — 83036 HEMOGLOBIN GLYCOSYLATED A1C: CPT

## 2023-01-19 ENCOUNTER — OFFICE VISIT (OUTPATIENT)
Dept: SURGERY | Age: 63
End: 2023-01-19

## 2023-01-19 VITALS — WEIGHT: 240 LBS | SYSTOLIC BLOOD PRESSURE: 122 MMHG | BODY MASS INDEX: 39.94 KG/M2 | DIASTOLIC BLOOD PRESSURE: 68 MMHG

## 2023-01-19 DIAGNOSIS — S30.1XXA ABDOMINAL WALL SEROMA, INITIAL ENCOUNTER: Primary | ICD-10-CM

## 2023-01-19 PROCEDURE — 99024 POSTOP FOLLOW-UP VISIT: CPT | Performed by: SURGERY

## 2023-01-19 RX ORDER — AMOXICILLIN AND CLAVULANATE POTASSIUM 875; 125 MG/1; MG/1
1 TABLET, FILM COATED ORAL 2 TIMES DAILY
Qty: 14 TABLET | Refills: 0 | Status: SHIPPED | OUTPATIENT
Start: 2023-01-19 | End: 2023-01-26

## 2023-01-19 NOTE — PROGRESS NOTES
[facility] GENERAL AND LAPAROSCOPIC SURGERY          PATIENT NAME: Ary Salter     TODAY'S DATE: 1/19/2023    SUBJECTIVE:    Pt with gauze in place, less packing, still draining.      OBJECTIVE:  VITALS:  Wt 240 lb (108.9 kg)   LMP 08/16/2010   BMI 39.94 kg/m²     CONSTITUTIONAL:  awake and alert  LUNGS:  clear to auscultation  ABDOMEN:  normal bowel sounds, soft, non-distended, non-tender, left lower sinus tract, no cellulitis, moderate drainage, nonmalodorous     Data:    Radiology Review:  None      ASSESSMENT AND PLAN:  LLQ abd wound / sinus  Try eliminating packing and see if site will close  Do Augmentin meanwhile  See back in 2 weeks  Do excision and open packing or VAC if not successful    Joy Nava MD

## 2023-02-02 ENCOUNTER — OFFICE VISIT (OUTPATIENT)
Dept: SURGERY | Age: 63
End: 2023-02-02

## 2023-02-02 VITALS — DIASTOLIC BLOOD PRESSURE: 75 MMHG | BODY MASS INDEX: 39.94 KG/M2 | WEIGHT: 240 LBS | SYSTOLIC BLOOD PRESSURE: 119 MMHG

## 2023-02-02 DIAGNOSIS — Z09 POSTOP CHECK: Primary | ICD-10-CM

## 2023-02-02 PROCEDURE — 99024 POSTOP FOLLOW-UP VISIT: CPT | Performed by: SURGERY

## 2023-02-02 NOTE — PROGRESS NOTES
[facility] GENERAL AND LAPAROSCOPIC SURGERY          PATIENT NAME: Erendira Salter     TODAY'S DATE: 2/2/2023    SUBJECTIVE:    Pt returns for wound check.  Mild drainage yesterday, almost none today     OBJECTIVE:  VITALS:  Wt 240 lb (108.9 kg)   LMP 08/16/2010   BMI 39.94 kg/m²     CONSTITUTIONAL:  awake and alert    ABDOMEN:  normal bowel sounds, soft, non-distended, non-tender, no cellulitis, no drainage on the left, scar looks closed       Radiology Review:  None      ASSESSMENT AND PLAN:  Wound improved, closed today without drainage, will not be sure if area will be fine or have fluid accumulate and become an issue for several days  See back next week    Donte Vigil MD

## 2023-02-07 ENCOUNTER — OFFICE VISIT (OUTPATIENT)
Dept: SURGERY | Age: 63
End: 2023-02-07

## 2023-02-07 VITALS — SYSTOLIC BLOOD PRESSURE: 127 MMHG | BODY MASS INDEX: 39.94 KG/M2 | WEIGHT: 240 LBS | DIASTOLIC BLOOD PRESSURE: 72 MMHG

## 2023-02-07 DIAGNOSIS — Z09 POSTOP CHECK: Primary | ICD-10-CM

## 2023-02-07 PROCEDURE — 99024 POSTOP FOLLOW-UP VISIT: CPT | Performed by: SURGERY

## 2023-02-07 NOTE — PROGRESS NOTES
[facility] GENERAL AND LAPAROSCOPIC SURGERY          PATIENT NAME: Marii Salter     TODAY'S DATE: 2/7/2023    SUBJECTIVE:    Pt seeping fluid, straw color.      OBJECTIVE:  VITALS:  Wt 240 lb (108.9 kg)   LMP 08/16/2010   BMI 39.94 kg/m²     CONSTITUTIONAL:  awake and alert  LUNGS:  clear to auscultation  ABDOMEN:  normal bowel sounds, soft, non-distended, non-tender, left wound marco a, clean, no cellulitis, weeping fluid     Data:      Radiology Review:  None      ASSESSMENT AND PLAN:  Open wound continues to seep, sinus tract is smaller  Will continue a dressing over the site  No pain or infection present  See back in 2 weeks to recheck  OK for RTW on Monday 2/13    Enrike Cheema MD

## 2023-02-15 ENCOUNTER — TELEPHONE (OUTPATIENT)
Dept: SURGERY | Age: 63
End: 2023-02-15

## 2023-02-15 RX ORDER — LEVOFLOXACIN 750 MG/1
750 TABLET ORAL DAILY
Qty: 7 TABLET | Refills: 0 | Status: ON HOLD | OUTPATIENT
Start: 2023-02-15 | End: 2023-02-22

## 2023-02-15 NOTE — TELEPHONE ENCOUNTER
I called to verify with pt, she started not feeling well on Monday, started vomiting Tuesday night and did 3x. She is not running a fever but has chills. States that these are the same sx she was having when she had the fluid collection. She is still draining from the area. Will discuss with Dr. Radha Weiss and call her back.

## 2023-02-15 NOTE — TELEPHONE ENCOUNTER
Vomiting last X 3, Nausea,chills, didn't take temp, has been building up since Monday.    Please Advise  [phone]

## 2023-02-16 ENCOUNTER — TELEPHONE (OUTPATIENT)
Dept: SURGERY | Age: 63
End: 2023-02-16

## 2023-02-16 DIAGNOSIS — S30.1XXA ABDOMINAL WALL SEROMA, INITIAL ENCOUNTER: Primary | ICD-10-CM

## 2023-02-16 RX ORDER — ONDANSETRON 4 MG/1
4 TABLET, FILM COATED ORAL EVERY 4 HOURS PRN
Qty: 20 TABLET | Refills: 0 | Status: ON HOLD | OUTPATIENT
Start: 2023-02-16

## 2023-02-16 RX ORDER — ONDANSETRON 4 MG/1
4 TABLET, FILM COATED ORAL EVERY 4 HOURS PRN
Qty: 20 TABLET | Refills: 0 | Status: SHIPPED | OUTPATIENT
Start: 2023-02-16 | End: 2023-02-16 | Stop reason: SDUPTHER

## 2023-02-16 NOTE — TELEPHONE ENCOUNTER
Pt states she started antibiotic last night. She had a very rough night. She is wondering if Dr. Zhang Kline can prescribe her something for nausea.  Please call and advise [phone]

## 2023-02-17 ENCOUNTER — HOSPITAL ENCOUNTER (INPATIENT)
Age: 63
LOS: 6 days | Discharge: HOME | DRG: 572 | End: 2023-02-23
Attending: EMERGENCY MEDICINE | Admitting: SURGERY
Payer: COMMERCIAL

## 2023-02-17 ENCOUNTER — APPOINTMENT (OUTPATIENT)
Dept: GENERAL RADIOLOGY | Age: 63
DRG: 572 | End: 2023-02-17
Payer: COMMERCIAL

## 2023-02-17 ENCOUNTER — APPOINTMENT (OUTPATIENT)
Dept: CT IMAGING | Age: 63
DRG: 572 | End: 2023-02-17
Payer: COMMERCIAL

## 2023-02-17 ENCOUNTER — TELEPHONE (OUTPATIENT)
Dept: SURGERY | Age: 63
End: 2023-02-17

## 2023-02-17 DIAGNOSIS — L02.211 ABSCESS OF ABDOMINAL WALL: Primary | ICD-10-CM

## 2023-02-17 DIAGNOSIS — L02.211 ABDOMINAL WALL ABSCESS: ICD-10-CM

## 2023-02-17 LAB
A/G RATIO: 1 (ref 1.1–2.2)
ALBUMIN SERPL-MCNC: 4.4 G/DL (ref 3.4–5)
ALP BLD-CCNC: 132 U/L (ref 40–129)
ALT SERPL-CCNC: 20 U/L (ref 10–40)
ANION GAP SERPL CALCULATED.3IONS-SCNC: 13 MMOL/L (ref 3–16)
AST SERPL-CCNC: 27 U/L (ref 15–37)
BACTERIA: ABNORMAL /HPF
BASOPHILS ABSOLUTE: 0 K/UL (ref 0–0.2)
BASOPHILS RELATIVE PERCENT: 0.5 %
BILIRUB SERPL-MCNC: 0.5 MG/DL (ref 0–1)
BILIRUBIN URINE: NEGATIVE
BLOOD, URINE: NEGATIVE
BUN BLDV-MCNC: 8 MG/DL (ref 7–20)
CALCIUM SERPL-MCNC: 10 MG/DL (ref 8.3–10.6)
CHLORIDE BLD-SCNC: 101 MMOL/L (ref 99–110)
CLARITY: CLEAR
CO2: 25 MMOL/L (ref 21–32)
COLOR: YELLOW
CREAT SERPL-MCNC: 0.6 MG/DL (ref 0.6–1.2)
EKG ATRIAL RATE: 79 BPM
EKG DIAGNOSIS: NORMAL
EKG P AXIS: 36 DEGREES
EKG P-R INTERVAL: 146 MS
EKG Q-T INTERVAL: 402 MS
EKG QRS DURATION: 84 MS
EKG QTC CALCULATION (BAZETT): 460 MS
EKG R AXIS: -3 DEGREES
EKG T AXIS: 35 DEGREES
EKG VENTRICULAR RATE: 79 BPM
EOSINOPHILS ABSOLUTE: 0 K/UL (ref 0–0.6)
EOSINOPHILS RELATIVE PERCENT: 0.1 %
EPITHELIAL CELLS, UA: 12 /HPF (ref 0–5)
GFR SERPL CREATININE-BSD FRML MDRD: >60 ML/MIN/{1.73_M2}
GLUCOSE BLD-MCNC: 104 MG/DL (ref 70–99)
GLUCOSE BLD-MCNC: 145 MG/DL (ref 70–99)
GLUCOSE URINE: NEGATIVE MG/DL
HCT VFR BLD CALC: 41.5 % (ref 36–48)
HEMOGLOBIN: 13.2 G/DL (ref 12–16)
HYALINE CASTS: 0 /LPF (ref 0–8)
KETONES, URINE: NEGATIVE MG/DL
LACTIC ACID, SEPSIS: 1.1 MMOL/L (ref 0.4–1.9)
LACTIC ACID, SEPSIS: 2.2 MMOL/L (ref 0.4–1.9)
LEUKOCYTE ESTERASE, URINE: ABNORMAL
LIPASE: 19 U/L (ref 13–60)
LYMPHOCYTES ABSOLUTE: 1.6 K/UL (ref 1–5.1)
LYMPHOCYTES RELATIVE PERCENT: 19.3 %
MCH RBC QN AUTO: 26.1 PG (ref 26–34)
MCHC RBC AUTO-ENTMCNC: 31.8 G/DL (ref 31–36)
MCV RBC AUTO: 82.1 FL (ref 80–100)
MICROSCOPIC EXAMINATION: YES
MONOCYTES ABSOLUTE: 0.5 K/UL (ref 0–1.3)
MONOCYTES RELATIVE PERCENT: 6.6 %
NEUTROPHILS ABSOLUTE: 6 K/UL (ref 1.7–7.7)
NEUTROPHILS RELATIVE PERCENT: 73.5 %
NITRITE, URINE: NEGATIVE
PDW BLD-RTO: 15.3 % (ref 12.4–15.4)
PERFORMED ON: ABNORMAL
PH UA: 7 (ref 5–8)
PLATELET # BLD: 298 K/UL (ref 135–450)
PMV BLD AUTO: 6.5 FL (ref 5–10.5)
POTASSIUM REFLEX MAGNESIUM: 3.6 MMOL/L (ref 3.5–5.1)
PROTEIN UA: NEGATIVE MG/DL
RBC # BLD: 5.05 M/UL (ref 4–5.2)
RBC UA: 2 /HPF (ref 0–4)
SODIUM BLD-SCNC: 139 MMOL/L (ref 136–145)
SPECIFIC GRAVITY UA: 1.01 (ref 1–1.03)
TOTAL PROTEIN: 8.8 G/DL (ref 6.4–8.2)
TROPONIN: <0.01 NG/ML
URINE REFLEX TO CULTURE: ABNORMAL
URINE TYPE: ABNORMAL
UROBILINOGEN, URINE: 0.2 E.U./DL
WBC # BLD: 8.1 K/UL (ref 4–11)
WBC UA: 3 /HPF (ref 0–5)

## 2023-02-17 PROCEDURE — 83605 ASSAY OF LACTIC ACID: CPT

## 2023-02-17 PROCEDURE — 36415 COLL VENOUS BLD VENIPUNCTURE: CPT

## 2023-02-17 PROCEDURE — 74177 CT ABD & PELVIS W/CONTRAST: CPT

## 2023-02-17 PROCEDURE — 6360000004 HC RX CONTRAST MEDICATION: Performed by: EMERGENCY MEDICINE

## 2023-02-17 PROCEDURE — 81001 URINALYSIS AUTO W/SCOPE: CPT

## 2023-02-17 PROCEDURE — 87040 BLOOD CULTURE FOR BACTERIA: CPT

## 2023-02-17 PROCEDURE — 2580000003 HC RX 258: Performed by: EMERGENCY MEDICINE

## 2023-02-17 PROCEDURE — 83036 HEMOGLOBIN GLYCOSYLATED A1C: CPT

## 2023-02-17 PROCEDURE — 80053 COMPREHEN METABOLIC PANEL: CPT

## 2023-02-17 PROCEDURE — 6360000002 HC RX W HCPCS: Performed by: EMERGENCY MEDICINE

## 2023-02-17 PROCEDURE — 71045 X-RAY EXAM CHEST 1 VIEW: CPT

## 2023-02-17 PROCEDURE — 2580000003 HC RX 258: Performed by: SURGERY

## 2023-02-17 PROCEDURE — 83690 ASSAY OF LIPASE: CPT

## 2023-02-17 PROCEDURE — 84484 ASSAY OF TROPONIN QUANT: CPT

## 2023-02-17 PROCEDURE — 93005 ELECTROCARDIOGRAM TRACING: CPT | Performed by: EMERGENCY MEDICINE

## 2023-02-17 PROCEDURE — 2500000003 HC RX 250 WO HCPCS: Performed by: SURGERY

## 2023-02-17 PROCEDURE — 93010 ELECTROCARDIOGRAM REPORT: CPT | Performed by: INTERNAL MEDICINE

## 2023-02-17 PROCEDURE — 6370000000 HC RX 637 (ALT 250 FOR IP): Performed by: SURGERY

## 2023-02-17 PROCEDURE — 85025 COMPLETE CBC W/AUTO DIFF WBC: CPT

## 2023-02-17 PROCEDURE — 1200000000 HC SEMI PRIVATE

## 2023-02-17 PROCEDURE — 0JB80ZZ EXCISION OF ABDOMEN SUBCUTANEOUS TISSUE AND FASCIA, OPEN APPROACH: ICD-10-PCS | Performed by: SURGERY

## 2023-02-17 PROCEDURE — 99285 EMERGENCY DEPT VISIT HI MDM: CPT

## 2023-02-17 RX ORDER — DEXTROSE, SODIUM CHLORIDE, AND POTASSIUM CHLORIDE 5; .45; .15 G/100ML; G/100ML; G/100ML
INJECTION INTRAVENOUS CONTINUOUS
Status: DISCONTINUED | OUTPATIENT
Start: 2023-02-17 | End: 2023-02-21

## 2023-02-17 RX ORDER — DEXTROSE MONOHYDRATE 100 MG/ML
INJECTION, SOLUTION INTRAVENOUS CONTINUOUS PRN
Status: DISCONTINUED | OUTPATIENT
Start: 2023-02-17 | End: 2023-02-23 | Stop reason: HOSPADM

## 2023-02-17 RX ORDER — ATORVASTATIN CALCIUM 10 MG/1
10 TABLET, FILM COATED ORAL DAILY
Status: DISCONTINUED | OUTPATIENT
Start: 2023-02-18 | End: 2023-02-23 | Stop reason: HOSPADM

## 2023-02-17 RX ORDER — SODIUM CHLORIDE 0.9 % (FLUSH) 0.9 %
5-40 SYRINGE (ML) INJECTION PRN
Status: DISCONTINUED | OUTPATIENT
Start: 2023-02-17 | End: 2023-02-23 | Stop reason: HOSPADM

## 2023-02-17 RX ORDER — LOSARTAN POTASSIUM 100 MG/1
100 TABLET ORAL DAILY
Status: DISCONTINUED | OUTPATIENT
Start: 2023-02-18 | End: 2023-02-23 | Stop reason: HOSPADM

## 2023-02-17 RX ORDER — SODIUM CHLORIDE 9 MG/ML
INJECTION, SOLUTION INTRAVENOUS PRN
Status: DISCONTINUED | OUTPATIENT
Start: 2023-02-17 | End: 2023-02-23 | Stop reason: HOSPADM

## 2023-02-17 RX ORDER — ACETAMINOPHEN 500 MG
500 TABLET ORAL EVERY 6 HOURS PRN
Status: DISCONTINUED | OUTPATIENT
Start: 2023-02-17 | End: 2023-02-23 | Stop reason: HOSPADM

## 2023-02-17 RX ORDER — SODIUM CHLORIDE 0.9 % (FLUSH) 0.9 %
5-40 SYRINGE (ML) INJECTION EVERY 12 HOURS SCHEDULED
Status: DISCONTINUED | OUTPATIENT
Start: 2023-02-17 | End: 2023-02-23 | Stop reason: HOSPADM

## 2023-02-17 RX ORDER — ONDANSETRON 4 MG/1
4 TABLET, ORALLY DISINTEGRATING ORAL EVERY 8 HOURS PRN
Status: DISCONTINUED | OUTPATIENT
Start: 2023-02-17 | End: 2023-02-23 | Stop reason: HOSPADM

## 2023-02-17 RX ORDER — MORPHINE SULFATE 4 MG/ML
4 INJECTION, SOLUTION INTRAMUSCULAR; INTRAVENOUS
Status: DISCONTINUED | OUTPATIENT
Start: 2023-02-17 | End: 2023-02-23 | Stop reason: HOSPADM

## 2023-02-17 RX ORDER — DEXTROSE MONOHYDRATE 100 MG/ML
INJECTION, SOLUTION INTRAVENOUS
Status: DISPENSED
Start: 2023-02-17 | End: 2023-02-18

## 2023-02-17 RX ORDER — LANOLIN ALCOHOL/MO/W.PET/CERES
400 CREAM (GRAM) TOPICAL DAILY
Status: DISCONTINUED | OUTPATIENT
Start: 2023-02-18 | End: 2023-02-23 | Stop reason: HOSPADM

## 2023-02-17 RX ORDER — ONDANSETRON 2 MG/ML
4 INJECTION INTRAMUSCULAR; INTRAVENOUS EVERY 6 HOURS PRN
Status: DISCONTINUED | OUTPATIENT
Start: 2023-02-17 | End: 2023-02-23 | Stop reason: HOSPADM

## 2023-02-17 RX ORDER — ENOXAPARIN SODIUM 100 MG/ML
30 INJECTION SUBCUTANEOUS 2 TIMES DAILY
Status: DISCONTINUED | OUTPATIENT
Start: 2023-02-18 | End: 2023-02-23 | Stop reason: HOSPADM

## 2023-02-17 RX ORDER — ONDANSETRON 2 MG/ML
4 INJECTION INTRAMUSCULAR; INTRAVENOUS ONCE
Status: COMPLETED | OUTPATIENT
Start: 2023-02-17 | End: 2023-02-17

## 2023-02-17 RX ORDER — OXYCODONE HYDROCHLORIDE 5 MG/1
5 TABLET ORAL EVERY 4 HOURS PRN
Status: DISCONTINUED | OUTPATIENT
Start: 2023-02-17 | End: 2023-02-23 | Stop reason: HOSPADM

## 2023-02-17 RX ORDER — INSULIN LISPRO 100 [IU]/ML
0-8 INJECTION, SOLUTION INTRAVENOUS; SUBCUTANEOUS EVERY 4 HOURS
Status: DISCONTINUED | OUTPATIENT
Start: 2023-02-17 | End: 2023-02-20

## 2023-02-17 RX ORDER — MORPHINE SULFATE 2 MG/ML
2 INJECTION, SOLUTION INTRAMUSCULAR; INTRAVENOUS
Status: DISCONTINUED | OUTPATIENT
Start: 2023-02-17 | End: 2023-02-23 | Stop reason: HOSPADM

## 2023-02-17 RX ORDER — 0.9 % SODIUM CHLORIDE 0.9 %
1000 INTRAVENOUS SOLUTION INTRAVENOUS ONCE
Status: COMPLETED | OUTPATIENT
Start: 2023-02-17 | End: 2023-02-17

## 2023-02-17 RX ADMIN — PIPERACILLIN AND TAZOBACTAM 3375 MG: 3; .375 INJECTION, POWDER, FOR SOLUTION INTRAVENOUS at 18:21

## 2023-02-17 RX ADMIN — Medication 10 ML: at 22:50

## 2023-02-17 RX ADMIN — SODIUM CHLORIDE 1000 ML: 9 INJECTION, SOLUTION INTRAVENOUS at 14:41

## 2023-02-17 RX ADMIN — ACETAMINOPHEN 500 MG: 500 TABLET ORAL at 20:15

## 2023-02-17 RX ADMIN — IOPAMIDOL 75 ML: 755 INJECTION, SOLUTION INTRAVENOUS at 14:21

## 2023-02-17 RX ADMIN — ONDANSETRON 4 MG: 2 INJECTION INTRAMUSCULAR; INTRAVENOUS at 17:56

## 2023-02-17 RX ADMIN — POTASSIUM CHLORIDE, DEXTROSE MONOHYDRATE AND SODIUM CHLORIDE: 150; 5; 450 INJECTION, SOLUTION INTRAVENOUS at 22:48

## 2023-02-17 ASSESSMENT — PAIN DESCRIPTION - LOCATION: LOCATION: HEAD

## 2023-02-17 ASSESSMENT — PAIN SCALES - GENERAL: PAINLEVEL_OUTOF10: 6

## 2023-02-17 ASSESSMENT — LIFESTYLE VARIABLES
HOW OFTEN DO YOU HAVE A DRINK CONTAINING ALCOHOL: NEVER
HOW MANY STANDARD DRINKS CONTAINING ALCOHOL DO YOU HAVE ON A TYPICAL DAY: PATIENT DOES NOT DRINK

## 2023-02-17 NOTE — PROGRESS NOTES
Patient seen in ED, room 09. Admission completed except for: 4 Eyes Assessment, Immunizations, Covid Vaccines, Rights and Responsibilities, Orientation to room, Plan of Care, education, white board, height and weight, pain assessment and head to toe assessment. Patient is alert and oriented X 4. Patient lives at home and is being admitted for abdominal wall abscess. Home Medication Reconciliation has been verbally reviewed with patient and updated as appropriate (patient sure of dosages on some OTC medications). It continues to be marked as \"In process\" as the verification with pharmacy has not been completed. Plan of care updated if indicated. All questions answered. Patient sister at bedside.

## 2023-02-17 NOTE — TELEPHONE ENCOUNTER
Pt called, running fever today, very shaky and weak, still nausea and vomiting. On antibx and taking nausea med, no  help.   Advised to go to ER

## 2023-02-17 NOTE — ED PROVIDER NOTES
[facility] Emergency Department      Pt Name: Varun Albarran  MRN: [id number]  [place] 1960  Date of evaluation: 2/17/2023  Provider: Aleksandra Garcia MD  CHIEF COMPLAINT  Chief Complaint   Patient presents with    Wound Infection     Pt reports wound to abdomen near ileostomy. Pt reports fever and fatigue. Taking Levaquin po now. HPI  Varun Albarran is a 65 y.o. female who presents because of fever. She has been feeling somewhat ill since Monday of this week. She has noticed a foul odor to the drainage from the wound on her abdomen near her ileostomy. She has a history of ulcerative colitis and had initial major procedure in 2004 with ileostomy. She had incarcerated hernia a couple years ago and subsequently had a seroma that formed on her abdominal wall that got infected. She has had a ongoing fistula since then and some chronic drainage. She started taking Levaquin a couple days ago. Despite the antibiotic, she has persistent nausea and elevated temperature. Is due to her temperature was 100 degrees before she came to the hospital and she did take a dose of Tylenol. She has had poor appetite. She has had decreased drainage than usual from her ostomy but says she has not been eating very much. She did not get the Zofran filled from the pharmacy because she felt too ill to go pick it up. REVIEW OF SYSTEMS:  Fever, no cough or congestion, no sob, no dysuria Pertinent positives and negatives as per the HPI. All other pertinent review of systems reviewed and negative. Nursing notes reviewed.     PAST MEDICAL HISTORY  Past Medical History:   Diagnosis Date    Asthma     Diabetes mellitus ([address]     type 2    GERD (gastroesophageal reflux disease)     Hyperlipidemia     Hypertension     Ileostomy care ([address] 03/17/2020    Iritis     PCOS (polycystic ovarian syndrome)     Pyoderma     Thyroid disease     hypothyroid    Ulcerative colitis     Ulcerative colitis ([facility]      SURGICAL HISTORY  Past Surgical History:   Procedure Laterality Date    ABDOMEN SURGERY      COLON RESECTION FOR ULCERATIVE COLITIS THEN HAD ANUS REMOVED    ABDOMEN SURGERY N/A 09/30/2022    INCISION AND DRAINAGE OF ABDOMINAL WALL SEROMA ([address]) performed by Rocío Guzmán MD at [address] [place] [address] N/A 10/14/2022    DRAINAGE OF ABDOMINAL WALL ABSCESS WITH PLACEMENT OF WOUND VAC performed by Rocío Guzmán MD at [address]      rectum removed    UPPER GASTROINTESTINAL ENDOSCOPY N/A 11/23/2021    EGD BIOPSY performed by Jennifer Munoz MD at [address] [facility] 01/18/2022    EGD DIAGNOSTIC ONLY performed by Jennifer Munoz MD at [place] [address] N/A 11/16/2021    OPEN REPAIR INCISIONAL HERNIA WITH MESH, LYSIS OF ADHESIONS performed by Rocío Guzmán MD at [facility] [address]:  No current facility-administered medications on file prior to encounter.      Current Outpatient Medications on File Prior to Encounter   Medication Sig Dispense Refill    Dicyclomine HCl (BENTYL PO) Take by mouth      ondansetron (ZOFRAN) 4 MG tablet Take 1 tablet by mouth every 4 hours as needed for Nausea (Patient not taking: Reported on 2/17/2023) 20 tablet 0    levoFLOXacin (LEVAQUIN) 750 MG tablet Take 1 tablet by mouth daily for 7 days 7 tablet 0    insulin glargine (LANTUS SOLOSTAR) 100 UNIT/ML injection pen INJECT 50 UNITS SUBCUTANEOUSLY TWICE DAILY (Patient taking differently: INJECT 55 UNITS SUBCUTANEOUSLY TWICE DAILY) 90 mL 1    Continuous Blood Gluc Sensor (FREESTYLE EDY 2 SENSOR) MISC CHANGE EVERY 14 DAYS TO MONITOR BS 6 each 1    insulin aspart (NOVOLOG FLEXPEN) 100 UNIT/ML injection pen INJECT 25 UNITS SUBCUTANEOUSLY TWICE DAILY 45 mL 3    Continuous Blood Gluc Sensor (FREESTYLE EDY 2 SENSOR) MISC Change every 14 days 2 each 5    Insulin Pen Needle (DROPLET PEN NEEDLES) 31G X 8 MM MISC USE TO INJECT INSULIN FOUR TIMES DAILY 400 each 5    blood glucose test strips (ONETOUCH ULTRA) strip Test 4 times daily. 150 each 5    OMEPRAZOLE PO Take by mouth OTC daily      ALBUTEROL IN Inhale 90 mcg into the lungs every 6 hours as needed (Patient not taking: Reported on 2/17/2023)      Multiple Vitamin (MULTIVITAMIN ADULT PO) Take by mouth      Ferrous Sulfate (IRON PO) Take by mouth daily  (Patient not taking: Reported on 2/17/2023)      COENZYME Q10 PO Take by mouth daily      ONE TOUCH ULTRASOFT LANCETS MISC USE TO TEST BLOOD GLUCOSE 4 TIMES DAILY      POTASSIUM PO Take by mouth daily       MAGNESIUM PO Take by mouth daily       Montelukast Sodium (SINGULAIR PO) Take 10 mg by mouth daily      Cholecalciferol (VITAMIN D3) 1.25 MG (66383 UT) CAPS Take by mouth once a week      TRIAMCINOLONE ACETONIDE NA by Nasal route as needed  (Patient not taking: Reported on 2/17/2023)      fluticasone-vilanterol (BREO ELLIPTA) 100-25 MCG/INH AEPB inhaler Inhale 1 puff into the lungs as needed  (Patient not taking: Reported on 2/17/2023)      losartan (COZAAR) 100 MG tablet Take 100 mg by mouth daily      Omega-3 Fatty Acids (FISH OIL) 1000 MG CAPS Take 1,000 mg by mouth daily      atorvastatin (LIPITOR) 10 MG tablet Take 10 mg by mouth daily      CRANBERRY PO Take by mouth daily      acetaminophen (TYLENOL) 500 MG tablet Take 500 mg by mouth every 6 hours as needed for Pain      loratadine (CLARITIN) 10 MG capsule Take by mouth daily        ALLERGIES  Patient has no known allergies. FAMILY HISTORY  Family History   Problem Relation Age of Onset    Cancer Mother         uterine    No Known Problems Father     Diabetes Sister     Cancer Maternal Grandfather         colon     SOCIAL HISTORY:  Social History     Tobacco Use    Smoking status: Never    Smokeless tobacco: Never   Vaping Use    Vaping Use: Never used   Substance Use Topics    Alcohol use: No    Drug use:  No IMMUNIZATIONS:  Noncontributory    PHYSICAL EXAM  VITAL SIGNS:  Blood pressure 126/72, pulse 88, temperature 98.2 °F (36.8 °C), temperature source Oral, resp. rate 20, weight 236 lb 12.8 oz (107.4 kg), last menstrual period 08/16/2010, SpO2 95 %. Constitutional:  65 y.o. female alert, cooperative  HENT:  Atraumatic, mucous membranes moist  Eyes:   Conjunctiva clear, no icterus  Neck:  Supple, no JVD, no signs of injury  Cardiovascular:  Regular, no rubs  Thorax & Lungs:  No accessory muscle usage, clear  Abdomen:  Soft, non distended, bowel sounds present, well healed ML scar, ostomy on right side with normal-appearing content in bag, there is a opening in the skin to the left of it with some serous drainage that is on the gauze, no surrounding erythema or tenderness  Back:  No deformity  Genitalia:  Deferred  Rectal:  Deferred  Extremities:  No cyanosis, no edema  Skin:  Exposed areas warm, dry  Neurologic:  Alert, no slurred speech, no focal deficits noted  Psychiatric:  Affect appropriate    RESULTS / MEDICAL DECISION MAKING:  Labs resulted at the time of this note reviewed.   Labs Reviewed   COMPREHENSIVE METABOLIC PANEL W/ REFLEX TO MG FOR LOW K - Abnormal; Notable for the following components:       Result Value    Glucose 104 (*)     Total Protein 8.8 (*)     Albumin/Globulin Ratio 1.0 (*)     Alkaline Phosphatase 132 (*)     All other components within normal limits   URINALYSIS WITH REFLEX TO CULTURE - Abnormal; Notable for the following components:    Leukocyte Esterase, Urine SMALL (*)     All other components within normal limits   LACTATE, SEPSIS - Abnormal; Notable for the following components:    Lactic Acid, Sepsis 2.2 (*)     All other components within normal limits   MICROSCOPIC URINALYSIS - Abnormal; Notable for the following components:    Bacteria, UA Rare (*)     Epithelial Cells, UA 12 (*)     All other components within normal limits   POCT GLUCOSE - Abnormal; Notable for the following components:    POC Glucose 145 (*)     All other components within normal limits   CULTURE, BLOOD 1   CULTURE, BLOOD 2   CBC WITH AUTO DIFFERENTIAL   LIPASE   TROPONIN   LACTATE, SEPSIS   HEMOGLOBIN A1C   CBC WITH AUTO DIFFERENTIAL   BASIC METABOLIC PANEL W/ REFLEX TO MG FOR LOW K   POCT GLUCOSE   POCT GLUCOSE     EKG:  Read by me in the absence of a cardiologist shows: Sinus rhythm, rate 79, intervals normal, axis normal, nonspecific ST-T wave abnormality, poor R wave progression, minimal change from October 2022    RADIOLOGY:  Plain x-rays were viewed by me:   CT ABDOMEN PELVIS W IV CONTRAST Additional Contrast? None   Final Result   1. Persistent complex fluid collection in the subcutaneous soft tissue at   prior site of ventral hernia repair. Chronic hematoma or recurrent abscess   may be suspected. 2. Probable fistula tract extending to the skin surface to the left of   midline. 3. Parastomal hernia with otherwise unremarkable ileostomy site on the right.          XR CHEST PORTABLE   Final Result   No acute disease           ED COURSE:    Medications   acetaminophen (TYLENOL) tablet 500 mg (500 mg Oral Given 2/17/23 2015)   atorvastatin (LIPITOR) tablet 10 mg (has no administration in time range)   losartan (COZAAR) tablet 100 mg (has no administration in time range)   magnesium oxide (MAG-OX) tablet 400 mg (has no administration in time range)   dextrose bolus 10% 125 mL (has no administration in time range)     Or   dextrose bolus 10% 250 mL (has no administration in time range)   glucagon (rDNA) injection 1 mg (has no administration in time range)   dextrose 10 % infusion (has no administration in time range)   dextrose 5 % and 0.45 % NaCl with KCl 20 mEq infusion ( IntraVENous New Bag 2/17/23 2244)   sodium chloride flush 0.9 % injection 5-40 mL (10 mLs IntraVENous Given 2/17/23 2250)   sodium chloride flush 0.9 % injection 5-40 mL (has no administration in time range)   0.9 % sodium chloride infusion (has no administration in time range)   ondansetron (ZOFRAN-ODT) disintegrating tablet 4 mg (has no administration in time range)     Or   ondansetron (ZOFRAN) injection 4 mg (has no administration in time range)   enoxaparin Sodium (LOVENOX) injection 30 mg (has no administration in time range)   oxyCODONE (ROXICODONE) immediate release tablet 5 mg (has no administration in time range)   morphine (PF) injection 2 mg (has no administration in time range)     Or   morphine injection 4 mg (has no administration in time range)   pantoprazole (PROTONIX) 40 mg in sodium chloride (PF) 0.9 % 10 mL injection (has no administration in time range)   insulin lispro (HUMALOG) injection vial 0-8 Units (0 Units SubCUTAneous Not Given 2/17/23 2233)   piperacillin-tazobactam (ZOSYN) 3,375 mg in sodium chloride 0.9 % 50 mL IVPB (mini-bag) (has no administration in time range)   dextrose 10 % infusion (has no administration in time range)   0.9 % sodium chloride bolus (0 mLs IntraVENous Stopped 2/17/23 1601)   iopamidol (ISOVUE-370) 76 % injection 75 mL (75 mLs IntraVENous Given 2/17/23 1421)   piperacillin-tazobactam (ZOSYN) 3,375 mg in sodium chloride 0.9 % 50 mL IVPB (mini-bag) (0 mg IntraVENous Stopped 2/17/23 1859)   ondansetron (ZOFRAN) injection 4 mg (4 mg IntraVENous Given 2/17/23 1756)     Vitals:    02/17/23 2000 02/17/23 2030 02/17/23 2130 02/18/23 0036   BP:   113/68 124/68   Pulse: 91 80 90 85   Resp: 14 13 18 18   Temp:   97.6 °F (36.4 °C) 97.8 °F (36.6 °C)   TempSrc:   Oral Oral   SpO2: 97% 98% 95% 96%   Weight:         History from : Patient  Limitations to history : None  Chronic Conditions:  has a past medical history of Asthma, Diabetes mellitus ([facility] [address] GERD (gastroesophageal reflux disease), Hyperlipidemia, Hypertension, [facility] ([facility] Iritis, PCOS (polycystic ovarian syndrome), Pyoderma, Thyroid disease, Ulcerative colitis, and Ulcerative colitis ([facility]   Records Reviewed : Inpatient Notes operative notes  Disposition Considerations (Tests not ordered but considered, Shared Decision Making, Pt Expectation of Test or Tx.):  MRI not deemed clinically necessary in the ED setting  Discussion with Other Profesionals : Admitting Team Dr Melanie Bay    Is this patient to be included in the SEP-1 Core Measure due to severe sepsis or septic shock? Yes   SEP-1 CORE MEASURE DATA      Sepsis Criteria   Severe Sepsis Criteria   Septic Shock Criteria     Must be confirmed or suspected to move forward with diagnosis of sepsis. Must meet 2:    [x] Temperature > 100.9 F (38.3 C)        or < 96.8 F (36 C)  [] HR > 90  [x] RR > 20  [] WBC > 12 or < 4 or 10% bands      AND:      [x] Infection Confirmed or        Suspected. Must meet 1:    [x] Lactate > 2       or   [] Signs of Organ Dysfunction:    - SBP < 90 or MAP < 65  - Altered mental status  - Creatinine > 2 or increased from      baseline  - Urine Output < 0.5 ml/kg/hr  - Bilirubin > 2  - INR > 1.5 (not anticoagulated)  - Platelets < 997,913  - Acute Respiratory Failure as     evidenced by new need for NIPPV     or mechanical ventilation      [] No criteria met for Severe Sepsis. Must meet 1:    [] Lactate > 4        or   [] SBP < 90 or MAP < 65 for at        least two readings in the first        hour after fluid bolus        administration      [] Vasopressors initiated (if hypotension persists after fluid resuscitation)        [] No criteria met for Septic Shock.    Patient Vitals for the past 6 hrs:   BP Temp Pulse Resp SpO2   02/17/23 2030 -- -- 80 13 98 %   02/17/23 2130 113/68 97.6 °F (36.4 °C) 90 18 95 %   02/18/23 0036 124/68 97.8 °F (36.6 °C) 85 18 96 %      Recent Labs     02/17/23  1321 02/17/23  1322   WBC 8.1  --    CREATININE  --  0.6   BILITOT  --  0.5     --          Time Severe Sepsis Identified: 1800    Fluid Resuscitation Rational: less than 30mL/kg because no HD instability or shock    Repeat lactate level: improving    Reassessment Exam:   Not applicable. Patient does not have septic shock. PROCEDURES:  None    CRITICAL CARE:  Total critical care time of 31 minutes (excludes any time for procedures). This includes but not limited to vital sign monitoring, medication and/or clinical response to medications, interpretation of diagnostics, review of nursing notes, pertinent record review, discussions about patient condition, consultation time, documentation time. Critical care due to the patient's sepsis. 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 She does have findings concerning for recurrent abscess. She has elevated lactate, fever at home. I initiated IV abx. I discussed her care with Dr Maci Silveira and he has admitted the patient for further care. FINAL IMPRESSION:    1. Abscess of abdominal wall        (Please note that I used voice recognition software to generate this note.   Occasionally words are mistranscribed despite my efforts to edit errors.)        Sunitha Carlos MD  02/18/23 0226

## 2023-02-18 ENCOUNTER — ANESTHESIA EVENT (OUTPATIENT)
Dept: OPERATING ROOM | Age: 63
End: 2023-02-18
Payer: COMMERCIAL

## 2023-02-18 ENCOUNTER — ANESTHESIA (OUTPATIENT)
Dept: OPERATING ROOM | Age: 63
End: 2023-02-18
Payer: COMMERCIAL

## 2023-02-18 LAB
ANION GAP SERPL CALCULATED.3IONS-SCNC: 10 MMOL/L (ref 3–16)
BASOPHILS ABSOLUTE: 0 K/UL (ref 0–0.2)
BASOPHILS RELATIVE PERCENT: 0.6 %
BUN BLDV-MCNC: 8 MG/DL (ref 7–20)
CALCIUM SERPL-MCNC: 8.7 MG/DL (ref 8.3–10.6)
CHLORIDE BLD-SCNC: 106 MMOL/L (ref 99–110)
CO2: 23 MMOL/L (ref 21–32)
CREAT SERPL-MCNC: <0.5 MG/DL (ref 0.6–1.2)
EOSINOPHILS ABSOLUTE: 0 K/UL (ref 0–0.6)
EOSINOPHILS RELATIVE PERCENT: 0.2 %
ESTIMATED AVERAGE GLUCOSE: 171.4 MG/DL
GFR SERPL CREATININE-BSD FRML MDRD: >60 ML/MIN/{1.73_M2}
GLUCOSE BLD-MCNC: 111 MG/DL (ref 70–99)
GLUCOSE BLD-MCNC: 115 MG/DL (ref 70–99)
GLUCOSE BLD-MCNC: 122 MG/DL (ref 70–99)
GLUCOSE BLD-MCNC: 134 MG/DL (ref 70–99)
GLUCOSE BLD-MCNC: 136 MG/DL (ref 70–99)
GLUCOSE BLD-MCNC: 190 MG/DL (ref 70–99)
GLUCOSE BLD-MCNC: 243 MG/DL (ref 70–99)
HBA1C MFR BLD: 7.6 %
HCT VFR BLD CALC: 33.5 % (ref 36–48)
HEMOGLOBIN: 11.1 G/DL (ref 12–16)
LYMPHOCYTES ABSOLUTE: 1.2 K/UL (ref 1–5.1)
LYMPHOCYTES RELATIVE PERCENT: 20.4 %
MCH RBC QN AUTO: 27.3 PG (ref 26–34)
MCHC RBC AUTO-ENTMCNC: 33.3 G/DL (ref 31–36)
MCV RBC AUTO: 82.2 FL (ref 80–100)
MONOCYTES ABSOLUTE: 0.4 K/UL (ref 0–1.3)
MONOCYTES RELATIVE PERCENT: 7.2 %
NEUTROPHILS ABSOLUTE: 4.1 K/UL (ref 1.7–7.7)
NEUTROPHILS RELATIVE PERCENT: 71.6 %
PDW BLD-RTO: 15.3 % (ref 12.4–15.4)
PERFORMED ON: ABNORMAL
PLATELET # BLD: 237 K/UL (ref 135–450)
PMV BLD AUTO: 6.7 FL (ref 5–10.5)
POTASSIUM REFLEX MAGNESIUM: 4 MMOL/L (ref 3.5–5.1)
RBC # BLD: 4.08 M/UL (ref 4–5.2)
SODIUM BLD-SCNC: 139 MMOL/L (ref 136–145)
WBC # BLD: 5.7 K/UL (ref 4–11)

## 2023-02-18 PROCEDURE — 3700000001 HC ADD 15 MINUTES (ANESTHESIA): Performed by: SURGERY

## 2023-02-18 PROCEDURE — 87075 CULTR BACTERIA EXCEPT BLOOD: CPT

## 2023-02-18 PROCEDURE — 7100000001 HC PACU RECOVERY - ADDTL 15 MIN: Performed by: SURGERY

## 2023-02-18 PROCEDURE — 87186 SC STD MICRODIL/AGAR DIL: CPT

## 2023-02-18 PROCEDURE — 3600000002 HC SURGERY LEVEL 2 BASE: Performed by: SURGERY

## 2023-02-18 PROCEDURE — 85025 COMPLETE CBC W/AUTO DIFF WBC: CPT

## 2023-02-18 PROCEDURE — 99222 1ST HOSP IP/OBS MODERATE 55: CPT | Performed by: SURGERY

## 2023-02-18 PROCEDURE — 2580000003 HC RX 258: Performed by: SURGERY

## 2023-02-18 PROCEDURE — 6360000002 HC RX W HCPCS: Performed by: SURGERY

## 2023-02-18 PROCEDURE — 11045 DBRDMT SUBQ TISS EACH ADDL: CPT | Performed by: SURGERY

## 2023-02-18 PROCEDURE — 6360000002 HC RX W HCPCS: Performed by: ANESTHESIOLOGY

## 2023-02-18 PROCEDURE — 1200000000 HC SEMI PRIVATE

## 2023-02-18 PROCEDURE — 7100000000 HC PACU RECOVERY - FIRST 15 MIN: Performed by: SURGERY

## 2023-02-18 PROCEDURE — 88304 TISSUE EXAM BY PATHOLOGIST: CPT

## 2023-02-18 PROCEDURE — 3600000012 HC SURGERY LEVEL 2 ADDTL 15MIN: Performed by: SURGERY

## 2023-02-18 PROCEDURE — 11042 DBRDMT SUBQ TIS 1ST 20SQCM/<: CPT | Performed by: SURGERY

## 2023-02-18 PROCEDURE — 80048 BASIC METABOLIC PNL TOTAL CA: CPT

## 2023-02-18 PROCEDURE — 3700000000 HC ANESTHESIA ATTENDED CARE: Performed by: SURGERY

## 2023-02-18 PROCEDURE — 2500000003 HC RX 250 WO HCPCS: Performed by: SURGERY

## 2023-02-18 PROCEDURE — 87205 SMEAR GRAM STAIN: CPT

## 2023-02-18 PROCEDURE — 0JB80ZZ EXCISION OF ABDOMEN SUBCUTANEOUS TISSUE AND FASCIA, OPEN APPROACH: ICD-10-PCS | Performed by: SURGERY

## 2023-02-18 PROCEDURE — A4217 STERILE WATER/SALINE, 500 ML: HCPCS | Performed by: SURGERY

## 2023-02-18 PROCEDURE — 2709999900 HC NON-CHARGEABLE SUPPLY: Performed by: SURGERY

## 2023-02-18 PROCEDURE — C9113 INJ PANTOPRAZOLE SODIUM, VIA: HCPCS | Performed by: SURGERY

## 2023-02-18 PROCEDURE — 87070 CULTURE OTHR SPECIMN AEROBIC: CPT

## 2023-02-18 PROCEDURE — 6370000000 HC RX 637 (ALT 250 FOR IP): Performed by: SURGERY

## 2023-02-18 PROCEDURE — 87077 CULTURE AEROBIC IDENTIFY: CPT

## 2023-02-18 PROCEDURE — 6360000002 HC RX W HCPCS

## 2023-02-18 RX ORDER — FENTANYL CITRATE 50 UG/ML
INJECTION, SOLUTION INTRAMUSCULAR; INTRAVENOUS PRN
Status: DISCONTINUED | OUTPATIENT
Start: 2023-02-18 | End: 2023-02-18 | Stop reason: SDUPTHER

## 2023-02-18 RX ORDER — MAGNESIUM HYDROXIDE 1200 MG/15ML
LIQUID ORAL CONTINUOUS PRN
Status: COMPLETED | OUTPATIENT
Start: 2023-02-18 | End: 2023-02-18

## 2023-02-18 RX ORDER — ROCURONIUM BROMIDE 10 MG/ML
INJECTION, SOLUTION INTRAVENOUS PRN
Status: DISCONTINUED | OUTPATIENT
Start: 2023-02-18 | End: 2023-02-18

## 2023-02-18 RX ORDER — SODIUM CHLORIDE 0.9 % (FLUSH) 0.9 %
5-40 SYRINGE (ML) INJECTION EVERY 12 HOURS SCHEDULED
Status: DISCONTINUED | OUTPATIENT
Start: 2023-02-18 | End: 2023-02-18 | Stop reason: HOSPADM

## 2023-02-18 RX ORDER — ONDANSETRON 2 MG/ML
4 INJECTION INTRAMUSCULAR; INTRAVENOUS
Status: COMPLETED | OUTPATIENT
Start: 2023-02-18 | End: 2023-02-18

## 2023-02-18 RX ORDER — MEPERIDINE HYDROCHLORIDE 25 MG/ML
12.5 INJECTION INTRAMUSCULAR; INTRAVENOUS; SUBCUTANEOUS EVERY 5 MIN PRN
Status: DISCONTINUED | OUTPATIENT
Start: 2023-02-18 | End: 2023-02-18 | Stop reason: HOSPADM

## 2023-02-18 RX ORDER — SODIUM CHLORIDE 9 MG/ML
INJECTION, SOLUTION INTRAVENOUS PRN
Status: DISCONTINUED | OUTPATIENT
Start: 2023-02-18 | End: 2023-02-18 | Stop reason: HOSPADM

## 2023-02-18 RX ORDER — HYDROMORPHONE HCL 110MG/55ML
0.5 PATIENT CONTROLLED ANALGESIA SYRINGE INTRAVENOUS EVERY 5 MIN PRN
Status: DISCONTINUED | OUTPATIENT
Start: 2023-02-18 | End: 2023-02-18 | Stop reason: HOSPADM

## 2023-02-18 RX ORDER — SODIUM CHLORIDE 0.9 % (FLUSH) 0.9 %
5-40 SYRINGE (ML) INJECTION PRN
Status: DISCONTINUED | OUTPATIENT
Start: 2023-02-18 | End: 2023-02-18 | Stop reason: HOSPADM

## 2023-02-18 RX ORDER — PROCHLORPERAZINE EDISYLATE 5 MG/ML
5 INJECTION INTRAMUSCULAR; INTRAVENOUS ONCE
Status: COMPLETED | OUTPATIENT
Start: 2023-02-18 | End: 2023-02-18

## 2023-02-18 RX ORDER — BUPIVACAINE HYDROCHLORIDE AND EPINEPHRINE 5; 5 MG/ML; UG/ML
INJECTION, SOLUTION PERINEURAL
Status: COMPLETED | OUTPATIENT
Start: 2023-02-18 | End: 2023-02-18

## 2023-02-18 RX ORDER — PROPOFOL 10 MG/ML
INJECTION, EMULSION INTRAVENOUS PRN
Status: DISCONTINUED | OUTPATIENT
Start: 2023-02-18 | End: 2023-02-18 | Stop reason: SDUPTHER

## 2023-02-18 RX ORDER — APREPITANT 40 MG/1
CAPSULE ORAL
Status: COMPLETED
Start: 2023-02-18 | End: 2023-02-18

## 2023-02-18 RX ORDER — ONDANSETRON 2 MG/ML
INJECTION INTRAMUSCULAR; INTRAVENOUS PRN
Status: DISCONTINUED | OUTPATIENT
Start: 2023-02-18 | End: 2023-02-18 | Stop reason: SDUPTHER

## 2023-02-18 RX ORDER — APREPITANT 40 MG/1
40 CAPSULE ORAL ONCE
Status: COMPLETED | OUTPATIENT
Start: 2023-02-18 | End: 2023-02-18

## 2023-02-18 RX ORDER — DEXAMETHASONE SODIUM PHOSPHATE 4 MG/ML
INJECTION, SOLUTION INTRA-ARTICULAR; INTRALESIONAL; INTRAMUSCULAR; INTRAVENOUS; SOFT TISSUE PRN
Status: DISCONTINUED | OUTPATIENT
Start: 2023-02-18 | End: 2023-02-18 | Stop reason: SDUPTHER

## 2023-02-18 RX ADMIN — POTASSIUM CHLORIDE, DEXTROSE MONOHYDRATE AND SODIUM CHLORIDE: 150; 5; 450 INJECTION, SOLUTION INTRAVENOUS at 18:22

## 2023-02-18 RX ADMIN — PIPERACILLIN AND TAZOBACTAM 3375 MG: 3; .375 INJECTION, POWDER, FOR SOLUTION INTRAVENOUS at 13:09

## 2023-02-18 RX ADMIN — APREPITANT 40 MG: 40 CAPSULE ORAL at 10:02

## 2023-02-18 RX ADMIN — ATORVASTATIN CALCIUM 10 MG: 10 TABLET, FILM COATED ORAL at 08:40

## 2023-02-18 RX ADMIN — ACETAMINOPHEN 500 MG: 500 TABLET ORAL at 04:51

## 2023-02-18 RX ADMIN — PROCHLORPERAZINE EDISYLATE 5 MG: 5 INJECTION INTRAMUSCULAR; INTRAVENOUS at 12:05

## 2023-02-18 RX ADMIN — PROPOFOL 200 MG: 10 INJECTION, EMULSION INTRAVENOUS at 10:28

## 2023-02-18 RX ADMIN — PIPERACILLIN AND TAZOBACTAM 3375 MG: 3; .375 INJECTION, POWDER, FOR SOLUTION INTRAVENOUS at 21:10

## 2023-02-18 RX ADMIN — ONDANSETRON 4 MG: 2 INJECTION INTRAMUSCULAR; INTRAVENOUS at 04:52

## 2023-02-18 RX ADMIN — ONDANSETRON 4 MG: 2 INJECTION INTRAMUSCULAR; INTRAVENOUS at 10:28

## 2023-02-18 RX ADMIN — ACETAMINOPHEN 500 MG: 500 TABLET ORAL at 18:13

## 2023-02-18 RX ADMIN — SODIUM CHLORIDE, PRESERVATIVE FREE 40 MG: 5 INJECTION INTRAVENOUS at 08:41

## 2023-02-18 RX ADMIN — ONDANSETRON 4 MG: 2 INJECTION INTRAMUSCULAR; INTRAVENOUS at 11:52

## 2023-02-18 RX ADMIN — DEXAMETHASONE SODIUM PHOSPHATE 8 MG: 4 INJECTION, SOLUTION INTRAMUSCULAR; INTRAVENOUS at 10:28

## 2023-02-18 RX ADMIN — PIPERACILLIN AND TAZOBACTAM 3375 MG: 3; .375 INJECTION, POWDER, FOR SOLUTION INTRAVENOUS at 05:02

## 2023-02-18 RX ADMIN — FENTANYL CITRATE 100 MCG: 50 INJECTION, SOLUTION INTRAMUSCULAR; INTRAVENOUS at 10:28

## 2023-02-18 RX ADMIN — Medication 400 MG: at 08:42

## 2023-02-18 RX ADMIN — ENOXAPARIN SODIUM 30 MG: 100 INJECTION SUBCUTANEOUS at 21:11

## 2023-02-18 ASSESSMENT — PAIN SCALES - GENERAL
PAINLEVEL_OUTOF10: 3
PAINLEVEL_OUTOF10: 2

## 2023-02-18 ASSESSMENT — PAIN DESCRIPTION - ORIENTATION
ORIENTATION: MID
ORIENTATION: LOWER

## 2023-02-18 ASSESSMENT — PAIN DESCRIPTION - DESCRIPTORS
DESCRIPTORS: SORE
DESCRIPTORS: ACHING

## 2023-02-18 ASSESSMENT — LIFESTYLE VARIABLES: HOW OFTEN DO YOU HAVE A DRINK CONTAINING ALCOHOL: NEVER

## 2023-02-18 ASSESSMENT — PAIN DESCRIPTION - LOCATION
LOCATION: ABDOMEN
LOCATION: ABDOMEN

## 2023-02-18 NOTE — PROGRESS NOTES
Patient abdominal dressing became saturated. Change  ABD pads and applies new tape.  Drainage from dressing was a moderate amount of serosanguineous drainage, with no odor

## 2023-02-18 NOTE — PROGRESS NOTES
Patient is in the bed with call light I reach, medications given per [facility], shift assessment completed. The care plan and education has been reviewed and mutually agreed upon with the patient. 0900- Patient left the unit for sx. 1230- Patient returned back to the unit from PACU, pt is A&O, drowsy but easy to awake. Placed pt on 2L NC due to drowsiness. Dressing on incision is clean dry and intact. No drainage. Neuro assessment completed and stable. Call light is in reach.

## 2023-02-18 NOTE — PROGRESS NOTES
Pt admit to unit for abd wound infection, I/D in am, no c/o of pain, pt has abd wound draining cloudy light bloody foul smelling drainage, changed abd wound drsg due to leaking drsg, pt a/ox3, pt oriented to room and call light, all vitals stable, will continue to monitor pt thur out night

## 2023-02-18 NOTE — PROGRESS NOTES
Pt resting quietly in bed, awake, denies pain and states nausea is improved. VSS, O2 sats 93% on room air. Dressing to abdomen dry and intact, ice pack in place. Pt seen by anesthesia, phase 1 criteria met. Will transfer pt to .

## 2023-02-18 NOTE — ANESTHESIA PRE PROCEDURE
Department of Anesthesiology  Preprocedure Note       Name:  Corinna Sheppard   Age:  65 y.o.  :  1960                                          MRN:  [id number]         Date:  2023      Surgeon: Suzie Nam):  Rebekah Carlisle MD    Procedure: Procedure(s):  ABDOMEN INCISION AND DRAINAGE    Medications prior to admission:   Prior to Admission medications    Medication Sig Start Date End Date Taking? Authorizing Provider   Dicyclomine HCl (BENTYL PO) Take by mouth    Historical Provider, MD   ondansetron (ZOFRAN) 4 MG tablet Take 1 tablet by mouth every 4 hours as needed for Nausea  Patient not taking: Reported on 2023   Rebekah Carlisle MD   levoFLOXacin [facility]) 750 MG tablet Take 1 tablet by mouth daily for 7 days 2/15/23 2/22/23  Rebekah Carlisle MD   insulin glargine (LANTUS SOLOSTAR) 100 UNIT/ML injection pen INJECT 50 UNITS SUBCUTANEOUSLY TWICE DAILY  Patient taking differently: INJECT [address]   Celine Ryan MD   Continuous Blood Gluc Sensor (FREESTYLE EDY 2 SENSOR) MISC CHANGE EVERY 14 DAYS TO MONITOR BS 10/27/22   Celine Ryan MD   insulin aspart (NOVOLOG FLEXPEN) 100 UNIT/ML injection pen INJECT [address] 10/27/22   Celine Ryan MD   Continuous Blood Gluc Sensor (FREESTYLE EDY 2 SENSOR) MISC Change every 14 days 10/27/22   Celine Ryan MD   Insulin Pen Needle (DROPLET PEN NEEDLES) 31G X 8 MM MISC USE TO INJECT INSULIN FOUR TIMES DAILY 10/26/22   Celine Ryan MD   blood glucose test strips (ONETOUCH ULTRA) strip Test 4 times daily.  10/20/22   Celine Ryan MD   OMEPRAZOLE PO Take by mouth OTC daily    Historical Provider, MD   ALBUTEROL IN Inhale 90 mcg into the lungs every 6 hours as needed  Patient not taking: Reported on 2023    Historical MD Anais   Multiple Vitamin (MULTIVITAMIN ADULT PO) Take by mouth    Historical Provider, MD   Ferrous Sulfate (IRON PO) Take by mouth daily   Patient not taking: Reported on 2/17/2023    Historical Provider, MD   COENZYME Q10 PO Take by mouth daily    Historical Provider, MD   ONE TOUCH ULTRASOFT LANCETS MISC USE TO TEST BLOOD GLUCOSE 4 TIMES DAILY 11/7/20   Historical Provider, MD   POTASSIUM PO Take by mouth daily     Historical Provider, MD   MAGNESIUM PO Take by mouth daily     Historical Provider, MD   Montelukast Sodium (SINGULAIR PO) Take 10 mg by mouth daily    Historical Provider, MD   Cholecalciferol (VITAMIN D3) 1.25 MG (94000 UT) CAPS Take by mouth once a week    Historical Provider, MD   TRIAMCINOLONE ACETONIDE NA by Nasal route as needed   Patient not taking: Reported on 2/17/2023    Historical Provider, MD   fluticasone-vilanterol (BREO ELLIPTA) 100-25 MCG/INH AEPB inhaler Inhale 1 puff into the lungs as needed   Patient not taking: Reported on 2/17/2023    Historical Provider, MD   losartan (COZAAR) 100 MG tablet Take 100 mg by mouth daily    Historical Provider, MD   Omega-3 Fatty Acids (FISH OIL) 1000 MG CAPS Take 1,000 mg by mouth daily    Historical Provider, MD   atorvastatin (LIPITOR) 10 MG tablet Take 10 mg by mouth daily    Historical Provider, MD   CRANBERRY PO Take by mouth daily    Historical Provider, MD   acetaminophen (TYLENOL) 500 MG tablet Take 500 mg by mouth every 6 hours as needed for Pain    Historical Provider, MD   loratadine (CLARITIN) 10 MG capsule Take by mouth daily     Historical Provider, MD       Current medications:    No current facility-administered medications for this visit. No current outpatient medications on file.      Facility-Administered Medications Ordered in Other Visits   Medication Dose Route Frequency Provider Last Rate Last Admin    acetaminophen (TYLENOL) tablet 500 mg  500 mg Oral Q6H PRN Mathew Quijano MD   500 mg at 02/18/23 0451    atorvastatin (LIPITOR) tablet 10 mg  10 mg Oral Daily Mathew Quijano MD        losartan (COZAAR) tablet 100 mg  100 mg Oral Daily Sekou Lord Inez Woody MD        magnesium oxide (MAG-OX) tablet 400 mg  400 mg Oral Daily Estefania Regalado MD        dextrose bolus 10% 125 mL  125 mL IntraVENous PRN Estefania Regalado MD        Or    dextrose bolus 10% 250 mL  250 mL IntraVENous PRN Estefania Regalado MD        glucagon (rDNA) injection 1 mg  1 mg SubCUTAneous PRN Estefania Regalado MD        dextrose 10 % infusion   IntraVENous Continuous PRN Estefania Regalado MD        dextrose 5 % and 0.45 % NaCl with KCl 20 mEq infusion   IntraVENous Continuous Estefania Regalado  mL/hr at 02/17/23 2248 New Bag at 02/17/23 2248    sodium chloride flush 0.9 % injection 5-40 mL  5-40 mL IntraVENous 2 times per day Estefania Regalado MD   10 mL at 02/17/23 2250    sodium chloride flush 0.9 % injection 5-40 mL  5-40 mL IntraVENous PRN Estefania Regalado MD        0.9 % sodium chloride infusion   IntraVENous PRN Estefania Regalado MD        ondansetron (ZOFRAN-ODT) disintegrating tablet 4 mg  4 mg Oral Q8H PRN Estefania Regalado MD        Or    ondansetron [facility]) injection 4 mg  4 mg IntraVENous Q6H PRN Estefania Regalado MD   4 mg at 02/18/23 0452    enoxaparin Sodium (LOVENOX) injection 30 mg  30 mg SubCUTAneous BID Estefania Regalado MD        oxyCODONE (ROXICODONE) immediate release tablet 5 mg  5 mg Oral Q4H PRN Estefania Regalado MD        morphine (PF) injection 2 mg  2 mg IntraVENous Q2H PRN Estefania Regalado MD        Or    morphine injection 4 mg  4 mg IntraVENous Q2H PRN Estefania Regalado MD        pantoprazole (PROTONIX) 40 mg in sodium chloride (PF) 0.9 % 10 mL injection  40 mg IntraVENous Daily Estefania Regalado MD        insulin lispro (HUMALOG) injection vial 0-8 Units  0-8 Units SubCUTAneous Q4H Estefania Regalado MD        piperacillin-tazobactam (ZOSYN) 3,375 mg in sodium chloride 0.9 % 50 mL IVPB (mini-bag)  3,375 mg IntraVENous Q8H Susan Pop MD 12.5 mL/hr at 02/18/23 0502 3,375 mg at 02/18/23 0502       Allergies:  No Known Allergies    Problem List:    Patient Active Problem List   Diagnosis Code    Ileostomy care [facility]) Z43.2    Diabetes mellitus, type II, insulin dependent ([facility] E11.9, Z79.4    Hypothyroidism E03.9    Essential hypertension I10    Mixed hyperlipidemia E78.2    Ulcerative (chronic) enterocolitis, unspecified complication ([facility] J02.501    Small bowel obstruction ([address] K56.609    Incarcerated incisional hernia K43.0    Generalized abdominal pain R10.84    Postoperative fever R50.82    Abdominal wall seroma S30. 1XXA    Abdominal wall seroma, initial encounter S30. 1XXA    Abdominal wall abscess L02.211       Past Medical History:        Diagnosis Date    Asthma     Diabetes mellitus ([facility]     type 2    GERD (gastroesophageal reflux disease)     Hyperlipidemia     Hypertension     Ileostomy care ([facility] 03/17/2020    Iritis     PCOS (polycystic ovarian syndrome)     Pyoderma     Thyroid disease     hypothyroid    Ulcerative colitis     Ulcerative colitis ([facility] [facility]        Past Surgical History:        Procedure Laterality Date    ABDOMEN SURGERY      COLON RESECTION FOR ULCERATIVE COLITIS THEN HAD ANUS REMOVED    ABDOMEN SURGERY N/A 09/30/2022    INCISION AND DRAINAGE OF ABDOMINAL WALL SEROMA (27295) performed by Susan Pop MD at [address] [place] [address] N/A 10/14/2022    DRAINAGE OF ABDOMINAL WALL ABSCESS WITH PLACEMENT OF WOUND VAC performed by Susan Pop MD at [address] [place] [address]      rectum removed    UPPER GASTROINTESTINAL ENDOSCOPY N/A 11/23/2021    EGD BIOPSY performed by Ashley Curran MD at [facility] 01/18/2022    EGD DIAGNOSTIC ONLY performed by Ashley Curran MD at [facility] [address] N/A 11/16/2021    OPEN REPAIR INCISIONAL HERNIA WITH MESH, LYSIS OF ADHESIONS performed by Rebekah Carlisle MD at [facility] History:    Social History     Tobacco Use    Smoking status: Never    Smokeless tobacco: Never   Substance Use Topics    Alcohol use: No                                Counseling given: Not Answered      Vital Signs (Current): There were no vitals filed for this visit.                                            BP Readings from Last 3 Encounters:   02/18/23 107/65   02/07/23 127/72   02/02/23 119/75       NPO Status:                                                                                 BMI:   Wt Readings from Last 3 Encounters:   02/17/23 236 lb 12.8 oz (107.4 kg)   02/07/23 240 lb (108.9 kg)   02/02/23 240 lb (108.9 kg)     There is no height or weight on file to calculate BMI.    CBC:   Lab Results   Component Value Date/Time    WBC 5.7 02/18/2023 06:09 AM    RBC 4.08 02/18/2023 06:09 AM    HGB 11.1 02/18/2023 06:09 AM    HCT 33.5 02/18/2023 06:09 AM    MCV 82.2 02/18/2023 06:09 AM    RDW 15.3 02/18/2023 06:09 AM     02/18/2023 06:09 AM       CMP:   Lab Results   Component Value Date/Time     02/18/2023 06:09 AM    K 4.0 02/18/2023 06:09 AM     02/18/2023 06:09 AM    CO2 23 02/18/2023 06:09 AM    BUN 8 02/18/2023 06:09 AM    CREATININE <0.5 02/18/2023 06:09 AM    GFRAA >60 10/15/2022 05:34 AM    GFRAA >60 03/22/2013 12:10 PM    AGRATIO 1.0 02/17/2023 01:22 PM    LABGLOM >60 02/18/2023 06:09 AM    GLUCOSE 122 02/18/2023 06:09 AM    PROT 8.8 02/17/2023 01:22 PM    PROT 7.8 12/22/2012 12:12 PM    CALCIUM 8.7 02/18/2023 06:09 AM    BILITOT 0.5 02/17/2023 01:22 PM    ALKPHOS 132 02/17/2023 01:22 PM    AST 27 02/17/2023 01:22 PM    ALT 20 02/17/2023 01:22 PM       POC Tests:   Recent Labs     02/18/23  0739   POCGLU 111*       Coags:   Lab Results   Component Value Date/Time    PROTIME 15.4 10/13/2022 04:59 AM    INR 1.23 10/13/2022 04:59 AM    APTT 28.6 11/22/2021 01:01 AM       HCG (If Applicable): No results found for: PREGTESTUR, PREGSERUM, HCG, HCGQUANT     ABGs: No results found for: PHART, PO2ART, MTU2MUA, HBT3OTM, BEART, O8DGSATD     Type & Screen (If Applicable):  No results found for: LABABO, LABRH    Drug/Infectious Status (If Applicable):  No results found for: HIV, HEPCAB    COVID-19 Screening (If Applicable):   Lab Results   Component Value Date/Time    COVID19 Not Detected 11/15/2021 05:55 AM           Anesthesia Evaluation  Patient summary reviewed and Nursing notes reviewed no history of anesthetic complications:   Airway: Mallampati: II  TM distance: >3 FB   Neck ROM: full  Mouth opening: > = 3 FB   Dental: normal exam         Pulmonary:   (+) sleep apnea (skin rxn to cpap mask): on noncompliant,  asthma (last inhaler use August): seasonal asthma,     (-) rhonchi and wheezes                           Cardiovascular:    (+) hypertension:,     (-) CABG/stent, dysrhythmias and  angina      Rhythm: regular  Rate: normal                    Neuro/Psych:      (-) seizures, TIA and CVA           GI/Hepatic/Renal:   (+) GERD: well controlled, PUD,          ROS comment: Denies gerd sx or n/v today. Endo/Other:    (+) DiabetesType II DM, , hypothyroidism::., .                  ROS comment: PCOS Abdominal:   (+) obese,           Vascular:     - DVT and PE. Other Findings:             Anesthesia Plan      general     ASA 3 - emergent       Induction: intravenous. MIPS: Postoperative opioids intended and Prophylactic antiemetics administered. Anesthetic plan and risks discussed with patient. Use of blood products discussed with patient whom.                      Estefana Rubinstein, MD   2/18/2023

## 2023-02-18 NOTE — H&P
[facility] GENERAL AND LAPAROSCOPIC SURGERY                       PATIENT NAME: Sade Salter     ADMISSION DATE: 2/17/2023 12:20 PM      TODAY'S DATE: 2/18/2023    Reason for Consult:  Abd pain    Requesting Physician:  Dr. Lazo Notice:              The patient is a 65 y.o. female who presents with abd pain, left. Lower, mild, focal, more with pressure. Has associated draining sinus, malodorous, sore at that area. Has had abd wall fluid collection in that region. Multiple prior abd surgeries, hernia repairs, subtotal colectomy with ileostomy.     Past Medical History:        Diagnosis Date    Asthma     Diabetes mellitus (HCC)     type 2    GERD (gastroesophageal reflux disease)     Hyperlipidemia     Hypertension     Ileostomy care ([facility] 03/17/2020    Iritis     PCOS (polycystic ovarian syndrome)     Pyoderma     Thyroid disease     hypothyroid    Ulcerative colitis     Ulcerative colitis ([facility] [address]        Past Surgical History:        Procedure Laterality Date    ABDOMEN SURGERY      COLON RESECTION FOR ULCERATIVE COLITIS THEN HAD ANUS REMOVED    ABDOMEN SURGERY N/A 09/30/2022    INCISION AND DRAINAGE OF ABDOMINAL WALL SEROMA (39709) performed by Jordan Kimbrough MD at [address] [place] [address] N/A 10/14/2022    DRAINAGE OF ABDOMINAL WALL ABSCESS WITH PLACEMENT OF WOUND VAC performed by Jordan Kimbrough MD at [facility]      rectum removed    UPPER GASTROINTESTINAL ENDOSCOPY N/A 11/23/2021    EGD BIOPSY performed by Janis Clifford MD at [facility] 01/18/2022    EGD DIAGNOSTIC ONLY performed by Janis Clifford MD at [place] [address] N/A 11/16/2021    OPEN REPAIR INCISIONAL HERNIA WITH MESH, LYSIS OF ADHESIONS performed by Jordan Kimbrough MD at [address] [facility]       Current Medications:   Current Facility-Administered Medications: acetaminophen (TYLENOL) tablet 500 mg, 500 mg, Oral, Q6H PRN  atorvastatin (LIPITOR) tablet 10 mg, 10 mg, Oral, Daily  losartan (COZAAR) tablet 100 mg, 100 mg, Oral, Daily  magnesium oxide (MAG-OX) tablet 400 mg, 400 mg, Oral, Daily  dextrose bolus 10% 125 mL, 125 mL, IntraVENous, PRN **OR** dextrose bolus 10% 250 mL, 250 mL, IntraVENous, PRN  glucagon (rDNA) injection 1 mg, 1 mg, SubCUTAneous, PRN  dextrose 10 % infusion, , IntraVENous, Continuous PRN  dextrose 5 % and 0.45 % NaCl with KCl 20 mEq infusion, , IntraVENous, Continuous  sodium chloride flush 0.9 % injection 5-40 mL, 5-40 mL, IntraVENous, 2 times per day  sodium chloride flush 0.9 % injection 5-40 mL, 5-40 mL, IntraVENous, PRN  0.9 % sodium chloride infusion, , IntraVENous, PRN  ondansetron (ZOFRAN-ODT) disintegrating tablet 4 mg, 4 mg, Oral, Q8H PRN **OR** ondansetron (ZOFRAN) injection 4 mg, 4 mg, IntraVENous, Q6H PRN  enoxaparin Sodium (LOVENOX) injection 30 mg, 30 mg, SubCUTAneous, BID  oxyCODONE (ROXICODONE) immediate release tablet 5 mg, 5 mg, Oral, Q4H PRN  morphine (PF) injection 2 mg, 2 mg, IntraVENous, Q2H PRN **OR** morphine injection 4 mg, 4 mg, IntraVENous, Q2H PRN  pantoprazole (PROTONIX) 40 mg in sodium chloride (PF) 0.9 % 10 mL injection, 40 mg, IntraVENous, Daily  insulin lispro (HUMALOG) injection vial 0-8 Units, 0-8 Units, SubCUTAneous, Q4H  piperacillin-tazobactam (ZOSYN) 3,375 mg in sodium chloride 0.9 % 50 mL IVPB (mini-bag), 3,375 mg, IntraVENous, Q8H  Prior to Admission medications    Medication Sig Start Date End Date Taking?  Authorizing Provider   Dicyclomine HCl (BENTYL PO) Take by mouth   Yes Historical Provider, MD   ondansetron (ZOFRAN) 4 MG tablet Take 1 tablet by mouth every 4 hours as needed for Nausea  Patient not taking: Reported on 2/17/2023 2/16/23   Trisha Pires MD   levoFLOXacin [facility]) 750 MG tablet Take 1 tablet by mouth daily for 7 days 2/15/23 2/22/23 William Lozano MD   insulin glargine (LANTUS SOLOSTAR) 100 UNIT/ML injection pen INJECT 50 UNITS SUBCUTANEOUSLY TWICE DAILY  Patient taking differently: INJECT [address] TWICE DAILY 1/6/23   Doreen Alicea MD   Continuous Blood Gluc Sensor (FREESTYLE EDY 2 SENSOR) MISC CHANGE EVERY 14 DAYS TO MONITOR BS 10/27/22   Doreen Alicea MD   insulin aspart (NOVOLOG FLEXPEN) 100 UNIT/ML injection pen INJECT [address] 10/27/22   Doreen Alicea MD   Continuous Blood Gluc Sensor (FREESTYLE EDY 2 SENSOR) MISC Change every 14 days 10/27/22   Doreen Alicea MD   Insulin Pen Needle (DROPLET PEN NEEDLES) 31G X 8 MM MISC USE TO INJECT INSULIN FOUR TIMES DAILY 10/26/22   Doreen Alicea MD   blood glucose test strips (ONETOUCH ULTRA) strip Test 4 times daily.  10/20/22   Doreen Alicea MD   OMEPRAZOLE PO Take by mouth OTC daily    Historical Provider, MD   ALBUTEROL IN Inhale 90 mcg into the lungs every 6 hours as needed  Patient not taking: Reported on 2/17/2023    Historical Provider, MD   Multiple Vitamin (MULTIVITAMIN ADULT PO) Take by mouth    Historical Provider, MD   Ferrous Sulfate (IRON PO) Take by mouth daily   Patient not taking: Reported on 2/17/2023    Historical Provider, MD   COENZYME Q10 PO Take by mouth daily    Historical Provider, MD   ONE TOUCH ULTRASOFT LANCETS MISC USE TO TEST BLOOD GLUCOSE 4 TIMES DAILY 11/7/20   Historical Provider, MD   POTASSIUM PO Take by mouth daily     Historical Provider, MD   MAGNESIUM PO Take by mouth daily     Historical Provider, MD   Montelukast Sodium (SINGULAIR PO) Take 10 mg by mouth daily    Historical Provider, MD   Cholecalciferol (VITAMIN D3) 1.25 MG (33441 UT) CAPS Take by mouth once a week    Historical Provider, MD   TRIAMCINOLONE ACETONIDE NA by Nasal route as needed   Patient not taking: Reported on 2/17/2023    Historical Provider, MD   fluticasone-vilanterol (BREO ELLIPTA) 100-25 MCG/INH AEPB inhaler Inhale 1 puff into the lungs as needed   Patient not taking: Reported on 2/17/2023    Historical Provider, MD   losartan (COZAAR) 100 MG tablet Take 100 mg by mouth daily    Historical Provider, MD   Omega-3 Fatty Acids (FISH OIL) 1000 MG CAPS Take 1,000 mg by mouth daily    Historical Provider, MD   atorvastatin (LIPITOR) 10 MG tablet Take 10 mg by mouth daily    Historical Provider, MD   CRANBERRY PO Take by mouth daily    Historical Provider, MD   acetaminophen (TYLENOL) 500 MG tablet Take 500 mg by mouth every 6 hours as needed for Pain    Historical Provider, MD   loratadine (CLARITIN) 10 MG capsule Take by mouth daily     Historical Provider, MD        Allergies:  Patient has no known allergies.    Social History:    reports that she has never smoked. She has never used smokeless tobacco. She reports that she does not drink alcohol and does not use drugs.    Family History:        Problem Relation Age of Onset    Cancer Mother         uterine    No Known Problems Father     Diabetes Sister     Cancer Maternal Grandfather         colon       REVIEW OF SYSTEMS:  CONSTITUTIONAL:  positive for  fatigue and malaise  HEENT:  negative  RESPIRATORY:  negative  CARDIOVASCULAR:  negative  GASTROINTESTINAL:  negative except for abdominal pain  GENITOURINARY:  negative  HEMATOLOGIC/LYMPHATIC:  negative  NEUROLOGICAL:  negative  SKIN: negative    PHYSICAL EXAM:  VITALS:  /69   Pulse 74   Temp 98.1 °F (36.7 °C) (Oral)   Resp 18   Wt 236 lb 12.8 oz (107.4 kg)   LMP 08/16/2010   SpO2 96%   BMI 39.41 kg/m²   24HR INTAKE/OUTPUT:  No intake or output data in the 24 hours ending 02/18/23 0947  DRAIN/TUBE OUTPUT:     CONSTITUTIONAL:  alert, no apparent distress and morbidly obese  EYES:  sclera clear  ENT:  normocepalic, without obvious abnormality  NECK:  supple, symmetrical, trachea midline and no carotid bruits  LUNGS:  clear to auscultation  CARDIOVASCULAR:  regular rate and rhythm and no murmur noted  ABDOMEN:  scars noted large midline scar,  normal bowel sounds, soft, non-distended, tenderness noted in the left upper quadrant and in the left lower quadrant, voluntary guarding absent, no masses palpated and hernia absent  MUSCULOSKELETAL:  0+ pitting edema lower extremities  NEUROLOGIC:  Mental Status Exam:  Level of Alertness:   awake  Orientation:   person, place, time  SKIN:  no bruising or bleeding and no redness, warmth, or swelling, left abd wall draining sinus    DATA:    CBC:   Recent Labs     02/17/23  1321 02/18/23  0609   WBC 8.1 5.7   HGB 13.2 11.1*   HCT 41.5 33.5*    237     BMP:    Recent Labs     02/17/23  1322 02/18/23  0609    139   K 3.6 4.0    106   CO2 25 23   BUN 8 8   CREATININE 0.6 <0.5*   GLUCOSE 104* 122*     Hepatic:   Recent Labs     02/17/23  1322   AST 27   ALT 20   BILITOT 0.5   ALKPHOS 132*     Mag:    No results for input(s): MG in the last 72 hours. Phos:   No results for input(s): PHOS in the last 72 hours. INR: No results for input(s): INR in the last 72 hours. LIPASE:   Recent Labs     02/17/23  1322   LIPASE 19.0      AMYLASE: No results for input(s): AMYLASE in the last 72 hours. Radiology Review:     CT ABDOMEN PELVIS W IV CONTRAST Additional Contrast? None    Result Date: 2/17/2023  EXAMINATION: CT OF THE ABDOMEN AND PELVIS WITH CONTRAST 2/17/2023 2:21 pm TECHNIQUE: CT of the abdomen and pelvis was performed with the administration of intravenous contrast. Multiplanar reformatted images are provided for review. Automated exposure control, iterative reconstruction, and/or weight based adjustment of the mA/kV was utilized to reduce the radiation dose to as low as reasonably achievable.  COMPARISON: 10/12/2022 CT HISTORY: ORDERING SYSTEM PROVIDED HISTORY: fever, hx of abd wall abscess with fistula TECHNOLOGIST PROVIDED HISTORY: Additional Contrast?->None Reason for exam:->fever, hx of abd wall abscess with fistula Decision Support Exception - unselect if not a suspected or confirmed emergency medical condition->Emergency Medical Condition (MA) Reason for Exam: fever, hx of abd wall abscess with fistula FINDINGS: Lower Chest:  The lung bases are clear. The base of the heart is normal. Organs: Cholelithiasis without inflammation or biliary dilatation. Mild pattern of hepatic steatosis. Pancreas and spleen are normal.  Kidneys and adrenal glands are normal. GI/Bowel: There is a prior history of colectomy in a patient with history of ulcerative colitis. Proximally, the stomach, duodenum and small bowel appear normal.  Ileostomy in the right lower quadrant with stable peristomal hernia containing several loops of noninflamed small bowel. Pelvis: The bladder is unremarkable. The uterus and adnexa are normal. Peritoneum/Retroperitoneum: The aorta tapers normally. No lymph node enlargement. Bones/Soft Tissues: Prior history of ventral hernia with repair and mesh. 2 prior incision and drainage procedures have been performed, most recently 10/14/2022. On current study, there is a thick walled fluid collection within the subcutaneous soft tissue superficial to prior mesh site. This measures 9.1 x 5.8 cm and there appears to be a fistula tract extending to the skin surface to the left of midline. There are no significant skeletal findings. 1. Persistent complex fluid collection in the subcutaneous soft tissue at prior site of ventral hernia repair. Chronic hematoma or recurrent abscess may be suspected. 2. Probable fistula tract extending to the skin surface to the left of midline. 3. Parastomal hernia with otherwise unremarkable ileostomy site on the right.      XR CHEST PORTABLE    Result Date: 2/17/2023  EXAMINATION: ONE XRAY VIEW OF THE CHEST 2/17/2023 1:04 pm COMPARISON: November 2021 HISTORY: ORDERING SYSTEM PROVIDED HISTORY: CP TECHNOLOGIST PROVIDED HISTORY: Reason for exam:->CP Reason for Exam: Wound Infection FINDINGS: Heart size is normal.  Mediastinal contours are normal.  Pulmonary vascularity is normal.  No focal lung consolidation is noted.   There is mild elevation of the right hemidiaphragm, similar to prior     No acute disease       IMPRESSION/RECOMMENDATIONS:    Abd wall abscess, chronic draining sinus  Open and drain area widely in OR, continue IV antibiotics  DM - orders written  IVF  Pain meds  Resume diet post op    Cristine Lock MD

## 2023-02-18 NOTE — ANESTHESIA POSTPROCEDURE EVALUATION
Department of Anesthesiology  Postprocedure Note    Patient: Sachin Levine  MRN: [id number]  YOB: 1960  Date of evaluation: 2/18/2023      Procedure Summary     Date: 02/18/23 Room / Location: [address]    Anesthesia Start: 1025 Anesthesia Stop:     Procedure: ABDOMEN INCISION AND DRAINAGE (Abdomen) Diagnosis:       Abdominal wall abscess      (abdominal wall abscess)    Surgeons: Pauly Sabillon MD Responsible Provider: Adelso Obando MD    Anesthesia Type: general ASA Status: 3 - Emergent          Anesthesia Type: No value filed.     Ruchi Phase I: Ruchi Score: 10    Ruchi Phase II:        Anesthesia Post Evaluation    Patient location during evaluation: PACU  Patient participation: complete - patient participated  Level of consciousness: awake  Airway patency: patent  Nausea & Vomiting: no vomiting and no nausea  Complications: no  Cardiovascular status: hemodynamically stable  Respiratory status: acceptable  Hydration status: stable  Multimodal analgesia pain management approach

## 2023-02-18 NOTE — PROGRESS NOTES
Pt arrived from OR to PACU, awakens to voice, denies pain at this time, c/o nausea. VSS, O2 sats 100% on room air. Dressing to abdomen dry and intact, abdomen soft. Will monitor and treat nausea per MAR.

## 2023-02-19 LAB
GLUCOSE BLD-MCNC: 139 MG/DL (ref 70–99)
GLUCOSE BLD-MCNC: 155 MG/DL (ref 70–99)
GLUCOSE BLD-MCNC: 195 MG/DL (ref 70–99)
GLUCOSE BLD-MCNC: 196 MG/DL (ref 70–99)
GLUCOSE BLD-MCNC: 209 MG/DL (ref 70–99)
PERFORMED ON: ABNORMAL

## 2023-02-19 PROCEDURE — 2500000003 HC RX 250 WO HCPCS: Performed by: SURGERY

## 2023-02-19 PROCEDURE — A4216 STERILE WATER/SALINE, 10 ML: HCPCS | Performed by: SURGERY

## 2023-02-19 PROCEDURE — 2580000003 HC RX 258: Performed by: SURGERY

## 2023-02-19 PROCEDURE — 94760 N-INVAS EAR/PLS OXIMETRY 1: CPT

## 2023-02-19 PROCEDURE — 1200000000 HC SEMI PRIVATE

## 2023-02-19 PROCEDURE — 99232 SBSQ HOSP IP/OBS MODERATE 35: CPT | Performed by: SURGERY

## 2023-02-19 PROCEDURE — 6370000000 HC RX 637 (ALT 250 FOR IP): Performed by: SURGERY

## 2023-02-19 PROCEDURE — 6360000002 HC RX W HCPCS: Performed by: SURGERY

## 2023-02-19 PROCEDURE — C9113 INJ PANTOPRAZOLE SODIUM, VIA: HCPCS | Performed by: SURGERY

## 2023-02-19 RX ADMIN — PIPERACILLIN AND TAZOBACTAM 3375 MG: 3; .375 INJECTION, POWDER, FOR SOLUTION INTRAVENOUS at 17:17

## 2023-02-19 RX ADMIN — PIPERACILLIN AND TAZOBACTAM 3375 MG: 3; .375 INJECTION, POWDER, FOR SOLUTION INTRAVENOUS at 07:44

## 2023-02-19 RX ADMIN — LOSARTAN POTASSIUM 100 MG: 100 TABLET, FILM COATED ORAL at 10:30

## 2023-02-19 RX ADMIN — ENOXAPARIN SODIUM 30 MG: 100 INJECTION SUBCUTANEOUS at 10:32

## 2023-02-19 RX ADMIN — SODIUM CHLORIDE, PRESERVATIVE FREE 40 MG: 5 INJECTION INTRAVENOUS at 10:32

## 2023-02-19 RX ADMIN — ACETAMINOPHEN 500 MG: 500 TABLET ORAL at 22:34

## 2023-02-19 RX ADMIN — Medication 400 MG: at 10:33

## 2023-02-19 RX ADMIN — POTASSIUM CHLORIDE, DEXTROSE MONOHYDRATE AND SODIUM CHLORIDE: 150; 5; 450 INJECTION, SOLUTION INTRAVENOUS at 07:58

## 2023-02-19 RX ADMIN — Medication 10 ML: at 10:32

## 2023-02-19 RX ADMIN — ACETAMINOPHEN 500 MG: 500 TABLET ORAL at 17:12

## 2023-02-19 RX ADMIN — ACETAMINOPHEN 500 MG: 500 TABLET ORAL at 01:18

## 2023-02-19 RX ADMIN — Medication 10 ML: at 21:14

## 2023-02-19 RX ADMIN — PIPERACILLIN AND TAZOBACTAM 3375 MG: 3; .375 INJECTION, POWDER, FOR SOLUTION INTRAVENOUS at 21:00

## 2023-02-19 RX ADMIN — ATORVASTATIN CALCIUM 10 MG: 10 TABLET, FILM COATED ORAL at 10:30

## 2023-02-19 RX ADMIN — ENOXAPARIN SODIUM 30 MG: 100 INJECTION SUBCUTANEOUS at 21:14

## 2023-02-19 ASSESSMENT — PAIN SCALES - GENERAL
PAINLEVEL_OUTOF10: 7
PAINLEVEL_OUTOF10: 2
PAINLEVEL_OUTOF10: 3

## 2023-02-19 ASSESSMENT — PAIN DESCRIPTION - LOCATION
LOCATION: ABDOMEN
LOCATION: ABDOMEN

## 2023-02-19 ASSESSMENT — PAIN DESCRIPTION - DESCRIPTORS
DESCRIPTORS: ACHING
DESCRIPTORS: ACHING

## 2023-02-19 NOTE — OP NOTE
[facility]                     [address] [facility], [address] [place] [address]                                OPERATIVE REPORT    PATIENT NAME: Angelica Clark                   :        1960  MED REC NO:   [id number]                          ROOM:       [id number]  ACCOUNT NO:   [de-identified]                           ADMIT DATE: 2023  PROVIDER:     Vanessa Madrid MD    DATE OF PROCEDURE:  2023    PREOPERATIVE DIAGNOSIS:  Abdominal wall abscess. POSTOPERATIVE DIAGNOSIS:  Abdominal wall abscess. OPERATION PERFORMED:  Debridement skin, subcutaneous tissue and thick  chronic inflammatory rind abdominal wall abscess with drainage of  infection. SURGEON:  Vanessa Madrid MD    ANESTHESIA:  General plus local.    ESTIMATED BLOOD LOSS:  Minimal.    COMPLICATIONS:  None. SPECIMENS:  1. Tissue sent for pathology. 2.  Wound cultures. DETAILS OF SURGERY:  The patient has chronic draining sinus abdominal  wall, has had prior abdominal surgeries including total colectomy,  ileostomy, multiple hernia repairs, mesh placement previously, has  retained fluid and an abscess present. Ultimately, she presented to the  OR today for recurrent infection, pain and drainage at that site. Excisional debridement and drainage is planned. Intended procedure was  reviewed with the patient. All questions were answered, and she  consents to proceed. The patient was brought to the operating room, placed on operating table  in supine position. The compression boots were placed. General  anesthetic was administered. The abdomen was prepped and draped  sterilely and time-out was done. In the left abdomen, there was a  chronic draining sinus tract and excisional debridement of the skin was  performed all around this area, opening down into a deep infected  abscess cavity. This was cultured.   There was undermining of the tissue  and chronic inflammatory debris and this was taken out and resected  including a sharp excisional debridement of the skin, full-thickness,  subcutaneous tissue and chronic inflammatory rind in these tissues 12 cm  x 14 cm in size for the excisional debridement. Hemostasis was assured  with Bovie electrocautery and 3-0 Vicryl suture ligatures. The wound  was packed open with saline-soaked Kerlix gauze x2. We will continue  open wound care to the point of having the wound refreshing clean, then  we will probably transfer to a VAC at that point.         Robson Siu MD    D: 02/18/2023 12:38:43       T: 02/18/2023 12:42:08     [email or web address]  Job#: [id number]     Doc#: [id number]    CC: rolling walker

## 2023-02-19 NOTE — PROGRESS NOTES
[facility]    Surgery Progress Note           POD # 1    PATIENT NAME: García Salter     TODAY'S DATE: 2/19/2023      CC: abd pain  SUBJECTIVE:    Pt  denies pain today, left abd NT, feeling better. Has had assoc drainage, ileostomy fit is improved as well following surgery. In good spirits, no CP or SOB. OBJECTIVE:   VITALS:  BP (!) 109/54   Pulse 69   Temp 97.8 °F (36.6 °C) (Oral)   Resp 16   Wt 236 lb 12.8 oz (107.4 kg)   LMP 08/16/2010   SpO2 98%   BMI 39.41 kg/m²     INTAKE/OUTPUT:    I/O last 3 completed shifts: In: 960.2 [I.V.:810.2; IV Piggyback:150]  Out: -   No intake/output data recorded. CONSTITUTIONAL:  awake and alert  LUNGS:  clear to auscultation  HEART: RRR  ABDOMEN:   normal bowel sounds, soft, non-distended, non-tender   INCISION: large open wound, much less fibrinous debris, , dressing redone    Data:  CBC:   Recent Labs     02/17/23  1321 02/18/23  0609   WBC 8.1 5.7   HGB 13.2 11.1*   HCT 41.5 33.5*    237     BMP:    Recent Labs     02/17/23  1322 02/18/23  0609    139   K 3.6 4.0    106   CO2 25 23   BUN 8 8   CREATININE 0.6 <0.5*   GLUCOSE 104* 122*     Hepatic:   Recent Labs     02/17/23  1322   AST 27   ALT 20   BILITOT 0.5   ALKPHOS 132*     Mag:    No results for input(s): MG in the last 72 hours. Phos:   No results for input(s): PHOS in the last 72 hours. INR: No results for input(s): INR in the last 72 hours.       Culture, Surgical [[id number]] (Abnormal)    Collected: 02/18/23 1125    Updated: 02/19/23 1054    Specimen Source: Abdomen     Gram Stain Result 3+ WBC's (Polymorphonuclear)   2+ Gram positive rods   1+ Gram positive cocci    Abnormal     Organism Enterococcus faecalis Abnormal     Culture Surgical --    Light growth   Sensitivity to follow        ASSESSMENT AND PLAN:  65 y.o. female with open wound, left abd wall infection  Enterococcus on cx so far, addn pending  DC IVF  Continue antibiotics  Diet as tolerated  Dakins bid dressings    Jeremie Gates MD

## 2023-02-20 PROBLEM — L02.211 ABDOMINAL WALL ABSCESS: Status: ACTIVE | Noted: 2023-02-20

## 2023-02-20 LAB
GLUCOSE BLD-MCNC: 152 MG/DL (ref 70–99)
GLUCOSE BLD-MCNC: 155 MG/DL (ref 70–99)
GLUCOSE BLD-MCNC: 157 MG/DL (ref 70–99)
GLUCOSE BLD-MCNC: 161 MG/DL (ref 70–99)
GLUCOSE BLD-MCNC: 168 MG/DL (ref 70–99)
GLUCOSE BLD-MCNC: 174 MG/DL (ref 70–99)
PERFORMED ON: ABNORMAL

## 2023-02-20 PROCEDURE — APPNB30 APP NON BILLABLE TIME 0-30 MINS: Performed by: NURSE PRACTITIONER

## 2023-02-20 PROCEDURE — 6360000002 HC RX W HCPCS: Performed by: SURGERY

## 2023-02-20 PROCEDURE — A4216 STERILE WATER/SALINE, 10 ML: HCPCS | Performed by: SURGERY

## 2023-02-20 PROCEDURE — APPSS15 APP SPLIT SHARED TIME 0-15 MINUTES: Performed by: NURSE PRACTITIONER

## 2023-02-20 PROCEDURE — 2580000003 HC RX 258: Performed by: SURGERY

## 2023-02-20 PROCEDURE — C9113 INJ PANTOPRAZOLE SODIUM, VIA: HCPCS | Performed by: SURGERY

## 2023-02-20 PROCEDURE — 6370000000 HC RX 637 (ALT 250 FOR IP): Performed by: SURGERY

## 2023-02-20 PROCEDURE — 99232 SBSQ HOSP IP/OBS MODERATE 35: CPT | Performed by: SURGERY

## 2023-02-20 PROCEDURE — 1200000000 HC SEMI PRIVATE

## 2023-02-20 RX ORDER — INSULIN LISPRO 100 [IU]/ML
0-8 INJECTION, SOLUTION INTRAVENOUS; SUBCUTANEOUS
Status: DISCONTINUED | OUTPATIENT
Start: 2023-02-20 | End: 2023-02-23 | Stop reason: HOSPADM

## 2023-02-20 RX ADMIN — Medication 10 ML: at 09:50

## 2023-02-20 RX ADMIN — ACETAMINOPHEN 500 MG: 500 TABLET ORAL at 18:32

## 2023-02-20 RX ADMIN — LOSARTAN POTASSIUM 100 MG: 100 TABLET, FILM COATED ORAL at 09:48

## 2023-02-20 RX ADMIN — ATORVASTATIN CALCIUM 10 MG: 10 TABLET, FILM COATED ORAL at 09:49

## 2023-02-20 RX ADMIN — PIPERACILLIN AND TAZOBACTAM 3375 MG: 3; .375 INJECTION, POWDER, FOR SOLUTION INTRAVENOUS at 06:37

## 2023-02-20 RX ADMIN — ENOXAPARIN SODIUM 30 MG: 100 INJECTION SUBCUTANEOUS at 09:50

## 2023-02-20 RX ADMIN — ACETAMINOPHEN 500 MG: 500 TABLET ORAL at 12:37

## 2023-02-20 RX ADMIN — SODIUM CHLORIDE, PRESERVATIVE FREE 40 MG: 5 INJECTION INTRAVENOUS at 09:50

## 2023-02-20 RX ADMIN — ENOXAPARIN SODIUM 30 MG: 100 INJECTION SUBCUTANEOUS at 21:20

## 2023-02-20 RX ADMIN — PIPERACILLIN AND TAZOBACTAM 3375 MG: 3; .375 INJECTION, POWDER, FOR SOLUTION INTRAVENOUS at 21:23

## 2023-02-20 RX ADMIN — PIPERACILLIN AND TAZOBACTAM 3375 MG: 3; .375 INJECTION, POWDER, FOR SOLUTION INTRAVENOUS at 15:48

## 2023-02-20 RX ADMIN — ACETAMINOPHEN 500 MG: 500 TABLET ORAL at 06:42

## 2023-02-20 RX ADMIN — Medication 400 MG: at 09:50

## 2023-02-20 ASSESSMENT — PAIN DESCRIPTION - ORIENTATION
ORIENTATION: MID

## 2023-02-20 ASSESSMENT — PAIN DESCRIPTION - LOCATION
LOCATION: ABDOMEN

## 2023-02-20 ASSESSMENT — PAIN SCALES - GENERAL
PAINLEVEL_OUTOF10: 2
PAINLEVEL_OUTOF10: 1
PAINLEVEL_OUTOF10: 3
PAINLEVEL_OUTOF10: 4
PAINLEVEL_OUTOF10: 2

## 2023-02-20 ASSESSMENT — PAIN DESCRIPTION - FREQUENCY
FREQUENCY: INTERMITTENT
FREQUENCY: INTERMITTENT

## 2023-02-20 ASSESSMENT — PAIN DESCRIPTION - PAIN TYPE
TYPE: SURGICAL PAIN
TYPE: SURGICAL PAIN

## 2023-02-20 ASSESSMENT — PAIN DESCRIPTION - DESCRIPTORS
DESCRIPTORS: ACHING

## 2023-02-20 NOTE — PROGRESS NOTES
Stoma care orders placed for staff if the patient is unable to care for her own ileostomy during hospitalization. the patient uses Guillermina 1-piece pre-cut ileostomy dressings with convexity, stoma rings & adhesive remover spray, all at bedside. Gabby Talley RN, BSN, [address] [place] [address].

## 2023-02-20 NOTE — PLAN OF CARE
Problem: Skin/Tissue Integrity  Goal: Absence of new skin breakdown  Description: 1. Monitor for areas of redness and/or skin breakdown  2. Assess vascular access sites hourly  3. Every 4-6 hours minimum:  Change oxygen saturation probe site  4. Every 4-6 hours:  If on nasal continuous positive airway pressure, respiratory therapy assess nares and determine need for appliance change or resting period. Outcome: Progressing  Note: Pt with abdominal surgical incision, POD#2, changed by surgery(Stefanie) this morning at 10am, dressing clean/dry/intact, will continue to assess and report changes. Problem: Pain  Goal: Verbalizes/displays adequate comfort level or baseline comfort level  Outcome: Progressing  Note: Pt c/o mild abdominal pain, mainly controlled by tylenol, pt able to ambulate well independently in room and hallway, reminded her to use IS 10x/hour, lungs cta, on RA.

## 2023-02-20 NOTE — CARE COORDINATION
Discharge Planning Note:    Chart reviewed and it appears that patient has minimal needs for discharge at this time. Discussed with patient and requested that case management be notified if discharge needs are identified.     - Current discharge plan is for the patient to return home. - PCP: Ronald Marinelli    - possible need for a wound vac - will continue to follow. - will need [facility] will be following the patient upon discharge. Case management will continue to follow progress and update discharge plan as needed.       Risk of Readmission Score: 12%    POPPY JoyceN RN    [facility]  Phone: [phone]

## 2023-02-20 NOTE — PROGRESS NOTES
Seen for Ostomy consult. the patient has ileostomy which was placed in 2004. the patient has [place] ileostomy products from home with her & is independent in ileostomy care. Current ileostomy dressing is clean, dry & intact. Americo Sky RN, BSN, [facility].

## 2023-02-20 NOTE — PROGRESS NOTES
[facility]        Progress Note    Patient Name: Min Brian  MRN: [id number]  YOB: 1960  Date of Evaluation: 2023    Subjective:  No acute events overnight  Pain controlled, reports just soreness, controlled with Tylenol alone  Mild nausea which she associates with antibiotics, no vomiting, tolerating regular diet  Stool and flatus per ileostomy  Resting in bed at this time    Post-Operative Day #2      Vital Signs:  Patient Vitals for the past 24 hrs:   BP Temp Temp src Pulse Resp SpO2   23 0946 110/72 98.1 °F (36.7 °C) Oral 79 16 97 %   23 0522 112/72 97.3 °F (36.3 °C) Oral 70 17 --   23 0130 117/70 97.6 °F (36.4 °C) Oral 72 17 98 %   23 2115 114/75 98 °F (36.7 °C) Oral 72 17 98 %   23 1649 116/75 98 °F (36.7 °C) Oral 68 16 98 %   23 1217 (!) 102/59 98.2 °F (36.8 °C) Oral 97 16 97 %   23 1027 (!) 109/54 97.8 °F (36.6 °C) Oral 69 16 98 %      TEMPERATURE HISTORY 24H: Temp (24hrs), Av.9 °F (36.6 °C), Min:97.3 °F (36.3 °C), Max:98.2 °F (36.8 °C)    BLOOD PRESSURE HISTORY: Systolic (65HRM), ZRI:123 , Min:102 , KMA:873    Diastolic (28TJK), GXY:00, Min:54, Max:75      Intake/Output:  I/O last 3 completed shifts: In: 910.2 [I.V.:760.2; IV Piggyback:150]  Out: -   No intake/output data recorded.   Drain/tube Output:       Physical Exam:  General: awake, alert, oriented to  person, place, time  Lungs: unlabored respirations  Abdomen: soft, non-distended, incisional tenderness only, bowel sounds present, ileostomy pouch intact--brown stool noted  Skin/Wound: open abdominal wound bed is generally clean with area of fibrinous debris along base, only scant thin/clear drainage--dressing changed; see image below:      Labs:  CBC:    Recent Labs     23  1321 23  0609   WBC 8.1 5.7   HGB 13.2 11.1*   HCT 41.5 33.5*    237     BMP:    Recent Labs     23  1322 23  0609    139   K 3.6 4.0    106   CO2 25 23   BUN 8 8   CREATININE 0.6 <0.5*   GLUCOSE 104* 122*     Hepatic:    Recent Labs     02/17/23  1322   AST 27   ALT 20   BILITOT 0.5   ALKPHOS 132*     Amylase:  No results found for: AMYLASE  Lipase:    Lab Results   Component Value Date/Time    LIPASE 19.0 02/17/2023 01:22 PM    LIPASE 43.0 10/12/2022 01:39 PM    LIPASE 58.0 11/21/2021 01:07 PM      Mag:    Lab Results   Component Value Date/Time    MG 1.80 10/14/2022 05:15 AM    MG 1.70 10/13/2022 04:59 AM     Phos:     Lab Results   Component Value Date/Time    PHOS 2.7 10/13/2022 04:59 AM    PHOS 2.4 11/22/2021 01:01 AM      Coags:   Lab Results   Component Value Date/Time    PROTIME 15.4 10/13/2022 04:59 AM    INR 1.23 10/13/2022 04:59 AM    APTT 28.6 11/22/2021 01:01 AM       Cultures:  Anaerobic culture  Lab Results   Component Value Date/Time    LABANAE Anaerobic culture further report to follow 02/18/2023 11:25 AM     Fungus stain  No results found for requested labs within last 30 days. Gram stain  Results in Past 30 Days  Result Component Current Result Ref Range Previous Result Ref Range   Gram Stain Result 3+ WBC's (Polymorphonuclear)  2+ Gram positive rods  1+ Gram positive cocci   (A) (2/18/2023)  Not in Time Range      Organism  Lab Results   Component Value Date/Time    ORG Enterococcus faecalis (A) 02/18/2023 11:25 AM    ORG Serratia marcescens (A) 02/18/2023 11:25 AM     Surgical culture  Lab Results   Component Value Date/Time    CXSURG Light growth 02/18/2023 11:25 AM    CXSURG Rare growth  Sensitivity to follow   02/18/2023 11:25 AM     Blood culture 1  Results in Past 30 Days  Result Component Current Result Ref Range Previous Result Ref Range   Blood Culture, Routine No Growth to date. Any change in status will be called. (2/17/2023)  Not in Time Range      Blood culture 2  Results in Past 30 Days  Result Component Current Result Ref Range Previous Result Ref Range   Culture, Blood 2 No Growth to date.   Any change in status will be called. (2/17/2023)  Not in Time Range      Fecal occult  No results found for requested labs within last 30 days. GI bacterial pathogens by PCR  No results found for requested labs within last 30 days. C. difficile  No results found for requested labs within last 30 days. Urine culture  No results found for: LABURIN    Pathology:  Pathology results pending     Imaging:  I have personally reviewed the following films:    No results found. Scheduled Meds:   atorvastatin  10 mg Oral Daily    losartan  100 mg Oral Daily    magnesium oxide  400 mg Oral Daily    sodium chloride flush  5-40 mL IntraVENous 2 times per day    enoxaparin  30 mg SubCUTAneous BID    pantoprazole (PROTONIX) 40 mg injection  40 mg IntraVENous Daily    insulin lispro  0-8 Units SubCUTAneous Q4H    piperacillin-tazobactam  3,375 mg IntraVENous Q8H     Continuous Infusions:   dextrose      dextrose 5% and 0.45% NaCl with KCl 20 mEq 100 mL/hr at 02/19/23 0758    sodium chloride       PRN Meds:.acetaminophen, dextrose bolus **OR** dextrose bolus, glucagon (rDNA), dextrose, sodium chloride flush, sodium chloride, ondansetron **OR** ondansetron, oxyCODONE, morphine **OR** morphine      Assessment:  65 y.o. female admitted with   1. Abscess of abdominal wall    2. Abdominal wall abscess        OR Date 2/18/2023, debridement skin, subcutaneous tissue and thick chronic inflammatory rind abdominal wall abscess with drainage of infection for abdominal wall abscess. Hypertension  Hyperlipidemia  Diabetes  Asthma  GERD      Plan:  1. Pain controlled without narcotics, wound bed is generally clean with area of fibrinous debris along base, vitals stable; continued supportive care and local wound care with Dakins wet-to-dry dressings BID  2. Regular diet as tolerated; monitor ileostomy output  3. IV hydration, decrease rate, stop when PO intake is adequate  4.  Antibiotics; surgical cultures growing Enterococcus and Serratia marcescens   5. Activity as tolerated, ambulate TID, up to chair for all meals  6. PRN analgesics and antiemetics--minimizing narcotics as tolerated, transition to PO  7. DVT prophylaxis with  Lovenox and SCD's  8. Management of medical comorbid etiologies; home medications resumed  9. Disposition: Anticipate discharge home in the next 24-48 hours; consult to home care place and discussed with  García Walker RN)    EDUCATION:  Educated patient on plan of care and disease process--all questions answered. Plans discussed with patient and nursing. Reviewed and discussed with Dr. Perla Vidal. Signed:  CORDELL Conde - CNP  2/20/2023 10:03 AM    Surgery Staff:     I have personally performed a face to face diagnostic evaluation on this patient. I have interviewed and examined the patient and I agree with the assessment above. Time was spent reviewing patient chart (including but not limited to notes, labs, imaging and other testing), interviewing and counseling patient and present family members, performing physical exam, documentation of my findings and subsequent follow up of ordered medication and testing, placing referrals and communication with patient care providers, coordinating future care as well as documentation in the EHR. This encompassed more than 50% of the total encounter time.  In summary, my findings and plan are the following:   Pt more comfortable, less soreness daily, not requiring narcotics  Wound base fibrinous debris remains  Continue bid Dakin;'s  Cx reviewed - continue Zosyn at this time - will follow up Reji Dowd MD

## 2023-02-21 LAB
BLOOD CULTURE, ROUTINE: NORMAL
CULTURE, BLOOD 2: NORMAL
GLUCOSE BLD-MCNC: 142 MG/DL (ref 70–99)
GLUCOSE BLD-MCNC: 150 MG/DL (ref 70–99)
GLUCOSE BLD-MCNC: 176 MG/DL (ref 70–99)
GLUCOSE BLD-MCNC: 180 MG/DL (ref 70–99)
PERFORMED ON: ABNORMAL

## 2023-02-21 PROCEDURE — 99232 SBSQ HOSP IP/OBS MODERATE 35: CPT | Performed by: SURGERY

## 2023-02-21 PROCEDURE — 6370000000 HC RX 637 (ALT 250 FOR IP): Performed by: SURGERY

## 2023-02-21 PROCEDURE — 6360000002 HC RX W HCPCS: Performed by: SURGERY

## 2023-02-21 PROCEDURE — 1200000000 HC SEMI PRIVATE

## 2023-02-21 PROCEDURE — C9113 INJ PANTOPRAZOLE SODIUM, VIA: HCPCS | Performed by: SURGERY

## 2023-02-21 PROCEDURE — 2580000003 HC RX 258: Performed by: SURGERY

## 2023-02-21 PROCEDURE — 2500000003 HC RX 250 WO HCPCS: Performed by: NURSE PRACTITIONER

## 2023-02-21 RX ADMIN — ACETAMINOPHEN 500 MG: 500 TABLET ORAL at 09:00

## 2023-02-21 RX ADMIN — PIPERACILLIN AND TAZOBACTAM 3375 MG: 3; .375 INJECTION, POWDER, FOR SOLUTION INTRAVENOUS at 04:25

## 2023-02-21 RX ADMIN — PIPERACILLIN AND TAZOBACTAM 3375 MG: 3; .375 INJECTION, POWDER, FOR SOLUTION INTRAVENOUS at 22:33

## 2023-02-21 RX ADMIN — LOSARTAN POTASSIUM 100 MG: 100 TABLET, FILM COATED ORAL at 09:00

## 2023-02-21 RX ADMIN — ENOXAPARIN SODIUM 30 MG: 100 INJECTION SUBCUTANEOUS at 22:24

## 2023-02-21 RX ADMIN — ENOXAPARIN SODIUM 30 MG: 100 INJECTION SUBCUTANEOUS at 08:59

## 2023-02-21 RX ADMIN — PIPERACILLIN AND TAZOBACTAM 3375 MG: 3; .375 INJECTION, POWDER, FOR SOLUTION INTRAVENOUS at 13:52

## 2023-02-21 RX ADMIN — SODIUM CHLORIDE, PRESERVATIVE FREE 40 MG: 5 INJECTION INTRAVENOUS at 09:00

## 2023-02-21 RX ADMIN — Medication 10 ML: at 09:02

## 2023-02-21 RX ADMIN — ACETAMINOPHEN 500 MG: 500 TABLET ORAL at 02:00

## 2023-02-21 RX ADMIN — ATORVASTATIN CALCIUM 10 MG: 10 TABLET, FILM COATED ORAL at 09:00

## 2023-02-21 RX ADMIN — POTASSIUM CHLORIDE, DEXTROSE MONOHYDRATE AND SODIUM CHLORIDE: 150; 5; 450 INJECTION, SOLUTION INTRAVENOUS at 02:05

## 2023-02-21 RX ADMIN — Medication 400 MG: at 09:02

## 2023-02-21 RX ADMIN — ACETAMINOPHEN 500 MG: 500 TABLET ORAL at 17:26

## 2023-02-21 ASSESSMENT — PAIN SCALES - GENERAL
PAINLEVEL_OUTOF10: 1
PAINLEVEL_OUTOF10: 2
PAINLEVEL_OUTOF10: 1
PAINLEVEL_OUTOF10: 3
PAINLEVEL_OUTOF10: 3

## 2023-02-21 ASSESSMENT — PAIN DESCRIPTION - FREQUENCY
FREQUENCY: INTERMITTENT
FREQUENCY: INTERMITTENT

## 2023-02-21 ASSESSMENT — PAIN DESCRIPTION - ORIENTATION
ORIENTATION: MID

## 2023-02-21 ASSESSMENT — PAIN DESCRIPTION - PAIN TYPE
TYPE: SURGICAL PAIN

## 2023-02-21 ASSESSMENT — PAIN DESCRIPTION - DESCRIPTORS
DESCRIPTORS: ACHING
DESCRIPTORS: ACHING;DISCOMFORT;SORE

## 2023-02-21 ASSESSMENT — PAIN DESCRIPTION - LOCATION
LOCATION: ABDOMEN

## 2023-02-21 ASSESSMENT — PAIN - FUNCTIONAL ASSESSMENT
PAIN_FUNCTIONAL_ASSESSMENT: ACTIVITIES ARE NOT PREVENTED
PAIN_FUNCTIONAL_ASSESSMENT: ACTIVITIES ARE NOT PREVENTED

## 2023-02-21 NOTE — PROGRESS NOTES
[facility]        Progress Note    Patient Name: Freeman Mallory  MRN: [id number]  YOB: 1960  Date of Evaluation: 2023    Subjective:  No acute events overnight  Pain controlled, reports just soreness, controlled with Tylenol alone  Mild nausea which she associates with antibiotics, no vomiting, tolerating regular diet  Stool and flatus per ileostomy  Resting in bed at this time; ambulates without difficulty    Post-Operative Day #3      Vital Signs:  Patient Vitals for the past 24 hrs:   BP Temp Temp src Pulse Resp SpO2 Height Weight   23 0845 119/77 98.4 °F (36.9 °C) Oral 80 18 95 % -- --   23 0419 124/72 98.6 °F (37 °C) Oral 82 16 92 % -- 237 lb 3.2 oz (107.6 kg)   23 0155 116/62 98.6 °F (37 °C) Oral 78 16 93 % -- --   23 2118 109/71 97.9 °F (36.6 °C) Oral 70 16 95 % -- --   23 2106 -- -- -- -- -- -- 5' 5\" (1.651 m) 236 lb (107 kg)        TEMPERATURE HISTORY 24H: Temp (24hrs), Av.4 °F (36.9 °C), Min:97.9 °F (36.6 °C), Max:98.6 °F (37 °C)    BLOOD PRESSURE HISTORY: Systolic (21WEF), YUC:216 , Min:105 , ZYY:002    Diastolic (00DSP), TIK:43, Min:62, Max:77      Intake/Output:  I/O last 3 completed shifts: In: 720 [P.O.:720]  Out: -   No intake/output data recorded. Drain/tube Output:       Physical Exam:  General: awake, alert, oriented to  person, place, time  Lungs: unlabored respirations  Abdomen: soft, non-distended, incisional tenderness only, bowel sounds present, ileostomy pouch intact--stoma pink/viable, brown stool noted  Skin/Wound: open abdominal wound bed is generally clean with area of fibrinous debris along base, old bloody drainage noted without active bleeding--dressing changed    Labs:  CBC:    No results for input(s): WBC, HGB, HCT, PLT in the last 72 hours. BMP:    No results for input(s): NA, K, CL, CO2, BUN, CREATININE, GLUCOSE in the last 72 hours.     Hepatic:    No results for input(s): AST, ALT, ALB, BILITOT, ALKPHOS in the last 72 hours. Amylase:  No results found for: AMYLASE  Lipase:    Lab Results   Component Value Date/Time    LIPASE 19.0 02/17/2023 01:22 PM    LIPASE 43.0 10/12/2022 01:39 PM    LIPASE 58.0 11/21/2021 01:07 PM        Mag:    Lab Results   Component Value Date/Time    MG 1.80 10/14/2022 05:15 AM    MG 1.70 10/13/2022 04:59 AM     Phos:     Lab Results   Component Value Date/Time    PHOS 2.7 10/13/2022 04:59 AM    PHOS 2.4 11/22/2021 01:01 AM      Coags:   Lab Results   Component Value Date/Time    PROTIME 15.4 10/13/2022 04:59 AM    INR 1.23 10/13/2022 04:59 AM    APTT 28.6 11/22/2021 01:01 AM       Cultures:  Anaerobic culture  Lab Results   Component Value Date/Time    LABANAE  02/18/2023 11:25 AM     Anaerobic culture further report to follow  No anaerobes isolated so far, Further report to follow       Fungus stain  No results found for requested labs within last 30 days. Gram stain  Results in Past 30 Days  Result Component Current Result Ref Range Previous Result Ref Range   Gram Stain Result 3+ WBC's (Polymorphonuclear)  2+ Gram positive rods  1+ Gram positive cocci   (A) (2/18/2023)  Not in Time Range      Organism  Lab Results   Component Value Date/Time    ORG Enterococcus faecalis (A) 02/18/2023 11:25 AM    ORG Serratia marcescens (A) 02/18/2023 11:25 AM     Surgical culture  Lab Results   Component Value Date/Time    CXSURG Light growth 02/18/2023 11:25 AM    CXSURG Rare growth 02/18/2023 11:25 AM     Blood culture 1  Results in Past 30 Days  Result Component Current Result Ref Range Previous Result Ref Range   Blood Culture, Routine No Growth after 4 days of incubation. (2/17/2023)  Not in Time Range      Blood culture 2  Results in Past 30 Days  Result Component Current Result Ref Range Previous Result Ref Range   Culture, Blood 2 No Growth after 4 days of incubation.  (2/17/2023)  Not in Time Range      Fecal occult  No results found for requested labs within last 30 days. GI bacterial pathogens by PCR  No results found for requested labs within last 30 days. C. difficile  No results found for requested labs within last 30 days. Urine culture  No results found for: LABURIN    Pathology:  OR 2/18/2023--FINAL DIAGNOSIS:   Abdominal wall, debridement:   -Ulcer and dermal fibrosis with foreign body type giant cells. -Negative for malignancy. Imaging:  I have personally reviewed the following films:    No results found. Scheduled Meds:   insulin lispro  0-8 Units SubCUTAneous 4x Daily AC & HS    Sodium Hypochlorite   Irrigation BID    atorvastatin  10 mg Oral Daily    losartan  100 mg Oral Daily    magnesium oxide  400 mg Oral Daily    sodium chloride flush  5-40 mL IntraVENous 2 times per day    enoxaparin  30 mg SubCUTAneous BID    pantoprazole (PROTONIX) 40 mg injection  40 mg IntraVENous Daily    piperacillin-tazobactam  3,375 mg IntraVENous Q8H     Continuous Infusions:   dextrose      dextrose 5% and 0.45% NaCl with KCl 20 mEq 50 mL/hr at 02/21/23 0205    sodium chloride       PRN Meds:.acetaminophen, dextrose bolus **OR** dextrose bolus, glucagon (rDNA), dextrose, sodium chloride flush, sodium chloride, ondansetron **OR** ondansetron, oxyCODONE, morphine **OR** morphine      Assessment:  65 y.o. female admitted with   1. Abscess of abdominal wall    2. Abdominal wall abscess        OR Date 2/18/2023, debridement skin, subcutaneous tissue and thick chronic inflammatory rind abdominal wall abscess with drainage of infection for abdominal wall abscess. Hypertension  Hyperlipidemia  Diabetes  Asthma  GERD      Plan:  1. Pain controlled without narcotics, wound bed is generally clean with area of fibrinous debris along base, vitals stable; continued supportive care and local wound care with Dakins wet-to-dry dressings BID  2. Regular diet as tolerated; monitor ileostomy output  3. Stop IV hydration  4.  Antibiotics; surgical cultures growing Enterococcus and Serratia marcescens   5. Activity as tolerated, ambulate TID, up to chair for all meals  6. PRN analgesics and antiemetics--minimizing narcotics as tolerated, transition to PO  7. DVT prophylaxis with  Lovenox and SCD's  8. Management of medical comorbid etiologies; home medications resumed  9. Disposition: Anticipate discharge home in the next 24-48 hours    EDUCATION:  Educated patient on plan of care and disease process--all questions answered. Plans discussed with patient and nursing. Reviewed and discussed with Dr. Geovanni Pollock. Signed:  CORDELL Michael - CNP  2/21/2023 2:21 PM    Surgery Staff:     I have personally performed a face to face diagnostic evaluation on this patient. I have interviewed and examined the patient and I agree with the assessment above. Time was spent reviewing patient chart (including but not limited to notes, labs, imaging and other testing), interviewing and counseling patient and present family members, performing physical exam, documentation of my findings and subsequent follow up of ordered medication and testing, placing referrals and communication with patient care providers, coordinating future care as well as documentation in the EHR. This encompassed more than 50% of the total encounter time. In summary, my findings and plan are the following:   Pt with activity improving, better up and walking.  No SOB  Abd soft, tender in wound area only  Base with some fibrinous debris, o/w improving  Continue IV Zosyn and Dakin's dressing care  Not ready for discharge yet    Terri Camacho MD

## 2023-02-22 LAB
GLUCOSE BLD-MCNC: 150 MG/DL (ref 70–99)
GLUCOSE BLD-MCNC: 154 MG/DL (ref 70–99)
GLUCOSE BLD-MCNC: 157 MG/DL (ref 70–99)
GLUCOSE BLD-MCNC: 181 MG/DL (ref 70–99)
PERFORMED ON: ABNORMAL

## 2023-02-22 PROCEDURE — 1200000000 HC SEMI PRIVATE

## 2023-02-22 PROCEDURE — 6370000000 HC RX 637 (ALT 250 FOR IP): Performed by: SURGERY

## 2023-02-22 PROCEDURE — APPSS15 APP SPLIT SHARED TIME 0-15 MINUTES: Performed by: NURSE PRACTITIONER

## 2023-02-22 PROCEDURE — 99232 SBSQ HOSP IP/OBS MODERATE 35: CPT | Performed by: SURGERY

## 2023-02-22 PROCEDURE — 6360000002 HC RX W HCPCS: Performed by: SURGERY

## 2023-02-22 PROCEDURE — 2580000003 HC RX 258: Performed by: SURGERY

## 2023-02-22 PROCEDURE — C9113 INJ PANTOPRAZOLE SODIUM, VIA: HCPCS | Performed by: SURGERY

## 2023-02-22 PROCEDURE — APPNB30 APP NON BILLABLE TIME 0-30 MINS: Performed by: NURSE PRACTITIONER

## 2023-02-22 RX ORDER — AMPICILLIN 500 MG/1
500 CAPSULE ORAL 4 TIMES DAILY
Qty: 56 CAPSULE | Refills: 0 | Status: SHIPPED | OUTPATIENT
Start: 2023-02-22 | End: 2023-03-08

## 2023-02-22 RX ORDER — LEVOFLOXACIN 750 MG/1
750 TABLET ORAL DAILY
Qty: 14 TABLET | Refills: 0 | Status: SHIPPED | OUTPATIENT
Start: 2023-02-22 | End: 2023-03-08

## 2023-02-22 RX ADMIN — Medication 400 MG: at 08:04

## 2023-02-22 RX ADMIN — ENOXAPARIN SODIUM 30 MG: 100 INJECTION SUBCUTANEOUS at 08:05

## 2023-02-22 RX ADMIN — SODIUM CHLORIDE, PRESERVATIVE FREE 40 MG: 5 INJECTION INTRAVENOUS at 08:05

## 2023-02-22 RX ADMIN — PIPERACILLIN AND TAZOBACTAM 3375 MG: 3; .375 INJECTION, POWDER, FOR SOLUTION INTRAVENOUS at 05:24

## 2023-02-22 RX ADMIN — PIPERACILLIN AND TAZOBACTAM 3375 MG: 3; .375 INJECTION, POWDER, FOR SOLUTION INTRAVENOUS at 21:40

## 2023-02-22 RX ADMIN — LOSARTAN POTASSIUM 100 MG: 100 TABLET, FILM COATED ORAL at 08:05

## 2023-02-22 RX ADMIN — ACETAMINOPHEN 500 MG: 500 TABLET ORAL at 08:05

## 2023-02-22 RX ADMIN — Medication 10 ML: at 08:12

## 2023-02-22 RX ADMIN — ACETAMINOPHEN 500 MG: 500 TABLET ORAL at 14:24

## 2023-02-22 RX ADMIN — ATORVASTATIN CALCIUM 10 MG: 10 TABLET, FILM COATED ORAL at 08:05

## 2023-02-22 RX ADMIN — ENOXAPARIN SODIUM 30 MG: 100 INJECTION SUBCUTANEOUS at 21:36

## 2023-02-22 RX ADMIN — ACETAMINOPHEN 500 MG: 500 TABLET ORAL at 21:36

## 2023-02-22 RX ADMIN — ACETAMINOPHEN 500 MG: 500 TABLET ORAL at 00:07

## 2023-02-22 RX ADMIN — PIPERACILLIN AND TAZOBACTAM 3375 MG: 3; .375 INJECTION, POWDER, FOR SOLUTION INTRAVENOUS at 12:44

## 2023-02-22 ASSESSMENT — PAIN DESCRIPTION - ORIENTATION
ORIENTATION: MID;LOWER;LEFT;RIGHT
ORIENTATION: MID;LOWER
ORIENTATION: MID

## 2023-02-22 ASSESSMENT — PAIN DESCRIPTION - DESCRIPTORS
DESCRIPTORS: SORE
DESCRIPTORS: ACHING;DISCOMFORT
DESCRIPTORS: ACHING;SORE
DESCRIPTORS: ACHING;DISCOMFORT
DESCRIPTORS: ACHING;DISCOMFORT

## 2023-02-22 ASSESSMENT — PAIN SCALES - GENERAL
PAINLEVEL_OUTOF10: 1
PAINLEVEL_OUTOF10: 0
PAINLEVEL_OUTOF10: 3
PAINLEVEL_OUTOF10: 2
PAINLEVEL_OUTOF10: 2

## 2023-02-22 ASSESSMENT — PAIN DESCRIPTION - LOCATION
LOCATION: ABDOMEN

## 2023-02-22 ASSESSMENT — PAIN DESCRIPTION - PAIN TYPE
TYPE: SURGICAL PAIN

## 2023-02-22 ASSESSMENT — PAIN - FUNCTIONAL ASSESSMENT
PAIN_FUNCTIONAL_ASSESSMENT: PREVENTS OR INTERFERES SOME ACTIVE ACTIVITIES AND ADLS
PAIN_FUNCTIONAL_ASSESSMENT: PREVENTS OR INTERFERES SOME ACTIVE ACTIVITIES AND ADLS
PAIN_FUNCTIONAL_ASSESSMENT: ACTIVITIES ARE NOT PREVENTED

## 2023-02-22 ASSESSMENT — PAIN DESCRIPTION - ONSET
ONSET: ON-GOING
ONSET: ON-GOING

## 2023-02-22 ASSESSMENT — PAIN DESCRIPTION - FREQUENCY
FREQUENCY: INTERMITTENT
FREQUENCY: CONTINUOUS

## 2023-02-22 NOTE — PLAN OF CARE
Problem: Skin/Tissue Integrity  Goal: Absence of new skin breakdown  Description: 1. Monitor for areas of redness and/or skin breakdown  2. Assess vascular access sites hourly  3. Every 4-6 hours minimum:  Change oxygen saturation probe site  4. Every 4-6 hours:  If on nasal continuous positive airway pressure, respiratory therapy assess nares and determine need for appliance change or resting period.   2/22/2023 0954 by Angelita Villegas RN  Outcome: Progressing  2/22/2023 0351 by Chico Silva RN  Outcome: Progressing     Problem: Discharge Planning  Goal: Discharge to home or other facility with appropriate resources  2/22/2023 0954 by Angelita Villegas RN  Outcome: Progressing  2/22/2023 0351 by Chico Silva RN  Outcome: Progressing  Flowsheets (Taken 2/22/2023 0701)  Discharge to home or other facility with appropriate resources: Identify discharge learning needs (meds, wound care, etc)     Problem: Pain  Goal: Verbalizes/displays adequate comfort level or baseline comfort level  2/22/2023 0954 by Angelita Villegas RN  Outcome: Progressing  2/22/2023 0351 by Chico Silva RN  Outcome: Progressing  Flowsheets (Taken 2/22/2023 6287)  Verbalizes/displays adequate comfort level or baseline comfort level: Assess pain using appropriate pain scale     Problem: Chronic Conditions and Co-morbidities  Goal: Patient's chronic conditions and co-morbidity symptoms are monitored and maintained or improved  2/22/2023 0954 by Angelita Villegas RN  Outcome: Progressing  2/22/2023 0351 by Chico Silva RN  Outcome: Progressing  Flowsheets (Taken 2/22/2023 0351)  Care Plan - Patient's Chronic Conditions and Co-Morbidity Symptoms are Monitored and Maintained or Improved: Monitor and assess patient's chronic conditions and comorbid symptoms for stability, deterioration, or improvement     Problem: Safety - Adult  Goal: Free from fall injury  Outcome: Progressing     Problem: ABCDS Injury Assessment  Goal: Absence of physical injury  2/22/2023 0954 by Cari Brock RN  Outcome: Progressing  2/22/2023 0351 by Vicki Olivo RN  Outcome: Progressing  Flowsheets (Taken 2/22/2023 3661)  Absence of Physical Injury: Implement safety measures based on patient assessment     Problem: Skin/Tissue Integrity - Adult  Goal: Incisions, wounds, or drain sites healing without S/S of infection  2/22/2023 0954 by Cari Brock RN  Outcome: Progressing  2/22/2023 0351 by Vicki Olivo RN  Outcome: Progressing  Flowsheets (Taken 2/22/2023 0351)  Incisions, Wounds, or Drain Sites Healing Without Sign and Symptoms of Infection: TWICE DAILY: Assess and document skin integrity     Problem: Gastrointestinal - Adult  Goal: Establish and maintain optimal ostomy function  2/22/2023 0954 by Cari Brock RN  Outcome: Progressing  2/22/2023 0351 by Vicki Olivo RN  Outcome: Progressing  Flowsheets (Taken 2/22/2023 7029)  Establish and maintain optimal ostomy function: Monitor output from ostomies

## 2023-02-22 NOTE — PROGRESS NOTES
[facility]        Progress Note    Patient Name: Agapito Swann  MRN: [id number]  YOB: 1960  Date of Evaluation: 2023    Subjective:  No acute events overnight  Pain improving and controlled with Tylenol alone  Nausea which she associates with antibiotics improving, no vomiting, tolerating regular diet  Stool and flatus per ileostomy  Resting in bed at this time; ambulates without difficulty    Post-Operative Day #4      Vital Signs:  Patient Vitals for the past 24 hrs:   BP Temp Temp src Pulse Resp SpO2   23 1219 110/61 98.9 °F (37.2 °C) Oral 90 16 94 %   23 0745 (!) 148/87 97.7 °F (36.5 °C) Oral 85 16 95 %   23 0400 114/70 98 °F (36.7 °C) Oral 70 17 95 %   23 0000 124/79 97.8 °F (36.6 °C) Oral 76 18 97 %   23 2228 105/63 97.9 °F (36.6 °C) Oral 75 16 96 %        TEMPERATURE HISTORY 24H: Temp (24hrs), Av.1 °F (36.7 °C), Min:97.7 °F (36.5 °C), Max:98.9 °F (37.2 °C)    BLOOD PRESSURE HISTORY: Systolic (35ENL), FPT:168 , Min:105 , IRP:587    Diastolic (29QHN), KBZ:58, Min:61, Max:87      Intake/Output:  No intake/output data recorded. I/O this shift:  In: 240 [P.O.:240]  Out: -   Drain/tube Output:       Physical Exam:  General: awake, alert, oriented to  person, place, time  Lungs: unlabored respirations  Abdomen: soft, non-distended, incisional tenderness only, bowel sounds present, ileostomy pouch intact--stoma pink/viable, brown stool noted  Skin/Wound: open abdominal wound bed is generally clean with area of fibrinous debris along base, no drainage--dressing changed    Labs:  CBC:    No results for input(s): WBC, HGB, HCT, PLT in the last 72 hours. BMP:    No results for input(s): NA, K, CL, CO2, BUN, CREATININE, GLUCOSE in the last 72 hours. Hepatic:    No results for input(s): AST, ALT, ALB, BILITOT, ALKPHOS in the last 72 hours.     Amylase:  No results found for: AMYLASE  Lipase:    Lab Results   Component Value Date/Time    LIPASE 19.0 02/17/2023 01:22 PM    LIPASE 43.0 10/12/2022 01:39 PM    LIPASE 58.0 11/21/2021 01:07 PM        Mag:    Lab Results   Component Value Date/Time    MG 1.80 10/14/2022 05:15 AM    MG 1.70 10/13/2022 04:59 AM     Phos:     Lab Results   Component Value Date/Time    PHOS 2.7 10/13/2022 04:59 AM    PHOS 2.4 11/22/2021 01:01 AM      Coags:   Lab Results   Component Value Date/Time    PROTIME 15.4 10/13/2022 04:59 AM    INR 1.23 10/13/2022 04:59 AM    APTT 28.6 11/22/2021 01:01 AM       Cultures:  Anaerobic culture  Lab Results   Component Value Date/Time    LABANAE  02/18/2023 11:25 AM     Anaerobic culture further report to follow  No anaerobes isolated so far, Further report to follow       Fungus stain  No results found for requested labs within last 30 days. Gram stain  Results in Past 30 Days  Result Component Current Result Ref Range Previous Result Ref Range   Gram Stain Result 3+ WBC's (Polymorphonuclear)  2+ Gram positive rods  1+ Gram positive cocci   (A) (2/18/2023)  Not in Time Range      Organism  Lab Results   Component Value Date/Time    ORG Enterococcus faecalis (A) 02/18/2023 11:25 AM    ORG Serratia marcescens (A) 02/18/2023 11:25 AM     Surgical culture  Lab Results   Component Value Date/Time    CXSURG Light growth 02/18/2023 11:25 AM    CXSURG Rare growth 02/18/2023 11:25 AM     Blood culture 1  Results in Past 30 Days  Result Component Current Result Ref Range Previous Result Ref Range   Blood Culture, Routine No Growth after 4 days of incubation. (2/17/2023)  Not in Time Range      Blood culture 2  Results in Past 30 Days  Result Component Current Result Ref Range Previous Result Ref Range   Culture, Blood 2 No Growth after 4 days of incubation. (2/17/2023)  Not in Time Range      Fecal occult  No results found for requested labs within last 30 days. GI bacterial pathogens by PCR  No results found for requested labs within last 30 days.      C. difficile  No results found for requested labs within last 30 days. Urine culture  No results found for: LABURIN    Pathology:  OR 2/18/2023--FINAL DIAGNOSIS:   Abdominal wall, debridement:   -Ulcer and dermal fibrosis with foreign body type giant cells. -Negative for malignancy. Imaging:  I have personally reviewed the following films:    No results found. Scheduled Meds:   insulin lispro  0-8 Units SubCUTAneous 4x Daily AC & HS    Sodium Hypochlorite   Irrigation BID    atorvastatin  10 mg Oral Daily    losartan  100 mg Oral Daily    magnesium oxide  400 mg Oral Daily    sodium chloride flush  5-40 mL IntraVENous 2 times per day    enoxaparin  30 mg SubCUTAneous BID    pantoprazole (PROTONIX) 40 mg injection  40 mg IntraVENous Daily    piperacillin-tazobactam  3,375 mg IntraVENous Q8H     Continuous Infusions:   dextrose      sodium chloride       PRN Meds:.acetaminophen, dextrose bolus **OR** dextrose bolus, glucagon (rDNA), dextrose, sodium chloride flush, sodium chloride, ondansetron **OR** ondansetron, oxyCODONE, morphine **OR** morphine      Assessment:  65 y.o. female admitted with   1. Abscess of abdominal wall    2. Abdominal wall abscess        OR Date 2/18/2023, debridement skin, subcutaneous tissue and thick chronic inflammatory rind abdominal wall abscess with drainage of infection for abdominal wall abscess. Hypertension  Hyperlipidemia  Diabetes  Asthma  GERD      Plan:  1. Pain improving and controlled without narcotics, wound bed is generally clean with area of fibrinous debris along base, vitals stable; continued supportive care and local wound care with Dakins wet-to-dry dressings BID, will switch to daily upon discharge  2. Regular diet as tolerated; monitor ileostomy output  3. Antibiotics; surgical cultures growing Enterococcus and Serratia marcescens, switch to PO at discharge  4. Activity as tolerated, ambulate TID, up to chair for all meals  5.  PRN analgesics and antiemetics--minimizing narcotics as tolerated, transition to PO  6. DVT prophylaxis with  Lovenox and SCD's  7. Management of medical comorbid etiologies; home medications resumed  8. Disposition: Anticipate discharge home tomorrow    EDUCATION:  Educated patient on plan of care and disease process--all questions answered. Plans discussed with patient and nursing. Reviewed and discussed with Dr. Jenifer Rodriguez. Signed:  CORDELL Mcfarland - CNP  2/22/2023 12:47 PM  Surgery Staff:     I have personally performed a face to face diagnostic evaluation on this patient. I have interviewed and examined the patient and I agree with the assessment above. Time was spent reviewing patient chart (including but not limited to notes, labs, imaging and other testing), interviewing and counseling patient and present family members, performing physical exam, documentation of my findings and subsequent follow up of ordered medication and testing, placing referrals and communication with patient care providers, coordinating future care as well as documentation in the EHR. This encompassed more than 50% of the total encounter time.  In summary, my findings and plan are the following:   Pt has had less pain, better activity, wound  daily  Dressing redone  Change to oral antibiotic for discharge  Will anticipate discharge for tomorrow    Magda Ruiz MD

## 2023-02-22 NOTE — PROGRESS NOTES
Shift assessment complete, /87, PT A&Ox4 in bed. C/o pain to abd 2/10, dressing to abd CDI. AM med administered per [facility], POC and education reviewed with the pt. All needs met at this time, call light in reach, will continue to monitor.

## 2023-02-23 VITALS
DIASTOLIC BLOOD PRESSURE: 64 MMHG | HEART RATE: 90 BPM | OXYGEN SATURATION: 93 % | WEIGHT: 237.2 LBS | RESPIRATION RATE: 16 BRPM | TEMPERATURE: 98.9 F | HEIGHT: 65 IN | BODY MASS INDEX: 39.52 KG/M2 | SYSTOLIC BLOOD PRESSURE: 116 MMHG

## 2023-02-23 LAB
ANAEROBIC CULTURE: ABNORMAL
CULTURE SURGICAL: ABNORMAL
CULTURE SURGICAL: ABNORMAL
GLUCOSE BLD-MCNC: 152 MG/DL (ref 70–99)
GLUCOSE BLD-MCNC: 210 MG/DL (ref 70–99)
GRAM STAIN RESULT: ABNORMAL
ORGANISM: ABNORMAL
ORGANISM: ABNORMAL
PERFORMED ON: ABNORMAL
PERFORMED ON: ABNORMAL

## 2023-02-23 PROCEDURE — 2580000003 HC RX 258: Performed by: SURGERY

## 2023-02-23 PROCEDURE — APPNB30 APP NON BILLABLE TIME 0-30 MINS: Performed by: NURSE PRACTITIONER

## 2023-02-23 PROCEDURE — 6360000002 HC RX W HCPCS: Performed by: SURGERY

## 2023-02-23 PROCEDURE — 6370000000 HC RX 637 (ALT 250 FOR IP): Performed by: SURGERY

## 2023-02-23 PROCEDURE — APPSS30 APP SPLIT SHARED TIME 16-30 MINUTES: Performed by: NURSE PRACTITIONER

## 2023-02-23 PROCEDURE — 99238 HOSP IP/OBS DSCHRG MGMT 30/<: CPT | Performed by: SURGERY

## 2023-02-23 PROCEDURE — C9113 INJ PANTOPRAZOLE SODIUM, VIA: HCPCS | Performed by: SURGERY

## 2023-02-23 RX ADMIN — INSULIN LISPRO 2 UNITS: 100 INJECTION, SOLUTION INTRAVENOUS; SUBCUTANEOUS at 11:55

## 2023-02-23 RX ADMIN — SODIUM CHLORIDE, PRESERVATIVE FREE 40 MG: 5 INJECTION INTRAVENOUS at 08:29

## 2023-02-23 RX ADMIN — ENOXAPARIN SODIUM 30 MG: 100 INJECTION SUBCUTANEOUS at 08:28

## 2023-02-23 RX ADMIN — Medication 400 MG: at 08:31

## 2023-02-23 RX ADMIN — LOSARTAN POTASSIUM 100 MG: 100 TABLET, FILM COATED ORAL at 08:28

## 2023-02-23 RX ADMIN — PIPERACILLIN AND TAZOBACTAM 3375 MG: 3; .375 INJECTION, POWDER, FOR SOLUTION INTRAVENOUS at 06:23

## 2023-02-23 RX ADMIN — ACETAMINOPHEN 500 MG: 500 TABLET ORAL at 08:28

## 2023-02-23 RX ADMIN — ATORVASTATIN CALCIUM 10 MG: 10 TABLET, FILM COATED ORAL at 08:28

## 2023-02-23 NOTE — PROGRESS NOTES
Pt resting awake in chair. States wet to dry is draining. Replaced outer dressing with x 2 sterile abd pads and secured with medipore tape. Pt is eager for discharge today. Denies need for pain medication other than tylenol at this time.  Pt is axox4 and able to answer questions and follow commands throughout assessment

## 2023-02-23 NOTE — PLAN OF CARE
Problem: Skin/Tissue Integrity  Goal: Absence of new skin breakdown  Description: 1. Monitor for areas of redness and/or skin breakdown  2. Assess vascular access sites hourly  3. Every 4-6 hours minimum:  Change oxygen saturation probe site  4. Every 4-6 hours:  If on nasal continuous positive airway pressure, respiratory therapy assess nares and determine need for appliance change or resting period.   Outcome: Progressing     Problem: Discharge Planning  Goal: Discharge to home or other facility with appropriate resources  Outcome: Progressing     Problem: Pain  Goal: Verbalizes/displays adequate comfort level or baseline comfort level  Outcome: Progressing     Problem: Chronic Conditions and Co-morbidities  Goal: Patient's chronic conditions and co-morbidity symptoms are monitored and maintained or improved  Outcome: Progressing     Problem: Safety - Adult  Goal: Free from fall injury  Outcome: Progressing     Problem: ABCDS Injury Assessment  Goal: Absence of physical injury  Outcome: Progressing     Problem: Skin/Tissue Integrity - Adult  Goal: Incisions, wounds, or drain sites healing without S/S of infection  Outcome: Progressing     Problem: Gastrointestinal - Adult  Goal: Establish and maintain optimal ostomy function  Outcome: Progressing

## 2023-02-23 NOTE — PROGRESS NOTES
Gave d/c instructions with list of active meds and when they are next due. Reviewed discharge instructions at bedside and provided printed copy of same. Pt verbalized understanding of all instructions. Denied additional questions. To home as passenger in private vehicle.

## 2023-02-23 NOTE — DISCHARGE SUMMARY
[place] [id number] and Laparoscopic Surgery        Discharge Summary    Patient Name: Santana Davis  MRN: [id number]  YOB: 1960  PCP: Lesa ORELLANA  Admission Date: 2/17/2023  Discharge Date: 2/23/2023  Disposition: home  Admitting Diagnosis: Abdominal wall abscess [L02.211]  Discharge Diagnosis:   Patient Active Problem List   Diagnosis    Ileostomy care [facility])    Diabetes mellitus, type II, insulin dependent ([facility] [address]    Hypothyroidism    Essential hypertension    Mixed hyperlipidemia    Ulcerative (chronic) enterocolitis, unspecified complication (HCC)    Small bowel obstruction (HCC)    Incarcerated incisional hernia    Generalized abdominal pain    Postoperative fever    Abdominal wall seroma    Abdominal wall seroma, initial encounter    Abscess of abdominal wall    Abdominal wall abscess      Consultants: IP CONSULT TO GENERAL SURGERY  IP CONSULT TO HOME CARE NEEDS    Procedures/Diagnostic Test(s):  CT ABDOMEN PELVIS W IV CONTRAST Additional Contrast? None   Final Result   1. Persistent complex fluid collection in the subcutaneous soft tissue at   prior site of ventral hernia repair. Chronic hematoma or recurrent abscess   may be suspected. 2. Probable fistula tract extending to the skin surface to the left of   midline. 3. Parastomal hernia with otherwise unremarkable ileostomy site on the right. XR CHEST PORTABLE   Final Result   No acute disease             Discharge Condition: good    HOSPITAL COURSE: Hilary Amezcua originally presented to the hospital on 2/17/2023 12:20 PM with a history of chronic draining sinus abdominal wall, has had prior abdominal surgeries including total colectomy, ileostomy, multiple hernia repairs, mesh placement previously, and had retained fluid and an abscess present.   She presented to the OR on the above date for recurrent infection, pain, and drainage at that site and underwent debridement skin, subcutaneous tissue and thick chronic inflammatory rind abdominal wall abscess with drainage of infection. She was treated with IV antibiotics while inpatient which were transitioned to PO upon discharge to cover results of surgical cultures. Hospital course was uneventful. Surgical Pathology:  OR 2/18/2023--FINAL DIAGNOSIS:   Abdominal wall, debridement:   -Ulcer and dermal fibrosis with foreign body type giant cells. -Negative for malignancy. At time of discharge, [facility] was tolerating a regular diet, had active bowel sounds, ambulating on her own accord and had adequate analgesia on oral pain medications, and had no signs of symptoms of complications. PHYSICAL EXAMINATION:  Discharge Vitals:  height is 5' 5\" (1.651 m) and weight is 237 lb 3.2 oz (107.6 kg). Her oral temperature is 98.9 °F (37.2 °C). Her blood pressure is 116/64 and her pulse is 90. Her respiration is 16 and oxygen saturation is 93%. General appearance - alert, well appearing, and in no distress  Chest - clear to ausculation  Heart - normal rate and regular rhythm  Abdomen - soft, non-distended, incisional tenderness only, bowel sounds present, stoma pink/viable with brown stool  Neurological - motor and sensory grossly normal bilaterally  Musculoskeletal - full range of motion without pain  Extremities - peripheral pulses normal, no pedal edema, no clubbing or cyanosis  Incision: open abdominal wound bed is generally clean with area of fibrinous debris along base, no drainage--dressing changed    LABS:  No results for input(s): WBC, HGB, HCT, PLT, NA, K, CL, CO2, BUN, CREATININE in the last 72 hours. DISCHARGE INSTRUCTIONS:  1.  Discharge medications:      Medication List        START taking these medications      ampicillin 500 MG capsule  Commonly known as: PRINCIPEN  Take 1 capsule by mouth 4 times daily for 14 days     Sodium Hypochlorite 0.25 % Soln  Commonly known as: DAKINS  Irrigate with 473 mLs as directed daily Dakins wet-to-dry dressing to open abdominal wound daily and PRN            CHANGE how you take these medications      Lantus SoloStar 100 UNIT/ML injection pen  Generic drug: insulin glargine  INJECT 50 UNITS SUBCUTANEOUSLY TWICE DAILY  What changed: additional instructions            CONTINUE taking these medications      acetaminophen 500 MG tablet  Commonly known as: TYLENOL     ALBUTEROL IN     atorvastatin 10 MG tablet  Commonly known as: LIPITOR     BENTYL PO     COENZYME Q10 PO     CRANBERRY PO     Droplet Pen Needles 31G X 8 MM Misc  Generic drug: Insulin Pen Needle  USE TO INJECT INSULIN FOUR TIMES DAILY     fish oil 1000 MG capsule     * FreeStyle Claudia 2 Sensor Misc  CHANGE EVERY 14 DAYS TO MONITOR BS     * FreeStyle Claudia 2 Sensor Misc  Change every 14 days     IRON PO     levoFLOXacin 750 MG tablet  Commonly known as: LEVAQUIN  Take 1 tablet by mouth daily for 14 days     loratadine 10 MG capsule  Commonly known as: CLARITIN     losartan 100 MG tablet  Commonly known as: COZAAR     MAGNESIUM PO     MULTIVITAMIN ADULT PO     NovoLOG FlexPen 100 UNIT/ML injection pen  Generic drug: insulin aspart  INJECT 25 UNITS SUBCUTANEOUSLY TWICE DAILY     OMEPRAZOLE PO     ONE TOUCH ULTRASOFT LANCETS Misc     OneTouch Ultra strip  Generic drug: blood glucose test strips  Test 4 times daily. POTASSIUM PO     SINGULAIR PO     TRIAMCINOLONE ACETONIDE NA     Vitamin D3 1.25 MG (64934 UT) Caps           * This list has 2 medication(s) that are the same as other medications prescribed for you. Read the directions carefully, and ask your doctor or other care provider to review them with you.                 STOP taking these medications      ondansetron 4 MG tablet  Commonly known as: Zofran            ASK your doctor about these medications      Breo Ellipta 100-25 MCG/ACT inhaler  Generic drug: fluticasone furoate-vilanterol               Where to Get Your Medications        You can get these medications from any pharmacy    Bring a paper prescription for each of these medications  ampicillin 500 MG capsule  levoFLOXacin 750 MG tablet  Sodium Hypochlorite 0.25 % Soln       2. Diet as tolerated. 3. Activity: activity as tolerated, no driving while on analgesics, and no heavy lifting for 6 weeks. 4. Wound care: Dakins wet-to-dry dressing to open abdominal wound daily and PRN. 5. Follow-up: recommend follow up with PCP in 2-4 weeks. 6. Okay to shower, don't submerge incisions under water. 7. No driving until off pain medication and able to move torso freely without any pain. 8. Return to hospital, or contact Dr. Villafana Drivers' office ([phone]) if any signs of infection are seen. 9. The patient is not currently smoking. Recommend maintaining a smoke-free lifestyle. 10. Return to Clinic: in 7 days. 11. Reviewed discharge instructions, restrictions, and reasons to call the office. EDUCATION:  Educated patient on plan of care and disease process--all questions answered. Plans discussed with patient and nursing. Reviewed and discussed with Dr. Ledy Tate. Time spent for discharge: 30 minutes, including plan of care, patient education, and care coordination. Signed:  CORDELL Fox - CNP  2/23/2023 9:29 AM    Surgery Staff:     I have personally performed a face to face diagnostic evaluation on this patient. I have interviewed and examined the patient and I agree with the assessment above. Time was spent reviewing patient chart (including but not limited to notes, labs, imaging and other testing), interviewing and counseling patient and present family members, performing physical exam, documentation of my findings and subsequent follow up of ordered medication and testing, placing referrals and communication with patient care providers, coordinating future care as well as documentation in the EHR. This encompassed more than 50% of the total encounter time.  In summary, my findings and plan are the following:   Pt improved today, up and walking, good bowel function  Abd NT, wound cleaning up  Will discharge today  Instructions reviewed, all Q answered    Padmini Zhou MD

## 2023-02-23 NOTE — DISCHARGE INSTRUCTIONS
[facility]    Discharge Instructions      Activity  - activity as tolerated, no driving while on analgesics, and no heavy lifting for 6 weeks; it is OK to be up walking around--walking up and down stairs is also OK. Do what is comfortable: stop and rest if you feel tired.     Diet  - regular diet  - Drink plenty of fluids     Pain  - You may use narcotic pain medication as prescribed for breakthrough pain  - You can use OTC Tylenol or Ibuprofen for 1-2 weeks (may need to adjust the dose as directed on the bottle if enteric coated ibuprofen chosen)  - Avoid taking narcotic pain medication on an empty stomach which may result in nausea, if pain medication is causing nausea try decreasing the dose  - Narcotic pain medication is not necessary, please contact the office if pain is not controlled  - Narcotic pain medication will not be refilled on weekends and after hours  - Please contact the office Monday-Friday 8a-4p if a refill is requested    Nausea  - Avoid taking narcotic pain medication on an empty stomach which may result in nausea  - If pain medication is causing nausea you may try decreasing the dose  - Nausea can be common for 24 hours after surgery--if nausea uncontrolled or worsening, contact the office or go to the ED    Constipation  - Pain medication can be constipating  - Often, it helps to take a stool softener with the goal of at least one soft bowel movement daily with minimal straining  - You may also need to increase water and fiber intake--may supplement with Benefiber and increasing your activity will also be helpful  - If a stool softener is needed, the following OTC medications are recommend: Colace 100 mg by mouth twice daily, Miralax 17 gm by mouth 1-3 times daily with glass of water, dulcolax suppositories, and Fleets enemas    Wound Care  - Dakins wet-to-dry dressing to open abdominal wound daily and as needed  - If incisional bleeding is noted, hold firm pressure for 15-20 minutes. If remains uncontrolled, either contact the office or present to nearest ED  - It is common to have bruising or mild redness around the wounds within the first few days after surgery. If it worsens, or starts draining, do not hesitate to contact the office  - May shower but no tub baths or swimming pools--do not submerge incisions under water    Cautions  - Watch for signs of infection, such as: fever over 100.5, excessive warmth or redness around incisions, bloody or cloudy drainage from incisions  - Watch for signs of complication: uncontrolled pain, nausea, bloating, sudden lightheadedness  - Serious problems can arise after surgery that need urgent or immediate care  - Do not hesitate to contact the office with any questions    Follow-up with your surgeon in  1 week. Call [phone] to schedule follow-up visit.

## 2023-02-23 NOTE — PROGRESS NOTES
CLINICAL PHARMACY NOTE: MEDS TO BEDS    Total # of Prescriptions Filled: 3   The following medications were delivered to the patient:  Dankins  Amoxicillin (replaced ampicillin, not in stock) pt aware  Levofloxacin     Additional Documentation:  Pt picked up

## 2023-02-23 NOTE — DISCHARGE INSTR - COC
Continuity of Care Form    Patient Name: Maddie Espitia   :  1960  MRN:  [id number]    [facility] date:  2023  Discharge date:  2023    Code Status Order: Full Code   Advance Directives:   Advance Care Flowsheet Documentation       Date/Time Healthcare Directive Type of Healthcare Directive Copy in [address] Agent's Name Healthcare Agent's Phone Number    [phone] No, patient does not have an advance directive for healthcare treatment -- -- -- -- --            Admitting Physician:  Billie Power MD  PCP: Megan Richardson    Discharging Nurse: [facility] Unit/Room#: 5PQ-8315/4456-30  Discharging Unit Phone Number: [phone]    Emergency Contact:   Extended Emergency Contact Information  Primary Emergency Contact: Tani Morin, [address] [facility] Phone: [phone]  Mobile Phone: [phone]  Relation: Brother/Sister  Secondary Emergency Contact: Shakir Malhotra Phone: [phone]  Relation: Brother/Sister    Past Surgical History:  Past Surgical History:   Procedure Laterality Date    ABDOMEN SURGERY      COLON RESECTION FOR ULCERATIVE COLITIS THEN HAD ANUS REMOVED    ABDOMEN SURGERY N/A 2022    INCISION AND DRAINAGE OF ABDOMINAL WALL SEROMA (06623) performed by Billie Power MD at [facility] 2023    ABDOMEN INCISION AND DRAINAGE performed by Billie Power MD at [address] [place] [address] N/A 10/14/2022    DRAINAGE OF ABDOMINAL WALL ABSCESS WITH PLACEMENT OF WOUND VAC performed by Billie Power MD at [facility] 87      rectum removed    UPPER GASTROINTESTINAL ENDOSCOPY N/A 2021    EGD BIOPSY performed by Luisa Stock MD at [address] [facility] [address] N/A 2022    EGD DIAGNOSTIC ONLY performed by Luisa Stock MD at [address] [place] [address] VENTRAL HERNIA REPAIR N/A 11/16/2021    OPEN REPAIR INCISIONAL HERNIA WITH MESH, LYSIS OF ADHESIONS performed by Tarah Beckford MD at [facility]       Immunization History:   Immunization History   Administered Date(s) Administered    COVID-19, MODERNA BLUE border, Primary or Immunocompromised, (age 12y+), IM, 100 mcg/0.5mL 01/06/2021, 02/04/2021, 10/28/2021    COVID-19, PFIZER Bivalent BOOSTER, DO NOT Dilute, (age 12y+), IM, 30 mcg/0.3 mL 09/07/2022    COVID-19, PFIZER GRAY top, DO NOT Dilute, (age 15 y+), IM, 30 mcg/0.3 mL 04/11/2022       Active Problems:  Patient Active Problem List   Diagnosis Code    Ileostomy care ([facility] Z43.2    Diabetes mellitus, type II, insulin dependent ([facility] E11.9, Z79.4    Hypothyroidism E03.9    Essential hypertension I10    Mixed hyperlipidemia E78.2    Ulcerative (chronic) enterocolitis, unspecified complication ([address] C44.092    Small bowel obstruction ([address] K56.609    Incarcerated incisional hernia K43.0    Generalized abdominal pain R10.84    Postoperative fever R50.82    Abdominal wall seroma S30. 1XXA    Abdominal wall seroma, initial encounter S30. 1XXA    Abscess of abdominal wall L02.211    Abdominal wall abscess L02.211       Isolation/Infection:   Isolation            No Isolation          Patient Infection Status       Infection Onset Added Last Indicated Last Indicated By Review Planned Expiration Resolved Resolved By    None active    Resolved    COVID-19 (Rule Out) 11/22/21 11/22/21 11/22/21 Respiratory Panel, Molecular, with COVID-19 (Restricted: peds pts or suitable admitted adults) (Ordered)   11/23/21 Rule-Out Test Resulted    C-diff Rule Out 11/21/21 11/21/21 11/21/21 Clostridium difficile toxin/antigen (Ordered)   11/22/21 Rule-Out Test Resulted    COVID-19 (Rule Out) 11/15/21 11/15/21 11/15/21 COVID-19, Rapid (Ordered)   11/15/21 Rule-Out Test Resulted            Nurse Assessment:  Last Vital Signs: /64   Pulse 90   Temp 98.9 °F (37.2 °C) (Oral) Resp 16   Ht 5' 5\" (1.651 m)   Wt 237 lb 3.2 oz (107.6 kg)   LMP 08/16/2010   SpO2 93%   BMI 39.47 kg/m²     Last documented pain score (0-10 scale): Pain Level: 1  Last Weight:   Wt Readings from Last 1 Encounters:   02/21/23 237 lb 3.2 oz (107.6 kg)     Mental Status:  oriented    IV Access:  - None    Nursing Mobility/ADLs:  Walking   Assisted  Transfer  Independent  Bathing  Independent  Dressing  Independent  Toileting  Independent  Feeding  Independent  Med Admin  Independent  Med Delivery   whole    Wound Care Documentation and Therapy:  Negative Pressure Wound Therapy Abdomen Left; Lower (Active)   Number of days: 132       Incision 09/30/22 Abdomen Medial;Upper (Active)   Number of days: 146       Incision 10/14/22 Abdomen Left; Lower (Active)   Number of days: 132       Incision 02/18/23 Abdomen Left; Lower;Medial (Active)   Dressing Status Dry; Intact; Old drainage noted;New dressing applied; Tape changed 02/22/23 2043   Dressing Change Due 02/23/23 02/22/23 2043   Incision Cleansed Other (Comment) 02/22/23 2043   Dressing/Treatment Other (comment) 02/22/23 2043   Closure Other (Comment) 02/22/23 2043   Margins Approximated 02/22/23 2043   Drainage Amount Moderate 02/22/23 2043   Drainage Description Serosanguinous 02/22/23 2043   Odor None 02/22/23 2043   Number of days: 4        Elimination:  Continence:    Bowel: yes  Bladder: Yes  Urinary Catheter: None   Colostomy/Ileostomy/Ileal Conduit: Yes  Ileostomy Ileostomy RLQ-Stomal Appliance: Clean, dry & intact  Ileostomy Ileostomy RLQ-Stoma  Assessment: Pink  Ileostomy Ileostomy RLQ-Peristomal Assessment: Clean, dry & intact  Ileostomy Ileostomy RLQ-Stool Appearance: Loose  Ileostomy Ileostomy RLQ-Stool Color: Brown  Ileostomy Ileostomy RLQ-Stool Amount: Medium    Date of Last BM: 2/22    Intake/Output Summary (Last 24 hours) at 2/23/2023 0924  Last data filed at 2/22/2023 1244  Gross per 24 hour   Intake 240 ml   Output --   Net 240 ml     I/O last 3 completed shifts: In: 480 [P.O.:480]  Out: -     Safety Concerns:     None    Impairments/Disabilities:      Open abdoninal incision with wet to dry dressing    Nutrition Therapy:  Current Nutrition Therapy:   - Oral Diet:  General    Routes of Feeding: Oral  Liquids: No Restrictions  Daily Fluid Restriction: no restriction  Last Modified Barium Swallow with Video (Video Swallowing Test): not done    Treatments at the Time of Hospital Discharge:   Respiratory Treatments: no  Oxygen Therapy:  is not on home oxygen therapy. Ventilator:    - No ventilator support    Rehab Therapies:   Weight Bearing Status/Restrictions: No weight bearing restrictions  Other Medical Equipment (for information only, NOT a DME order):  bath bench  Other Treatments: dressing changes to abdominal dressing, possible wound vac care, ostomy supplies and management    Patient's personal belongings (please select all that are sent with patient):  Xi    RN SIGNATURE:  Electronically signed by Lauren Umaña RN on 2/23/23 at 11:28 AM EST    CASE MANAGEMENT/SOCIAL WORK SECTION    Inpatient Status Date: 02/17/2023    Readmission Risk Assessment Score:  Readmission Risk              Risk of Unplanned Readmission:  15           Discharging to Facility/ Agency   [facility]  [facility] [place] [address] [facility]  Phone: [phone]  Fax:      / signature: Electronically signed by Patt Garcia RN on 2/23/23 at 10:58 AM EST    PHYSICIAN SECTION    Prognosis: Good    Condition at Discharge: Stable    Rehab Potential (if transferring to Rehab): Good    Recommended Labs or Other Treatments After Discharge: Dakins wet-to-dry dressing to open abdominal wound daily and as needed; follow up with Dr. Michi Roldan in 1 week.     Physician Certification: I certify the above information and transfer of Héctor Waters  is necessary for the continuing treatment of the diagnosis listed and that she requires Home Care for greater 30 days.      Update Admission H&P: No change in H&P    PHYSICIAN SIGNATURE:  Electronically signed by CORDELL Cuevas CNP on 2/23/23 at 9:26 AM EST

## 2023-02-27 NOTE — PROGRESS NOTES
Physician Progress Note      PATIENT:               Venkat Lambert  CSN #:                  [id number]  :                       1960  ADMIT DATE:       2023 12:20 PM  [address] DATE:        2023 2:55 PM  RESPONDING  PROVIDER #:        Tai Nunez MD          QUERY TEXT:    Pt admitted with abdominal wall abscess and underwent Abdominal surgery   22. noted documentation of Abscess of abdominal wall. ? If possible,   please document in progress notes and discharge summary the relationship if   any between the Abscess of abdominal wall and the surgery: The medical record reflects the following:  Risk Factors: Abdominal wall abscess with drainage, mx abd surgeries  Clinical Indicators: Past Surgical History: ABDOMEN SURGERY-2022,   LAPAROTOMY-10/14/2022 DRAINAGE OF ABDOMINAL WALL ABSCESS WITH PLACEMENT OF   WOUND VAC. Per H&P-Has associated draining sinus, malodorous, sore at that   area. Has had abd wall fluid collection in that region. Multiple prior abd   surgeries, hernia repairs, subtotal colectomy with ileostomy CT finding:   \"Persistent complex fluid collection in the subcutaneous soft tissue at prior   site of ventral hernia repair\"  Treatment: Continue IV Zosyn and Dakin's dressing care, I&D with debridement  Options provided:  -- Abscess of abdominal wall is due to the surgery  -- Abscess of abdominal wall is unrelated to the surgery  -- Other - I will add my own diagnosis  -- Disagree - Not applicable / Not valid  -- Disagree - Clinically unable to determine / Unknown  -- Refer to Clinical Documentation Reviewer    PROVIDER RESPONSE TEXT:    Abscess of abdominal wall is not due to the surgery.     Query created by: Yasemin Moss on 2023 8:40 PM      Electronically signed by:  Tai Nunez MD 2023 9:08 AM

## 2023-03-02 ENCOUNTER — OFFICE VISIT (OUTPATIENT)
Dept: SURGERY | Age: 63
End: 2023-03-02
Payer: COMMERCIAL

## 2023-03-02 VITALS — WEIGHT: 237 LBS | BODY MASS INDEX: 39.44 KG/M2 | SYSTOLIC BLOOD PRESSURE: 116 MMHG | DIASTOLIC BLOOD PRESSURE: 64 MMHG

## 2023-03-02 DIAGNOSIS — R10.9 LEFT LATERAL ABDOMINAL PAIN: Primary | ICD-10-CM

## 2023-03-02 PROCEDURE — 99212 OFFICE O/P EST SF 10 MIN: CPT | Performed by: SURGERY

## 2023-03-02 PROCEDURE — 3078F DIAST BP <80 MM HG: CPT | Performed by: SURGERY

## 2023-03-02 PROCEDURE — 3074F SYST BP LT 130 MM HG: CPT | Performed by: SURGERY

## 2023-03-02 NOTE — PROGRESS NOTES
[facility] GENERAL AND LAPAROSCOPIC SURGERY          PATIENT NAME: Troy Salter     TODAY'S DATE: 3/2/2023    CC: wound and pain    SUBJECTIVE:    Pt has had improvement at home, light drainage, minimal pain, No fevers.  Eating well, good stomal function and bag seal.     OBJECTIVE:  VITALS:  Wt 237 lb (107.5 kg)   LMP 08/16/2010   BMI 39.44 kg/m²     CONSTITUTIONAL:  awake and alert  LUNGS:  clear to auscultation  ABDOMEN:  normal bowel sounds, soft, non-distended, non-tender, wound open and clean, minimal left lateral pain     Data:      Radiology Review:  None      ASSESSMENT AND PLAN:  Wound repacked, site doing well  Continue Dakin's  Finished antibiotics  See in a week - may change to Nannette Ramachandran MD

## 2023-03-09 ENCOUNTER — OFFICE VISIT (OUTPATIENT)
Dept: SURGERY | Age: 63
End: 2023-03-09

## 2023-03-09 VITALS — BODY MASS INDEX: 39.44 KG/M2 | DIASTOLIC BLOOD PRESSURE: 72 MMHG | SYSTOLIC BLOOD PRESSURE: 124 MMHG | WEIGHT: 237 LBS

## 2023-03-09 DIAGNOSIS — Z09 POSTOP CHECK: Primary | ICD-10-CM

## 2023-03-09 PROCEDURE — 99024 POSTOP FOLLOW-UP VISIT: CPT | Performed by: SURGERY

## 2023-03-09 NOTE — PROGRESS NOTES
[facility] GENERAL AND LAPAROSCOPIC SURGERY          PATIENT NAME: Kalyan Salter     TODAY'S DATE: 3/9/2023    SUBJECTIVE:    Pt doing dressing daily at home, feels she is progressing. Stomal area ok, some wound fluid drainage. No F/C.  Normal diet, bowel activity     OBJECTIVE:  VITALS:  Wt 237 lb (107.5 kg)   LMP 08/16/2010   BMI 39.44 kg/m²     CONSTITUTIONAL:  awake and alert    ABDOMEN:  skin healthy, open left wound, smaller, with good granulation on sides, base with some fibrinous tissue      ASSESSMENT AND PLAN:  Open abd wound  Lower base debrided, wound overall progressing  Off antibiotics  Begin VAC at home  Francisco in a week in office      Raul Gomez MD

## 2023-03-13 ENCOUNTER — TELEPHONE (OUTPATIENT)
Dept: SURGERY | Age: 63
End: 2023-03-13

## 2023-03-13 NOTE — TELEPHONE ENCOUNTER
DESHAWN WITH [facility] WANTED TO LET YOU KNOW PATIENT HAS NOT RECEIVED WOUND VAC AS OF TODAY VISIT. DOING WET TO DRY DRESSINGS.   [phone]

## 2023-03-13 NOTE — TELEPHONE ENCOUNTER
Per Dr. Soraya Kruse, we were under the assumption that pt still had her wound vac from the still healing wound. Verified with pt over the phone that she in fact does NOT have the vac. Called Velvet with the home care and asked her if they needed anything else from us other than what we sent over on Thursday, she stated she did not receive anything. Faxing the new orders over to [facility] today. Wound vac delayed due to miscommunication about still having the vac.

## 2023-03-14 ENCOUNTER — TELEPHONE (OUTPATIENT)
Dept: SURGERY | Age: 63
End: 2023-03-14

## 2023-03-14 NOTE — TELEPHONE ENCOUNTER
WOUND VAC ORDER NEEDS TO BE SENT TO [facility]. THE OTHER PLACE IS OUT OF NETWORK FOR [facility]- PHONE # [phone] FOR KENNETH Clemens. FAX # IS [phone].

## 2023-03-16 ENCOUNTER — OFFICE VISIT (OUTPATIENT)
Dept: SURGERY | Age: 63
End: 2023-03-16

## 2023-03-16 VITALS — SYSTOLIC BLOOD PRESSURE: 124 MMHG | BODY MASS INDEX: 38.94 KG/M2 | DIASTOLIC BLOOD PRESSURE: 70 MMHG | WEIGHT: 234 LBS

## 2023-03-16 DIAGNOSIS — Z09 POSTOP CHECK: Primary | ICD-10-CM

## 2023-03-16 PROCEDURE — 99024 POSTOP FOLLOW-UP VISIT: CPT | Performed by: SURGERY

## 2023-03-16 RX ORDER — AMOXICILLIN AND CLAVULANATE POTASSIUM 875; 125 MG/1; MG/1
1 TABLET, FILM COATED ORAL 2 TIMES DAILY
Qty: 14 TABLET | Refills: 0 | Status: SHIPPED | OUTPATIENT
Start: 2023-03-16 | End: 2023-03-23

## 2023-03-16 RX ORDER — FLUCONAZOLE 100 MG/1
100 TABLET ORAL DAILY
Qty: 7 TABLET | Refills: 0 | Status: SHIPPED | OUTPATIENT
Start: 2023-03-16 | End: 2023-03-23

## 2023-03-16 NOTE — PROGRESS NOTES
[facility] GENERAL AND LAPAROSCOPIC SURGERY          PATIENT NAME: Prudencio Salter     TODAY'S DATE: 3/16/2023    SUBJECTIVE:    Pt without new concerns today. Doing dressings at home. Stable diet, no new abd issues.       OBJECTIVE:  VITALS:  Wt 234 lb (106.1 kg)   LMP 08/16/2010   BMI 38.94 kg/m²     CONSTITUTIONAL:  awake and alert    ABDOMEN:  normal bowel sounds, soft, non-distended, non-tender, wound open, size decreasing, remains open widely, no purulent drainage, base and sides with improving granulation, skin surrounding area irritated from tape         Radiology Review:  No studies      ASSESSMENT AND PLAN:  Open wound dressing redone today  Site packed with saline Kerlix gauze  ABD over top, moving tape for skin health  Add atbx today, beginning [address] in office next week after a couple changes to make sure we continue to improve and VAC is the right plan    Pepito Garcia MD

## 2023-03-20 ENCOUNTER — TELEPHONE (OUTPATIENT)
Dept: SURGERY | Age: 63
End: 2023-03-20

## 2023-03-20 NOTE — TELEPHONE ENCOUNTER
Call from [address] [place] [address]  They called to set up wound vac. Patient will have to pay 50% out of pocket and cannot afford. They stated they are out of network. Patient declined therapy.     Please advise:      [facility] Customer Service:   [phone]

## 2023-03-23 ENCOUNTER — OFFICE VISIT (OUTPATIENT)
Dept: SURGERY | Age: 63
End: 2023-03-23

## 2023-03-23 VITALS — BODY MASS INDEX: 39.61 KG/M2 | SYSTOLIC BLOOD PRESSURE: 124 MMHG | WEIGHT: 238 LBS | DIASTOLIC BLOOD PRESSURE: 70 MMHG

## 2023-03-23 DIAGNOSIS — S30.1XXA ABDOMINAL WALL SEROMA, INITIAL ENCOUNTER: Primary | ICD-10-CM

## 2023-03-23 PROCEDURE — 99024 POSTOP FOLLOW-UP VISIT: CPT | Performed by: SURGERY

## 2023-03-23 NOTE — PROGRESS NOTES
[facility] GENERAL AND LAPAROSCOPIC SURGERY          PATIENT NAME: Froylan Salter     TODAY'S DATE: 3/23/2023    SUBJECTIVE:    Pt here for wound check. Has started using the VAC. Completed / off antibiotics. OBJECTIVE:  VITALS:  Wt 238 lb (108 kg)   LMP 08/16/2010   BMI 39.61 kg/m²     CONSTITUTIONAL:  awake and alert    ABDOMEN:  normal bowel sounds, soft, non-distended, tenderness noted at the wound area. VAC removed, area of sides is clean, base with some fibrinous debris, small tunnel on upper side         ASSESSMENT AND PLAN:    Small amount of fibrinous debris cleaned off base of wound, sides have really healthy looking granulation tissue. Replaced VAC. They have been putting a venting hole in the plastic which is not correct for proper seal, fluid drainage, and maintaining suction pressure on the wound. Will make the change to stop doing that. Will david in office again in 2 weeks.       Hubert Umanzor MD

## 2023-04-04 ENCOUNTER — OFFICE VISIT (OUTPATIENT)
Dept: SURGERY | Age: 63
End: 2023-04-04

## 2023-04-04 VITALS — SYSTOLIC BLOOD PRESSURE: 126 MMHG | DIASTOLIC BLOOD PRESSURE: 62 MMHG | BODY MASS INDEX: 39.77 KG/M2 | WEIGHT: 239 LBS

## 2023-04-04 DIAGNOSIS — Z09 POSTOP CHECK: Primary | ICD-10-CM

## 2023-04-04 PROCEDURE — 99024 POSTOP FOLLOW-UP VISIT: CPT | Performed by: SURGERY

## 2023-04-04 NOTE — PROGRESS NOTES
[facility] GENERAL AND LAPAROSCOPIC SURGERY          PATIENT NAME: Donna Salter     TODAY'S DATE: 4/4/2023    SUBJECTIVE:    Pt here for wound check.      OBJECTIVE:  VITALS:  Wt 239 lb (108.4 kg)   LMP 08/16/2010   BMI 39.77 kg/m²     CONSTITUTIONAL:  awake and alert  ABDOMEN:  normal bowel sounds, soft, non-distended, non-tender, VAC removed, wound clean, no odor, marco a, granulating, tissue present     Data:    Radiology Review:  None      ASSESSMENT AND PLAN:    Open abd wound  VAC working well  Changed VAC and some fibrinous tissue at base debrided  See back again next week    Chencho Fall MD

## 2023-04-08 ENCOUNTER — APPOINTMENT (OUTPATIENT)
Dept: CT IMAGING | Age: 63
DRG: 552 | End: 2023-04-08
Payer: COMMERCIAL

## 2023-04-08 ENCOUNTER — HOSPITAL ENCOUNTER (EMERGENCY)
Age: 63
Discharge: HOME | DRG: 552 | End: 2023-04-08
Payer: COMMERCIAL

## 2023-04-08 VITALS
WEIGHT: 239.9 LBS | DIASTOLIC BLOOD PRESSURE: 81 MMHG | BODY MASS INDEX: 39.97 KG/M2 | HEART RATE: 93 BPM | OXYGEN SATURATION: 97 % | RESPIRATION RATE: 18 BRPM | TEMPERATURE: 98.3 F | HEIGHT: 65 IN | SYSTOLIC BLOOD PRESSURE: 149 MMHG

## 2023-04-08 DIAGNOSIS — S33.5XXA LUMBAR SPRAIN, INITIAL ENCOUNTER: Primary | ICD-10-CM

## 2023-04-08 LAB
ALBUMIN SERPL-MCNC: 3.7 G/DL (ref 3.4–5)
ALBUMIN/GLOB SERPL: 1 {RATIO} (ref 1.1–2.2)
ALP SERPL-CCNC: 107 U/L (ref 40–129)
ALT SERPL-CCNC: 16 U/L (ref 10–40)
ANION GAP SERPL CALCULATED.3IONS-SCNC: 13 MMOL/L (ref 3–16)
AST SERPL-CCNC: 18 U/L (ref 15–37)
BACTERIA URNS QL MICRO: NORMAL /HPF
BASOPHILS # BLD: 0.1 K/UL (ref 0–0.2)
BASOPHILS NFR BLD: 0.7 %
BILIRUB SERPL-MCNC: 0.3 MG/DL (ref 0–1)
BILIRUB UR QL STRIP.AUTO: NEGATIVE
BUN SERPL-MCNC: 8 MG/DL (ref 7–20)
CALCIUM SERPL-MCNC: 9 MG/DL (ref 8.3–10.6)
CHLORIDE SERPL-SCNC: 103 MMOL/L (ref 99–110)
CLARITY UR: CLEAR
CO2 SERPL-SCNC: 22 MMOL/L (ref 21–32)
COLOR UR: YELLOW
CREAT SERPL-MCNC: 0.6 MG/DL (ref 0.6–1.2)
DEPRECATED RDW RBC AUTO: 15.2 % (ref 12.4–15.4)
EOSINOPHIL # BLD: 0 K/UL (ref 0–0.6)
EOSINOPHIL NFR BLD: 0.1 %
EPI CELLS #/AREA URNS AUTO: 2 /HPF (ref 0–5)
GFR SERPLBLD CREATININE-BSD FMLA CKD-EPI: >60 ML/MIN/{1.73_M2}
GLUCOSE SERPL-MCNC: 205 MG/DL (ref 70–99)
GLUCOSE UR STRIP.AUTO-MCNC: NEGATIVE MG/DL
HCT VFR BLD AUTO: 36.7 % (ref 36–48)
HGB BLD-MCNC: 11.9 G/DL (ref 12–16)
HGB UR QL STRIP.AUTO: ABNORMAL
HYALINE CASTS #/AREA URNS AUTO: 0 /LPF (ref 0–8)
KETONES UR STRIP.AUTO-MCNC: NEGATIVE MG/DL
LEUKOCYTE ESTERASE UR QL STRIP.AUTO: NEGATIVE
LYMPHOCYTES # BLD: 1.4 K/UL (ref 1–5.1)
LYMPHOCYTES NFR BLD: 19.6 %
MCH RBC QN AUTO: 26.2 PG (ref 26–34)
MCHC RBC AUTO-ENTMCNC: 32.3 G/DL (ref 31–36)
MCV RBC AUTO: 81.2 FL (ref 80–100)
MONOCYTES # BLD: 0.4 K/UL (ref 0–1.3)
MONOCYTES NFR BLD: 5.4 %
NEUTROPHILS # BLD: 5.3 K/UL (ref 1.7–7.7)
NEUTROPHILS NFR BLD: 74.2 %
NITRITE UR QL STRIP.AUTO: NEGATIVE
PH UR STRIP.AUTO: 6 [PH] (ref 5–8)
PLATELET # BLD AUTO: 256 K/UL (ref 135–450)
PMV BLD AUTO: 6.6 FL (ref 5–10.5)
POTASSIUM SERPL-SCNC: 4 MMOL/L (ref 3.5–5.1)
PROT SERPL-MCNC: 7.4 G/DL (ref 6.4–8.2)
PROT UR STRIP.AUTO-MCNC: NEGATIVE MG/DL
RBC # BLD AUTO: 4.52 M/UL (ref 4–5.2)
RBC CLUMPS #/AREA URNS AUTO: 3 /HPF (ref 0–4)
SODIUM SERPL-SCNC: 138 MMOL/L (ref 136–145)
SP GR UR STRIP.AUTO: 1.01 (ref 1–1.03)
UA COMPLETE W REFLEX CULTURE PNL UR: ABNORMAL
UA DIPSTICK W REFLEX MICRO PNL UR: YES
URN SPEC COLLECT METH UR: ABNORMAL
UROBILINOGEN UR STRIP-ACNC: 0.2 E.U./DL
WBC # BLD AUTO: 7.1 K/UL (ref 4–11)
WBC #/AREA URNS AUTO: 1 /HPF (ref 0–5)

## 2023-04-08 PROCEDURE — 6370000000 HC RX 637 (ALT 250 FOR IP): Performed by: PHYSICIAN ASSISTANT

## 2023-04-08 PROCEDURE — 81001 URINALYSIS AUTO W/SCOPE: CPT

## 2023-04-08 PROCEDURE — 85025 COMPLETE CBC W/AUTO DIFF WBC: CPT

## 2023-04-08 PROCEDURE — 80053 COMPREHEN METABOLIC PANEL: CPT

## 2023-04-08 PROCEDURE — 99284 EMERGENCY DEPT VISIT MOD MDM: CPT

## 2023-04-08 PROCEDURE — 72131 CT LUMBAR SPINE W/O DYE: CPT

## 2023-04-08 RX ORDER — ONDANSETRON 4 MG/1
4 TABLET, ORALLY DISINTEGRATING ORAL ONCE
Status: COMPLETED | OUTPATIENT
Start: 2023-04-08 | End: 2023-04-08

## 2023-04-08 RX ORDER — CYCLOBENZAPRINE HCL 10 MG
10 TABLET ORAL ONCE
Status: COMPLETED | OUTPATIENT
Start: 2023-04-08 | End: 2023-04-08

## 2023-04-08 RX ORDER — LIDOCAINE 4 G/G
1 PATCH TOPICAL DAILY
Status: DISCONTINUED | OUTPATIENT
Start: 2023-04-08 | End: 2023-04-08 | Stop reason: HOSPADM

## 2023-04-08 RX ORDER — CYCLOBENZAPRINE HCL 10 MG
10 TABLET ORAL 3 TIMES DAILY PRN
Qty: 20 TABLET | Refills: 0 | Status: ON HOLD | OUTPATIENT
Start: 2023-04-08 | End: 2023-04-13 | Stop reason: HOSPADM

## 2023-04-08 RX ORDER — LIDOCAINE 4 G/G
1 PATCH TOPICAL EVERY 24 HOURS
Qty: 30 PATCH | Refills: 0 | Status: ON HOLD | OUTPATIENT
Start: 2023-04-08 | End: 2023-04-13 | Stop reason: SDUPTHER

## 2023-04-08 RX ADMIN — CYCLOBENZAPRINE 10 MG: 10 TABLET, FILM COATED ORAL at 15:36

## 2023-04-08 RX ADMIN — ONDANSETRON 4 MG: 4 TABLET, ORALLY DISINTEGRATING ORAL at 15:36

## 2023-04-08 ASSESSMENT — PAIN DESCRIPTION - PAIN TYPE: TYPE: ACUTE PAIN

## 2023-04-08 ASSESSMENT — ENCOUNTER SYMPTOMS
RHINORRHEA: 0
SHORTNESS OF BREATH: 0
NAUSEA: 0
ABDOMINAL PAIN: 0
COUGH: 0
DIARRHEA: 0
EYE PAIN: 0
CONSTIPATION: 0
BACK PAIN: 1
SORE THROAT: 0
VOMITING: 0

## 2023-04-08 ASSESSMENT — PAIN DESCRIPTION - LOCATION: LOCATION: ABDOMEN

## 2023-04-08 ASSESSMENT — PAIN SCALES - GENERAL: PAINLEVEL_OUTOF10: 4

## 2023-04-08 ASSESSMENT — PAIN - FUNCTIONAL ASSESSMENT
PAIN_FUNCTIONAL_ASSESSMENT: PREVENTS OR INTERFERES SOME ACTIVE ACTIVITIES AND ADLS
PAIN_FUNCTIONAL_ASSESSMENT: 0-10

## 2023-04-08 ASSESSMENT — PAIN DESCRIPTION - ORIENTATION: ORIENTATION: LOWER

## 2023-04-08 ASSESSMENT — PAIN DESCRIPTION - FREQUENCY: FREQUENCY: CONTINUOUS

## 2023-04-08 ASSESSMENT — PAIN DESCRIPTION - DESCRIPTORS: DESCRIPTORS: ACHING;SHARP;THROBBING

## 2023-04-08 ASSESSMENT — PAIN DESCRIPTION - ONSET: ONSET: PROGRESSIVE

## 2023-04-08 NOTE — DISCHARGE INSTRUCTIONS
Continue Tylenol for pain, take medication as prescribed. Return to the ED if symptoms worsen, fever, chills, nausea, vomiting    Follow up with your PCP next week for a recheck.

## 2023-04-08 NOTE — ED PROVIDER NOTES
Ankle (Up)  · L5 Point Great Toe Up  · L2 L3-L4 Knee Reflex  · S1 Flex Knee  · S2 Plantarflex Toes       Skin:     Findings: No rash. Neurological:      Mental Status: She is alert and oriented to person, place, and time. Gait: Gait normal.   Psychiatric:         Mood and Affect: Mood normal.         Behavior: Behavior normal.       DIAGNOSTIC RESULTS   LABS:    Labs Reviewed   CBC WITH AUTO DIFFERENTIAL - Abnormal; Notable for the following components:       Result Value    Hemoglobin 11.9 (*)     All other components within normal limits   COMPREHENSIVE METABOLIC PANEL W/ REFLEX TO MG FOR LOW K - Abnormal; Notable for the following components:    Glucose 205 (*)     Albumin/Globulin Ratio 1.0 (*)     All other components within normal limits   URINALYSIS WITH REFLEX TO CULTURE - Abnormal; Notable for the following components:    Blood, Urine TRACE (*)     All other components within normal limits   MICROSCOPIC URINALYSIS       When ordered only abnormal lab results are displayed. All other labs were within normal range or not returned as of this dictation. EKG: When ordered, EKG's are interpreted by the Emergency Department Physician in the absence of a cardiologist.  Please see their note for interpretation of EKG. RADIOLOGY:   Non-plain film images such as CT, Ultrasound and MRI are read by the radiologist. Plain radiographic images are visualized and preliminarily interpreted by the ED Provider with the below findings:        Interpretation per the Radiologist below, if available at the time of this note:    [place]   Final Result   1. No evidence of an acute fracture or traumatic malalignment involving the   lumbar spine   2. Multilevel degenerative changes throughout the lumbar region, with   multiple levels of acquired central spinal stenosis, most severe at the L3-4   and L4-5 disc levels. No results found. No results found.     PROCEDURES   Unless otherwise

## 2023-04-10 ENCOUNTER — APPOINTMENT (OUTPATIENT)
Dept: CT IMAGING | Age: 63
DRG: 552 | End: 2023-04-10
Payer: COMMERCIAL

## 2023-04-10 ENCOUNTER — HOSPITAL ENCOUNTER (INPATIENT)
Age: 63
LOS: 3 days | Discharge: HOME | DRG: 552 | End: 2023-04-13
Attending: EMERGENCY MEDICINE | Admitting: FAMILY MEDICINE
Payer: COMMERCIAL

## 2023-04-10 DIAGNOSIS — M54.41 LEFT-SIDED LOW BACK PAIN WITH BILATERAL SCIATICA, UNSPECIFIED CHRONICITY: ICD-10-CM

## 2023-04-10 DIAGNOSIS — L08.9 WOUND INFECTION: Primary | ICD-10-CM

## 2023-04-10 DIAGNOSIS — T14.8XXA WOUND INFECTION: Primary | ICD-10-CM

## 2023-04-10 DIAGNOSIS — M54.50 ACUTE BILATERAL LOW BACK PAIN WITHOUT SCIATICA: ICD-10-CM

## 2023-04-10 DIAGNOSIS — Z78.9 UNABLE TO CARE FOR SELF: ICD-10-CM

## 2023-04-10 DIAGNOSIS — M54.42 LEFT-SIDED LOW BACK PAIN WITH BILATERAL SCIATICA, UNSPECIFIED CHRONICITY: ICD-10-CM

## 2023-04-10 PROBLEM — T81.49XA POSTOPERATIVE ABSCESS: Status: ACTIVE | Noted: 2023-04-10

## 2023-04-10 LAB
ALBUMIN SERPL-MCNC: 3.5 G/DL (ref 3.4–5)
ALBUMIN/GLOB SERPL: 1 {RATIO} (ref 1.1–2.2)
ALP SERPL-CCNC: 86 U/L (ref 40–129)
ALT SERPL-CCNC: 12 U/L (ref 10–40)
ANION GAP SERPL CALCULATED.3IONS-SCNC: 12 MMOL/L (ref 3–16)
AST SERPL-CCNC: 13 U/L (ref 15–37)
BACTERIA URNS QL MICRO: NORMAL /HPF
BASOPHILS # BLD: 0.1 K/UL (ref 0–0.2)
BASOPHILS NFR BLD: 1.1 %
BILIRUB SERPL-MCNC: 0.3 MG/DL (ref 0–1)
BILIRUB UR QL STRIP.AUTO: NEGATIVE
BUN SERPL-MCNC: 8 MG/DL (ref 7–20)
CALCIUM SERPL-MCNC: 9.1 MG/DL (ref 8.3–10.6)
CHLORIDE SERPL-SCNC: 105 MMOL/L (ref 99–110)
CLARITY UR: CLEAR
CO2 SERPL-SCNC: 23 MMOL/L (ref 21–32)
COLOR UR: YELLOW
CREAT SERPL-MCNC: 0.5 MG/DL (ref 0.6–1.2)
DEPRECATED RDW RBC AUTO: 15 % (ref 12.4–15.4)
EOSINOPHIL # BLD: 0 K/UL (ref 0–0.6)
EOSINOPHIL NFR BLD: 0 %
EPI CELLS #/AREA URNS AUTO: 2 /HPF (ref 0–5)
GFR SERPLBLD CREATININE-BSD FMLA CKD-EPI: >60 ML/MIN/{1.73_M2}
GLUCOSE BLD-MCNC: 130 MG/DL (ref 70–99)
GLUCOSE BLD-MCNC: 184 MG/DL (ref 70–99)
GLUCOSE SERPL-MCNC: 193 MG/DL (ref 70–99)
GLUCOSE UR STRIP.AUTO-MCNC: NEGATIVE MG/DL
HCT VFR BLD AUTO: 34.4 % (ref 36–48)
HGB BLD-MCNC: 11.3 G/DL (ref 12–16)
HGB UR QL STRIP.AUTO: NEGATIVE
HYALINE CASTS #/AREA URNS AUTO: 0 /LPF (ref 0–8)
KETONES UR STRIP.AUTO-MCNC: ABNORMAL MG/DL
LACTATE BLDV-SCNC: 1.8 MMOL/L (ref 0.4–1.9)
LACTATE BLDV-SCNC: 2.4 MMOL/L (ref 0.4–1.9)
LEUKOCYTE ESTERASE UR QL STRIP.AUTO: ABNORMAL
LYMPHOCYTES # BLD: 1.5 K/UL (ref 1–5.1)
LYMPHOCYTES NFR BLD: 22.4 %
MCH RBC QN AUTO: 26.7 PG (ref 26–34)
MCHC RBC AUTO-ENTMCNC: 32.8 G/DL (ref 31–36)
MCV RBC AUTO: 81.4 FL (ref 80–100)
MONOCYTES # BLD: 0.4 K/UL (ref 0–1.3)
MONOCYTES NFR BLD: 5.8 %
NEUTROPHILS # BLD: 4.7 K/UL (ref 1.7–7.7)
NEUTROPHILS NFR BLD: 70.7 %
NITRITE UR QL STRIP.AUTO: NEGATIVE
PERFORMED ON: ABNORMAL
PERFORMED ON: ABNORMAL
PH UR STRIP.AUTO: 7 [PH] (ref 5–8)
PLATELET # BLD AUTO: 237 K/UL (ref 135–450)
PMV BLD AUTO: 7.1 FL (ref 5–10.5)
POTASSIUM SERPL-SCNC: 3.8 MMOL/L (ref 3.5–5.1)
PROT SERPL-MCNC: 6.9 G/DL (ref 6.4–8.2)
PROT UR STRIP.AUTO-MCNC: NEGATIVE MG/DL
RBC # BLD AUTO: 4.23 M/UL (ref 4–5.2)
RBC CLUMPS #/AREA URNS AUTO: 3 /HPF (ref 0–4)
SODIUM SERPL-SCNC: 140 MMOL/L (ref 136–145)
SP GR UR STRIP.AUTO: 1.01 (ref 1–1.03)
UA COMPLETE W REFLEX CULTURE PNL UR: ABNORMAL
UA DIPSTICK W REFLEX MICRO PNL UR: YES
URN SPEC COLLECT METH UR: ABNORMAL
UROBILINOGEN UR STRIP-ACNC: 0.2 E.U./DL
WBC # BLD AUTO: 6.6 K/UL (ref 4–11)
WBC #/AREA URNS AUTO: 1 /HPF (ref 0–5)

## 2023-04-10 PROCEDURE — 99285 EMERGENCY DEPT VISIT HI MDM: CPT

## 2023-04-10 PROCEDURE — 6370000000 HC RX 637 (ALT 250 FOR IP): Performed by: PHYSICIAN ASSISTANT

## 2023-04-10 PROCEDURE — 2580000003 HC RX 258: Performed by: FAMILY MEDICINE

## 2023-04-10 PROCEDURE — 83605 ASSAY OF LACTIC ACID: CPT

## 2023-04-10 PROCEDURE — 6360000002 HC RX W HCPCS: Performed by: FAMILY MEDICINE

## 2023-04-10 PROCEDURE — 96375 TX/PRO/DX INJ NEW DRUG ADDON: CPT

## 2023-04-10 PROCEDURE — 6360000002 HC RX W HCPCS: Performed by: EMERGENCY MEDICINE

## 2023-04-10 PROCEDURE — 1200000000 HC SEMI PRIVATE

## 2023-04-10 PROCEDURE — 36415 COLL VENOUS BLD VENIPUNCTURE: CPT

## 2023-04-10 PROCEDURE — APPNB30 APP NON BILLABLE TIME 0-30 MINS: Performed by: NURSE PRACTITIONER

## 2023-04-10 PROCEDURE — 81001 URINALYSIS AUTO W/SCOPE: CPT

## 2023-04-10 PROCEDURE — 6370000000 HC RX 637 (ALT 250 FOR IP): Performed by: EMERGENCY MEDICINE

## 2023-04-10 PROCEDURE — 6370000000 HC RX 637 (ALT 250 FOR IP): Performed by: FAMILY MEDICINE

## 2023-04-10 PROCEDURE — 87040 BLOOD CULTURE FOR BACTERIA: CPT

## 2023-04-10 PROCEDURE — 85025 COMPLETE CBC W/AUTO DIFF WBC: CPT

## 2023-04-10 PROCEDURE — 96366 THER/PROPH/DIAG IV INF ADDON: CPT

## 2023-04-10 PROCEDURE — 96365 THER/PROPH/DIAG IV INF INIT: CPT

## 2023-04-10 PROCEDURE — 6360000004 HC RX CONTRAST MEDICATION: Performed by: PHYSICIAN ASSISTANT

## 2023-04-10 PROCEDURE — 74177 CT ABD & PELVIS W/CONTRAST: CPT

## 2023-04-10 PROCEDURE — 2580000003 HC RX 258: Performed by: PHYSICIAN ASSISTANT

## 2023-04-10 PROCEDURE — 80053 COMPREHEN METABOLIC PANEL: CPT

## 2023-04-10 PROCEDURE — 96367 TX/PROPH/DG ADDL SEQ IV INF: CPT

## 2023-04-10 PROCEDURE — 83036 HEMOGLOBIN GLYCOSYLATED A1C: CPT

## 2023-04-10 RX ORDER — ONDANSETRON 4 MG/1
4 TABLET, ORALLY DISINTEGRATING ORAL EVERY 8 HOURS PRN
Status: DISCONTINUED | OUTPATIENT
Start: 2023-04-10 | End: 2023-04-13 | Stop reason: HOSPADM

## 2023-04-10 RX ORDER — POLYETHYLENE GLYCOL 3350 17 G/17G
17 POWDER, FOR SOLUTION ORAL DAILY PRN
Status: DISCONTINUED | OUTPATIENT
Start: 2023-04-10 | End: 2023-04-13 | Stop reason: HOSPADM

## 2023-04-10 RX ORDER — LEVOFLOXACIN 5 MG/ML
750 INJECTION, SOLUTION INTRAVENOUS EVERY 24 HOURS
Status: DISCONTINUED | OUTPATIENT
Start: 2023-04-10 | End: 2023-04-10 | Stop reason: ALTCHOICE

## 2023-04-10 RX ORDER — CYCLOBENZAPRINE HCL 10 MG
10 TABLET ORAL ONCE
Status: COMPLETED | OUTPATIENT
Start: 2023-04-10 | End: 2023-04-10

## 2023-04-10 RX ORDER — INSULIN LISPRO 100 [IU]/ML
0-4 INJECTION, SOLUTION INTRAVENOUS; SUBCUTANEOUS NIGHTLY
Status: DISCONTINUED | OUTPATIENT
Start: 2023-04-10 | End: 2023-04-13 | Stop reason: HOSPADM

## 2023-04-10 RX ORDER — DEXTROSE MONOHYDRATE 100 MG/ML
INJECTION, SOLUTION INTRAVENOUS CONTINUOUS PRN
Status: DISCONTINUED | OUTPATIENT
Start: 2023-04-10 | End: 2023-04-13 | Stop reason: HOSPADM

## 2023-04-10 RX ORDER — DICYCLOMINE HYDROCHLORIDE 10 MG/1
10 CAPSULE ORAL EVERY 6 HOURS PRN
COMMUNITY

## 2023-04-10 RX ORDER — HYDROCODONE BITARTRATE AND ACETAMINOPHEN 7.5; 325 MG/1; MG/1
1 TABLET ORAL EVERY 6 HOURS PRN
Qty: 12 TABLET | Refills: 0 | Status: SHIPPED | OUTPATIENT
Start: 2023-04-10 | End: 2023-04-13

## 2023-04-10 RX ORDER — KETOROLAC TROMETHAMINE 30 MG/ML
15 INJECTION, SOLUTION INTRAMUSCULAR; INTRAVENOUS ONCE
Status: COMPLETED | OUTPATIENT
Start: 2023-04-10 | End: 2023-04-10

## 2023-04-10 RX ORDER — ONDANSETRON 2 MG/ML
4 INJECTION INTRAMUSCULAR; INTRAVENOUS ONCE
Status: COMPLETED | OUTPATIENT
Start: 2023-04-10 | End: 2023-04-10

## 2023-04-10 RX ORDER — MORPHINE SULFATE 2 MG/ML
2 INJECTION, SOLUTION INTRAMUSCULAR; INTRAVENOUS EVERY 4 HOURS PRN
Status: DISCONTINUED | OUTPATIENT
Start: 2023-04-10 | End: 2023-04-13 | Stop reason: HOSPADM

## 2023-04-10 RX ORDER — CYCLOBENZAPRINE HCL 10 MG
10 TABLET ORAL 3 TIMES DAILY PRN
Status: DISCONTINUED | OUTPATIENT
Start: 2023-04-10 | End: 2023-04-12

## 2023-04-10 RX ORDER — ONDANSETRON 2 MG/ML
4 INJECTION INTRAMUSCULAR; INTRAVENOUS EVERY 6 HOURS PRN
Status: DISCONTINUED | OUTPATIENT
Start: 2023-04-10 | End: 2023-04-13 | Stop reason: HOSPADM

## 2023-04-10 RX ORDER — ATORVASTATIN CALCIUM 10 MG/1
10 TABLET, FILM COATED ORAL DAILY
Status: DISCONTINUED | OUTPATIENT
Start: 2023-04-10 | End: 2023-04-13 | Stop reason: HOSPADM

## 2023-04-10 RX ORDER — OXYCODONE HYDROCHLORIDE AND ACETAMINOPHEN 5; 325 MG/1; MG/1
1 TABLET ORAL ONCE
Status: COMPLETED | OUTPATIENT
Start: 2023-04-10 | End: 2023-04-10

## 2023-04-10 RX ORDER — ACETAMINOPHEN 650 MG/1
650 SUPPOSITORY RECTAL EVERY 6 HOURS PRN
Status: DISCONTINUED | OUTPATIENT
Start: 2023-04-10 | End: 2023-04-11

## 2023-04-10 RX ORDER — INSULIN LISPRO 100 [IU]/ML
0-8 INJECTION, SOLUTION INTRAVENOUS; SUBCUTANEOUS
Status: DISCONTINUED | OUTPATIENT
Start: 2023-04-10 | End: 2023-04-13 | Stop reason: HOSPADM

## 2023-04-10 RX ORDER — 0.9 % SODIUM CHLORIDE 0.9 %
1000 INTRAVENOUS SOLUTION INTRAVENOUS ONCE
Status: COMPLETED | OUTPATIENT
Start: 2023-04-10 | End: 2023-04-10

## 2023-04-10 RX ORDER — LEVOFLOXACIN 750 MG/1
750 TABLET ORAL DAILY
Qty: 7 TABLET | Refills: 0 | Status: SHIPPED | OUTPATIENT
Start: 2023-04-10 | End: 2023-04-13 | Stop reason: HOSPADM

## 2023-04-10 RX ORDER — SODIUM CHLORIDE 0.9 % (FLUSH) 0.9 %
5-40 SYRINGE (ML) INJECTION PRN
Status: DISCONTINUED | OUTPATIENT
Start: 2023-04-10 | End: 2023-04-13 | Stop reason: HOSPADM

## 2023-04-10 RX ORDER — LEVOTHYROXINE SODIUM 0.03 MG/1
25 TABLET ORAL DAILY
COMMUNITY

## 2023-04-10 RX ORDER — HYDROCODONE BITARTRATE AND ACETAMINOPHEN 5; 325 MG/1; MG/1
1 TABLET ORAL ONCE
Status: COMPLETED | OUTPATIENT
Start: 2023-04-10 | End: 2023-04-10

## 2023-04-10 RX ORDER — ONDANSETRON 4 MG/1
4 TABLET, ORALLY DISINTEGRATING ORAL ONCE
Status: COMPLETED | OUTPATIENT
Start: 2023-04-10 | End: 2023-04-10

## 2023-04-10 RX ORDER — LINEZOLID 600 MG/1
600 TABLET, FILM COATED ORAL 2 TIMES DAILY
Qty: 28 TABLET | Refills: 0 | Status: SHIPPED | OUTPATIENT
Start: 2023-04-10 | End: 2023-04-13 | Stop reason: HOSPADM

## 2023-04-10 RX ORDER — ENOXAPARIN SODIUM 100 MG/ML
30 INJECTION SUBCUTANEOUS 2 TIMES DAILY
Status: DISCONTINUED | OUTPATIENT
Start: 2023-04-10 | End: 2023-04-13 | Stop reason: HOSPADM

## 2023-04-10 RX ORDER — LANOLIN ALCOHOL/MO/W.PET/CERES
400 CREAM (GRAM) TOPICAL DAILY
COMMUNITY

## 2023-04-10 RX ORDER — SODIUM CHLORIDE 0.9 % (FLUSH) 0.9 %
5-40 SYRINGE (ML) INJECTION EVERY 12 HOURS SCHEDULED
Status: DISCONTINUED | OUTPATIENT
Start: 2023-04-10 | End: 2023-04-13 | Stop reason: HOSPADM

## 2023-04-10 RX ORDER — SODIUM CHLORIDE 9 MG/ML
INJECTION, SOLUTION INTRAVENOUS PRN
Status: DISCONTINUED | OUTPATIENT
Start: 2023-04-10 | End: 2023-04-13 | Stop reason: HOSPADM

## 2023-04-10 RX ORDER — ACETAMINOPHEN 325 MG/1
650 TABLET ORAL EVERY 6 HOURS PRN
Status: DISCONTINUED | OUTPATIENT
Start: 2023-04-10 | End: 2023-04-11

## 2023-04-10 RX ORDER — ONDANSETRON 8 MG/1
8 TABLET, ORALLY DISINTEGRATING ORAL EVERY 8 HOURS PRN
Qty: 10 TABLET | Refills: 0 | Status: SHIPPED | OUTPATIENT
Start: 2023-04-10

## 2023-04-10 RX ORDER — LINEZOLID 2 MG/ML
600 INJECTION, SOLUTION INTRAVENOUS ONCE
Status: COMPLETED | OUTPATIENT
Start: 2023-04-10 | End: 2023-04-10

## 2023-04-10 RX ORDER — MONTELUKAST SODIUM 10 MG/1
10 TABLET ORAL DAILY
Status: DISCONTINUED | OUTPATIENT
Start: 2023-04-10 | End: 2023-04-13 | Stop reason: HOSPADM

## 2023-04-10 RX ADMIN — KETOROLAC TROMETHAMINE 15 MG: 30 INJECTION, SOLUTION INTRAMUSCULAR at 11:08

## 2023-04-10 RX ADMIN — VANCOMYCIN HYDROCHLORIDE 2000 MG: 10 INJECTION, POWDER, LYOPHILIZED, FOR SOLUTION INTRAVENOUS at 19:30

## 2023-04-10 RX ADMIN — OXYCODONE AND ACETAMINOPHEN 1 TABLET: 5; 325 TABLET ORAL at 14:09

## 2023-04-10 RX ADMIN — MORPHINE SULFATE 2 MG: 2 INJECTION, SOLUTION INTRAMUSCULAR; INTRAVENOUS at 19:25

## 2023-04-10 RX ADMIN — HYDROCODONE BITARTRATE AND ACETAMINOPHEN 1 TABLET: 5; 325 TABLET ORAL at 08:31

## 2023-04-10 RX ADMIN — ONDANSETRON 4 MG: 4 TABLET, ORALLY DISINTEGRATING ORAL at 08:31

## 2023-04-10 RX ADMIN — ENOXAPARIN SODIUM 30 MG: 100 INJECTION SUBCUTANEOUS at 22:31

## 2023-04-10 RX ADMIN — ONDANSETRON 4 MG: 2 INJECTION INTRAMUSCULAR; INTRAVENOUS at 17:40

## 2023-04-10 RX ADMIN — ONDANSETRON 4 MG: 2 INJECTION INTRAMUSCULAR; INTRAVENOUS at 11:37

## 2023-04-10 RX ADMIN — CYCLOBENZAPRINE 10 MG: 10 TABLET, FILM COATED ORAL at 16:17

## 2023-04-10 RX ADMIN — LEVOFLOXACIN 750 MG: 5 INJECTION, SOLUTION INTRAVENOUS at 12:49

## 2023-04-10 RX ADMIN — IOPAMIDOL 75 ML: 755 INJECTION, SOLUTION INTRAVENOUS at 09:10

## 2023-04-10 RX ADMIN — SODIUM CHLORIDE 1000 ML: 9 INJECTION, SOLUTION INTRAVENOUS at 09:38

## 2023-04-10 RX ADMIN — MONTELUKAST SODIUM 10 MG: 10 TABLET, FILM COATED ORAL at 22:31

## 2023-04-10 RX ADMIN — LINEZOLID 600 MG: 600 INJECTION, SOLUTION INTRAVENOUS at 11:37

## 2023-04-10 ASSESSMENT — PAIN DESCRIPTION - LOCATION
LOCATION: BACK
LOCATION: BACK

## 2023-04-10 ASSESSMENT — PAIN SCALES - GENERAL
PAINLEVEL_OUTOF10: 8
PAINLEVEL_OUTOF10: 6
PAINLEVEL_OUTOF10: 8
PAINLEVEL_OUTOF10: 6
PAINLEVEL_OUTOF10: 8
PAINLEVEL_OUTOF10: 8

## 2023-04-10 ASSESSMENT — ENCOUNTER SYMPTOMS
CONSTIPATION: 0
VOMITING: 0
DIARRHEA: 0
NAUSEA: 0
BACK PAIN: 1
SHORTNESS OF BREATH: 0
ABDOMINAL PAIN: 0
CHEST TIGHTNESS: 0
COLOR CHANGE: 0
COUGH: 0
RESPIRATORY NEGATIVE: 1
PHOTOPHOBIA: 0

## 2023-04-10 ASSESSMENT — PAIN DESCRIPTION - DESCRIPTORS
DESCRIPTORS: ACHING
DESCRIPTORS: ACHING;CRAMPING

## 2023-04-10 ASSESSMENT — PAIN DESCRIPTION - ORIENTATION: ORIENTATION: LOWER;LEFT

## 2023-04-10 ASSESSMENT — PAIN - FUNCTIONAL ASSESSMENT: PAIN_FUNCTIONAL_ASSESSMENT: 0-10

## 2023-04-10 ASSESSMENT — LIFESTYLE VARIABLES
HOW OFTEN DO YOU HAVE A DRINK CONTAINING ALCOHOL: NEVER
HOW MANY STANDARD DRINKS CONTAINING ALCOHOL DO YOU HAVE ON A TYPICAL DAY: PATIENT DOES NOT DRINK
HOW OFTEN DO YOU HAVE A DRINK CONTAINING ALCOHOL: NEVER

## 2023-04-10 ASSESSMENT — PAIN DESCRIPTION - PAIN TYPE: TYPE: CHRONIC PAIN;ACUTE PAIN

## 2023-04-10 NOTE — ED NOTES
Abdominal wound dressing changed by this RN. Dressing changed per patient's routine.      Ana Maria Anderson RN  04/10/23 1910

## 2023-04-10 NOTE — ED NOTES
Writer caren 1 set of BC. Education provided d/t pt touching site after cleaning the area. Pt was moving around and very minimal specimen obtained.       Cordell Baker RN  04/10/23 4203

## 2023-04-10 NOTE — ED PROVIDER NOTES
In addition to the advanced practice provider, I personally saw Mitch Obregon and performed a substantive portion of the visit including all aspects of the medical decision making. Medical Decision Making  ***    I personally saw the patient and independently provided *** minutes of non-concurrent critical care out of the total shared critical care time provided. EKG  The Ekg interpreted by me in the absence of a cardiologist shows. {Exam; heart rhythm:60543} with a rate of ***  Axis is   {Left-right-normal:50150}  QTc is  {normal/acceptable:64434}  Intervals and Durations are unremarkable. No specific ST-T wave changes appreciated. No evidence of acute ischemia. No significant change from prior EKG dated ***      SEP-1  Is this patient to be included in the SEP-1 Core Measure due to severe sepsis or septic shock? {[id number]:20091}    Screenings     [place] Coma Scale  Eye Opening: Spontaneous  Best Verbal Response: Oriented  Best Motor Response: Obeys commands  [place] Coma Scale Score: 15             Patient Referrals:  No follow-up provider specified. Discharge Medications:  New Prescriptions    No medications on file       FINAL IMPRESSION  No diagnosis found. Blood pressure 114/60, pulse 69, temperature 98.1 °F (36.7 °C), temperature source Oral, resp. rate 20, height 5' 5\" (1.651 m), weight 240 lb (108.9 kg), last menstrual period 08/16/2010, SpO2 98 %.      For further details of [address] [place] [address] emergency department encounter, please see documentation by advanced practice provider, CONSTANCE Fitzgerald.
hours as needed for Pain for up to 3 days. Intended supply: 3 days.  Take lowest dose possible to manage pain Max Daily Amount: 4 tablets    LEVOFLOXACIN (LEVAQUIN) 750 MG TABLET    Take 1 tablet by mouth daily for 7 days    LINEZOLID (ZYVOX) 600 MG TABLET    Take 1 tablet by mouth 2 times daily for 14 days    ONDANSETRON (ZOFRAN-ODT) 8 MG TBDP DISINTEGRATING TABLET    Place 1 tablet under the tongue every 8 hours as needed for Nausea or Vomiting       DISCONTINUED MEDICATIONS:  Discontinued Medications    No medications on file              (Please note that portions of this note were completed with a voice recognition program.  Efforts were made to edit the dictations but occasionally words are mis-transcribed.)    CONSTANCE Storm (electronically signed)       CONSTANCE Frey  04/10/23 [address] [place]  04/10/23 1600

## 2023-04-11 LAB
ANION GAP SERPL CALCULATED.3IONS-SCNC: 10 MMOL/L (ref 3–16)
BUN SERPL-MCNC: 8 MG/DL (ref 7–20)
CALCIUM SERPL-MCNC: 8.6 MG/DL (ref 8.3–10.6)
CHLORIDE SERPL-SCNC: 108 MMOL/L (ref 99–110)
CO2 SERPL-SCNC: 23 MMOL/L (ref 21–32)
CREAT SERPL-MCNC: 0.6 MG/DL (ref 0.6–1.2)
DEPRECATED RDW RBC AUTO: 14.7 % (ref 12.4–15.4)
EST. AVERAGE GLUCOSE BLD GHB EST-MCNC: 171.4 MG/DL
GFR SERPLBLD CREATININE-BSD FMLA CKD-EPI: >60 ML/MIN/{1.73_M2}
GLUCOSE BLD-MCNC: 142 MG/DL (ref 70–99)
GLUCOSE BLD-MCNC: 154 MG/DL (ref 70–99)
GLUCOSE BLD-MCNC: 167 MG/DL (ref 70–99)
GLUCOSE BLD-MCNC: 194 MG/DL (ref 70–99)
GLUCOSE SERPL-MCNC: 164 MG/DL (ref 70–99)
HBA1C MFR BLD: 7.6 %
HCT VFR BLD AUTO: 33.9 % (ref 36–48)
HGB BLD-MCNC: 11.1 G/DL (ref 12–16)
MCH RBC QN AUTO: 26.9 PG (ref 26–34)
MCHC RBC AUTO-ENTMCNC: 32.9 G/DL (ref 31–36)
MCV RBC AUTO: 81.9 FL (ref 80–100)
PERFORMED ON: ABNORMAL
PLATELET # BLD AUTO: 195 K/UL (ref 135–450)
PMV BLD AUTO: 6.3 FL (ref 5–10.5)
POTASSIUM SERPL-SCNC: 4.1 MMOL/L (ref 3.5–5.1)
RBC # BLD AUTO: 4.13 M/UL (ref 4–5.2)
SODIUM SERPL-SCNC: 141 MMOL/L (ref 136–145)
VANCOMYCIN SERPL-MCNC: 11 UG/ML
WBC # BLD AUTO: 6.3 K/UL (ref 4–11)

## 2023-04-11 PROCEDURE — 99222 1ST HOSP IP/OBS MODERATE 55: CPT | Performed by: SURGERY

## 2023-04-11 PROCEDURE — APPNB30 APP NON BILLABLE TIME 0-30 MINS: Performed by: NURSE PRACTITIONER

## 2023-04-11 PROCEDURE — 6370000000 HC RX 637 (ALT 250 FOR IP): Performed by: FAMILY MEDICINE

## 2023-04-11 PROCEDURE — 6370000000 HC RX 637 (ALT 250 FOR IP): Performed by: INTERNAL MEDICINE

## 2023-04-11 PROCEDURE — 80048 BASIC METABOLIC PNL TOTAL CA: CPT

## 2023-04-11 PROCEDURE — 85027 COMPLETE CBC AUTOMATED: CPT

## 2023-04-11 PROCEDURE — 94761 N-INVAS EAR/PLS OXIMETRY MLT: CPT

## 2023-04-11 PROCEDURE — 2580000003 HC RX 258: Performed by: FAMILY MEDICINE

## 2023-04-11 PROCEDURE — 80202 ASSAY OF VANCOMYCIN: CPT

## 2023-04-11 PROCEDURE — 36415 COLL VENOUS BLD VENIPUNCTURE: CPT

## 2023-04-11 PROCEDURE — 1200000000 HC SEMI PRIVATE

## 2023-04-11 PROCEDURE — 6360000002 HC RX W HCPCS: Performed by: FAMILY MEDICINE

## 2023-04-11 PROCEDURE — 94640 AIRWAY INHALATION TREATMENT: CPT

## 2023-04-11 RX ORDER — INSULIN GLARGINE 100 [IU]/ML
25 INJECTION, SOLUTION SUBCUTANEOUS 2 TIMES DAILY
Status: DISCONTINUED | OUTPATIENT
Start: 2023-04-11 | End: 2023-04-13 | Stop reason: HOSPADM

## 2023-04-11 RX ORDER — GABAPENTIN 100 MG/1
100 CAPSULE ORAL 3 TIMES DAILY
Status: DISCONTINUED | OUTPATIENT
Start: 2023-04-11 | End: 2023-04-12

## 2023-04-11 RX ORDER — PANTOPRAZOLE SODIUM 40 MG/1
40 TABLET, DELAYED RELEASE ORAL
Status: DISCONTINUED | OUTPATIENT
Start: 2023-04-12 | End: 2023-04-13 | Stop reason: HOSPADM

## 2023-04-11 RX ORDER — DICYCLOMINE HYDROCHLORIDE 10 MG/1
10 CAPSULE ORAL EVERY 6 HOURS PRN
Status: DISCONTINUED | OUTPATIENT
Start: 2023-04-11 | End: 2023-04-13 | Stop reason: HOSPADM

## 2023-04-11 RX ORDER — ACETAMINOPHEN 500 MG
1000 TABLET ORAL EVERY 8 HOURS SCHEDULED
Status: DISCONTINUED | OUTPATIENT
Start: 2023-04-11 | End: 2023-04-13 | Stop reason: HOSPADM

## 2023-04-11 RX ORDER — MULTIVITAMIN WITH IRON
1 TABLET ORAL DAILY
Status: DISCONTINUED | OUTPATIENT
Start: 2023-04-11 | End: 2023-04-13 | Stop reason: HOSPADM

## 2023-04-11 RX ORDER — VITAMIN B COMPLEX
5000 TABLET ORAL DAILY
Status: DISCONTINUED | OUTPATIENT
Start: 2023-04-11 | End: 2023-04-13 | Stop reason: HOSPADM

## 2023-04-11 RX ORDER — INSULIN LISPRO 100 [IU]/ML
0-4 INJECTION, SOLUTION INTRAVENOUS; SUBCUTANEOUS NIGHTLY
Status: DISCONTINUED | OUTPATIENT
Start: 2023-04-11 | End: 2023-04-11

## 2023-04-11 RX ORDER — OXYCODONE HYDROCHLORIDE 5 MG/1
5 TABLET ORAL EVERY 4 HOURS PRN
Status: DISCONTINUED | OUTPATIENT
Start: 2023-04-11 | End: 2023-04-13 | Stop reason: HOSPADM

## 2023-04-11 RX ORDER — CETIRIZINE HYDROCHLORIDE 10 MG/1
10 TABLET ORAL DAILY
Status: DISCONTINUED | OUTPATIENT
Start: 2023-04-11 | End: 2023-04-13 | Stop reason: HOSPADM

## 2023-04-11 RX ORDER — FERROUS SULFATE 325(65) MG
325 TABLET ORAL DAILY
Status: DISCONTINUED | OUTPATIENT
Start: 2023-04-11 | End: 2023-04-13 | Stop reason: HOSPADM

## 2023-04-11 RX ORDER — METHOCARBAMOL 750 MG/1
750 TABLET, FILM COATED ORAL 4 TIMES DAILY PRN
Status: DISCONTINUED | OUTPATIENT
Start: 2023-04-11 | End: 2023-04-13 | Stop reason: HOSPADM

## 2023-04-11 RX ORDER — LIDOCAINE 4 G/G
1 PATCH TOPICAL DAILY
Status: DISCONTINUED | OUTPATIENT
Start: 2023-04-11 | End: 2023-04-13 | Stop reason: HOSPADM

## 2023-04-11 RX ORDER — INSULIN LISPRO 100 [IU]/ML
0-8 INJECTION, SOLUTION INTRAVENOUS; SUBCUTANEOUS
Status: DISCONTINUED | OUTPATIENT
Start: 2023-04-11 | End: 2023-04-11

## 2023-04-11 RX ORDER — OXYCODONE HYDROCHLORIDE 5 MG/1
10 TABLET ORAL EVERY 4 HOURS PRN
Status: DISCONTINUED | OUTPATIENT
Start: 2023-04-11 | End: 2023-04-13 | Stop reason: HOSPADM

## 2023-04-11 RX ADMIN — MORPHINE SULFATE 2 MG: 2 INJECTION, SOLUTION INTRAMUSCULAR; INTRAVENOUS at 01:41

## 2023-04-11 RX ADMIN — GABAPENTIN 100 MG: 100 CAPSULE ORAL at 12:52

## 2023-04-11 RX ADMIN — CYCLOBENZAPRINE 10 MG: 10 TABLET, FILM COATED ORAL at 07:46

## 2023-04-11 RX ADMIN — GABAPENTIN 100 MG: 100 CAPSULE ORAL at 20:24

## 2023-04-11 RX ADMIN — CETIRIZINE HYDROCHLORIDE 10 MG: 10 TABLET, FILM COATED ORAL at 17:03

## 2023-04-11 RX ADMIN — METHOCARBAMOL 750 MG: 750 TABLET ORAL at 17:05

## 2023-04-11 RX ADMIN — THERA TABS 1 TABLET: TAB at 17:04

## 2023-04-11 RX ADMIN — FERROUS SULFATE TAB 325 MG (65 MG ELEMENTAL FE) 325 MG: 325 (65 FE) TAB at 17:03

## 2023-04-11 RX ADMIN — ACETAMINOPHEN 1000 MG: 500 TABLET ORAL at 21:51

## 2023-04-11 RX ADMIN — SODIUM CHLORIDE: 9 INJECTION, SOLUTION INTRAVENOUS at 05:56

## 2023-04-11 RX ADMIN — MORPHINE SULFATE 2 MG: 2 INJECTION, SOLUTION INTRAMUSCULAR; INTRAVENOUS at 08:08

## 2023-04-11 RX ADMIN — INSULIN GLARGINE 25 UNITS: 100 INJECTION, SOLUTION SUBCUTANEOUS at 20:26

## 2023-04-11 RX ADMIN — ENOXAPARIN SODIUM 30 MG: 100 INJECTION SUBCUTANEOUS at 20:25

## 2023-04-11 RX ADMIN — CEFEPIME 2000 MG: 2 INJECTION, POWDER, FOR SOLUTION INTRAVENOUS at 05:57

## 2023-04-11 RX ADMIN — METHOCARBAMOL 750 MG: 750 TABLET ORAL at 23:05

## 2023-04-11 RX ADMIN — MONTELUKAST SODIUM 10 MG: 10 TABLET, FILM COATED ORAL at 20:24

## 2023-04-11 RX ADMIN — VANCOMYCIN HYDROCHLORIDE 1250 MG: 1.25 INJECTION, POWDER, LYOPHILIZED, FOR SOLUTION INTRAVENOUS at 10:52

## 2023-04-11 RX ADMIN — Medication 2 PUFF: at 20:35

## 2023-04-11 RX ADMIN — ATORVASTATIN CALCIUM 10 MG: 10 TABLET, FILM COATED ORAL at 09:19

## 2023-04-11 RX ADMIN — ACETAMINOPHEN 1000 MG: 500 TABLET ORAL at 14:56

## 2023-04-11 RX ADMIN — Medication 5000 UNITS: at 17:03

## 2023-04-11 RX ADMIN — SODIUM CHLORIDE, PRESERVATIVE FREE 10 ML: 5 INJECTION INTRAVENOUS at 20:25

## 2023-04-11 RX ADMIN — ENOXAPARIN SODIUM 30 MG: 100 INJECTION SUBCUTANEOUS at 09:19

## 2023-04-11 RX ADMIN — SODIUM CHLORIDE, PRESERVATIVE FREE 10 ML: 5 INJECTION INTRAVENOUS at 08:09

## 2023-04-11 ASSESSMENT — PAIN DESCRIPTION - DESCRIPTORS
DESCRIPTORS: CRAMPING
DESCRIPTORS: ACHING
DESCRIPTORS: CRAMPING
DESCRIPTORS: CRAMPING;SHARP
DESCRIPTORS: CRAMPING
DESCRIPTORS: CRAMPING
DESCRIPTORS: ACHING

## 2023-04-11 ASSESSMENT — PAIN DESCRIPTION - LOCATION
LOCATION: BACK

## 2023-04-11 ASSESSMENT — PAIN DESCRIPTION - ORIENTATION
ORIENTATION: LEFT;LOWER
ORIENTATION: LOWER;LEFT
ORIENTATION: LOWER
ORIENTATION: LEFT;LOWER
ORIENTATION: LEFT
ORIENTATION: LEFT
ORIENTATION: LOWER
ORIENTATION: LOWER

## 2023-04-11 ASSESSMENT — PAIN SCALES - GENERAL
PAINLEVEL_OUTOF10: 7
PAINLEVEL_OUTOF10: 7
PAINLEVEL_OUTOF10: 3
PAINLEVEL_OUTOF10: 2
PAINLEVEL_OUTOF10: 7
PAINLEVEL_OUTOF10: 7
PAINLEVEL_OUTOF10: 3
PAINLEVEL_OUTOF10: 1

## 2023-04-11 NOTE — CARE COORDINATION
Case Management Assessment  Initial Evaluation    Date/Time of Evaluation: 4/11/2023 9:54 AM  Assessment Completed by: Marylou Beltrán RN    If patient is discharged prior to next notation, then this note serves as note for discharge by case management. Patient Name: Larissa Craft                   YOB: 1960  Diagnosis: Wound infection [T14. 8XXA, L08.9]  Postoperative abscess [T81.49XA]  Acute bilateral low back pain without sciatica [M54.50]  Unable to care for self [Z78.9]                   Date / Time: 4/10/2023  7:53 AM    Patient Admission Status: Inpatient   Readmission Risk (Low < 19, Mod (19-27), High > 27): Readmission Risk Score: 23.7    Current PCP: DIVYA ORELLANA  PCP verified by CM? (P) Yes    Chart Reviewed: Yes      History Provided by: (P) Patient  Patient Orientation: (P) Alert and Oriented    Patient Cognition: (P) Alert    Hospitalization in the last 30 days (Readmission):  No    If yes, Readmission Assessment in CM Navigator will be completed.     Advance Directives:      Code Status: Full Code   Patient's Primary Decision Maker is: (P) Legal Next of Kin    Primary Decision Maker: ElderTrixie - Brother/Sister - [phone]    Discharge Planning:    Patient lives with: (P) Family Members (Lives with her sister) Type of Home: (P) House  Primary Care Giver: (P) Self  Patient Support Systems include: (P) Family Members   Current Financial resources: (P) None  Current community resources: (P) Other (Comment)  Current services prior to admission: (P) [facility]            Current DME:              Type of Home Care services:  (P) OT, PT, Skilled Therapy    ADLS  Prior functional level: (P) Mobility (Pain to ambulate)  Current functional level: (P) Mobility, Bathing, Dressing (weakness)    PT AM-PAC:   /24  OT AM-PAC:   /24    Family can provide assistance at DC: (P) Other (comment) (Sister is able to provide some but not all

## 2023-04-11 NOTE — H&P
HOSPITALISTS HISTORY AND PHYSICAL    04/10/23    Patient Information:  Lakisha Duque is a 65 y.o. female [id number]  PCP:  Prema Garcia (Tel: [phone] )    Chief complaint:    Chief Complaint   Patient presents with    Back Pain     Arrived per [facility] from home d/t back pain; was seen on the 7th for same episode; pt believes arthritis flare up d/t sepsis from ostomy; VSS          History of Present Illness:  Tucker Vargas is a 65 y.o. female with h/o UC , multiple abdominal surgeries, colectomy, ileostomy, hernia repair who presented with   C/o back pain and drainage from ostomy. She follows up with Surgery dr. Nancy Gonzalez . She was taken to OR in 02/23 for debridement and abdominal wall abscess drainage. She also has a wound vac but does not always wear it. Denies abdominal pain , fever  Pt states she lives with her sister who is sick. The pt  is unable to care for self at this time  ED work up   CT abdomen showed   Ventral abdominal wall wound with surrounding scar tissue. There is also   a region of soft tissue thickening inferior to the open wound at the location   of the previously seen abscess. Although no drainable fluid collection is   seen within this region of soft tissue thickening, there are a few foci of   gas within the soft tissue thickening which likely represents early abscess   formation. Labs showed normal WBC count  Surgery was consulted . REVIEW OF SYSTEMS:   Constitutional: Negative for fever,chills or night sweats  ENT: Negative for rhinorrhea, epistaxis, hoarseness, sore throat.   Respiratory: Negative for shortness of breath,wheezing  Cardiovascular: Negative for chest pain, palpitations   Gastrointestinal: Negative for nausea, vomiting, diarrhea  Genitourinary: Negative for polyuria, dysuria   Hematologic/Lymphatic: Negative for bleeding tendency,

## 2023-04-11 NOTE — ED NOTES
Blood glucose obtained at 2128 resulted 130. Results given to Harry Casas RN.       Maria L Allred  04/10/23 2131

## 2023-04-12 ENCOUNTER — APPOINTMENT (OUTPATIENT)
Dept: MRI IMAGING | Age: 63
DRG: 552 | End: 2023-04-12
Payer: COMMERCIAL

## 2023-04-12 PROBLEM — S31.109A OPEN ABDOMINAL WALL WOUND: Status: ACTIVE | Noted: 2023-04-12

## 2023-04-12 LAB
ANION GAP SERPL CALCULATED.3IONS-SCNC: 9 MMOL/L (ref 3–16)
BUN SERPL-MCNC: 10 MG/DL (ref 7–20)
C DIFF TOX A+B STL QL IA: NORMAL
CALCIUM SERPL-MCNC: 8.8 MG/DL (ref 8.3–10.6)
CHLORIDE SERPL-SCNC: 109 MMOL/L (ref 99–110)
CO2 SERPL-SCNC: 24 MMOL/L (ref 21–32)
CREAT SERPL-MCNC: 0.5 MG/DL (ref 0.6–1.2)
CRP SERPL-MCNC: 15.1 MG/L (ref 0–5.1)
DEPRECATED RDW RBC AUTO: 14.9 % (ref 12.4–15.4)
ERYTHROCYTE [SEDIMENTATION RATE] IN BLOOD BY WESTERGREN METHOD: 56 MM/HR (ref 0–30)
GFR SERPLBLD CREATININE-BSD FMLA CKD-EPI: >60 ML/MIN/{1.73_M2}
GLUCOSE BLD-MCNC: 145 MG/DL (ref 70–99)
GLUCOSE BLD-MCNC: 159 MG/DL (ref 70–99)
GLUCOSE BLD-MCNC: 162 MG/DL (ref 70–99)
GLUCOSE BLD-MCNC: 240 MG/DL (ref 70–99)
GLUCOSE SERPL-MCNC: 140 MG/DL (ref 70–99)
HCT VFR BLD AUTO: 33.9 % (ref 36–48)
HGB BLD-MCNC: 10.9 G/DL (ref 12–16)
MCH RBC QN AUTO: 26.4 PG (ref 26–34)
MCHC RBC AUTO-ENTMCNC: 32.3 G/DL (ref 31–36)
MCV RBC AUTO: 81.7 FL (ref 80–100)
PERFORMED ON: ABNORMAL
PLATELET # BLD AUTO: 200 K/UL (ref 135–450)
PMV BLD AUTO: 6.5 FL (ref 5–10.5)
POTASSIUM SERPL-SCNC: 3.6 MMOL/L (ref 3.5–5.1)
RBC # BLD AUTO: 4.15 M/UL (ref 4–5.2)
SODIUM SERPL-SCNC: 142 MMOL/L (ref 136–145)
WBC # BLD AUTO: 5.3 K/UL (ref 4–11)

## 2023-04-12 PROCEDURE — 72195 MRI PELVIS W/O DYE: CPT

## 2023-04-12 PROCEDURE — 80048 BASIC METABOLIC PNL TOTAL CA: CPT

## 2023-04-12 PROCEDURE — 97166 OT EVAL MOD COMPLEX 45 MIN: CPT

## 2023-04-12 PROCEDURE — 84439 ASSAY OF FREE THYROXINE: CPT

## 2023-04-12 PROCEDURE — APPNB30 APP NON BILLABLE TIME 0-30 MINS: Performed by: NURSE PRACTITIONER

## 2023-04-12 PROCEDURE — 36415 COLL VENOUS BLD VENIPUNCTURE: CPT

## 2023-04-12 PROCEDURE — 99231 SBSQ HOSP IP/OBS SF/LOW 25: CPT | Performed by: SURGERY

## 2023-04-12 PROCEDURE — 87324 CLOSTRIDIUM AG IA: CPT

## 2023-04-12 PROCEDURE — 85652 RBC SED RATE AUTOMATED: CPT

## 2023-04-12 PROCEDURE — 1200000000 HC SEMI PRIVATE

## 2023-04-12 PROCEDURE — 97162 PT EVAL MOD COMPLEX 30 MIN: CPT

## 2023-04-12 PROCEDURE — 82746 ASSAY OF FOLIC ACID SERUM: CPT

## 2023-04-12 PROCEDURE — 97535 SELF CARE MNGMENT TRAINING: CPT

## 2023-04-12 PROCEDURE — 6370000000 HC RX 637 (ALT 250 FOR IP): Performed by: FAMILY MEDICINE

## 2023-04-12 PROCEDURE — 97116 GAIT TRAINING THERAPY: CPT

## 2023-04-12 PROCEDURE — 87449 NOS EACH ORGANISM AG IA: CPT

## 2023-04-12 PROCEDURE — 97530 THERAPEUTIC ACTIVITIES: CPT

## 2023-04-12 PROCEDURE — 6360000002 HC RX W HCPCS: Performed by: FAMILY MEDICINE

## 2023-04-12 PROCEDURE — 72148 MRI LUMBAR SPINE W/O DYE: CPT

## 2023-04-12 PROCEDURE — 94640 AIRWAY INHALATION TREATMENT: CPT

## 2023-04-12 PROCEDURE — 82728 ASSAY OF FERRITIN: CPT

## 2023-04-12 PROCEDURE — 2580000003 HC RX 258: Performed by: FAMILY MEDICINE

## 2023-04-12 PROCEDURE — APPSS15 APP SPLIT SHARED TIME 0-15 MINUTES: Performed by: NURSE PRACTITIONER

## 2023-04-12 PROCEDURE — 6370000000 HC RX 637 (ALT 250 FOR IP): Performed by: INTERNAL MEDICINE

## 2023-04-12 PROCEDURE — 86140 C-REACTIVE PROTEIN: CPT

## 2023-04-12 PROCEDURE — 84443 ASSAY THYROID STIM HORMONE: CPT

## 2023-04-12 PROCEDURE — 82607 VITAMIN B-12: CPT

## 2023-04-12 PROCEDURE — 83540 ASSAY OF IRON: CPT

## 2023-04-12 PROCEDURE — 83550 IRON BINDING TEST: CPT

## 2023-04-12 PROCEDURE — 85027 COMPLETE CBC AUTOMATED: CPT

## 2023-04-12 RX ORDER — KETOROLAC TROMETHAMINE 30 MG/ML
30 INJECTION, SOLUTION INTRAMUSCULAR; INTRAVENOUS EVERY 6 HOURS PRN
Status: DISCONTINUED | OUTPATIENT
Start: 2023-04-12 | End: 2023-04-13 | Stop reason: HOSPADM

## 2023-04-12 RX ORDER — GABAPENTIN 100 MG/1
200 CAPSULE ORAL 3 TIMES DAILY
Status: DISCONTINUED | OUTPATIENT
Start: 2023-04-12 | End: 2023-04-13

## 2023-04-12 RX ADMIN — ACETAMINOPHEN 1000 MG: 500 TABLET ORAL at 14:37

## 2023-04-12 RX ADMIN — Medication 2 PUFF: at 21:47

## 2023-04-12 RX ADMIN — SODIUM CHLORIDE, PRESERVATIVE FREE 10 ML: 5 INJECTION INTRAVENOUS at 11:50

## 2023-04-12 RX ADMIN — GABAPENTIN 200 MG: 100 CAPSULE ORAL at 21:09

## 2023-04-12 RX ADMIN — ATORVASTATIN CALCIUM 10 MG: 10 TABLET, FILM COATED ORAL at 11:39

## 2023-04-12 RX ADMIN — MONTELUKAST SODIUM 10 MG: 10 TABLET, FILM COATED ORAL at 21:09

## 2023-04-12 RX ADMIN — PANTOPRAZOLE SODIUM 40 MG: 40 TABLET, DELAYED RELEASE ORAL at 05:07

## 2023-04-12 RX ADMIN — CETIRIZINE HYDROCHLORIDE 10 MG: 10 TABLET, FILM COATED ORAL at 11:40

## 2023-04-12 RX ADMIN — METHOCARBAMOL 750 MG: 750 TABLET ORAL at 05:09

## 2023-04-12 RX ADMIN — ENOXAPARIN SODIUM 30 MG: 100 INJECTION SUBCUTANEOUS at 11:41

## 2023-04-12 RX ADMIN — INSULIN GLARGINE 25 UNITS: 100 INJECTION, SOLUTION SUBCUTANEOUS at 12:07

## 2023-04-12 RX ADMIN — GABAPENTIN 100 MG: 100 CAPSULE ORAL at 07:48

## 2023-04-12 RX ADMIN — OXYCODONE HYDROCHLORIDE 5 MG: 5 TABLET ORAL at 02:02

## 2023-04-12 RX ADMIN — ACETAMINOPHEN 1000 MG: 500 TABLET ORAL at 21:09

## 2023-04-12 RX ADMIN — ACETAMINOPHEN 1000 MG: 500 TABLET ORAL at 05:07

## 2023-04-12 RX ADMIN — CYCLOBENZAPRINE 10 MG: 10 TABLET, FILM COATED ORAL at 11:49

## 2023-04-12 RX ADMIN — FERROUS SULFATE TAB 325 MG (65 MG ELEMENTAL FE) 325 MG: 325 (65 FE) TAB at 11:40

## 2023-04-12 RX ADMIN — ENOXAPARIN SODIUM 30 MG: 100 INJECTION SUBCUTANEOUS at 21:09

## 2023-04-12 RX ADMIN — THERA TABS 1 TABLET: TAB at 11:39

## 2023-04-12 RX ADMIN — CYCLOBENZAPRINE 10 MG: 10 TABLET, FILM COATED ORAL at 02:02

## 2023-04-12 RX ADMIN — Medication 5000 UNITS: at 11:38

## 2023-04-12 RX ADMIN — GABAPENTIN 200 MG: 100 CAPSULE ORAL at 14:37

## 2023-04-12 RX ADMIN — INSULIN GLARGINE 25 UNITS: 100 INJECTION, SOLUTION SUBCUTANEOUS at 21:10

## 2023-04-12 ASSESSMENT — PAIN SCALES - GENERAL
PAINLEVEL_OUTOF10: 3
PAINLEVEL_OUTOF10: 6
PAINLEVEL_OUTOF10: 5
PAINLEVEL_OUTOF10: 3
PAINLEVEL_OUTOF10: 4
PAINLEVEL_OUTOF10: 2
PAINLEVEL_OUTOF10: 4
PAINLEVEL_OUTOF10: 3

## 2023-04-12 ASSESSMENT — PAIN - FUNCTIONAL ASSESSMENT: PAIN_FUNCTIONAL_ASSESSMENT: ACTIVITIES ARE NOT PREVENTED

## 2023-04-12 ASSESSMENT — PAIN DESCRIPTION - ORIENTATION
ORIENTATION: LEFT;LOWER
ORIENTATION: LOWER
ORIENTATION: LEFT;LOWER
ORIENTATION: LEFT
ORIENTATION: LEFT;LOWER

## 2023-04-12 ASSESSMENT — PAIN DESCRIPTION - LOCATION
LOCATION: BACK

## 2023-04-12 ASSESSMENT — PAIN DESCRIPTION - DESCRIPTORS
DESCRIPTORS: ACHING

## 2023-04-12 ASSESSMENT — PAIN DESCRIPTION - PAIN TYPE: TYPE: ACUTE PAIN

## 2023-04-13 VITALS
HEART RATE: 75 BPM | RESPIRATION RATE: 16 BRPM | HEIGHT: 65 IN | TEMPERATURE: 98.9 F | OXYGEN SATURATION: 96 % | SYSTOLIC BLOOD PRESSURE: 132 MMHG | BODY MASS INDEX: 39.99 KG/M2 | WEIGHT: 240 LBS | DIASTOLIC BLOOD PRESSURE: 75 MMHG

## 2023-04-13 PROBLEM — M48.07 FORAMINAL STENOSIS OF LUMBOSACRAL REGION: Status: ACTIVE | Noted: 2023-04-13

## 2023-04-13 PROBLEM — M54.50 LOWER BACK PAIN: Status: ACTIVE | Noted: 2023-04-13

## 2023-04-13 LAB
FERRITIN SERPL IA-MCNC: 82.7 NG/ML (ref 15–150)
FOLATE SERPL-MCNC: >20 NG/ML (ref 4.78–24.2)
GLUCOSE BLD-MCNC: 127 MG/DL (ref 70–99)
GLUCOSE BLD-MCNC: 156 MG/DL (ref 70–99)
IRON SATN MFR SERPL: 12 % (ref 15–50)
IRON SERPL-MCNC: 32 UG/DL (ref 37–145)
PERFORMED ON: ABNORMAL
PERFORMED ON: ABNORMAL
T4 FREE SERPL-MCNC: 1.7 NG/DL (ref 0.9–1.8)
TIBC SERPL-MCNC: 264 UG/DL (ref 260–445)
TSH SERPL DL<=0.005 MIU/L-ACNC: 4.52 UIU/ML (ref 0.27–4.2)
VIT B12 SERPL-MCNC: 375 PG/ML (ref 211–911)

## 2023-04-13 PROCEDURE — 97530 THERAPEUTIC ACTIVITIES: CPT

## 2023-04-13 PROCEDURE — 99231 SBSQ HOSP IP/OBS SF/LOW 25: CPT | Performed by: SURGERY

## 2023-04-13 PROCEDURE — 6370000000 HC RX 637 (ALT 250 FOR IP): Performed by: INTERNAL MEDICINE

## 2023-04-13 PROCEDURE — 6360000002 HC RX W HCPCS: Performed by: FAMILY MEDICINE

## 2023-04-13 PROCEDURE — 97535 SELF CARE MNGMENT TRAINING: CPT

## 2023-04-13 PROCEDURE — 94640 AIRWAY INHALATION TREATMENT: CPT

## 2023-04-13 PROCEDURE — 2580000003 HC RX 258: Performed by: FAMILY MEDICINE

## 2023-04-13 PROCEDURE — 6370000000 HC RX 637 (ALT 250 FOR IP): Performed by: FAMILY MEDICINE

## 2023-04-13 PROCEDURE — 94761 N-INVAS EAR/PLS OXIMETRY MLT: CPT

## 2023-04-13 RX ORDER — GABAPENTIN 100 MG/1
200 CAPSULE ORAL EVERY 6 HOURS
Qty: 240 CAPSULE | Refills: 1 | Status: SHIPPED | OUTPATIENT
Start: 2023-04-13 | End: 2023-05-13

## 2023-04-13 RX ORDER — GABAPENTIN 400 MG/1
400 CAPSULE ORAL 3 TIMES DAILY
Status: DISCONTINUED | OUTPATIENT
Start: 2023-04-13 | End: 2023-04-13

## 2023-04-13 RX ORDER — METHOCARBAMOL 750 MG/1
750 TABLET, FILM COATED ORAL 4 TIMES DAILY PRN
Qty: 30 TABLET | Refills: 1 | Status: SHIPPED | OUTPATIENT
Start: 2023-04-13 | End: 2023-04-23

## 2023-04-13 RX ORDER — LIDOCAINE 4 G/G
1 PATCH TOPICAL EVERY 24 HOURS
Qty: 30 PATCH | Refills: 0 | Status: SHIPPED | OUTPATIENT
Start: 2023-04-13 | End: 2023-05-13

## 2023-04-13 RX ORDER — GABAPENTIN 100 MG/1
200 CAPSULE ORAL EVERY 6 HOURS
Status: DISCONTINUED | OUTPATIENT
Start: 2023-04-13 | End: 2023-04-13 | Stop reason: HOSPADM

## 2023-04-13 RX ADMIN — METHOCARBAMOL 750 MG: 750 TABLET ORAL at 08:55

## 2023-04-13 RX ADMIN — METHOCARBAMOL 750 MG: 750 TABLET ORAL at 01:51

## 2023-04-13 RX ADMIN — GABAPENTIN 200 MG: 100 CAPSULE ORAL at 08:52

## 2023-04-13 RX ADMIN — CETIRIZINE HYDROCHLORIDE 10 MG: 10 TABLET, FILM COATED ORAL at 08:52

## 2023-04-13 RX ADMIN — GABAPENTIN 200 MG: 100 CAPSULE ORAL at 12:54

## 2023-04-13 RX ADMIN — PANTOPRAZOLE SODIUM 40 MG: 40 TABLET, DELAYED RELEASE ORAL at 05:34

## 2023-04-13 RX ADMIN — ENOXAPARIN SODIUM 30 MG: 100 INJECTION SUBCUTANEOUS at 08:52

## 2023-04-13 RX ADMIN — ACETAMINOPHEN 1000 MG: 500 TABLET ORAL at 15:07

## 2023-04-13 RX ADMIN — THERA TABS 1 TABLET: TAB at 08:51

## 2023-04-13 RX ADMIN — Medication 2 PUFF: at 09:24

## 2023-04-13 RX ADMIN — Medication 5000 UNITS: at 08:51

## 2023-04-13 RX ADMIN — FERROUS SULFATE TAB 325 MG (65 MG ELEMENTAL FE) 325 MG: 325 (65 FE) TAB at 08:52

## 2023-04-13 RX ADMIN — SODIUM CHLORIDE, PRESERVATIVE FREE 10 ML: 5 INJECTION INTRAVENOUS at 08:54

## 2023-04-13 RX ADMIN — INSULIN GLARGINE 25 UNITS: 100 INJECTION, SOLUTION SUBCUTANEOUS at 08:54

## 2023-04-13 RX ADMIN — ATORVASTATIN CALCIUM 10 MG: 10 TABLET, FILM COATED ORAL at 08:52

## 2023-04-13 RX ADMIN — ACETAMINOPHEN 1000 MG: 500 TABLET ORAL at 05:34

## 2023-04-13 ASSESSMENT — PAIN SCALES - GENERAL
PAINLEVEL_OUTOF10: 1
PAINLEVEL_OUTOF10: 5
PAINLEVEL_OUTOF10: 2

## 2023-04-13 ASSESSMENT — PAIN DESCRIPTION - PAIN TYPE: TYPE: ACUTE PAIN

## 2023-04-13 ASSESSMENT — PAIN DESCRIPTION - FREQUENCY: FREQUENCY: INTERMITTENT

## 2023-04-13 ASSESSMENT — PAIN - FUNCTIONAL ASSESSMENT: PAIN_FUNCTIONAL_ASSESSMENT: ACTIVITIES ARE NOT PREVENTED

## 2023-04-13 ASSESSMENT — PAIN DESCRIPTION - LOCATION
LOCATION: BACK
LOCATION: BACK

## 2023-04-13 ASSESSMENT — PAIN DESCRIPTION - DESCRIPTORS
DESCRIPTORS: ACHING
DESCRIPTORS: ACHING

## 2023-04-13 ASSESSMENT — PAIN DESCRIPTION - ORIENTATION
ORIENTATION: LEFT
ORIENTATION: LOWER

## 2023-04-13 NOTE — PROGRESS NOTES
Admission assessment completed, pt is alert and oriented, VSS, independent in room, call light within reach, denies any needs at this time, see flowsheets and MAR, will monitor pt. The care plan and education has been reviewed and mutually agreed upon with the patient.
Assessment complete and charted. Wound dressing was C/D/I but changed at 2200 per orders. Pt tolerated well. VSS on RA. Pt denies needs beyond pain meds for back pain at times- MRIs ordered and checklist completed by AM RN. Call light within reach, will continue to monitor.
[facility] Department   Phone: [phone]    Physical Therapy    [x] Initial Evaluation            [] Daily Treatment Note         [] Discharge Summary      Patient: Khalif Mauricio   : 1960   MRN: [id number]   Date of Service:  2023  Admitting Diagnosis: Postoperative abscess  Current Admission Summary: Khalif Mauricio is a 65 y.o. female with h/o UC , multiple abdominal surgeries, colectomy, ileostomy, hernia repair who presented with   C/o back pain and drainage from ostomy. She follows up with Surgery dr. Georgia Aguilar . She was taken to OR in  for debridement and abdominal wall abscess drainage. Past Medical History:  has a past medical history of Asthma, Diabetes mellitus ([facility] [address] GERD (gastroesophageal reflux disease), Hyperlipidemia, Hypertension, Ileostomy care ([facility] [address] Iritis, PCOS (polycystic ovarian syndrome), Pyoderma, Thyroid disease, Ulcerative colitis, and Ulcerative colitis ([facility] [address] Past Surgical History:  has a past surgical history that includes Abdomen surgery; Jejunostomy; ventral hernia repair (N/A, 2021); Upper gastrointestinal endoscopy (N/A, 2021); hernia repair (2021); Upper gastrointestinal endoscopy (N/A, 2022); Abdomen surgery (N/A, 2022); laparotomy (N/A, 10/14/2022); other surgical history; Abdomen surgery (N/A, 2023); and knee surgery. Discharge Recommendations: Khalif Mauricio scored a 23/24 on the AM-PAC short mobility form. Current research shows that an AM-PAC score of 18 or greater is typically associated with a discharge to the patient's home setting. Based on the patient's AM-PAC score and their current functional mobility deficits, it is recommended that the patient have 2-3 sessions per week of Physical Therapy at d/c to increase the patient's independence.   At this time, this patient demonstrates the endurance and safety to discharge home with home health PT this visit and a follow up
[facility] Department   Phone: [phone]    Occupational Therapy    [] Initial Evaluation            [x] Daily Treatment Note         [] Discharge Summary      Patient: Steven Blood   : 1960   MRN: [id number]   Date of Service:  2023    Admitting Diagnosis:  Back pain  Current Admission Summary: 65 y.o. female with h/o UC , multiple abdominal surgeries, colectomy, ileostomy, hernia repair who presented with C/o back pain and drainage from ostomy. She was taken to OR in  for debridement and abdominal wall abscess drainage. Not found to have abscess, CT L-spine with multilevel degenerative changes and spinal canal stenosis most severe at L3-L4 and L4-L5  Past Medical History:  has a past medical history of Asthma, Diabetes mellitus ([address] GERD (gastroesophageal reflux disease), Hyperlipidemia, Hypertension, Ileostomy care ([address] Iritis, PCOS (polycystic ovarian syndrome), Pyoderma, Thyroid disease, Ulcerative colitis, and Ulcerative colitis ([address] Past Surgical History:  has a past surgical history that includes Abdomen surgery; Jejunostomy; ventral hernia repair (N/A, 2021); Upper gastrointestinal endoscopy (N/A, 2021); hernia repair (2021); Upper gastrointestinal endoscopy (N/A, 2022); Abdomen surgery (N/A, 2022); laparotomy (N/A, 10/14/2022); other surgical history; Abdomen surgery (N/A, 2023); and knee surgery. Discharge Recommendations: Steven Blood scored a 21/24 on the AM-PAC ADL Inpatient form. Current research shows that an AM-PAC score of 18 or greater is typically associated with a discharge to the patient's home setting. Based on the patient's AM-PAC score, and their current ADL deficits, it is recommended that the patient have 2-3 sessions per week of Occupational Therapy at d/c to increase the patient's independence.   At this time, this patient demonstrates the endurance and safety to discharge home
[facility] [address] Department   Phone: [phone]    Occupational Therapy    [x] Initial Evaluation            [] Daily Treatment Note         [] Discharge Summary      Patient: Ramona Tuttle   : 1960   MRN: [id number]   Date of Service:  2023    Admitting Diagnosis:  Back pain  Current Admission Summary: 65 y.o. female with h/o UC , multiple abdominal surgeries, colectomy, ileostomy, hernia repair who presented with C/o back pain and drainage from ostomy. She was taken to OR in  for debridement and abdominal wall abscess drainage. Not found to have abscess, CT L-spine with multilevel degenerative changes and spinal canal stenosis most severe at L3-L4 and L4-L5  Past Medical History:  has a past medical history of Asthma, Diabetes mellitus ([address] GERD (gastroesophageal reflux disease), Hyperlipidemia, Hypertension, Ileostomy care ([facility] [address] Iritis, PCOS (polycystic ovarian syndrome), Pyoderma, Thyroid disease, Ulcerative colitis, and Ulcerative colitis ([address] Past Surgical History:  has a past surgical history that includes Abdomen surgery; Jejunostomy; ventral hernia repair (N/A, 2021); Upper gastrointestinal endoscopy (N/A, 2021); hernia repair (2021); Upper gastrointestinal endoscopy (N/A, 2022); Abdomen surgery (N/A, 2022); laparotomy (N/A, 10/14/2022); other surgical history; Abdomen surgery (N/A, 2023); and knee surgery. Discharge Recommendations: Ramona Tuttle scored a 21/24 on the AM-PAC ADL Inpatient form. Current research shows that an AM-PAC score of 18 or greater is typically associated with a discharge to the patient's home setting. Based on the patient's AM-PAC score, and their current ADL deficits, it is recommended that the patient have 2-3 sessions per week of Occupational Therapy at d/c to increase the patient's independence.   At this time, this patient demonstrates the endurance and safety to discharge home
CLINICAL PHARMACY NOTE: MEDS TO BEDS    Total # of Prescriptions Filled: 2   The following medications were delivered to the patient:  Methocarbamol 750 mg  Gabapentin 100 mg    Additional Documentation:  Delivered to patient=signed  Ok to be delivered per [employer]
Clinical Pharmacy Note: Pharmacy to Dose Vancomycin    Axel Holt is a 65 y.o. female started on Vancomycin for SSTI; consult received from Dr. Tammy Nava to manage therapy. Also receiving the following antibiotics: cefepime. Goal AUC: 400-600 mg/L*hr  Goal Trough Level: 15 mcg/mL    Assessment/Plan:  A 2000 mg loading dose was scheduled to be given on 4/10/23 at 1830  Initiate vancomycin 1250 mg IV every 12 hours. Bayesian modeling predicts an AUC of 460 mg/L*hr and a trough of 14.4 mcg/mL at steady state concentration. A vancomycin random level has been ordered for 4/11/23 at 0600  Changes in regimen will be determined based on culture results, renal function, and clinical response. Pharmacy will continue to monitor and adjust regimen as necessary. Allergies:  Patient has no known allergies. Recent Labs     04/08/23  1540 04/10/23  0810   CREATININE 0.6 0.5*       Recent Labs     04/08/23  1540 04/10/23  0810   WBC 7.1 6.6       Ht Readings from Last 1 Encounters:   04/10/23 5' 5\" (1.651 m)        Wt Readings from Last 1 Encounters:   04/10/23 240 lb (108.9 kg)       Estimated Creatinine Clearance: 145 mL/min (A) (based on SCr of 0.5 mg/dL (L)).     Thank you for the consult,    Taty Parker, PharmD  PGY-1 Pharmacy Resident  [id number]
[facility]        Progress Note    Patient Name: Angélica Oliveira  MRN: [id number]  YOB: 1960  Date of Evaluation: 2023    Chief Complaint: Abdominal wound    Subjective:  No acute events overnight  No pain at wound site; back pain improved with Gabapentin  No nausea or vomiting  Resting in bed at this time      Vital Signs:  Patient Vitals for the past 24 hrs:   BP Temp Temp src Pulse Resp SpO2   23 1115 129/78 98.8 °F (37.1 °C) Oral 88 16 97 %   23 99/66 98.9 °F (37.2 °C) Oral 76 16 96 %   23 -- -- -- 78 16 95 %   23 120/76 99.3 °F (37.4 °C) Oral 83 18 97 %      TEMPERATURE HISTORY 24H: Temp (24hrs), Av °F (37.2 °C), Min:98.8 °F (37.1 °C), Max:99.3 °F (37.4 °C)    BLOOD PRESSURE HISTORY: Systolic (27KHU), XXT:473 , Min:99 , OPU:392    Diastolic (05ISK), LEI:45, Min:66, Max:78      Intake/Output:  I/O last 3 completed shifts: In: 300 [P.O.:300]  Out: -   No intake/output data recorded.   Drain/tube Output:       Physical Exam:  General: awake, alert, oriented to  person, place, time  Lungs: unlabored respirations  Abdomen: soft, nontender, nondistended, stoma unremarkable   Skin/Wound: open abdominal wound bed is clean with only scant fibrinous debris--dressing changed    Labs:  CBC:    Recent Labs     04/10/23  0810 23  0459 23  0436   WBC 6.6 6.3 5.3   HGB 11.3* 11.1* 10.9*   HCT 34.4* 33.9* 33.9*    195 200     BMP:    Recent Labs     04/10/23  0810 23  0459 23  0435    141 142   K 3.8 4.1 3.6    108 109   CO2    BUN 8 8 10   CREATININE 0.5* 0.6 0.5*   GLUCOSE 193* 164* 140*     Hepatic:    Recent Labs     04/10/23  0810   AST 13*   ALT 12   BILITOT 0.3   ALKPHOS 86     Amylase:  No results found for: AMYLASE  Lipase:    Lab Results   Component Value Date/Time    LIPASE 19.0 2023 01:22 PM    LIPASE 43.0 10/12/2022 01:39 PM    LIPASE 58.0 2021
Hospitalist Progress Note      PCP: Alejandro OERLLANA    Date of Admission: 4/10/2023    Chief Complaint: Back pain    Hospital Course:   Som Kumar is a 65 y.o. female with h/o UC, multiple abdominal surgeries, colectomy, ileostomy, hernia repair with longstanding open abdominal wound who presented with c/o lower back pain and drainage from ostomy. She follows up with Dr. Sherif Blandon. She recently had abdominal wall debridement on 2/18/2023. She admitted wound VAC had not been functioning properly for the past few days prior to admission. She denied abdominal pain, fever, chills, nausea or vomiting. Pt stated she lives with her sister who is sick but she is unable to care to care for herself and requested SNF placement. CT done in the ED was concerning for a possible early abscess formation in the ventral abdominal wall. Subjective: Patient seen and examined. Complaining of lower back pain with difficulty ambulating.          Medications:  Reviewed    Infusion Medications    dextrose      sodium chloride 10 mL/hr at 04/11/23 0556     Scheduled Medications    gabapentin  200 mg Oral TID    acetaminophen  1,000 mg Oral 3 times per day    lidocaine  1 patch TransDERmal Daily    Vitamin D  5,000 Units Oral Daily    ferrous sulfate  325 mg Oral Daily    mometasone-formoterol  2 puff Inhalation BID    cetirizine  10 mg Oral Daily    multivitamin  1 tablet Oral Daily    pantoprazole  40 mg Oral QAM AC    insulin glargine  25 Units SubCUTAneous BID    atorvastatin  10 mg Oral Daily    sodium chloride flush  5-40 mL IntraVENous 2 times per day    enoxaparin  30 mg SubCUTAneous BID    insulin lispro  0-8 Units SubCUTAneous TID WC    insulin lispro  0-4 Units SubCUTAneous Nightly    montelukast  10 mg Oral Daily     PRN Meds: methocarbamol, oxyCODONE **OR** oxyCODONE, dicyclomine, cyclobenzaprine, morphine, dextrose bolus **OR** dextrose bolus, glucagon (rDNA), dextrose, sodium chloride flush, sodium
Hospitalist Progress Note      PCP: Nicola ORELLANA    Date of Admission: 4/10/2023    Chief Complaint: Back pain    Hospital Course:   Giovanny Perez is a 65 y.o. female with h/o UC , multiple abdominal surgeries, colectomy, ileostomy, hernia repair who presented with   C/o back pain and drainage from ostomy. She follows up with Surgery dr. Hemal Vides . She was taken to OR in 02/23 for debridement and abdominal wall abscess drainage. She also has a wound vac but does not always wear it. Denies abdominal pain , fever  Pt states she lives with her sister who is sick. The pt  is unable to care for self at this time  ED work up   CT abdomen showed   Ventral abdominal wall wound with surrounding scar tissue. There is also   a region of soft tissue thickening inferior to the open wound at the location   of the previously seen abscess. Although no drainable fluid collection is   seen within this region of soft tissue thickening, there are a few foci of   gas within the soft tissue thickening which likely represents early abscess   formation. Labs showed normal WBC count  Surgery was consulted . Subjective: Patient seen and examined. Complaining of lower back pain with difficulty ambulating.          Medications:  Reviewed    Infusion Medications    dextrose      sodium chloride 10 mL/hr at 04/11/23 0556     Scheduled Medications    gabapentin  100 mg Oral TID    acetaminophen  1,000 mg Oral 3 times per day    lidocaine  1 patch TransDERmal Daily    Vitamin D3  5,000 Units Oral Daily    Iron  325 mg Oral Daily    mometasone-formoterol  2 puff Inhalation BID    cetirizine  10 mg Oral Daily    Multivitamin Adult  1 tablet Oral Daily    [START ON 4/12/2023] pantoprazole  40 mg Oral QAM AC    insulin glargine  25 Units SubCUTAneous BID    atorvastatin  10 mg Oral Daily    sodium chloride flush  5-40 mL IntraVENous 2 times per day    enoxaparin  30 mg SubCUTAneous BID    insulin lispro  0-8 Units
Patient seen in ED, room 23. Admission completed with the following exceptions:  4 Eyes Assessment, Immunizations, Covid Vaccines, Rights and Responsibilities, Orientation to room, Plan of Care, Education/Learning Assessment and Education Plan, white board, height and weight, pain assessment and head to toe assessment. Patient is alert and oriented X 4. Patient lives alone in a ranch style house and is being admitted for postoperative abscess. She is aware that she may need to go to a skilled nursing home at discharge. She has wound vac and ostomy supplies in the car. Home Medication reconciliation was completed by Elizabet Honey leora. Order entered for Spiritual Care for Advance directives. Plan of care updated if indicated. All questions answered.
Pharmacy Home Medication Reconciliation Note    A medication reconciliation has been completed for Moshe Hunt 1960    Pharmacy: [address]  Information provided by: patient    The patient's home medication list is as follows: No current facility-administered medications on file prior to encounter. Current Outpatient Medications on File Prior to Encounter   Medication Sig Dispense Refill    magnesium oxide (MAG-OX) 400 (240 Mg) MG tablet Take 1 tablet by mouth daily      dicyclomine (BENTYL) 10 MG capsule Take 1 capsule by mouth every 6 hours as needed (Take 1 capsule by mouth as needed for stomach spasms.)      levothyroxine (SYNTHROID) 25 MCG tablet Take 1 tablet by mouth Daily      cyclobenzaprine (FLEXERIL) 10 MG tablet Take 1 tablet by mouth 3 times daily as needed for Muscle spasms 20 tablet 0    lidocaine 4 % external patch Place 1 patch onto the skin every 24 hours Place 1 patch onto the skin daily 12 hours on, 12 hours off. 30 patch 0    Sodium Hypochlorite (DAKINS) 0.25 % SOLN Irrigate with 473 mLs as directed daily Dakins wet-to-dry dressing to open abdominal wound daily and PRN (Patient not taking: Reported on 4/10/2023) 6622 mL 1    [DISCONTINUED] Dicyclomine HCl (BENTYL PO) Take 10 mg by mouth as needed (As needed for stomach spasms.)      insulin glargine (LANTUS SOLOSTAR) 100 UNIT/ML injection pen INJECT 50 UNITS SUBCUTANEOUSLY TWICE DAILY 90 mL 1    Continuous Blood Gluc Sensor (FREESTYLE EDY 2 SENSOR) MISC CHANGE EVERY 14 DAYS TO MONITOR BS 6 each 1    insulin aspart (NOVOLOG FLEXPEN) 100 UNIT/ML injection pen INJECT 25 UNITS SUBCUTANEOUSLY TWICE DAILY 45 mL 3    Continuous Blood Gluc Sensor (FREESTYLE EDY 2 SENSOR) MISC Change every 14 days 2 each 5    Insulin Pen Needle (DROPLET PEN NEEDLES) 31G X 8 MM MISC USE TO INJECT INSULIN FOUR TIMES DAILY 400 each 5    blood glucose test strips (ONETOUCH ULTRA) strip Test 4 times daily.  150 each 5
Physical/Occupational Therapy  Delphine Salter  Orders received, chart reviewed. Initiated PT/OT evaluation this date and assisted pt to bathroom however MD arriving and then RN arriving to administer medication and therapy unable to complete evaluation at this time. Will re-attempt evaluation later this date as schedule allows.    [address] [facility] PT, DPT [id number]    Yarelis Donaldson, MOT OTR/L [id number]    18 minutes spent with pt
Shift assessment completed. Medicine given per [facility] Call light within reach. The care plan and education has been reviewed and mutually agreed upon with the patient. Dressing was changed by MD. Terrie Chen in place.
CONTRAST, 4/12/2023 9:07 am TECHNIQUE: Multiplanar multisequence MRI of the lumbar spine was performed without the administration of intravenous contrast. COMPARISON: None. HISTORY: ORDERING SYSTEM PROVIDED HISTORY: Lower back pain FINDINGS: BONES/ALIGNMENT: There is normal alignment of the spine. The vertebral body heights are maintained. The bone marrow signal appears unremarkable. Moderate to severe degenerative disc space narrowing with opposing fatty endplate degenerative signal changes at L4-5. The moderate degenerative disc space narrowing at L5-S1. Large intraosseous hemangioma involving the L5 vertebral body. Smaller hemangiomas involving the L2 and L3 vertebral bodies. SPINAL CORD: The conus terminates normally. SOFT TISSUES: No paraspinal mass identified. L1-L2: There is no significant disc herniation, spinal canal stenosis or neural foraminal narrowing. L2-L3: Disc bulging and facet arthropathy without significant canal or foraminal stenosis. L3-L4: Disc bulging and facet arthropathy resulting in mild canal narrowing, mild left and moderate right foraminal narrowing. L4-L5: Disc bulging and facet arthropathy resulting in mild to moderate canal narrowing, moderate left and severe right foraminal narrowing. L5-S1: Disc bulging with superimposed central disc protrusion and focal annular fissure. Facet arthropathy. There is impingement of the transversing left S1 nerve root and abutment of the transversing right S1 nerve root. Moderate left and mild right foraminal narrowing. Superimposed central disc protrusion and focal annular fissure at L5-S1 with resultant impingement of the transversing left S1 nerve root and abutment of the transversing right S1 nerve root. Mild to moderate canal narrowing at L4-5.      MRI SACRUM COCCYX WO CONTRAST    Result Date: 4/12/2023  EXAMINATION: MRI OF THE SACRUM/SI JOINT WITHOUT CONTRAST, 4/12/2023 9:06 am TECHNIQUE: Multiplanar multisequence MRI of the sacrum/si

## 2023-04-13 NOTE — DISCHARGE SUMMARY
collection is   seen within this region of soft tissue thickening, there are a few foci of   gas within the soft tissue thickening which likely represents early abscess   formation. Invasive procedures and treatments. None     [facility] Course. Sarah Heredia is a 65 y.o. female with h/o UC, multiple abdominal surgeries, colectomy, ileostomy, hernia repair with longstanding open abdominal wound who presented with c/o lower back pain and drainage from ostomy. She follows up with Dr. Avtar Garg. She recently had abdominal wall debridement on 2/18/2023. She admitted wound VAC had not been functioning properly for the past few days prior to admission. She denied abdominal pain, fever, chills, nausea or vomiting. Pt stated she lives with her sister who is sick but she is unable to care to care for herself and requested SNF placement. CT done in the ED was concerning for a possible early abscess formation in the ventral abdominal wall. Open Abdominal wound:  CT A/P concerning for possible early abscess formation in ventral abdominal wound. Surgery consult appreciated: No abscess or acute infection. CT findings continue to gauze in the open wound. Antibiotics discontinued. Continue local wound care. Lower back pain:  CT L-spine with multilevel degenerative changes and spinal canal stenosis most severe at L3-L4 and L4-L5. MRI lumbosacral spine with similar findings as above. No sacroiliitis or OM. Pain control, PT OT. Outpatient follow-up with NSGY. Obesity:Body mass index is 39.94 kg/m². BMI Complicating assessment and treatment. Placing patient at risk for multiple co-morbidities as well as early death and contributing to the patient's presentation. Education, and counseling provided. T2DM on long-term insulin:  A1c 7.6%. Discharged on home medications. Anemia: B12 and folate wnl. Iron panel pending.   Will need monitoring

## 2023-04-13 NOTE — PLAN OF CARE
Problem: Skin/Tissue Integrity  Goal: Absence of new skin breakdown  Description: 1. Monitor for areas of redness and/or skin breakdown  2. Assess vascular access sites hourly  3. Every 4-6 hours minimum:  Change oxygen saturation probe site  4. Every 4-6 hours:  If on nasal continuous positive airway pressure, respiratory therapy assess nares and determine need for appliance change or resting period.   4/13/2023 1211 by Selwyn Rosales RN  Outcome: Progressing  4/13/2023 0123 by Shirley Brand RN  Outcome: Progressing     Problem: Discharge Planning  Goal: Discharge to home or other facility with appropriate resources  Outcome: Progressing     Problem: Pain  Goal: Verbalizes/displays adequate comfort level or baseline comfort level  4/13/2023 1211 by Selwyn Rosales RN  Outcome: Progressing  4/13/2023 0123 by Shirley Brand RN  Outcome: Progressing     Problem: Safety - Adult  Goal: Free from fall injury  4/13/2023 1211 by Selwyn Rosales RN  Outcome: Progressing  4/13/2023 0123 by Shirley Brand RN  Outcome: Progressing

## 2023-04-13 NOTE — DISCHARGE INSTR - COC
Continuity of Care Form    Patient Name: Katie Bond   :  1960  MRN:  [id number]    [facility] date:  4/10/2023  Discharge date:  ***    Code Status Order: Full Code   Advance Directives:     Admitting Physician:  Tani Kathleen MD  PCP: Akira Macdonald    Discharging Nurse: [facility] Unit/Room#: 4GC-0940/2316-22  Discharging Unit Phone Number: ***    Emergency Contact:   Extended Emergency Contact Information  Primary Emergency Contact: Caitie Hernandez, [address] [facility]  Home Phone: [phone]  Mobile Phone: [phone]  Relation: Brother/Sister  Secondary Emergency Contact: Shakir Malhotra Phone: [phone]  Relation: Brother/Sister    Past Surgical History:  Past Surgical History:   Procedure Laterality Date    ABDOMEN SURGERY      COLON RESECTION FOR ULCERATIVE COLITIS THEN HAD ANUS REMOVED    ABDOMEN SURGERY N/A 2022    INCISION AND DRAINAGE OF ABDOMINAL WALL SEROMA (59123) performed by Nav Dorsey MD at [address] [place] [address] N/A 2023    ABDOMEN INCISION AND DRAINAGE performed by Nav Dorsey MD at [address] [facility]  2021    ILEOSTOMY OR JEJUNOSTOMY      PERMANENT    KNEE SURGERY      shave knee cap, repaired meniscus    LAPAROTOMY N/A 10/14/2022    DRAINAGE OF ABDOMINAL WALL ABSCESS WITH PLACEMENT OF WOUND VAC performed by Nav Dorsey MD at [address] [facility]      rectum removed    UPPER GASTROINTESTINAL ENDOSCOPY N/A 2021    EGD BIOPSY performed by Teresa Yin MD at [facility] 2022    EGD DIAGNOSTIC ONLY performed by Teresa Yin MD at [place] [address] N/A 2021    OPEN [facility], LYSIS OF ADHESIONS performed by Nav Dorsey MD at [address]       Immunization History:   Immunization History   Administered Date(s) Administered    COVID-19, MODERNA BLUE border,

## 2023-04-14 LAB
BACTERIA BLD CULT ORG #2: NORMAL
BACTERIA BLD CULT: NORMAL

## 2023-04-20 ENCOUNTER — TELEPHONE (OUTPATIENT)
Dept: SURGERY | Age: 63
End: 2023-04-20

## 2023-04-20 ENCOUNTER — OFFICE VISIT (OUTPATIENT)
Dept: SURGERY | Age: 63
End: 2023-04-20

## 2023-04-20 VITALS — DIASTOLIC BLOOD PRESSURE: 75 MMHG | BODY MASS INDEX: 39.94 KG/M2 | SYSTOLIC BLOOD PRESSURE: 132 MMHG | WEIGHT: 240 LBS

## 2023-04-20 DIAGNOSIS — S31.109A OPEN WOUND OF ABDOMINAL WALL, INITIAL ENCOUNTER: Primary | ICD-10-CM

## 2023-04-20 NOTE — PROGRESS NOTES
[facility] GENERAL AND LAPAROSCOPIC SURGERY          PATIENT NAME: Livia Castleman Mishos     TODAY'S DATE: 4/20/2023    SUBJECTIVE:    Pt seen for wound care. Doing dressing  Back a little better  Walking with cane, able to drive     OBJECTIVE:  VITALS:  [facility] 08/16/2010     CONSTITUTIONAL:  awake and alert    ABDOMEN: Open wound on the left, some fibrinous debris on base / fascial area and SQ.      ASSESSMENT AND PLAN:  Wound debridement today    Sharp excisional debridement done 4 X 4 cm area, fascia and SQ.   Wound  thereafter  No local, pt tolerated well  Fibrinous debris and biofilm removed  Continue daily dressing  See in a week    Syl Prater MD

## 2023-04-27 ENCOUNTER — OFFICE VISIT (OUTPATIENT)
Dept: SURGERY | Age: 63
End: 2023-04-27

## 2023-04-27 ENCOUNTER — OFFICE VISIT (OUTPATIENT)
Dept: ENDOCRINOLOGY | Age: 63
End: 2023-04-27

## 2023-04-27 VITALS
HEIGHT: 65 IN | HEART RATE: 86 BPM | RESPIRATION RATE: 16 BRPM | WEIGHT: 235 LBS | BODY MASS INDEX: 39.15 KG/M2 | DIASTOLIC BLOOD PRESSURE: 66 MMHG | SYSTOLIC BLOOD PRESSURE: 132 MMHG

## 2023-04-27 VITALS — SYSTOLIC BLOOD PRESSURE: 132 MMHG | WEIGHT: 235 LBS | BODY MASS INDEX: 39.11 KG/M2 | DIASTOLIC BLOOD PRESSURE: 66 MMHG

## 2023-04-27 DIAGNOSIS — E11.9 CONTROLLED TYPE 2 DIABETES MELLITUS WITHOUT COMPLICATION, WITH LONG-TERM CURRENT USE OF INSULIN (HCC): ICD-10-CM

## 2023-04-27 DIAGNOSIS — E11.42 TYPE 2 DIABETES MELLITUS WITH DIABETIC POLYNEUROPATHY, WITH LONG-TERM CURRENT USE OF INSULIN (HCC): Primary | ICD-10-CM

## 2023-04-27 DIAGNOSIS — S31.109A OPEN WOUND OF ABDOMINAL WALL, INITIAL ENCOUNTER: Primary | ICD-10-CM

## 2023-04-27 DIAGNOSIS — Z79.4 TYPE 2 DIABETES MELLITUS WITH DIABETIC POLYNEUROPATHY, WITH LONG-TERM CURRENT USE OF INSULIN (HCC): Primary | ICD-10-CM

## 2023-04-27 DIAGNOSIS — E78.2 MIXED HYPERLIPIDEMIA: ICD-10-CM

## 2023-04-27 DIAGNOSIS — I10 ESSENTIAL HYPERTENSION: ICD-10-CM

## 2023-04-27 DIAGNOSIS — Z79.4 CONTROLLED TYPE 2 DIABETES MELLITUS WITHOUT COMPLICATION, WITH LONG-TERM CURRENT USE OF INSULIN (HCC): ICD-10-CM

## 2023-04-27 RX ORDER — METHOCARBAMOL 750 MG/1
TABLET, FILM COATED ORAL
COMMUNITY
Start: 2023-04-13

## 2023-04-27 NOTE — PROGRESS NOTES
swelling   Will recheck labs in 3 months    Hypoglycemia protocol reviewed in detail with patient Patient was advised to carry glucose tablets    Patient was advised that sending of her fingerstick blood glucose logs is crucial in management of her  diabetes. I will adjust the  doses of diabetic medications  according to sent data. Health Maintenance       Last Eye Exam: advised to have annual dilated eye exam. her last eye exam was in 2020  Last Podiatry Exam: Discussed foot care  Lipids: Last LDL level was at a level of 58 in May 2020  Microalbumin/Creatinine Ratio: I have ordered MA with next lab work     . Education: Reviewed ABCs of diabetes management (respective goals in parentheses): A1C (<7), blood pressure (<130/80), and cholesterol (LDL <100). 2. Acquired Hypothyroidism, ---thyroid Ab negative April 2021   On review of chart notes from previous endocrinologist at [facility] and the [place] visits at [facility] shows that she has a history of Hashimoto's hypothyroidism and apparently in 2010 her thyroid antibodies were positive. TSH <0.01 stop Lt4 25 mcg daily TSH was 3.88>>4.12  Will stay off Lt4 for now     3. Essential hypertension  Blood pressure control is adequate historically and she is on losartan 100 mg daily    4. Mixed hyperlipidemia  Last LDL was at target in April 2021 she is currently on Lipitor 10 mg daily    5.  Ulcerative (chronic) enterocolitis, status post colectomy and rectal surgery she has a colectomy bag now  Symptoms are now stable    Reviewed and/or ordered clinical lab results yes   Reviewed and/or ordered radiology tests Yes  Reviewed and/or ordered other diagnostic tests yes   Made a decision to obtain old records yes   Reviewed and summarized old records yes     Roz Hadley was counseled regarding symptoms of current diagnosis, course and complications of disease if inadequately treated, side effects of medications, diagnosis, treatment options, and prognosis, risks, benefits,

## 2023-04-27 NOTE — PROGRESS NOTES
[facility] GENERAL AND LAPAROSCOPIC SURGERY          PATIENT NAME: Ryan Salter     TODAY'S DATE: 4/27/2023    SUBJECTIVE:    Pt here for wound eval.  Doing daily packing, feels it is a little smaller, has had some fluid drain still     OBJECTIVE:  VITALS:  Wt 235 lb (106.6 kg)   LMP 08/16/2010   BMI 39.11 kg/m²     CONSTITUTIONAL:  awake and alert  ABDOMEN:  normal bowel sounds, soft, non-distended, non-tender, wound open, largely clean with some biofilm on the base / fascial area     Data:      Radiology Review:  None      ASSESSMENT AND PLAN:  Debrid basilar area  Daily dressing, do Dakin's for a week and recheck  Slow improvement overall    Celso Masterson MD      Procedure Note:    Left abd area necrotic wound  Site confirmed, pt consents to excisional debridement of the site    Chloraprep Prep  No local placed  #15 blade and loop curette sharp excisional debridement done  Full thickness Skin, SQ, and fascia removed; 2 cm X 2 cm size  Cleaned with NS   Packed with NS wet to dry  Pt tolerated well  Site hemostatic  DSD placed overlying the area  Dressing care instructions reviewed with pt, all questions answered    Celso Masterson

## 2023-04-28 ENCOUNTER — HOSPITAL ENCOUNTER (OUTPATIENT)
Dept: PHYSICAL THERAPY | Age: 63
Setting detail: THERAPIES SERIES
Discharge: HOME | End: 2023-04-28
Payer: COMMERCIAL

## 2023-04-28 ENCOUNTER — TELEPHONE (OUTPATIENT)
Dept: SURGERY | Age: 63
End: 2023-04-28

## 2023-04-28 DIAGNOSIS — L02.211 ABSCESS OF ABDOMINAL WALL: Primary | ICD-10-CM

## 2023-04-28 PROCEDURE — 97530 THERAPEUTIC ACTIVITIES: CPT

## 2023-04-28 PROCEDURE — 97110 THERAPEUTIC EXERCISES: CPT

## 2023-04-28 PROCEDURE — 97161 PT EVAL LOW COMPLEX 20 MIN: CPT

## 2023-04-28 RX ORDER — SODIUM HYPOCHLORITE 5 MG/ML
SOLUTION TOPICAL DAILY
Qty: 1 EACH | Refills: 1 | Status: SHIPPED | OUTPATIENT
Start: 2023-04-28

## 2023-04-28 NOTE — PLAN OF CARE
[] Not Met: [] Adjusted  4. Patient will return to functional activities including carrying a full laundry basket up and down stairs at least once without increased symptoms or restriction. [] Progressing: [] Met: [] Not Met: [] Adjusted  5. Patient will be able to improve sleep for at least 5 hours continuously without waking up from pain to improve recovery and quality of life.    [] Progressing: [] Met: [] Not Met: [] Adjusted     Electronically signed by:  Grant Ochoa, PT, DPT

## 2023-04-28 NOTE — FLOWSHEET NOTE
[address] [place] [address]  Phone: [phone]   Fax: [phone]    Physical Therapy Daily Treatment Note    Date:  2023     Patient Name:  Antionette Duncan    :  1960  MRN: [id number]  Medical Diagnosis:  Left-sided low back pain with bilateral sciatica, unspecified chronicity [M54.41, M54.42]  Treatment Diagnosis: decreased hip/lumbar ROM, core and gross LE strength, antalgic gait    Insurance/Certification information:  PT Insurance Information: [employer] - no auth or copay; dry needling not covered  Physician Information:  CORDELL Meléndez CNP    Plan of care signed (Y/N): []  Yes [x]  No     Date of Patient follow up with Physician:      Progress Report: []  Yes  [x]  No     Date Range for reporting period:  Beginnin2023  Ending:     Progress report due (10 Rx/or 30 days whichever is less): visit #10 or  (date)     Recertification due (POC duration/ or 90 days whichever is less): visit #16 or 23 (date)     Visit # Insurance Allowable Auth required?  Date Range    60/yr combined hard max []  Yes  [x]  No      Latex Allergy:  [x]NO      []YES  Preferred Language for Healthcare:   [x]English       []other:    Functional Scale:       Date assessed:  MOE: raw score = 23; dysfunction = 46%  23    Pain level:  3-7/10     SUBJECTIVE:  See eval    OBJECTIVE: See eval  Observation:   Test measurements:      RESTRICTIONS/PRECAUTIONS: diabetic, ulcerative colitis, current abdomen wound      Treatment based classification:    [x] mobilization/manipulation   [] stabilization   [] extension based   [x] flexion based   [] lateral shift   [] traction   [] unspecified Components:   [x] thoracolumbar   [x] pelvic   [] SIJ   [] sacral   [] hip         Comparable sign:     Exercises/Interventions:     Therapeutic Exercise (00073) Resistance / level Sets / Seconds Reps Notes / Cues   bike       IB       FSU       LSU       Propped on elbow hip

## 2023-04-28 NOTE — TELEPHONE ENCOUNTER
Pt states Dakin's wasn't called into a pharmacy. Pt would like the Elidain's to be called into the [facility].  Any questions please call [phone]

## 2023-04-28 NOTE — TELEPHONE ENCOUNTER
Dr. Michi Roldan, please advise. Dakins was added for you to send but you will need to adjust the rx.

## 2023-05-04 ENCOUNTER — HOSPITAL ENCOUNTER (OUTPATIENT)
Dept: PHYSICAL THERAPY | Age: 63
Setting detail: THERAPIES SERIES
Discharge: HOME | End: 2023-05-04
Payer: COMMERCIAL

## 2023-05-04 ENCOUNTER — OFFICE VISIT (OUTPATIENT)
Dept: SURGERY | Age: 63
End: 2023-05-04

## 2023-05-04 VITALS — BODY MASS INDEX: 39.11 KG/M2 | WEIGHT: 235 LBS | DIASTOLIC BLOOD PRESSURE: 66 MMHG | SYSTOLIC BLOOD PRESSURE: 132 MMHG

## 2023-05-04 DIAGNOSIS — S31.109A OPEN WOUND OF ABDOMINAL WALL, INITIAL ENCOUNTER: Primary | ICD-10-CM

## 2023-05-04 PROCEDURE — 97110 THERAPEUTIC EXERCISES: CPT

## 2023-05-04 NOTE — FLOWSHEET NOTE
[address] [place] [address]  Phone: [phone]   Fax: [phone]    Physical Therapy Daily Treatment Note    Date:  2023     Patient Name:  Larissa Craft    :  1960  MRN: [id number]  Medical Diagnosis:  Left-sided low back pain with bilateral sciatica, unspecified chronicity [M54.41, M54.42]  Treatment Diagnosis: decreased hip/lumbar ROM, core and gross LE strength, antalgic gait    Insurance/Certification information:  PT Insurance Information: [employer] - no auth or copay; dry needling not covered  Physician Information:  CORDELL Leavitt CNP    Plan of care signed (Y/N): []  Yes [x]  No     Date of Patient follow up with Physician:      Progress Report: []  Yes  [x]  No     Date Range for reporting period:  Beginnin2023  Ending:     Progress report due (10 Rx/or 30 days whichever is less): visit #10 or [birth year] (date)     Recertification due (POC duration/ or 90 days whichever is less): visit #16 or 23 (date)     Visit # Insurance Allowable Auth required? Date Range    60/yr combined hard max []  Yes  [x]  No      Latex Allergy:  [x]NO      []YES  Preferred Language for Healthcare:   [x]English       []other:    Functional Scale:       Date assessed:  MOE: raw score = 23; dysfunction = 46%  23    Pain level:  1/10     SUBJECTIVE:  States exercises help more with pain than muscle relaxers and has stopped taking them. Only taking gabapentin now and tylenol. Hasn't used cane for walking in several days either.      OBJECTIVE: See eval  Observation:   Test measurements:      RESTRICTIONS/PRECAUTIONS: diabetic, ulcerative colitis, current abdomen wound      Treatment based classification:    [x] mobilization/manipulation   [] stabilization   [] extension based   [x] flexion based   [] lateral shift   [] traction   [] unspecified Components:   [x] thoracolumbar   [x] pelvic   [] SIJ   [] sacral   [] hip         Comparable sign:

## 2023-05-09 ENCOUNTER — HOSPITAL ENCOUNTER (OUTPATIENT)
Dept: PHYSICAL THERAPY | Age: 63
Setting detail: THERAPIES SERIES
Discharge: HOME | End: 2023-05-09
Payer: COMMERCIAL

## 2023-05-09 PROCEDURE — 97112 NEUROMUSCULAR REEDUCATION: CPT

## 2023-05-09 PROCEDURE — 97110 THERAPEUTIC EXERCISES: CPT

## 2023-05-09 NOTE — FLOWSHEET NOTE
[address] [place] [address]  Phone: [phone]   Fax: [phone]    Physical Therapy Daily Treatment Note    Date:  2023     Patient Name:  Mell Reeves    :  1960  MRN: [id number]  Medical Diagnosis:  Left-sided low back pain with bilateral sciatica, unspecified chronicity [M54.41, M54.42]  Treatment Diagnosis: decreased hip/lumbar ROM, core and gross LE strength, antalgic gait    Insurance/Certification information:  PT Insurance Information: [employer] - no auth or copay; dry needling not covered  Physician Information:  CORDELL Garcia CNP    Plan of care signed (Y/N): [x]  Yes []  No     Date of Patient follow up with Physician:      Progress Report: []  Yes  [x]  No     Date Range for reporting period:  Beginnin2023  Ending:     Progress report due (10 Rx/or 30 days whichever is less): visit #10 or      Recertification due (POC duration/ or 90 days whichever is less): visit #16 or 23    Visit # Insurance Allowable Auth required? Date Range   3/16 60/yr combined hard max []  Yes  [x]  No      Latex Allergy:  [x]NO      []YES  Preferred Language for Healthcare:   [x]English       []other:    Functional Scale:       Date assessed:  MOE: raw score = 23; dysfunction = 46%  23    Pain level:  1/10     SUBJECTIVE:  states over the weekend she had a lot of pain in her L groin and anterior thigh muscles, but states today she almost back to normal. She has some pinching in her back. When she does the exercises she is able to relax.       OBJECTIVE: See eval  Observation:   Test measurements:      RESTRICTIONS/PRECAUTIONS: diabetic, ulcerative colitis, current abdomen wound      Treatment based classification:    [x] mobilization/manipulation   [] stabilization   [] extension based   [x] flexion based   [] lateral shift   [] traction   [] unspecified Components:   [x] thoracolumbar   [x] pelvic   [] SIJ   [] sacral   [] hip Admitted

## 2023-05-11 ENCOUNTER — HOSPITAL ENCOUNTER (OUTPATIENT)
Dept: PHYSICAL THERAPY | Age: 63
Setting detail: THERAPIES SERIES
Discharge: HOME | End: 2023-05-11
Payer: COMMERCIAL

## 2023-05-11 PROCEDURE — 97110 THERAPEUTIC EXERCISES: CPT

## 2023-05-11 PROCEDURE — 97112 NEUROMUSCULAR REEDUCATION: CPT

## 2023-05-11 NOTE — FLOWSHEET NOTE
Seconds Reps Notes / Cues   bike  3'  Unable to go longer due to increased L adductor pain. IB  30\"     FSU       LSU       Propped on elbow hip ext       Side stepping Along table Lime band 4 laps 5/4. 5/11- resistance   HSS - supine 90-90 Towel behind knee 20\" 3 B 5/9    5/4   Hooklying L BKFO  5\" 10 B 5/9   bridges  2\" 15    2/5\" 10 Attempted but unable due to L inner thigh pain. 5/9: able to frankie iso hold   Standing hip adductor stretch  30\" 3 5/4   Standing hip   - abduction  - extension  - flexion    2  2  2   10  10  10 5/4   Seated marches  2 10 5/11   Seated LAQ  3\" / 2 10 5/11   Seated hip abduction in chair w/ tband lime 2 10 5/11 - band on mid thigh for gluteal activation   Slider 3 point reach bilateral 1 10 5/11          Therapeutic Activities (96358)         1 3 5/4. 5/9: 3 sec ecc, pulling in L thigh                               Neuromuscular Re-ed (90428)       Hooklying TrA iso w/ SB  + UE lift w/ SB  + LE march  5\"  1  1 10  10  10 5/4. 5/9: added SB   Step taps 6\" 2 10 R/L 5/9   tandem floor 20\" 2 B 5/9                 Manual Intervention (88830)       Prone PA       GISTM/STM       Lumbar Manip       SI Manip       Hip belt mobs       Hip LA distraction       Isometric hip flexion/extension for pelvic rotation 5/4                   Modalities:     Pt. Education:  -pt educated on diagnosis, prognosis and expectations for rehab  -all pt questions were answered    Home Exercise Prorgam:  Access Code: [email or web address]  URL: [email or web address]  Date: 05/11/2023  Prepared by: Amy Sal    Exercises  - Supine Lower Trunk Rotation  - 1 x daily - 7 x weekly - 1-2 sets - 10 reps - 5-10 seconds hold  - Supine Posterior Pelvic Tilt  - 1-2 x daily - 7 x weekly - 1 sets - 10 reps - 5 seconds hold  - Supine Single Knee to Chest Stretch  - 1-2 x daily - 7 x weekly - 1 sets - 3-5 reps - 20-30 seconds hold  - Supine Bridge  - 1 x daily - 7 x weekly - 1-2 sets - 10 reps - 2 seconds hold  - Supine Hamstring

## 2023-05-15 ENCOUNTER — TELEPHONE (OUTPATIENT)
Dept: SURGERY | Age: 63
End: 2023-05-15

## 2023-05-15 NOTE — TELEPHONE ENCOUNTER
From CJ:  Ask her to come in tomorrow am to see us and check the wound out. RLQ pain may not be related, so if that gets worse advise to go to ER today. CJ     I called pt, she is coming in tomorrow.

## 2023-05-15 NOTE — TELEPHONE ENCOUNTER
Hadley Garnerr with home health wanted to communicate [place] Tafoyas complaining of RLQ pain 2 out of 10 that hasn't been there. Slight fever of 99.9 with heart rate 103. Increase of tunneling on lower part of wound 6 o'clock that is 4 cm.  Any questions call Hadley Abel [phone]

## 2023-05-16 ENCOUNTER — APPOINTMENT (OUTPATIENT)
Dept: PHYSICAL THERAPY | Age: 63
End: 2023-05-16
Payer: COMMERCIAL

## 2023-05-16 ENCOUNTER — OFFICE VISIT (OUTPATIENT)
Dept: SURGERY | Age: 63
End: 2023-05-16
Payer: COMMERCIAL

## 2023-05-16 VITALS — DIASTOLIC BLOOD PRESSURE: 80 MMHG | SYSTOLIC BLOOD PRESSURE: 119 MMHG | BODY MASS INDEX: 38.27 KG/M2 | WEIGHT: 230 LBS

## 2023-05-16 DIAGNOSIS — L02.211 ABSCESS OF ABDOMINAL WALL: Primary | ICD-10-CM

## 2023-05-16 PROCEDURE — 10061 I&D ABSCESS COMP/MULTIPLE: CPT | Performed by: SURGERY

## 2023-05-16 RX ORDER — GABAPENTIN 300 MG/1
CAPSULE ORAL
COMMUNITY
Start: 2023-05-10

## 2023-05-16 RX ORDER — LEVOFLOXACIN 750 MG/1
750 TABLET ORAL DAILY
Qty: 10 TABLET | Refills: 0 | Status: SHIPPED | OUTPATIENT
Start: 2023-05-16 | End: 2023-05-26

## 2023-05-16 NOTE — PROGRESS NOTES
[facility] GENERAL AND LAPAROSCOPIC SURGERY          PATIENT NAME: Neeru Salter     TODAY'S DATE: 5/16/2023    SUBJECTIVE:    Pt with lw grade fever and tenderness in the LLQ. OBJECTIVE:  VITALS:  /80   Wt 230 lb (104.3 kg)   LMP 08/16/2010   BMI 38.27 kg/m²     CONSTITUTIONAL:  awake and alert  LUNGS:  clear to auscultation  HEART: RRR  ABDOMEN:  normal bowel sounds, soft, non-distended, tenderness noted LLQ in lateral, inferior open wound area.  Has papular nodular spot noted in lateral wound, base clean     Data:      Radiology Review:  None      ASSESSMENT AND PLAN:  Lateral abd wall abscess    Open and drain, begin antibiotics today    Debrid plan in OR    Procedure Note:  Abdominal area abscess  Site confirmed, pt consents      Betadine Prep  No local needed  #15 blade I/D done  Deep abscess present, multiple loculations opened and drained  Cleaned with NS and irrigation  Culture of turbid / particulate fluid obtained  Packed with 1/2\" Iodoform  Pt tolerated well  Site hemostatic  DSD placed  Dressing care instructions reviewed with pt    Glendy Blue

## 2023-05-17 NOTE — PROGRESS NOTES
Name_______________________________________Printed:____________________  Date and time of surgery___5/19/23  1330  MAIN_____________________Arrival Time:_1200_______________   1. The instructions given regarding when and if a patient needs to stop oral intake prior to surgery varies. Follow the specific instructions you were given                  _x__Nothing to eat or to drink after Midnight the night before.                   ____Carbo loading or instructions will be given to select patients-if you have been given those instructions -please do the following                           The evening before your surgery after dinner before midnight drink 40 ounces of gatorade. If you are diabetic use sugar free. The morning of surgery drink 40 ounces of water. This needs to be finished 3 hours prior to your surgery start time. 2. Take the following pills with a small sip of water on the morning of surgery__omeprazole and gabapentin______________________________________                  Do not take blood pressure medications ending in pril or sartan the regla prior to surgery or the morning of surgery. Dr Carlton Jiang patient are not to take any medications the AM of surgery. 3. Aspirin, Ibuprofen, Advil, Naproxen, Vitamin E and other Anti-inflammatory products and supplements should be stopped for 5 -7days before surgery or as directed by your physician. 4. Check with your Doctor regarding stopping Plavix, Coumadin,Eliquis, Lovenox,Effient,Pradaxa,Xarelto, Fragmin or other blood thinners and follow their instructions. 5. Do not smoke, and do not drink any alcoholic beverages 24 hours prior to surgery. This includes NA Beer. Refrain from the usage of any recreational drugs. 6. You may brush your teeth and gargle the morning of surgery. DO NOT SWALLOW WATER   7. You MUST make arrangements for a responsible adult to stay on site while you are here and take you home after your surgery.  You will not be allowed to

## 2023-05-18 ENCOUNTER — APPOINTMENT (OUTPATIENT)
Dept: PHYSICAL THERAPY | Age: 63
End: 2023-05-18
Payer: COMMERCIAL

## 2023-05-19 ENCOUNTER — ANESTHESIA (OUTPATIENT)
Dept: OPERATING ROOM | Age: 63
End: 2023-05-19
Payer: COMMERCIAL

## 2023-05-19 ENCOUNTER — HOSPITAL ENCOUNTER (OUTPATIENT)
Age: 63
Setting detail: OUTPATIENT SURGERY
Discharge: HOME | End: 2023-05-19
Attending: SURGERY | Admitting: SURGERY
Payer: COMMERCIAL

## 2023-05-19 ENCOUNTER — ANESTHESIA EVENT (OUTPATIENT)
Dept: OPERATING ROOM | Age: 63
End: 2023-05-19
Payer: COMMERCIAL

## 2023-05-19 VITALS
SYSTOLIC BLOOD PRESSURE: 109 MMHG | HEART RATE: 81 BPM | HEIGHT: 65 IN | RESPIRATION RATE: 10 BRPM | DIASTOLIC BLOOD PRESSURE: 64 MMHG | TEMPERATURE: 98.5 F | OXYGEN SATURATION: 98 % | WEIGHT: 230.16 LBS | BODY MASS INDEX: 38.35 KG/M2

## 2023-05-19 LAB
ANION GAP SERPL CALCULATED.3IONS-SCNC: 12 MMOL/L (ref 3–16)
BACTERIA SPEC AEROBE CULT: ABNORMAL
BACTERIA SPEC AEROBE CULT: ABNORMAL
BUN SERPL-MCNC: 11 MG/DL (ref 7–20)
CALCIUM SERPL-MCNC: 9.5 MG/DL (ref 8.3–10.6)
CHLORIDE SERPL-SCNC: 106 MMOL/L (ref 99–110)
CO2 SERPL-SCNC: 23 MMOL/L (ref 21–32)
CREAT SERPL-MCNC: 0.6 MG/DL (ref 0.6–1.2)
GFR SERPLBLD CREATININE-BSD FMLA CKD-EPI: >60 ML/MIN/{1.73_M2}
GLUCOSE BLD-MCNC: 144 MG/DL (ref 70–99)
GLUCOSE SERPL-MCNC: 154 MG/DL (ref 70–99)
GRAM STN SPEC: ABNORMAL
ORGANISM: ABNORMAL
PERFORMED ON: ABNORMAL
POTASSIUM SERPL-SCNC: 4.3 MMOL/L (ref 3.5–5.1)
SODIUM SERPL-SCNC: 141 MMOL/L (ref 136–145)

## 2023-05-19 PROCEDURE — 11042 DBRDMT SUBQ TIS 1ST 20SQCM/<: CPT | Performed by: SURGERY

## 2023-05-19 PROCEDURE — 3600000002 HC SURGERY LEVEL 2 BASE: Performed by: SURGERY

## 2023-05-19 PROCEDURE — 2580000003 HC RX 258: Performed by: SURGERY

## 2023-05-19 PROCEDURE — 2500000003 HC RX 250 WO HCPCS: Performed by: NURSE ANESTHETIST, CERTIFIED REGISTERED

## 2023-05-19 PROCEDURE — 2500000003 HC RX 250 WO HCPCS: Performed by: SURGERY

## 2023-05-19 PROCEDURE — 3700000000 HC ANESTHESIA ATTENDED CARE: Performed by: SURGERY

## 2023-05-19 PROCEDURE — 6360000002 HC RX W HCPCS: Performed by: SURGERY

## 2023-05-19 PROCEDURE — 6360000002 HC RX W HCPCS: Performed by: NURSE ANESTHETIST, CERTIFIED REGISTERED

## 2023-05-19 PROCEDURE — 3700000001 HC ADD 15 MINUTES (ANESTHESIA): Performed by: SURGERY

## 2023-05-19 PROCEDURE — 3600000012 HC SURGERY LEVEL 2 ADDTL 15MIN: Performed by: SURGERY

## 2023-05-19 PROCEDURE — 80048 BASIC METABOLIC PNL TOTAL CA: CPT

## 2023-05-19 PROCEDURE — 11045 DBRDMT SUBQ TISS EACH ADDL: CPT | Performed by: SURGERY

## 2023-05-19 PROCEDURE — 7100000011 HC PHASE II RECOVERY - ADDTL 15 MIN: Performed by: SURGERY

## 2023-05-19 PROCEDURE — 7100000010 HC PHASE II RECOVERY - FIRST 15 MIN: Performed by: SURGERY

## 2023-05-19 PROCEDURE — 7100000001 HC PACU RECOVERY - ADDTL 15 MIN: Performed by: SURGERY

## 2023-05-19 PROCEDURE — 7100000000 HC PACU RECOVERY - FIRST 15 MIN: Performed by: SURGERY

## 2023-05-19 PROCEDURE — 2580000003 HC RX 258: Performed by: ANESTHESIOLOGY

## 2023-05-19 PROCEDURE — C9290 INJ, BUPIVACAINE LIPOSOME: HCPCS | Performed by: SURGERY

## 2023-05-19 PROCEDURE — A4217 STERILE WATER/SALINE, 500 ML: HCPCS | Performed by: SURGERY

## 2023-05-19 PROCEDURE — 2709999900 HC NON-CHARGEABLE SUPPLY: Performed by: SURGERY

## 2023-05-19 PROCEDURE — 36415 COLL VENOUS BLD VENIPUNCTURE: CPT

## 2023-05-19 RX ORDER — FENTANYL CITRATE 50 UG/ML
INJECTION, SOLUTION INTRAMUSCULAR; INTRAVENOUS PRN
Status: DISCONTINUED | OUTPATIENT
Start: 2023-05-19 | End: 2023-05-19 | Stop reason: SDUPTHER

## 2023-05-19 RX ORDER — SODIUM CHLORIDE 9 MG/ML
INJECTION, SOLUTION INTRAVENOUS PRN
Status: DISCONTINUED | OUTPATIENT
Start: 2023-05-19 | End: 2023-05-19 | Stop reason: HOSPADM

## 2023-05-19 RX ORDER — SODIUM CHLORIDE 0.9 % (FLUSH) 0.9 %
5-40 SYRINGE (ML) INJECTION EVERY 12 HOURS SCHEDULED
Status: DISCONTINUED | OUTPATIENT
Start: 2023-05-19 | End: 2023-05-19 | Stop reason: HOSPADM

## 2023-05-19 RX ORDER — LEVOFLOXACIN 5 MG/ML
500 INJECTION, SOLUTION INTRAVENOUS ONCE
Status: COMPLETED | OUTPATIENT
Start: 2023-05-19 | End: 2023-05-19

## 2023-05-19 RX ORDER — PROPOFOL 10 MG/ML
INJECTION, EMULSION INTRAVENOUS PRN
Status: DISCONTINUED | OUTPATIENT
Start: 2023-05-19 | End: 2023-05-19 | Stop reason: SDUPTHER

## 2023-05-19 RX ORDER — HYDROMORPHONE HCL 110MG/55ML
0.25 PATIENT CONTROLLED ANALGESIA SYRINGE INTRAVENOUS EVERY 5 MIN PRN
Status: DISCONTINUED | OUTPATIENT
Start: 2023-05-19 | End: 2023-05-19 | Stop reason: HOSPADM

## 2023-05-19 RX ORDER — FAMOTIDINE 10 MG/ML
INJECTION, SOLUTION INTRAVENOUS PRN
Status: DISCONTINUED | OUTPATIENT
Start: 2023-05-19 | End: 2023-05-19 | Stop reason: SDUPTHER

## 2023-05-19 RX ORDER — ONDANSETRON 2 MG/ML
INJECTION INTRAMUSCULAR; INTRAVENOUS PRN
Status: DISCONTINUED | OUTPATIENT
Start: 2023-05-19 | End: 2023-05-19 | Stop reason: SDUPTHER

## 2023-05-19 RX ORDER — SODIUM CHLORIDE 0.9 % (FLUSH) 0.9 %
5-40 SYRINGE (ML) INJECTION PRN
Status: DISCONTINUED | OUTPATIENT
Start: 2023-05-19 | End: 2023-05-19 | Stop reason: HOSPADM

## 2023-05-19 RX ORDER — HYDROMORPHONE HCL 110MG/55ML
0.5 PATIENT CONTROLLED ANALGESIA SYRINGE INTRAVENOUS EVERY 5 MIN PRN
Status: DISCONTINUED | OUTPATIENT
Start: 2023-05-19 | End: 2023-05-19 | Stop reason: HOSPADM

## 2023-05-19 RX ORDER — FAMOTIDINE 10 MG/ML
INJECTION, SOLUTION INTRAVENOUS
Status: COMPLETED
Start: 2023-05-19 | End: 2023-05-19

## 2023-05-19 RX ORDER — GLYCOPYRROLATE 0.2 MG/ML
INJECTION INTRAMUSCULAR; INTRAVENOUS PRN
Status: DISCONTINUED | OUTPATIENT
Start: 2023-05-19 | End: 2023-05-19 | Stop reason: SDUPTHER

## 2023-05-19 RX ORDER — LABETALOL HYDROCHLORIDE 5 MG/ML
5 INJECTION, SOLUTION INTRAVENOUS
Status: DISCONTINUED | OUTPATIENT
Start: 2023-05-19 | End: 2023-05-19 | Stop reason: HOSPADM

## 2023-05-19 RX ORDER — LIDOCAINE HYDROCHLORIDE 10 MG/ML
1 INJECTION, SOLUTION EPIDURAL; INFILTRATION; INTRACAUDAL; PERINEURAL
Status: DISCONTINUED | OUTPATIENT
Start: 2023-05-19 | End: 2023-05-19 | Stop reason: HOSPADM

## 2023-05-19 RX ORDER — MAGNESIUM HYDROXIDE 1200 MG/15ML
LIQUID ORAL CONTINUOUS PRN
Status: COMPLETED | OUTPATIENT
Start: 2023-05-19 | End: 2023-05-19

## 2023-05-19 RX ORDER — DEXAMETHASONE SODIUM PHOSPHATE 4 MG/ML
INJECTION, SOLUTION INTRA-ARTICULAR; INTRALESIONAL; INTRAMUSCULAR; INTRAVENOUS; SOFT TISSUE PRN
Status: DISCONTINUED | OUTPATIENT
Start: 2023-05-19 | End: 2023-05-19 | Stop reason: SDUPTHER

## 2023-05-19 RX ORDER — ONDANSETRON 2 MG/ML
4 INJECTION INTRAMUSCULAR; INTRAVENOUS
Status: DISCONTINUED | OUTPATIENT
Start: 2023-05-19 | End: 2023-05-19 | Stop reason: HOSPADM

## 2023-05-19 RX ORDER — LIDOCAINE HYDROCHLORIDE 20 MG/ML
INJECTION, SOLUTION INFILTRATION; PERINEURAL PRN
Status: DISCONTINUED | OUTPATIENT
Start: 2023-05-19 | End: 2023-05-19 | Stop reason: SDUPTHER

## 2023-05-19 RX ORDER — SODIUM CHLORIDE 9 MG/ML
INJECTION, SOLUTION INTRAVENOUS CONTINUOUS
Status: DISCONTINUED | OUTPATIENT
Start: 2023-05-19 | End: 2023-05-19 | Stop reason: HOSPADM

## 2023-05-19 RX ORDER — DEXMEDETOMIDINE HYDROCHLORIDE 100 UG/ML
INJECTION, SOLUTION INTRAVENOUS PRN
Status: DISCONTINUED | OUTPATIENT
Start: 2023-05-19 | End: 2023-05-19 | Stop reason: SDUPTHER

## 2023-05-19 RX ADMIN — ONDANSETRON 4 MG: 2 INJECTION INTRAMUSCULAR; INTRAVENOUS at 15:00

## 2023-05-19 RX ADMIN — DEXMEDETOMIDINE HYDROCHLORIDE 10 MCG: 100 INJECTION, SOLUTION INTRAVENOUS at 15:32

## 2023-05-19 RX ADMIN — DEXAMETHASONE SODIUM PHOSPHATE 4 MG: 4 INJECTION, SOLUTION INTRAMUSCULAR; INTRAVENOUS at 14:56

## 2023-05-19 RX ADMIN — FENTANYL CITRATE 50 MCG: 50 INJECTION, SOLUTION INTRAMUSCULAR; INTRAVENOUS at 15:02

## 2023-05-19 RX ADMIN — GLYCOPYRROLATE 0.2 MG: 0.2 INJECTION, SOLUTION INTRAMUSCULAR; INTRAVENOUS at 14:49

## 2023-05-19 RX ADMIN — SODIUM CHLORIDE: 9 INJECTION, SOLUTION INTRAVENOUS at 15:22

## 2023-05-19 RX ADMIN — SODIUM CHLORIDE: 9 INJECTION, SOLUTION INTRAVENOUS at 14:10

## 2023-05-19 RX ADMIN — DEXMEDETOMIDINE HYDROCHLORIDE 4 MCG: 100 INJECTION, SOLUTION INTRAVENOUS at 15:13

## 2023-05-19 RX ADMIN — PROPOFOL 200 MG: 10 INJECTION, EMULSION INTRAVENOUS at 14:51

## 2023-05-19 RX ADMIN — LEVOFLOXACIN 500 MG: 5 INJECTION, SOLUTION INTRAVENOUS at 14:43

## 2023-05-19 RX ADMIN — PHENYLEPHRINE HYDROCHLORIDE 100 MCG: 10 INJECTION INTRAVENOUS at 15:07

## 2023-05-19 RX ADMIN — PHENYLEPHRINE HYDROCHLORIDE 100 MCG: 10 INJECTION INTRAVENOUS at 15:01

## 2023-05-19 RX ADMIN — FENTANYL CITRATE 50 MCG: 50 INJECTION, SOLUTION INTRAMUSCULAR; INTRAVENOUS at 14:51

## 2023-05-19 RX ADMIN — LIDOCAINE HYDROCHLORIDE 100 MG: 20 INJECTION, SOLUTION INFILTRATION; PERINEURAL at 14:51

## 2023-05-19 RX ADMIN — DEXMEDETOMIDINE HYDROCHLORIDE 6 MCG: 100 INJECTION, SOLUTION INTRAVENOUS at 15:19

## 2023-05-19 RX ADMIN — FAMOTIDINE 20 MG: 10 INJECTION INTRAVENOUS at 14:40

## 2023-05-19 RX ADMIN — DEXMEDETOMIDINE HYDROCHLORIDE 10 MCG: 100 INJECTION, SOLUTION INTRAVENOUS at 15:25

## 2023-05-19 RX ADMIN — PHENYLEPHRINE HYDROCHLORIDE 100 MCG: 10 INJECTION INTRAVENOUS at 14:55

## 2023-05-19 ASSESSMENT — PAIN - FUNCTIONAL ASSESSMENT: PAIN_FUNCTIONAL_ASSESSMENT: 0-10

## 2023-05-19 ASSESSMENT — PAIN DESCRIPTION - DESCRIPTORS: DESCRIPTORS: ACHING

## 2023-05-19 NOTE — DISCHARGE INSTRUCTIONS
Eben Robetr M.D.    [phone] [address] [facility] [facility] [address], [address]    Excision discharge instructions    Resume regular diet  May drive tomorrow  May shower daily, change dressing daily  No heavy lifting for 48 hours  May use pain meds and ice on the surgical site as needed  Call the office as needed for any post op questions and for follow up in one week. ANESTHESIA DISCHARGE INSTRUCTIONS    Wear your seatbelt home. You are under the influence of drugs-do not drink alcohol, drive, operate machinery, make any important decisions or sign any legal documents for 24 hours. Children should not ride bikes, [place] or play on gym sets for 24 hours after surgery. A responsible adult needs to be with you for 24 hours. You may experience lightheadedness, dizziness, or sleepiness following surgery. Rest at home today- increase activity as tolerated. Progress slowly to a regular diet unless your physician has instructed you otherwise. Drink plenty of water. If persistent nausea and vomiting becomes a problem, call your physician. Coughing, sore throat and muscle aches are other side effects of anesthesia, and should improve with time. Do not drive or operate machinery while taking narcotics.

## 2023-05-19 NOTE — H&P
[facility]  and Laparoscopic Surgery      I have reviewed the history and physical and examined the patient and find no relevant changes. I have reviewed with the patient and/or family the risks, benefits, and alternatives to the procedure.     Jumana Vyas MD  5/19/2023

## 2023-05-19 NOTE — BRIEF OP NOTE
Brief Postoperative Note      Patient: Alfonzo Ron  YOB: 1960  MRN: [id number]    Date of Procedure: 5/19/2023    Pre-Op Diagnosis Codes:     * Abdominal wall abscess [L02.211]    Post-Op Diagnosis: Same       Procedure(s):  DEBRIDE ABDOMINAL WALL ABSCESS    Surgeon(s):  Carlos Enrique Lagunas MD    Assistant:  Surgical Assistant: Olga Lidia Irwin    Anesthesia: General    Estimated Blood Loss (mL): Minimal    Complications: None    Specimens:   * No specimens in log *    Implants:  * No implants in log *      Drains:   Ileostomy Ileostomy RLQ (Active)       [REMOVED] Negative Pressure Wound Therapy Abdomen Left; Lower (Removed)       Findings: Skin and SQ debridement completed, 10 x 5 cm area      Electronically signed by Carlos Enrique Lagunas MD on 5/19/2023 at 3:36 PM

## 2023-05-19 NOTE — ANESTHESIA PRE PROCEDURE
Department of Anesthesiology  Preprocedure Note       Name:  Jaya Craig   Age:  65 y.o.  :  1960                                          MRN:  [id number]         Date:  2023      Surgeon: Alessandro Figueroa):  Nithya Taylor MD    Procedure: Procedure(s):  DEBRIDE ABDOMINAL WALL ABSCESS    Medications prior to admission:   Prior to Admission medications    Medication Sig Start Date End Date Taking?  Authorizing Provider   gabapentin (NEURONTIN) 300 MG capsule TAKE 1 CAPSULE BY MOUTH EVERY 8 HOURS FOR 30 DAYS 5/10/23   Historical Provider, MD   levoFLOXacin (LEVAQUIN) 750 MG tablet Take 1 tablet by mouth daily for 10 days  Patient not taking: Reported on 2023  Nithya Taylor MD   sodium hypochlorite (DAKINS) 0.5 % SOLN external solution Irrigate with as directed daily  Patient not taking: Reported on 2023   Nithya Taylor MD   methocarbamol (ROBAXIN) 750 MG tablet  23   Historical Provider, MD   Continuous Blood Gluc Sensor (FREESTYLE EDY 2 SENSOR) MISC Change every 14 days 23   Judie Brandon MD   Continuous Blood Gluc Sensor (FREESTYLE DEY 2 SENSOR) MISC CHANGE EVERY 14 DAYS TO MONITOR BS 23   Judie Brandon MD   Continuous Blood Gluc Sensor (FREESTYLE EDY 2 SENSOR) MISC Change every 14 days 23   Judie Brandon MD   magnesium oxide (MAG-OX) 400 (240 Mg) MG tablet Take 1 tablet by mouth nightly    Historical Provider, MD   dicyclomine (BENTYL) 10 MG capsule Take 1 capsule by mouth every 6 hours as needed (Take 1 capsule by mouth as needed for stomach spasms.)    Historical Provider, MD   insulin glargine (LANTUS SOLOSTAR) 100 UNIT/ML injection pen INJECT 50 UNITS SUBCUTANEOUSLY TWICE DAILY  Patient taking differently: Inject into the skin daily INJECT 60 UNITS SUBCUTANEOUSLY 23   Judie Brandon MD   insulin aspart (NOVOLOG FLEXPEN) 100 UNIT/ML injection pen INJECT 25 UNITS SUBCUTANEOUSLY TWICE DAILY  Patient taking

## 2023-05-19 NOTE — PROGRESS NOTES
Patient to PACU from OR. Drowsy. VSS. Abd soft, ileostomy noted, abd surgical dressing intact, no drainage, ice pack placed. Will monitor.

## 2023-05-19 NOTE — ANESTHESIA POSTPROCEDURE EVALUATION
Department of Anesthesiology  Postprocedure Note    Patient: Alfonzo Ron  MRN: [id number]  YOB: 1960  Date of evaluation: 5/19/2023      Procedure Summary     Date: 05/19/23 Room / Location: [address]    Anesthesia Start: 1446 Anesthesia Stop: 1538    Procedure: DEBRIDE ABDOMINAL WALL ABSCESS Diagnosis:       Abdominal wall abscess      (Abdominal wall abscess [L02.211])    Surgeons: Carlos Enrique Lagunas MD Responsible Provider:     Anesthesia Type: general ASA Status: 3          Anesthesia Type: No value filed.     Ruchi Phase I: Ruchi Score: 10    Ruchi Phase II: Ruchi Score: 10      Anesthesia Post Evaluation    Patient location during evaluation: PACU  Patient participation: complete - patient participated  Level of consciousness: awake  Airway patency: patent  Nausea & Vomiting: no vomiting  Complications: no  Cardiovascular status: hemodynamically stable  Respiratory status: acceptable  Hydration status: euvolemic  Multimodal analgesia pain management approach

## 2023-05-19 NOTE — PROGRESS NOTES
Discharge instructions reviewed with patient and her sister. Patient has already been doing dressing changes at home, and home care comes out 3 days a week. Patient more supplies for the weekend. Patient will follow up with Dr. Uma Easton in 1 week.

## 2023-05-20 NOTE — OP NOTE
[facility]                     [address] [facility], [address] [facility]                                OPERATIVE REPORT    PATIENT NAME: Andree Hodgkin                   :        1960  MED REC NO:   [id number]                          ROOM:  ACCOUNT NO:   [de-identified]                           ADMIT DATE: 2023  PROVIDER:     Tala Saldana MD    DATE OF PROCEDURE:  2023    PREOPERATIVE DIAGNOSIS:  Abdominal wall abscess. POSTOPERATIVE DIAGNOSIS:  Abdominal wall abscess. PROCEDURE:  Excisional debridement of skin and subcutaneous tissue 10 cm  x 5 cm abdominal wall open wound and abscess. SURGEON:  Tala Saldana MD    ANESTHESIA:  General by LMA and local.    ESTIMATED BLOOD LOSS:  Minimal.    COMPLICATIONS:  None. DETAILS OF SURGERY:  The patient presents for operative debridement. She has had an open wound, developed a new pocket of infection with  Serratia bacteria growing in this area. Excisional debridement, removal  of this area is done today. Site of surgery is confirmed with her. All  questions answered. She consents to proceed. ADDITIONAL DETAILS OF SURGERY:  The patient taken to the operating room,  placed on the operative table in supine position. Compression boots  were placed. General anesthetic was administered. The abdomen was  prepped and draped sterilely and time-out was done. Local anesthetic  with Marcaine and Exparel was placed liberally in the left abdomen  around the excisional site. Skin excision full-thickness, subcutaneous  tissue is done down to level the fascia with the sharp excisional  debridement of this area completed, removing the pocket infection, the  tunnel and chronic inflammatory debris in this area. 10 cm x 5 cm  debridement was done. The wound was irrigated thoroughly and aspirated  dry. Majority of the dissection was done out laterally and then  laterally inferiorly.   The wound

## 2023-05-22 ENCOUNTER — TELEPHONE (OUTPATIENT)
Dept: SURGERY | Age: 63
End: 2023-05-22

## 2023-05-22 NOTE — TELEPHONE ENCOUNTER
Patient called stating her disability provider is requesting additional information. She said the info should have been faxed, she just wanted to be sure. She stated they are requesting more labs and op reports and If you have any questions to please call her.

## 2023-05-23 ENCOUNTER — HOSPITAL ENCOUNTER (OUTPATIENT)
Dept: PHYSICAL THERAPY | Age: 63
Setting detail: THERAPIES SERIES
Discharge: HOME | End: 2023-05-23
Payer: COMMERCIAL

## 2023-05-23 PROCEDURE — 97112 NEUROMUSCULAR REEDUCATION: CPT

## 2023-05-23 PROCEDURE — 97110 THERAPEUTIC EXERCISES: CPT

## 2023-05-23 NOTE — FLOWSHEET NOTE
[address] [place] [address], Casandra Ambriz  Phone: [phone]   Fax: [phone]    Physical Therapy Daily Treatment Note    Date:  2023     Patient Name:  Geoffrey Parrish    :  1960  MRN: [id number]  Medical Diagnosis:  Left-sided low back pain with bilateral sciatica, unspecified chronicity [M54.41, M54.42]  Treatment Diagnosis: decreased hip/lumbar ROM, core and gross LE strength, antalgic gait    Insurance/Certification information:  PT Insurance Information: [employer] - no auth or copay; dry needling not covered  Physician Information:  CORDELL Serrano CNP    Plan of care signed (Y/N): [x]  Yes []  No     Date of Patient follow up with Physician:      Progress Report: []  Yes  [x]  No     Date Range for reporting period:  Beginnin2023  PN: NPV  Ending:     Progress report due (10 Rx/or 30 days whichever is less): visit #10 or      Recertification due (POC duration/ or 90 days whichever is less): visit #16 or 23    Visit # Insurance Allowable Auth required? Date Range    60/yr combined hard max []  Yes  [x]  No      Latex Allergy:  [x]NO      []YES  Preferred Language for Healthcare:   [x]English       []other:    Functional Scale:       Date assessed:  MOE: raw score = 23; dysfunction = 46%  23    Pain level:  310     SUBJECTIVE:  Pt had surgery on wound as it became infected. She reports they made the wound a 1/3 bigger and the area around is numb. Has not been doing HEP since surgery on Friday and back and L hip feel worse.      OBJECTIVE: See eval  Observation:   Test measurements:      RESTRICTIONS/PRECAUTIONS: diabetic, ulcerative colitis, current abdomen wound      Treatment based classification:    [x] mobilization/manipulation   [] stabilization   [] extension based   [x] flexion based   [] lateral shift   [] traction   [] unspecified Components:   [x] thoracolumbar   [x] pelvic   [] SIJ   [] sacral   [] hip         Comparable

## 2023-05-25 ENCOUNTER — APPOINTMENT (OUTPATIENT)
Dept: PHYSICAL THERAPY | Age: 63
End: 2023-05-25
Payer: COMMERCIAL

## 2023-05-25 ENCOUNTER — OFFICE VISIT (OUTPATIENT)
Dept: SURGERY | Age: 63
End: 2023-05-25
Payer: COMMERCIAL

## 2023-05-25 VITALS — SYSTOLIC BLOOD PRESSURE: 122 MMHG | DIASTOLIC BLOOD PRESSURE: 70 MMHG | BODY MASS INDEX: 38.61 KG/M2 | WEIGHT: 232 LBS

## 2023-05-25 DIAGNOSIS — S31.109A OPEN WOUND OF ABDOMINAL WALL, INITIAL ENCOUNTER: Primary | ICD-10-CM

## 2023-05-25 PROCEDURE — 99024 POSTOP FOLLOW-UP VISIT: CPT | Performed by: SURGERY

## 2023-05-25 PROCEDURE — 11042 DBRDMT SUBQ TIS 1ST 20SQCM/<: CPT | Performed by: SURGERY

## 2023-05-26 ENCOUNTER — TELEPHONE (OUTPATIENT)
Dept: SURGERY | Age: 63
End: 2023-05-26

## 2023-05-26 NOTE — TELEPHONE ENCOUNTER
[employer] disability is telling her they need more information so she can extend her leave for 30 more days.  Please call [phone]

## 2023-05-30 NOTE — PROGRESS NOTES
Procedure Note:    Left lower abdominal area open wound  Much better now with tunneled area open and excised  Does have fibrinous debris and biofilm in mid lower area    Site confirmed, pt consents to excisional debridement of the site    Chloraprep Prep  No local placed  #15 blade and sharp loop curette excisional debridement done  Full thickness Skin and SQ removed; 2 cm X 3 cm size  Cleaned with NS  Packed with NS wet to dry  Pt tolerated well  Site hemostatic  DSD placed overlying the area  Dressing care instructions reviewed with pt, all questions answered  See again next week    Dave Morrissey

## 2023-05-31 ENCOUNTER — HOSPITAL ENCOUNTER (OUTPATIENT)
Dept: PHYSICAL THERAPY | Age: 63
Setting detail: THERAPIES SERIES
Discharge: HOME | End: 2023-05-31
Payer: COMMERCIAL

## 2023-05-31 ENCOUNTER — TELEPHONE (OUTPATIENT)
Dept: SURGERY | Age: 63
End: 2023-05-31

## 2023-05-31 PROCEDURE — 97112 NEUROMUSCULAR REEDUCATION: CPT

## 2023-05-31 PROCEDURE — 97110 THERAPEUTIC EXERCISES: CPT

## 2023-05-31 NOTE — TELEPHONE ENCOUNTER
-[employer] is missing 11/16/2021 Ventra hernia repair report &  -11/16/2021 SBO operation report  Please refax  Fax# [phone] David Fermin

## 2023-05-31 NOTE — TELEPHONE ENCOUNTER
I need a request from [facility] for this. I've only received requests for 2/2022 to present (3 different requests). Left  for pt stating this.

## 2023-05-31 NOTE — FLOWSHEET NOTE
for activities related to improving balance, coordination, kinesthetic sense, posture, motor skill, proprioception and motor activation to allow for proper function  with self care and ADLs  [] (53843) Provided training and instruction to the patient for proper core and proximal hip recruitment and positioning with ambulation re-education     Home Exercise Program:    [x] (30007) Reviewed/Progressed HEP activities related to strengthening, flexibility, endurance, ROM of core, proximal hip and LE for functional self-care, mobility, lifting and ambulation   [] (36986) Reviewed/Progressed HEP activities related to improving balance, coordination, kinesthetic sense, posture, motor skill, proprioception of core, proximal hip and LE for self care, mobility, lifting, and ambulation      Manual Treatments:  PROM / STM / Oscillations-Mobs:  G-I, II, III, IV (PA's, Inf., Post.)  [] (07389) Provided manual therapy to mobilize proximal hip and LS spine soft tissue/joints for the purpose of modulating pain, promoting relaxation,  increasing ROM, reducing/eliminating soft tissue swelling/inflammation/restriction, improving soft tissue extensibility and allowing for proper ROM for normal function with self care, mobility, lifting and ambulation.        Charges:  Timed Code Treatment Minutes: 45   Total Treatment Minutes: 45       [] EVAL - LOW (53695)   [] EVAL - MOD (47382)  [] EVAL - HIGH (26407)  [] RE-EVAL (20985)  [x] MV(82829) x 2      [] Ionto  [x] NMR (37907) x 1      [] Vaso  [] Manual (98986) x       [] Ultrasound  [] TA x        [] Mech Traction (66193)  [] Aquatic Therapy x     [] ES (un) (52615):   [] Home Management Training x  [] ES(attended) (53500)   [] Dry Needling 1-2 muscles (13891):  [] Dry Needling 3+ muscles (187839  [] Group:      [] Other:     GOALS:  Patient stated goal: \"return to work, increase strength\"  [] Progressing: [] Met: [] Not Met: [] Adjusted    Therapist goals for Patient:   Short Term Goals:

## 2023-06-01 ENCOUNTER — OFFICE VISIT (OUTPATIENT)
Dept: SURGERY | Age: 63
End: 2023-06-01

## 2023-06-01 VITALS — BODY MASS INDEX: 38.61 KG/M2 | DIASTOLIC BLOOD PRESSURE: 70 MMHG | WEIGHT: 232 LBS | SYSTOLIC BLOOD PRESSURE: 121 MMHG

## 2023-06-01 DIAGNOSIS — S31.109A OPEN WOUND OF ABDOMINAL WALL, INITIAL ENCOUNTER: Primary | ICD-10-CM

## 2023-06-01 RX ORDER — SODIUM HYPOCHLORITE 2.5 MG/ML
SOLUTION TOPICAL
Qty: 1 EACH | Refills: 2 | Status: SHIPPED | OUTPATIENT
Start: 2023-06-01 | End: 2023-06-08

## 2023-06-01 NOTE — PROGRESS NOTES
[facility] GENERAL AND LAPAROSCOPIC SURGERY          PATIENT NAME: Livia Castleman Mishos     TODAY'S DATE: 6/1/2023    SUBJECTIVE:    Pt returns for wound check, overall  with Dakin's use daily.      OBJECTIVE:  VITALS:  Wt 232 lb (105.2 kg)   LMP 08/16/2010   BMI 38.61 kg/m²     Procedure Note:    Open left lower abdominal area wound  Site confirmed, pt consents to excisional debridement of the site    No local placed  #15 blade and loop curette excisional debridement done  Full thickness Skin and SQ removed; 2 cm X 3 cm size  Cleaned with NS  Packed with NS wet to dry  Pt tolerated well  Site hemostatic  DSD placed overlying the area  Dressing care instructions reviewed with pt, all questions answered  Return in a week    Streeter Laundry

## 2023-06-06 ENCOUNTER — HOSPITAL ENCOUNTER (OUTPATIENT)
Dept: PHYSICAL THERAPY | Age: 63
Setting detail: THERAPIES SERIES
Discharge: HOME | End: 2023-06-06
Payer: COMMERCIAL

## 2023-06-06 PROCEDURE — 97110 THERAPEUTIC EXERCISES: CPT

## 2023-06-06 PROCEDURE — 97112 NEUROMUSCULAR REEDUCATION: CPT

## 2023-06-08 ENCOUNTER — HOSPITAL ENCOUNTER (OUTPATIENT)
Dept: PHYSICAL THERAPY | Age: 63
Setting detail: THERAPIES SERIES
Discharge: HOME | End: 2023-06-08
Payer: COMMERCIAL

## 2023-06-08 ENCOUNTER — OFFICE VISIT (OUTPATIENT)
Dept: SURGERY | Age: 63
End: 2023-06-08
Payer: COMMERCIAL

## 2023-06-08 VITALS — DIASTOLIC BLOOD PRESSURE: 62 MMHG | SYSTOLIC BLOOD PRESSURE: 116 MMHG | BODY MASS INDEX: 38.61 KG/M2 | WEIGHT: 232 LBS

## 2023-06-08 DIAGNOSIS — S31.109D OPEN WOUND OF LATERAL ABDOMINAL WALL, SUBSEQUENT ENCOUNTER: Primary | ICD-10-CM

## 2023-06-08 PROCEDURE — 3074F SYST BP LT 130 MM HG: CPT | Performed by: SURGERY

## 2023-06-08 PROCEDURE — 97110 THERAPEUTIC EXERCISES: CPT

## 2023-06-08 PROCEDURE — 99212 OFFICE O/P EST SF 10 MIN: CPT | Performed by: SURGERY

## 2023-06-08 PROCEDURE — 97112 NEUROMUSCULAR REEDUCATION: CPT

## 2023-06-08 PROCEDURE — 3078F DIAST BP <80 MM HG: CPT | Performed by: SURGERY

## 2023-06-08 NOTE — PROGRESS NOTES
[facility] GENERAL AND LAPAROSCOPIC SURGERY          PATIENT NAME: German Salter     TODAY'S DATE: 6/8/2023    SUBJECTIVE:    Pt feeling well, stronger. Thinks wound is decreasing in size. No fevers. Minimal drainage.      OBJECTIVE:  VITALS:  /62   Wt 232 lb (105.2 kg)   LMP 08/16/2010   BMI 38.61 kg/m²     CONSTITUTIONAL:  awake and alert    ABDOMEN:  normal bowel sounds, soft, non-distended, non-tender, open wound on the left packing changed, looks clean today, no cellulitis or drainage     SKIN: no rash or cellulitis    Data:    Radiology Review:  None      ASSESSMENT AND PLAN:  Open wound anterior abdominal wall  Continue daily dressings  No tunnels or deep pockets noted  No debridement today  Will david in 2 weeks    Paola Swanson MD

## 2023-06-08 NOTE — FLOWSHEET NOTE
Treatment Minutes: 45       [] EVAL - LOW (91939)   [] EVAL - MOD (98711)  [] EVAL - HIGH (61794)  [] RE-EVAL (81699)  [x] KR(52441) x 2      [] Ionto  [x] NMR (91664) x 1      [] Vaso  [] Manual (65112) x       [] Ultrasound  [] TA x        [] Mech Traction (68779)  [] Aquatic Therapy x     [] ES (un) (30946):   [] Home Management Training x  [] ES(attended) (21771)   [] Dry Needling 1-2 muscles (13938):  [] Dry Needling 3+ muscles (267817  [] Group:      [] Other:     GOALS:  Patient stated goal: \"return to work, increase strength\"  [x] Progressin/6: not yet back at work d/t wound[] Met: [] Not Met: [] Adjusted    Therapist goals for Patient:   Short Term Goals: To be achieved in: 2 weeks  1. Independent in HEP and progression per patient tolerance, in order to prevent re-injury. [] Progressing: [x] Met: [] Not Met: [] Adjusted  2. Patient will have a decrease in pain to facilitate improvement in movement, function, and ADLs as indicated by Functional Deficits. [] Progressing: [x] Met: [] Not Met: [] Adjusted    Long Term Goals: To be achieved in: 8 weeks  1. Pt will improve MOE by 10 points to reduce disability and progress towards PLOF. [x] Progressing: [] Met: [] Not Met: [] Adjusted  2. Patient will demonstrate increased AROM to WNL, good LS mobility, good hip ROM to allow for proper joint functioning as indicated by patients Functional Deficits. [] Progressing: [x] Met: [] Not Met: [] Adjusted  3. Patient will demonstrate an increase in Strength as demo'd by performing at least 1x15 SLR flex to demo good proximal hip and core activation to allow for proper functional mobility as indicated by patients Functional Deficits. [] Progressing: [] Met: [] Not Met: [] Adjusted  4. Patient will return to functional activities including carrying a full laundry basket up and down stairs at least once without increased symptoms or restriction.    [x] Progressing: able to complete w/o back pain, limited by knee

## 2023-06-13 ENCOUNTER — APPOINTMENT (OUTPATIENT)
Dept: PHYSICAL THERAPY | Age: 63
End: 2023-06-13
Payer: COMMERCIAL

## 2023-06-15 ENCOUNTER — APPOINTMENT (OUTPATIENT)
Dept: PHYSICAL THERAPY | Age: 63
End: 2023-06-15
Payer: COMMERCIAL

## 2023-06-20 ENCOUNTER — APPOINTMENT (OUTPATIENT)
Dept: PHYSICAL THERAPY | Age: 63
End: 2023-06-20
Payer: COMMERCIAL

## 2023-06-22 ENCOUNTER — HOSPITAL ENCOUNTER (OUTPATIENT)
Dept: PHYSICAL THERAPY | Age: 63
Setting detail: THERAPIES SERIES
Discharge: HOME | End: 2023-06-22
Payer: COMMERCIAL

## 2023-06-22 ENCOUNTER — OFFICE VISIT (OUTPATIENT)
Dept: SURGERY | Age: 63
End: 2023-06-22

## 2023-06-22 VITALS — SYSTOLIC BLOOD PRESSURE: 127 MMHG | DIASTOLIC BLOOD PRESSURE: 68 MMHG | WEIGHT: 237 LBS | BODY MASS INDEX: 39.44 KG/M2

## 2023-06-22 DIAGNOSIS — S31.109D OPEN WOUND OF LATERAL ABDOMINAL WALL, SUBSEQUENT ENCOUNTER: Primary | ICD-10-CM

## 2023-06-22 NOTE — PROGRESS NOTES
Physical Therapy    [facility]     Physical Therapy  Cancellation/No-show Note  Patient Name:  Cherry Oliveros  :  1960   Date:  2023  Cancelled visits to date: 0  No-shows to date: 1    Patient status for today's appointment patient:  []  Cancelled  []  Rescheduled appointment  [x]  No-show     Reason given by patient:  []  Patient ill  []  Conflicting appointment  []  No transportation    []  Conflict with work  []  No reason given  [x]  Other:  pt forgot about appt today and wants to wait until insurance changes and \"gets settled\" to schedule more PT visits. Comments:      Phone call information:   [x]  Phone call made today to patient at _ time at number provided:  [phone]     [x]  Patient answered, conversation as follows: missed appt today at 1:45pm does not have any more appts scheduled at this time. []  Patient did not answer, message left as follows:  []  Phone call not made today  []  Phone call not needed - pt contacted us to cancel and provided reason for cancellation.      Electronically signed by:  Rob Church PTA [id number]

## 2023-06-22 NOTE — PROGRESS NOTES
[facility] GENERAL AND LAPAROSCOPIC SURGERY          PATIENT NAME: Trinity Salter     TODAY'S DATE: 6/22/2023    SUBJECTIVE:    Pt here for wound check.  Doing daily dressing with Dakin's     OBJECTIVE:  VITALS:  Wt 237 lb (107.5 kg)   LMP 08/16/2010   BMI 39.44 kg/m²     CONSTITUTIONAL:  awake and alert  Wound: site cleaning up, no cellulitis      ASSESSMENT AND PLAN:  Open abd wound  Site healing in a little better over the last couple weeks  Will plan to continue same local care and david in 2 weeks      Freeman Hart MD

## 2023-06-26 ENCOUNTER — TELEPHONE (OUTPATIENT)
Dept: SURGERY | Age: 63
End: 2023-06-26

## 2023-07-05 NOTE — TELEPHONE ENCOUNTER
I called and talked to [facility], informed her of what Dr. Kasey Bradshaw, pt and I discussed. She requested for the OV note for 7/6/23 be sent to her. Will fax that to [phone] as soon as he completes the note.

## 2023-07-05 NOTE — TELEPHONE ENCOUNTER
I talked to [facility], he said that pt can work as long as they will allow but she will be on very strict restrictions of no lifting, pushing, pulling or tugging over 15 lbs. Wanted me to call and talk to pt to see how she is feeling and we will have a better plan from there. 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 She is working on getting her stamina back up, being on the go for just a few short hours wears her out that as soon as she's home she is napping/resting for the rest of the day. She does have an appointment with [facility] on 7/6/23 and we will follow up with her at that time.

## 2023-07-06 ENCOUNTER — OFFICE VISIT (OUTPATIENT)
Dept: SURGERY | Age: 63
End: 2023-07-06

## 2023-07-06 VITALS — SYSTOLIC BLOOD PRESSURE: 122 MMHG | DIASTOLIC BLOOD PRESSURE: 74 MMHG | WEIGHT: 237 LBS | BODY MASS INDEX: 39.44 KG/M2

## 2023-07-06 DIAGNOSIS — S31.109A OPEN WOUND OF ABDOMINAL WALL, INITIAL ENCOUNTER: Primary | ICD-10-CM

## 2023-07-11 ENCOUNTER — TELEPHONE (OUTPATIENT)
Dept: SURGERY | Age: 63
End: 2023-07-11

## 2023-07-11 NOTE — TELEPHONE ENCOUNTER
Johnnie Fritz spoke with [employer]. They told her to have all orders resent to Attn: Franky Gitelman. They are fitting her in and they need to be faxed again.

## 2023-07-12 ENCOUNTER — TELEPHONE (OUTPATIENT)
Dept: SURGERY | Age: 63
End: 2023-07-12

## 2023-07-12 NOTE — TELEPHONE ENCOUNTER
Nurse from [employer] [facility] [employer] [facility] home care  Wants to clarify wet to dry dressing order. Because Pt was using Bakins wet to dry and the nurse said she couldn't do that until she had an order for that. The order she has is normal saline wet to dry.   Also PT is changing the dressing 1x qd   The order the nurse has is 3x wk    Heidy Michaud from [employer]  [phone]

## 2023-07-12 NOTE — TELEPHONE ENCOUNTER
I called and talked to Yovanny Mcfadden, clarified with her that pt is supposed to have daily wet to dry dressing changes with Dakins. She will send over a new order for us to sign and send back.

## 2023-07-20 ENCOUNTER — OFFICE VISIT (OUTPATIENT)
Dept: SURGERY | Age: 63
End: 2023-07-20

## 2023-07-20 VITALS — WEIGHT: 234 LBS | SYSTOLIC BLOOD PRESSURE: 126 MMHG | BODY MASS INDEX: 38.94 KG/M2 | DIASTOLIC BLOOD PRESSURE: 85 MMHG

## 2023-07-20 DIAGNOSIS — S31.109A OPEN WOUND OF ABDOMINAL WALL, INITIAL ENCOUNTER: Primary | ICD-10-CM

## 2023-07-31 ENCOUNTER — TELEPHONE (OUTPATIENT)
Dept: SURGERY | Age: 63
End: 2023-07-31

## 2023-07-31 NOTE — TELEPHONE ENCOUNTER
Pt states she feels very ill. Has nausea and feels loopy. No vomiting or fever at this time but feels terrible and not sure if she should go to the ER.  Please advise [phone]

## 2023-07-31 NOTE — TELEPHONE ENCOUNTER
Called and advised pt to go to the ER or see her PCP today. Don't know if this is related to her wound but she does need to be seen by someone today.

## 2023-08-01 ENCOUNTER — OFFICE VISIT (OUTPATIENT)
Dept: SURGERY | Age: 63
End: 2023-08-01
Payer: COMMERCIAL

## 2023-08-01 VITALS — WEIGHT: 234 LBS | DIASTOLIC BLOOD PRESSURE: 80 MMHG | SYSTOLIC BLOOD PRESSURE: 134 MMHG | BODY MASS INDEX: 38.94 KG/M2

## 2023-08-01 DIAGNOSIS — S31.109A OPEN WOUND OF ABDOMINAL WALL, INITIAL ENCOUNTER: Primary | ICD-10-CM

## 2023-08-01 PROCEDURE — 11042 DBRDMT SUBQ TIS 1ST 20SQCM/<: CPT | Performed by: SURGERY

## 2023-08-01 PROCEDURE — 99024 POSTOP FOLLOW-UP VISIT: CPT | Performed by: SURGERY

## 2023-08-04 ENCOUNTER — TELEPHONE (OUTPATIENT)
Dept: SURGERY | Age: 63
End: 2023-08-04

## 2023-08-08 NOTE — BRIEF OP NOTE
Brief Postoperative Note      Patient: Joanie Slade  YOB: 1960  MRN: [id number]    Date of Procedure: 2/18/2023    Pre-Op Diagnosis: abdominal wall abscess    Post-Op Diagnosis: Same       Procedure(s):  ABDOMEN INCISION AND DRAINAGE, DEBRIDEMENT    Surgeon(s):  Prieto Suárez MD    Assistant:  Surgical Assistant: Cyril Garay    Anesthesia: General    Estimated Blood Loss (mL): Minimal    Complications: None    Specimens:   ID Type Source Tests Collected by Time Destination   1 : ABDOMINAL WOUND FLUID  Body Fluid Fluid CULTURE, ANAEROBIC (Canceled) Prieto Suárez MD 2/18/2023 1125    A : ABDOMINAL WALL DEBRIDEMENT  Tissue Tissue SURGICAL PATHOLOGY Prieto Suárez MD 2/18/2023 1120        Implants:  * No implants in log *      Drains:   Negative Pressure Wound Therapy Abdomen Left; Lower (Active)       Ileostomy Ileostomy RLQ (Active)   Stool Appearance Watery; Loose 02/18/23 1216   Stool Amount Small 02/18/23 1216       Findings: Site opened and drained, debridement of skin sq 14 x 12 cm completed.  Cultures sent    Electronically signed by Prieto Suárez MD on 2/18/2023 at 12:23 PM
Patient

## 2023-08-10 NOTE — TELEPHONE ENCOUNTER
I talked to Dr. Henok Badillo, he states that pt was against using the vac last he talked to her but he is ok with is if she wants to do that. He does want to see her to debride the wound as he has been doing as needed.      Left  a detailed message for pt on verified VM

## 2023-08-15 ENCOUNTER — OFFICE VISIT (OUTPATIENT)
Dept: SURGERY | Age: 63
End: 2023-08-15

## 2023-08-15 ENCOUNTER — TELEPHONE (OUTPATIENT)
Dept: ENDOCRINOLOGY | Age: 63
End: 2023-08-15

## 2023-08-15 ENCOUNTER — PATIENT MESSAGE (OUTPATIENT)
Dept: ENDOCRINOLOGY | Age: 63
End: 2023-08-15

## 2023-08-15 VITALS — WEIGHT: 232 LBS | SYSTOLIC BLOOD PRESSURE: 110 MMHG | BODY MASS INDEX: 38.61 KG/M2 | DIASTOLIC BLOOD PRESSURE: 70 MMHG

## 2023-08-15 DIAGNOSIS — S31.109D OPEN WOUND OF LATERAL ABDOMINAL WALL, SUBSEQUENT ENCOUNTER: Primary | ICD-10-CM

## 2023-08-15 DIAGNOSIS — E11.9 CONTROLLED TYPE 2 DIABETES MELLITUS WITHOUT COMPLICATION, WITH LONG-TERM CURRENT USE OF INSULIN (HCC): ICD-10-CM

## 2023-08-15 DIAGNOSIS — Z79.4 CONTROLLED TYPE 2 DIABETES MELLITUS WITHOUT COMPLICATION, WITH LONG-TERM CURRENT USE OF INSULIN (HCC): ICD-10-CM

## 2023-08-15 PROCEDURE — 99024 POSTOP FOLLOW-UP VISIT: CPT | Performed by: SURGERY

## 2023-08-15 RX ORDER — INSULIN GLARGINE 100 [IU]/ML
INJECTION, SOLUTION SUBCUTANEOUS
Qty: 18 ADJUSTABLE DOSE PRE-FILLED PEN SYRINGE | Refills: 1 | Status: SHIPPED | OUTPATIENT
Start: 2023-08-15 | End: 2023-08-15 | Stop reason: SDUPTHER

## 2023-08-15 RX ORDER — PEN NEEDLE, DIABETIC 31 GX5/16"
NEEDLE, DISPOSABLE MISCELLANEOUS
Qty: 400 EACH | Refills: 5 | Status: SHIPPED | OUTPATIENT
Start: 2023-08-15

## 2023-08-15 RX ORDER — INSULIN GLARGINE 100 [IU]/ML
INJECTION, SOLUTION SUBCUTANEOUS
Qty: 18 ADJUSTABLE DOSE PRE-FILLED PEN SYRINGE | Refills: 1 | Status: SHIPPED | OUTPATIENT
Start: 2023-08-15

## 2023-08-15 RX ORDER — INSULIN ASPART 100 [IU]/ML
INJECTION, SOLUTION INTRAVENOUS; SUBCUTANEOUS
Qty: 45 ML | Refills: 3 | Status: CANCELLED | OUTPATIENT
Start: 2023-08-15

## 2023-08-15 RX ORDER — INSULIN LISPRO 100 [IU]/ML
INJECTION, SOLUTION INTRAVENOUS; SUBCUTANEOUS
Qty: 10 ADJUSTABLE DOSE PRE-FILLED PEN SYRINGE | Refills: 3 | Status: SHIPPED | OUTPATIENT
Start: 2023-08-15

## 2023-08-15 NOTE — PROGRESS NOTES
[facility] GENERAL AND LAPAROSCOPIC SURGERY          PATIENT NAME: Shiraz Salter     TODAY'S DATE: 8/15/2023      VAC is on with a good seal.  No leak, minimal drainage into collection chamber  No surrounding cellulitis or tenderness  Will see photo at next change, see in 2 weeks, sooner clary Fajardo MD

## 2023-08-15 NOTE — TELEPHONE ENCOUNTER
Call from [facility] needing more specific instructions on rx for Lantus    Sig: INJECT 60 UNITS SUBCUTANEOUSLY    CB# [facility] #[phone]  [facility]

## 2023-08-15 NOTE — TELEPHONE ENCOUNTER
From: Neftali Salter  To: Dr. Yomaira Harrison  Sent: 8/15/2023 10:00 AM EDT  Subject: Change in Pharmacy due to insurance    Hello,    My insurance changed 7/1/2023 to [facility]. [address] [place] [address] is now out of network. My new Pharmacy is now [place] [address]. Phone [phone]. Can You please send prescriptions to new Pharmacy? [facility] will not forward them. Lantus SoloStar 100 UNIT/ML injection pen  NovoLOG FlexPen 100 UNIT/ML injection pen  Droplet Pen Needles 31G X 8 MM Misc    Thank You!     Orion Lubin

## 2023-08-29 ENCOUNTER — OFFICE VISIT (OUTPATIENT)
Dept: SURGERY | Age: 63
End: 2023-08-29
Payer: COMMERCIAL

## 2023-08-29 VITALS — WEIGHT: 235 LBS | DIASTOLIC BLOOD PRESSURE: 70 MMHG | BODY MASS INDEX: 39.11 KG/M2 | SYSTOLIC BLOOD PRESSURE: 112 MMHG

## 2023-08-29 DIAGNOSIS — S31.109A OPEN WOUND OF ABDOMINAL WALL, INITIAL ENCOUNTER: Primary | ICD-10-CM

## 2023-08-29 PROCEDURE — 11042 DBRDMT SUBQ TIS 1ST 20SQCM/<: CPT | Performed by: SURGERY

## 2023-09-12 ENCOUNTER — OFFICE VISIT (OUTPATIENT)
Dept: SURGERY | Age: 63
End: 2023-09-12
Payer: COMMERCIAL

## 2023-09-12 VITALS — WEIGHT: 235 LBS | DIASTOLIC BLOOD PRESSURE: 62 MMHG | BODY MASS INDEX: 39.11 KG/M2 | SYSTOLIC BLOOD PRESSURE: 127 MMHG

## 2023-09-12 DIAGNOSIS — S31.109A OPEN WOUND OF ABDOMINAL WALL, INITIAL ENCOUNTER: Primary | ICD-10-CM

## 2023-09-12 PROCEDURE — 11042 DBRDMT SUBQ TIS 1ST 20SQCM/<: CPT | Performed by: SURGERY

## 2023-09-26 ENCOUNTER — OFFICE VISIT (OUTPATIENT)
Dept: SURGERY | Age: 63
End: 2023-09-26
Payer: COMMERCIAL

## 2023-09-26 VITALS — DIASTOLIC BLOOD PRESSURE: 67 MMHG | SYSTOLIC BLOOD PRESSURE: 120 MMHG | WEIGHT: 238 LBS | BODY MASS INDEX: 39.61 KG/M2

## 2023-09-26 DIAGNOSIS — S31.109A OPEN WOUND OF ABDOMINAL WALL, INITIAL ENCOUNTER: Primary | ICD-10-CM

## 2023-09-26 PROCEDURE — 11042 DBRDMT SUBQ TIS 1ST 20SQCM/<: CPT | Performed by: SURGERY

## 2023-10-16 ENCOUNTER — TELEPHONE (OUTPATIENT)
Dept: ENDOCRINOLOGY | Age: 63
End: 2023-10-16

## 2023-10-16 DIAGNOSIS — E11.9 CONTROLLED TYPE 2 DIABETES MELLITUS WITHOUT COMPLICATION, WITH LONG-TERM CURRENT USE OF INSULIN (HCC): ICD-10-CM

## 2023-10-16 DIAGNOSIS — Z79.4 CONTROLLED TYPE 2 DIABETES MELLITUS WITHOUT COMPLICATION, WITH LONG-TERM CURRENT USE OF INSULIN (HCC): ICD-10-CM

## 2023-10-16 RX ORDER — BLOOD SUGAR DIAGNOSTIC
STRIP MISCELLANEOUS
Qty: 150 EACH | Refills: 2 | Status: SHIPPED | OUTPATIENT
Start: 2023-10-16

## 2023-10-26 ENCOUNTER — OFFICE VISIT (OUTPATIENT)
Dept: SURGERY | Age: 63
End: 2023-10-26
Payer: COMMERCIAL

## 2023-10-26 VITALS — BODY MASS INDEX: 39.64 KG/M2 | DIASTOLIC BLOOD PRESSURE: 68 MMHG | SYSTOLIC BLOOD PRESSURE: 125 MMHG | WEIGHT: 238.2 LBS

## 2023-10-26 DIAGNOSIS — S31.109D OPEN WOUND OF LATERAL ABDOMINAL WALL, SUBSEQUENT ENCOUNTER: Primary | ICD-10-CM

## 2023-10-26 PROCEDURE — 99212 OFFICE O/P EST SF 10 MIN: CPT | Performed by: SURGERY

## 2023-10-26 PROCEDURE — 3078F DIAST BP <80 MM HG: CPT | Performed by: SURGERY

## 2023-10-26 PROCEDURE — 3074F SYST BP LT 130 MM HG: CPT | Performed by: SURGERY

## 2023-11-28 ENCOUNTER — OFFICE VISIT (OUTPATIENT)
Dept: SURGERY | Age: 63
End: 2023-11-28

## 2023-11-28 VITALS — WEIGHT: 237 LBS | BODY MASS INDEX: 39.44 KG/M2 | DIASTOLIC BLOOD PRESSURE: 68 MMHG | SYSTOLIC BLOOD PRESSURE: 127 MMHG

## 2023-11-28 DIAGNOSIS — S31.109D OPEN WOUND OF LATERAL ABDOMINAL WALL, SUBSEQUENT ENCOUNTER: Primary | ICD-10-CM

## 2023-11-28 PROCEDURE — 99024 POSTOP FOLLOW-UP VISIT: CPT | Performed by: SURGERY

## 2023-12-15 DIAGNOSIS — E11.9 CONTROLLED TYPE 2 DIABETES MELLITUS WITHOUT COMPLICATION, WITH LONG-TERM CURRENT USE OF INSULIN (HCC): ICD-10-CM

## 2023-12-15 DIAGNOSIS — Z79.4 CONTROLLED TYPE 2 DIABETES MELLITUS WITHOUT COMPLICATION, WITH LONG-TERM CURRENT USE OF INSULIN (HCC): ICD-10-CM

## 2023-12-15 RX ORDER — INSULIN LISPRO 100 [IU]/ML
INJECTION, SOLUTION INTRAVENOUS; SUBCUTANEOUS
Qty: 30 ML | Refills: 0 | Status: SHIPPED | OUTPATIENT
Start: 2023-12-15

## 2023-12-15 NOTE — TELEPHONE ENCOUNTER
Requested Prescriptions     Pending Prescriptions Disp Refills    HUMALOG KWIKPEN 100 UNIT/ML SOPN [Pharmacy Med Name: HumaLOG KwikPen Subcutaneous Solution Pen-injector 100 UNIT/ML] 30 mL 0     Sig: INJECT 25 TO 30 UNITS WITH EACH MEAL UP TO 3 TIMES A DAY

## 2023-12-26 DIAGNOSIS — E11.9 CONTROLLED TYPE 2 DIABETES MELLITUS WITHOUT COMPLICATION, WITH LONG-TERM CURRENT USE OF INSULIN (HCC): ICD-10-CM

## 2023-12-26 DIAGNOSIS — Z79.4 CONTROLLED TYPE 2 DIABETES MELLITUS WITHOUT COMPLICATION, WITH LONG-TERM CURRENT USE OF INSULIN (HCC): ICD-10-CM

## 2023-12-26 RX ORDER — INSULIN LISPRO 100 [IU]/ML
INJECTION, SOLUTION INTRAVENOUS; SUBCUTANEOUS
Qty: 30 ML | Refills: 0 | Status: SHIPPED | OUTPATIENT
Start: 2023-12-26

## 2024-01-02 ENCOUNTER — OFFICE VISIT (OUTPATIENT)
Dept: ENDOCRINOLOGY | Age: 64
End: 2024-01-02
Payer: COMMERCIAL

## 2024-01-02 VITALS
WEIGHT: 241 LBS | HEIGHT: 65 IN | BODY MASS INDEX: 40.15 KG/M2 | SYSTOLIC BLOOD PRESSURE: 109 MMHG | HEART RATE: 94 BPM | DIASTOLIC BLOOD PRESSURE: 69 MMHG

## 2024-01-02 DIAGNOSIS — E11.9 CONTROLLED TYPE 2 DIABETES MELLITUS WITHOUT COMPLICATION, WITH LONG-TERM CURRENT USE OF INSULIN (HCC): ICD-10-CM

## 2024-01-02 DIAGNOSIS — I10 ESSENTIAL HYPERTENSION: ICD-10-CM

## 2024-01-02 DIAGNOSIS — E78.2 MIXED HYPERLIPIDEMIA: Primary | ICD-10-CM

## 2024-01-02 DIAGNOSIS — Z79.4 CONTROLLED TYPE 2 DIABETES MELLITUS WITHOUT COMPLICATION, WITH LONG-TERM CURRENT USE OF INSULIN (HCC): ICD-10-CM

## 2024-01-02 PROCEDURE — 99214 OFFICE O/P EST MOD 30 MIN: CPT | Performed by: INTERNAL MEDICINE

## 2024-01-02 PROCEDURE — 3074F SYST BP LT 130 MM HG: CPT | Performed by: INTERNAL MEDICINE

## 2024-01-02 PROCEDURE — 3078F DIAST BP <80 MM HG: CPT | Performed by: INTERNAL MEDICINE

## 2024-01-02 RX ORDER — PEN NEEDLE, DIABETIC 31 GX5/16"
NEEDLE, DISPOSABLE MISCELLANEOUS
Qty: 400 EACH | Refills: 5 | Status: SHIPPED | OUTPATIENT
Start: 2024-01-02

## 2024-01-02 RX ORDER — INSULIN GLARGINE 100 [IU]/ML
INJECTION, SOLUTION SUBCUTANEOUS
Qty: 18 ADJUSTABLE DOSE PRE-FILLED PEN SYRINGE | Refills: 1 | Status: SHIPPED | OUTPATIENT
Start: 2024-01-02

## 2024-01-02 RX ORDER — INSULIN LISPRO 100 [IU]/ML
INJECTION, SOLUTION INTRAVENOUS; SUBCUTANEOUS
Qty: 30 ML | Refills: 0 | Status: SHIPPED | OUTPATIENT
Start: 2024-01-02

## 2024-01-02 NOTE — PROGRESS NOTES
(FREESTYLE EDY 2 SENSOR) MISC Change every 14 days (Patient not taking: Reported on 2024) 2 each 5     No current facility-administered medications for this visit.     No Known Allergies  Family Status   Relation Name Status    Mother      Father      Sister  Alive    MGF       OBJECTIVE:  /69 (Site: Right Upper Arm, Position: Sitting, Cuff Size: Large Adult)   Pulse 94   Ht 1.651 m (5' 5\")   Wt 109.3 kg (241 lb)   LMP 2010   BMI 40.10 kg/m²    Wt Readings from Last 3 Encounters:   24 109.3 kg (241 lb)   23 107.5 kg (237 lb)   23 107.5 kg (237 lb)     Constitutional: no acute distress, well appearing, well nourished  Psychiatric: oriented to person, place and time, judgement, insight and normal, recent and remote memory and intact and mood, affect are normal  Skin: skin and subcutaneous tissue is normal without mass,   Head and Face: examination of head and face revealed no abnormalities  Eyes: no lid or conjunctival swelling, no erythema or discharge, pupils are normal,   Ears/Nose: external inspection of ears and nose revealed no abnormalities, hearing is grossly normal  Oropharynx/Mouth/Face: lips, tongue and gums are normal with no lesions, the voice quality was normal  Neck: neck is supple and symmetric, with midline trachea and no masses, thyroid is normal    Pulmonary: no increased work of breathing or signs of respiratory distress, lungs are clear to auscultation  Cardiovascular: normal heart rate and rhythm, normal S1 and S2,   Musculoskeletal: normal gait and station,   Neurological: normal coordination, normal general cortical function  Lab Results   Component Value Date/Time    LABA1C 7.6 04/10/2023 08:10 AM    LABA1C 7.6 2023 01:21 PM    LABA1C 7.6 01/10/2023 10:26 AM         ASSESSMENT/PLAN:    1. unControlled type 2 diabetes mellitus without complication, with long-term current use of insulin       aic 7.9 >6.5>>8.0>>7.6>>7.7

## 2024-01-25 ENCOUNTER — OFFICE VISIT (OUTPATIENT)
Dept: SURGERY | Age: 64
End: 2024-01-25

## 2024-01-25 VITALS
BODY MASS INDEX: 39.99 KG/M2 | HEIGHT: 65 IN | WEIGHT: 240 LBS | DIASTOLIC BLOOD PRESSURE: 75 MMHG | SYSTOLIC BLOOD PRESSURE: 114 MMHG

## 2024-01-25 DIAGNOSIS — S31.109D OPEN WOUND OF LATERAL ABDOMINAL WALL, SUBSEQUENT ENCOUNTER: Primary | ICD-10-CM

## 2024-01-25 PROCEDURE — 99024 POSTOP FOLLOW-UP VISIT: CPT | Performed by: SURGERY

## 2024-01-25 NOTE — PROGRESS NOTES
[facility] GENERAL AND LAPAROSCOPIC SURGERY          PATIENT NAME: Yulissa Salter     TODAY'S DATE: 1/25/2024    SUBJECTIVE:    Pt here for wound check.     OBJECTIVE:  VITALS:  /75   Ht 1.651 m (5' 5\")   Wt 108.9 kg (240 lb)   LMP 08/16/2010   BMI 39.94 kg/m²     CONSTITUTIONAL:  awake and alert    ABDOMEN:  normal bowel sounds, soft, non-distended, non-tender, open area clean and small, no cellulitis or drainage     Data:      ASSESSMENT AND PLAN:  Doing well with wound care  Back to [facility] in a week, david here in 3 weeks    Ab Chang MD

## 2024-02-07 DIAGNOSIS — E11.9 CONTROLLED TYPE 2 DIABETES MELLITUS WITHOUT COMPLICATION, WITH LONG-TERM CURRENT USE OF INSULIN (HCC): ICD-10-CM

## 2024-02-07 DIAGNOSIS — Z79.4 CONTROLLED TYPE 2 DIABETES MELLITUS WITHOUT COMPLICATION, WITH LONG-TERM CURRENT USE OF INSULIN (HCC): ICD-10-CM

## 2024-02-07 RX ORDER — INSULIN LISPRO 100 [IU]/ML
INJECTION, SOLUTION INTRAVENOUS; SUBCUTANEOUS
Qty: 30 ML | Refills: 9 | Status: SHIPPED | OUTPATIENT
Start: 2024-02-07

## 2024-02-07 NOTE — TELEPHONE ENCOUNTER
Requested Prescriptions     Pending Prescriptions Disp Refills    HUMALOG KWIKPEN 100 UNIT/ML SOPN [Pharmacy Med Name: HumaLOG KwikPen 100 UNIT/ML Subcutaneous Solution Pen-injector] 30 mL 9     Sig: INJECT SUBCUTANEOUSLY 25 TO 30  UNITS WITH EACH MEAL UP TO 3  TIMES A DAY     NOV 05/06/2024

## 2024-02-15 ENCOUNTER — OFFICE VISIT (OUTPATIENT)
Dept: SURGERY | Age: 64
End: 2024-02-15

## 2024-02-15 VITALS — DIASTOLIC BLOOD PRESSURE: 75 MMHG | BODY MASS INDEX: 40.4 KG/M2 | WEIGHT: 242.8 LBS | SYSTOLIC BLOOD PRESSURE: 114 MMHG

## 2024-02-15 DIAGNOSIS — S31.109D OPEN WOUND OF LATERAL ABDOMINAL WALL, SUBSEQUENT ENCOUNTER: Primary | ICD-10-CM

## 2024-02-15 PROCEDURE — 99024 POSTOP FOLLOW-UP VISIT: CPT | Performed by: SURGERY

## 2024-02-15 RX ORDER — FLUCONAZOLE 100 MG/1
100 TABLET ORAL DAILY
Qty: 7 TABLET | Refills: 0 | Status: SHIPPED | OUTPATIENT
Start: 2024-02-15 | End: 2024-02-22

## 2024-02-15 NOTE — PROGRESS NOTES
[facility] GENERAL AND LAPAROSCOPIC SURGERY          PATIENT NAME: Yulissa Salter     TODAY'S DATE: 2/15/2024    CC: rash  SUBJECTIVE:    Pt has had skin irritation under stoma, using fugal powder, several sites, no left wound drainage.   .     OBJECTIVE:  VITALS:  /75   Wt 110.1 kg (242 lb 12.8 oz)   LMP 08/16/2010   BMI 40.40 kg/m²     CONSTITUTIONAL:  awake and alert    ABDOMEN:  normal bowel sounds, soft, non-distended, non-tender, stoma skin area with rash, wound base on left pretty clean     Data:    Radiology Review:  None      ASSESSMENT AND PLAN:  Diflucan to aid with skin rash  Continue local wound care  See me in a month    Ab Chang MD

## 2024-02-23 DIAGNOSIS — S31.109D OPEN WOUND OF LATERAL ABDOMINAL WALL, SUBSEQUENT ENCOUNTER: Primary | ICD-10-CM

## 2024-02-23 RX ORDER — FLUCONAZOLE 100 MG/1
100 TABLET ORAL DAILY
Qty: 7 TABLET | Refills: 0 | Status: SHIPPED | OUTPATIENT
Start: 2024-02-23 | End: 2024-03-01

## 2024-03-21 ENCOUNTER — OFFICE VISIT (OUTPATIENT)
Dept: SURGERY | Age: 64
End: 2024-03-21

## 2024-03-21 VITALS — DIASTOLIC BLOOD PRESSURE: 75 MMHG | BODY MASS INDEX: 40.27 KG/M2 | SYSTOLIC BLOOD PRESSURE: 114 MMHG | WEIGHT: 242 LBS

## 2024-03-21 DIAGNOSIS — R20.9 DISTURBANCE OF SKIN SENSATION: Primary | ICD-10-CM

## 2024-03-21 PROCEDURE — 99024 POSTOP FOLLOW-UP VISIT: CPT | Performed by: SURGERY

## 2024-03-21 RX ORDER — FLUCONAZOLE 100 MG/1
100 TABLET ORAL DAILY
Qty: 7 TABLET | Refills: 1 | Status: SHIPPED | OUTPATIENT
Start: 2024-03-21 | End: 2024-03-28

## 2024-03-21 NOTE — PROGRESS NOTES
[facility] GENERAL AND LAPAROSCOPIC SURGERY          PATIENT NAME: Yulissa Salter     TODAY'S DATE: 3/21/2024    SUBJECTIVE:    Pt here for recheck.  Skin around stoma feels irritated     OBJECTIVE:  VITALS:  /75   Wt 109.8 kg (242 lb)   LMP 08/16/2010   BMI 40.27 kg/m²     CONSTITUTIONAL:  awake and alert    ABDOMEN:  normal bowel sounds, soft, non-distended, non-tender, no cellulitis, left medial edge of stoma region irritated / rash, maybe fungal  Old wound clean, with silver gauze dressing, no cellulitis or drainage, epidermis open about 5 mm x 8 mm size     Data:      Radiology Review:  None      ASSESSMENT AND PLAN:  Skin rash - diflucan ordered, local care to do    Old wound, base clean, minimal epidermal growth needed to cover  Dressing replaced    Francisco in 6 weeks    Ab Chang MD

## 2024-04-15 RX ORDER — FLUCONAZOLE 100 MG/1
100 TABLET ORAL DAILY
Qty: 7 TABLET | Refills: 0 | OUTPATIENT
Start: 2024-04-15 | End: 2024-04-22

## 2024-04-19 RX ORDER — FLUCONAZOLE 100 MG/1
100 TABLET ORAL DAILY
Qty: 7 TABLET | Refills: 0 | Status: SHIPPED | OUTPATIENT
Start: 2024-04-19 | End: 2024-04-26

## 2024-05-11 DIAGNOSIS — E11.42 TYPE 2 DIABETES MELLITUS WITH DIABETIC POLYNEUROPATHY, WITH LONG-TERM CURRENT USE OF INSULIN (HCC): ICD-10-CM

## 2024-05-11 DIAGNOSIS — Z79.4 TYPE 2 DIABETES MELLITUS WITH DIABETIC POLYNEUROPATHY, WITH LONG-TERM CURRENT USE OF INSULIN (HCC): ICD-10-CM

## 2024-05-13 NOTE — TELEPHONE ENCOUNTER
Requested Prescriptions     Pending Prescriptions Disp Refills    Continuous Glucose Sensor (FREESTYLE EDY 2 SENSOR) MISC [Pharmacy Med Name: FreeStyle Edy 2 Sensor Miscellaneous]  0     Sig: Change every 14 days           [id number] - [place], [place] - [address] [facility] [address]. - P [phone] - F [phone]  [address] [place] [id number]  Phone: [phone] Fax: [phone]

## 2024-07-18 DIAGNOSIS — E11.9 CONTROLLED TYPE 2 DIABETES MELLITUS WITHOUT COMPLICATION, WITH LONG-TERM CURRENT USE OF INSULIN (HCC): ICD-10-CM

## 2024-07-18 DIAGNOSIS — Z79.4 CONTROLLED TYPE 2 DIABETES MELLITUS WITHOUT COMPLICATION, WITH LONG-TERM CURRENT USE OF INSULIN (HCC): ICD-10-CM

## 2024-07-18 RX ORDER — INSULIN LISPRO 100 [IU]/ML
INJECTION, SOLUTION INTRAVENOUS; SUBCUTANEOUS
Qty: 30 ML | Refills: 3 | Status: SHIPPED | OUTPATIENT
Start: 2024-07-18

## 2024-08-09 DIAGNOSIS — Z79.4 CONTROLLED TYPE 2 DIABETES MELLITUS WITHOUT COMPLICATION, WITH LONG-TERM CURRENT USE OF INSULIN (HCC): ICD-10-CM

## 2024-08-09 DIAGNOSIS — E11.9 CONTROLLED TYPE 2 DIABETES MELLITUS WITHOUT COMPLICATION, WITH LONG-TERM CURRENT USE OF INSULIN (HCC): ICD-10-CM

## 2024-08-09 RX ORDER — INSULIN GLARGINE 100 [IU]/ML
INJECTION, SOLUTION SUBCUTANEOUS
Qty: 54 ML | Refills: 0 | Status: SHIPPED | OUTPATIENT
Start: 2024-08-09

## 2024-08-26 ENCOUNTER — TELEPHONE (OUTPATIENT)
Dept: ENDOCRINOLOGY | Age: 64
End: 2024-08-26

## 2024-08-27 ENCOUNTER — HOSPITAL ENCOUNTER (OUTPATIENT)
Age: 64
Discharge: HOME | End: 2024-08-27
Payer: COMMERCIAL

## 2024-08-27 DIAGNOSIS — E78.2 MIXED HYPERLIPIDEMIA: ICD-10-CM

## 2024-08-27 DIAGNOSIS — Z79.4 CONTROLLED TYPE 2 DIABETES MELLITUS WITHOUT COMPLICATION, WITH LONG-TERM CURRENT USE OF INSULIN (HCC): ICD-10-CM

## 2024-08-27 DIAGNOSIS — E11.9 CONTROLLED TYPE 2 DIABETES MELLITUS WITHOUT COMPLICATION, WITH LONG-TERM CURRENT USE OF INSULIN (HCC): ICD-10-CM

## 2024-08-27 DIAGNOSIS — I10 ESSENTIAL HYPERTENSION: ICD-10-CM

## 2024-08-27 LAB
ALBUMIN SERPL-MCNC: 3.9 G/DL (ref 3.4–5)
ALBUMIN/GLOB SERPL: 1.1 {RATIO} (ref 1.1–2.2)
ALP SERPL-CCNC: 86 U/L (ref 40–129)
ALT SERPL-CCNC: 29 U/L (ref 10–40)
ANION GAP SERPL CALCULATED.3IONS-SCNC: 11 MMOL/L (ref 3–16)
AST SERPL-CCNC: 33 U/L (ref 15–37)
BILIRUB SERPL-MCNC: 0.4 MG/DL (ref 0–1)
BUN SERPL-MCNC: 9 MG/DL (ref 7–20)
CALCIUM SERPL-MCNC: 9.2 MG/DL (ref 8.3–10.6)
CHLORIDE SERPL-SCNC: 103 MMOL/L (ref 99–110)
CHOLEST SERPL-MCNC: 127 MG/DL (ref 0–199)
CO2 SERPL-SCNC: 22 MMOL/L (ref 21–32)
CREAT SERPL-MCNC: 0.6 MG/DL (ref 0.6–1.2)
CREAT UR-MCNC: 174 MG/DL (ref 28–259)
EST. AVERAGE GLUCOSE BLD GHB EST-MCNC: 168.6 MG/DL
GFR SERPLBLD CREATININE-BSD FMLA CKD-EPI: >90 ML/MIN/{1.73_M2}
GLUCOSE SERPL-MCNC: 181 MG/DL (ref 70–99)
HBA1C MFR BLD: 7.5 %
HDLC SERPL-MCNC: 42 MG/DL (ref 40–60)
LDLC SERPL CALC-MCNC: 58 MG/DL
MICROALBUMIN UR DL<=1MG/L-MCNC: <1.2 MG/DL
MICROALBUMIN/CREAT UR: NORMAL MG/G (ref 0–30)
POTASSIUM SERPL-SCNC: 4.1 MMOL/L (ref 3.5–5.1)
PROT SERPL-MCNC: 7.4 G/DL (ref 6.4–8.2)
SODIUM SERPL-SCNC: 136 MMOL/L (ref 136–145)
TRIGL SERPL-MCNC: 134 MG/DL (ref 0–150)
TSH SERPL DL<=0.005 MIU/L-ACNC: 2.74 UIU/ML (ref 0.27–4.2)
VLDLC SERPL CALC-MCNC: 27 MG/DL

## 2024-08-27 PROCEDURE — 36415 COLL VENOUS BLD VENIPUNCTURE: CPT

## 2024-08-27 PROCEDURE — 80053 COMPREHEN METABOLIC PANEL: CPT

## 2024-08-27 PROCEDURE — 82043 UR ALBUMIN QUANTITATIVE: CPT

## 2024-08-27 PROCEDURE — 83036 HEMOGLOBIN GLYCOSYLATED A1C: CPT

## 2024-08-27 PROCEDURE — 84443 ASSAY THYROID STIM HORMONE: CPT

## 2024-08-27 PROCEDURE — 80061 LIPID PANEL: CPT

## 2024-08-27 PROCEDURE — 82570 ASSAY OF URINE CREATININE: CPT

## 2024-08-29 ENCOUNTER — OFFICE VISIT (OUTPATIENT)
Dept: ENDOCRINOLOGY | Age: 64
End: 2024-08-29
Payer: COMMERCIAL

## 2024-08-29 VITALS
SYSTOLIC BLOOD PRESSURE: 137 MMHG | RESPIRATION RATE: 16 BRPM | DIASTOLIC BLOOD PRESSURE: 76 MMHG | HEIGHT: 65 IN | BODY MASS INDEX: 40.32 KG/M2 | HEART RATE: 94 BPM | WEIGHT: 242 LBS

## 2024-08-29 DIAGNOSIS — E11.9 CONTROLLED TYPE 2 DIABETES MELLITUS WITHOUT COMPLICATION, WITH LONG-TERM CURRENT USE OF INSULIN (HCC): Primary | ICD-10-CM

## 2024-08-29 DIAGNOSIS — E78.2 MIXED HYPERLIPIDEMIA: ICD-10-CM

## 2024-08-29 DIAGNOSIS — I10 ESSENTIAL HYPERTENSION: ICD-10-CM

## 2024-08-29 DIAGNOSIS — Z79.4 CONTROLLED TYPE 2 DIABETES MELLITUS WITHOUT COMPLICATION, WITH LONG-TERM CURRENT USE OF INSULIN (HCC): Primary | ICD-10-CM

## 2024-08-29 PROCEDURE — 3051F HG A1C>EQUAL 7.0%<8.0%: CPT | Performed by: INTERNAL MEDICINE

## 2024-08-29 PROCEDURE — 3078F DIAST BP <80 MM HG: CPT | Performed by: INTERNAL MEDICINE

## 2024-08-29 PROCEDURE — 3075F SYST BP GE 130 - 139MM HG: CPT | Performed by: INTERNAL MEDICINE

## 2024-08-29 PROCEDURE — 99214 OFFICE O/P EST MOD 30 MIN: CPT | Performed by: INTERNAL MEDICINE

## 2024-08-29 PROCEDURE — 95251 CONT GLUC MNTR ANALYSIS I&R: CPT | Performed by: INTERNAL MEDICINE

## 2024-08-29 RX ORDER — TIRZEPATIDE 2.5 MG/.5ML
2.5 INJECTION, SOLUTION SUBCUTANEOUS WEEKLY
Qty: 4 EACH | Refills: 4 | Status: SHIPPED | OUTPATIENT
Start: 2024-08-29

## 2024-08-29 RX ORDER — BLOOD-GLUCOSE SENSOR
EACH MISCELLANEOUS
Qty: 2 EACH | Refills: 5 | Status: SHIPPED | OUTPATIENT
Start: 2024-08-29

## 2024-08-29 NOTE — PROGRESS NOTES
Yulissa Salter is a 64 y.o. female is seen for management of uncontrolled Type 2  Diabetes, obesity and Hypothyroidism.  Patient has complex medical history inclusive of hypertension hyperlipidemia, asthma, PCOD, ulcerative colitis status post total colectomy and now has a colectomy bag. Yulissa Salter was diagnosed with Diabetes mellitus at age 31   Diabetes was diagnosed at routine screening .  . Yulissa Salter got diabetic education in the past.  Comorbid conditions: Neuropathy    Hypothyroidism diagnosed in 2015 which was diagnosed in 2015 due to her abnormal thyroid fx test   She also has MNG, which was considered stable as per previous imaging and there were no compressive symptoms  She has hypertension and hyperlipidemia   She has Ulcerative coliitis diagnosed  at age 7 she had colectomy in 2004 , in 2006 she had rectal surgery due to UC she has colectomy bag   --- she ended up with seroma which led to sepsis and she was hospitalized in oct 2022 and it is healing now       INTERIM:    Diabetes  She presents for her follow-up diabetic visit. She has type 2 diabetes mellitus. No MedicAlert identification noted. The initial diagnosis of diabetes was made 29 years ago. Her disease course has been improving. Hypoglycemia symptoms include hunger and sweats. Associated symptoms include foot paresthesias. Pertinent negatives for diabetes include no weight loss. There are no hypoglycemic complications. Symptoms are improving. Risk factors for coronary artery disease include dyslipidemia and obesity. Current diabetic treatment includes insulin injections. She is compliant with treatment most of the time. Her breakfast blood glucose is taken between 7-8 am. Her breakfast blood glucose range is generally 70-90 mg/dl.         --she is taking lantus 50 units bid and novolog 25 units with each meal       Past Medical History:   Diagnosis Date    Asthma     Diabetes mellitus (HCC)     type 2    GERD (gastroesophageal

## 2024-08-30 ENCOUNTER — TELEPHONE (OUTPATIENT)
Dept: ENDOCRINOLOGY | Age: 64
End: 2024-08-30

## 2024-08-30 NOTE — TELEPHONE ENCOUNTER
Submitted PA for Ebonie  Via CM Key: [id number]   STATUS: Not Sent    Most recent OV note is unsigned. Please send back to PA dept once completed. Thanks!    If this requires a response please respond to the pool ( P MHCX PSC MEDICATION PRE-AUTH).      Thank you please advise patient.

## 2024-09-03 NOTE — TELEPHONE ENCOUNTER
Submitted PA for Mounjar[employer]  Via [facility] Key: [id number] STATUS: PENDING.    Follow up done daily; if no decision with in three days we will refax.  If another three days goes by with no decision will call the insurance for status.

## 2024-09-04 NOTE — TELEPHONE ENCOUNTER
[employer] approved    Approval scanned in media    If this requires a response please respond to the pool ( P MHCX PSC MEDICATION PRE-AUTH).      Thank you please advise patient.

## 2024-09-20 DIAGNOSIS — Z79.4 CONTROLLED TYPE 2 DIABETES MELLITUS WITHOUT COMPLICATION, WITH LONG-TERM CURRENT USE OF INSULIN (HCC): ICD-10-CM

## 2024-09-20 DIAGNOSIS — E11.9 CONTROLLED TYPE 2 DIABETES MELLITUS WITHOUT COMPLICATION, WITH LONG-TERM CURRENT USE OF INSULIN (HCC): ICD-10-CM

## 2024-09-20 RX ORDER — PEN NEEDLE, DIABETIC 31 GX5/16"
NEEDLE, DISPOSABLE MISCELLANEOUS
Qty: 400 EACH | Refills: 1 | Status: SHIPPED | OUTPATIENT
Start: 2024-09-20

## 2024-11-11 ENCOUNTER — APPOINTMENT (OUTPATIENT)
Dept: CT IMAGING | Age: 64
End: 2024-11-11
Payer: COMMERCIAL

## 2024-11-11 ENCOUNTER — HOSPITAL ENCOUNTER (INPATIENT)
Age: 64
LOS: 5 days | Discharge: HOME | End: 2024-11-16
Attending: EMERGENCY MEDICINE | Admitting: INTERNAL MEDICINE
Payer: COMMERCIAL

## 2024-11-11 ENCOUNTER — APPOINTMENT (OUTPATIENT)
Dept: GENERAL RADIOLOGY | Age: 64
End: 2024-11-11
Payer: COMMERCIAL

## 2024-11-11 DIAGNOSIS — S52.501A CLOSED FRACTURE OF DISTAL END OF RIGHT RADIUS, UNSPECIFIED FRACTURE MORPHOLOGY, INITIAL ENCOUNTER: ICD-10-CM

## 2024-11-11 DIAGNOSIS — S00.83XA CONTUSION OF FACE, INITIAL ENCOUNTER: ICD-10-CM

## 2024-11-11 DIAGNOSIS — G89.11 ACUTE PAIN DUE TO TRAUMA: ICD-10-CM

## 2024-11-11 DIAGNOSIS — S00.81XA FACIAL ABRASION, INITIAL ENCOUNTER: ICD-10-CM

## 2024-11-11 DIAGNOSIS — S52.602A CLOSED FRACTURE OF DISTAL END OF LEFT ULNA, UNSPECIFIED FRACTURE MORPHOLOGY, INITIAL ENCOUNTER: ICD-10-CM
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
## 2024-11-11 DIAGNOSIS — S52.502A CLOSED FRACTURE OF DISTAL END OF LEFT RADIUS, UNSPECIFIED FRACTURE MORPHOLOGY, INITIAL ENCOUNTER: ICD-10-CM

## 2024-11-11 DIAGNOSIS — S52.601A CLOSED FRACTURE OF DISTAL END OF RIGHT ULNA, UNSPECIFIED FRACTURE MORPHOLOGY, INITIAL ENCOUNTER: ICD-10-CM

## 2024-11-11 PROBLEM — S52.91XA: Status: ACTIVE | Noted: 2024-11-11

## 2024-11-11 PROBLEM — S52.201A: Status: ACTIVE | Noted: 2024-11-11

## 2024-11-11 PROBLEM — S52.202A: Status: ACTIVE | Noted: 2024-11-11

## 2024-11-11 PROBLEM — S52.92XA: Status: ACTIVE | Noted: 2024-11-11

## 2024-11-11 LAB
ALBUMIN SERPL-MCNC: 3.8 G/DL (ref 3.4–5)
ALBUMIN/GLOB SERPL: 1.5 {RATIO} (ref 1.1–2.2)
ALP SERPL-CCNC: 87 U/L (ref 40–129)
ALT SERPL-CCNC: 24 U/L (ref 10–40)
ANION GAP SERPL CALCULATED.3IONS-SCNC: 13 MMOL/L (ref 3–16)
AST SERPL-CCNC: 31 U/L (ref 15–37)
BACTERIA URNS QL MICRO: ABNORMAL /HPF
BASOPHILS # BLD: 0.1 K/UL (ref 0–0.2)
BASOPHILS NFR BLD: 1.4 %
BILIRUB SERPL-MCNC: <0.2 MG/DL (ref 0–1)
BILIRUB UR QL STRIP.AUTO: NEGATIVE
BUN SERPL-MCNC: 13 MG/DL (ref 7–20)
CALCIUM SERPL-MCNC: 9 MG/DL (ref 8.3–10.6)
CHLORIDE SERPL-SCNC: 103 MMOL/L (ref 99–110)
CLARITY UR: CLEAR
CO2 SERPL-SCNC: 22 MMOL/L (ref 21–32)
COLOR UR: YELLOW
CREAT SERPL-MCNC: 0.6 MG/DL (ref 0.6–1.2)
DEPRECATED RDW RBC AUTO: 14.2 % (ref 12.4–15.4)
EOSINOPHIL # BLD: 0 K/UL (ref 0–0.6)
EOSINOPHIL NFR BLD: 0 %
EPI CELLS #/AREA URNS AUTO: 3 /HPF (ref 0–5)
GFR SERPLBLD CREATININE-BSD FMLA CKD-EPI: >90 ML/MIN/{1.73_M2}
GLUCOSE SERPL-MCNC: 250 MG/DL (ref 70–99)
GLUCOSE UR STRIP.AUTO-MCNC: 500 MG/DL
HCT VFR BLD AUTO: 37.7 % (ref 36–48)
HGB BLD-MCNC: 12.7 G/DL (ref 12–16)
HGB UR QL STRIP.AUTO: NEGATIVE
HYALINE CASTS #/AREA URNS AUTO: 0 /LPF (ref 0–8)
INR PPP: 0.94 (ref 0.85–1.15)
KETONES UR STRIP.AUTO-MCNC: ABNORMAL MG/DL
LEUKOCYTE ESTERASE UR QL STRIP.AUTO: ABNORMAL
LYMPHOCYTES # BLD: 1.4 K/UL (ref 1–5.1)
LYMPHOCYTES NFR BLD: 21.1 %
MCH RBC QN AUTO: 29.5 PG (ref 26–34)
MCHC RBC AUTO-ENTMCNC: 33.8 G/DL (ref 31–36)
MCV RBC AUTO: 87.2 FL (ref 80–100)
MONOCYTES # BLD: 0.5 K/UL (ref 0–1.3)
MONOCYTES NFR BLD: 7 %
NEUTROPHILS # BLD: 4.6 K/UL (ref 1.7–7.7)
NEUTROPHILS NFR BLD: 70.5 %
NITRITE UR QL STRIP.AUTO: NEGATIVE
PH UR STRIP.AUTO: 5.5 [PH] (ref 5–8)
PLATELET # BLD AUTO: 212 K/UL (ref 135–450)
PMV BLD AUTO: 7.1 FL (ref 5–10.5)
POTASSIUM SERPL-SCNC: 4.1 MMOL/L (ref 3.5–5.1)
PROT SERPL-MCNC: 6.3 G/DL (ref 6.4–8.2)
PROT UR STRIP.AUTO-MCNC: NEGATIVE MG/DL
PROTHROMBIN TIME: 12.8 SEC (ref 11.9–14.9)
RBC # BLD AUTO: 4.32 M/UL (ref 4–5.2)
RBC CLUMPS #/AREA URNS AUTO: 1 /HPF (ref 0–4)
SODIUM SERPL-SCNC: 138 MMOL/L (ref 136–145)
SP GR UR STRIP.AUTO: >=1.03 (ref 1–1.03)
UA DIPSTICK W REFLEX MICRO PNL UR: YES
URN SPEC COLLECT METH UR: ABNORMAL
UROBILINOGEN UR STRIP-ACNC: 0.2 E.U./DL
WBC # BLD AUTO: 6.5 K/UL (ref 4–11)
WBC #/AREA URNS AUTO: 15 /HPF (ref 0–5)

## 2024-11-11 PROCEDURE — 25565 CLTX RDL&ULN SHFT FX W/MNPJ: CPT

## 2024-11-11 PROCEDURE — 6370000000 HC RX 637 (ALT 250 FOR IP): Performed by: EMERGENCY MEDICINE

## 2024-11-11 PROCEDURE — 96374 THER/PROPH/DIAG INJ IV PUSH: CPT

## 2024-11-11 PROCEDURE — 73110 X-RAY EXAM OF WRIST: CPT

## 2024-11-11 PROCEDURE — 2700000000 HC OXYGEN THERAPY PER DAY

## 2024-11-11 PROCEDURE — 0PSJ04Z REPOSITION LEFT RADIUS WITH INTERNAL FIXATION DEVICE, OPEN APPROACH: ICD-10-PCS | Performed by: ORTHOPAEDIC SURGERY

## 2024-11-11 PROCEDURE — 87086 URINE CULTURE/COLONY COUNT: CPT

## 2024-11-11 PROCEDURE — 85025 COMPLETE CBC W/AUTO DIFF WBC: CPT

## 2024-11-11 PROCEDURE — 0PSH04Z REPOSITION RIGHT RADIUS WITH INTERNAL FIXATION DEVICE, OPEN APPROACH: ICD-10-PCS | Performed by: ORTHOPAEDIC SURGERY

## 2024-11-11 PROCEDURE — 96375 TX/PRO/DX INJ NEW DRUG ADDON: CPT

## 2024-11-11 PROCEDURE — 70450 CT HEAD/BRAIN W/O DYE: CPT

## 2024-11-11 PROCEDURE — 1200000000 HC SEMI PRIVATE

## 2024-11-11 PROCEDURE — 0PSJXZZ REPOSITION LEFT RADIUS, EXTERNAL APPROACH: ICD-10-PCS | Performed by: ORTHOPAEDIC SURGERY

## 2024-11-11 PROCEDURE — 85610 PROTHROMBIN TIME: CPT

## 2024-11-11 PROCEDURE — 72125 CT NECK SPINE W/O DYE: CPT

## 2024-11-11 PROCEDURE — 71260 CT THORAX DX C+: CPT

## 2024-11-11 PROCEDURE — 73100 X-RAY EXAM OF WRIST: CPT

## 2024-11-11 PROCEDURE — 81001 URINALYSIS AUTO W/SCOPE: CPT

## 2024-11-11 PROCEDURE — 6360000004 HC RX CONTRAST MEDICATION: Performed by: EMERGENCY MEDICINE

## 2024-11-11 PROCEDURE — 80053 COMPREHEN METABOLIC PANEL: CPT

## 2024-11-11 PROCEDURE — 6360000002 HC RX W HCPCS: Performed by: EMERGENCY MEDICINE

## 2024-11-11 PROCEDURE — 70486 CT MAXILLOFACIAL W/O DYE: CPT

## 2024-11-11 PROCEDURE — 94761 N-INVAS EAR/PLS OXIMETRY MLT: CPT

## 2024-11-11 PROCEDURE — 99285 EMERGENCY DEPT VISIT HI MDM: CPT

## 2024-11-11 PROCEDURE — 96376 TX/PRO/DX INJ SAME DRUG ADON: CPT

## 2024-11-11 RX ORDER — IOPAMIDOL 755 MG/ML
75 INJECTION, SOLUTION INTRAVASCULAR
Status: COMPLETED | OUTPATIENT
Start: 2024-11-11 | End: 2024-11-11

## 2024-11-11 RX ORDER — HYDROMORPHONE HYDROCHLORIDE 1 MG/ML
1 INJECTION, SOLUTION INTRAMUSCULAR; INTRAVENOUS; SUBCUTANEOUS ONCE
Status: COMPLETED | OUTPATIENT
Start: 2024-11-11 | End: 2024-11-11

## 2024-11-11 RX ORDER — ACETAMINOPHEN 500 MG
1000 TABLET ORAL ONCE
Status: COMPLETED | OUTPATIENT
Start: 2024-11-11 | End: 2024-11-11

## 2024-11-11 RX ORDER — KETOROLAC TROMETHAMINE 15 MG/ML
15 INJECTION, SOLUTION INTRAMUSCULAR; INTRAVENOUS ONCE
Status: COMPLETED | OUTPATIENT
Start: 2024-11-11 | End: 2024-11-11

## 2024-11-11 RX ORDER — PROPOFOL 10 MG/ML
75 INJECTION, EMULSION INTRAVENOUS ONCE
Status: COMPLETED | OUTPATIENT
Start: 2024-11-11 | End: 2024-11-11

## 2024-11-11 RX ADMIN — KETOROLAC TROMETHAMINE 15 MG: 15 INJECTION, SOLUTION INTRAMUSCULAR; INTRAVENOUS at 22:13

## 2024-11-11 RX ADMIN — HYDROMORPHONE HYDROCHLORIDE 1 MG: 1 INJECTION, SOLUTION INTRAMUSCULAR; INTRAVENOUS; SUBCUTANEOUS at 19:31

## 2024-11-11 RX ADMIN — IOPAMIDOL 75 ML: 755 INJECTION, SOLUTION INTRAVENOUS at 20:02

## 2024-11-11 RX ADMIN — ACETAMINOPHEN 1000 MG: 500 TABLET ORAL at 19:31

## 2024-11-11 RX ADMIN — PROPOFOL 200 MG: 10 INJECTION, EMULSION INTRAVENOUS at 22:47

## 2024-11-11 RX ADMIN — HYDROMORPHONE HYDROCHLORIDE 1 MG: 1 INJECTION, SOLUTION INTRAMUSCULAR; INTRAVENOUS; SUBCUTANEOUS at 22:13

## 2024-11-11 ASSESSMENT — PAIN SCALES - GENERAL
PAINLEVEL_OUTOF10: 5
PAINLEVEL_OUTOF10: 6
PAINLEVEL_OUTOF10: 5
PAINLEVEL_OUTOF10: 8

## 2024-11-11 ASSESSMENT — PAIN DESCRIPTION - ORIENTATION: ORIENTATION: LEFT;RIGHT

## 2024-11-11 ASSESSMENT — PAIN DESCRIPTION - LOCATION: LOCATION: WRIST

## 2024-11-11 ASSESSMENT — PAIN - FUNCTIONAL ASSESSMENT: PAIN_FUNCTIONAL_ASSESSMENT: 0-10

## 2024-11-12 PROBLEM — S52.501A CLOSED FRACTURE OF RIGHT DISTAL RADIUS: Status: ACTIVE | Noted: 2024-11-12

## 2024-11-12 PROBLEM — S52.502A CLOSED FRACTURE OF LEFT DISTAL RADIUS: Status: ACTIVE | Noted: 2024-11-12
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
## 2024-11-12 LAB
BACTERIA UR CULT: NORMAL
BASOPHILS # BLD: 0 K/UL (ref 0–0.2)
BASOPHILS NFR BLD: 0.6 %
DEPRECATED RDW RBC AUTO: 14.1 % (ref 12.4–15.4)
EOSINOPHIL # BLD: 0 K/UL (ref 0–0.6)
EOSINOPHIL NFR BLD: 0 %
GLUCOSE BLD-MCNC: 162 MG/DL (ref 70–99)
GLUCOSE BLD-MCNC: 204 MG/DL (ref 70–99)
GLUCOSE BLD-MCNC: 209 MG/DL (ref 70–99)
GLUCOSE BLD-MCNC: 236 MG/DL (ref 70–99)
GLUCOSE BLD-MCNC: 273 MG/DL (ref 70–99)
HCT VFR BLD AUTO: 37.2 % (ref 36–48)
HGB BLD-MCNC: 12.5 G/DL (ref 12–16)
LYMPHOCYTES # BLD: 1.3 K/UL (ref 1–5.1)
LYMPHOCYTES NFR BLD: 16.4 %
MCH RBC QN AUTO: 29.4 PG (ref 26–34)
MCHC RBC AUTO-ENTMCNC: 33.7 G/DL (ref 31–36)
MCV RBC AUTO: 87.4 FL (ref 80–100)
MONOCYTES # BLD: 0.5 K/UL (ref 0–1.3)
MONOCYTES NFR BLD: 6.5 %
NEUTROPHILS # BLD: 6.2 K/UL (ref 1.7–7.7)
NEUTROPHILS NFR BLD: 76.5 %
PERFORMED ON: ABNORMAL
PLATELET # BLD AUTO: 228 K/UL (ref 135–450)
PMV BLD AUTO: 6.8 FL (ref 5–10.5)
RBC # BLD AUTO: 4.26 M/UL (ref 4–5.2)
WBC # BLD AUTO: 8.1 K/UL (ref 4–11)

## 2024-11-12 PROCEDURE — 6370000000 HC RX 637 (ALT 250 FOR IP): Performed by: PHYSICIAN ASSISTANT

## 2024-11-12 PROCEDURE — 99223 1ST HOSP IP/OBS HIGH 75: CPT | Performed by: ORTHOPAEDIC SURGERY

## 2024-11-12 PROCEDURE — 97165 OT EVAL LOW COMPLEX 30 MIN: CPT

## 2024-11-12 PROCEDURE — 6360000002 HC RX W HCPCS: Performed by: NURSE PRACTITIONER

## 2024-11-12 PROCEDURE — 97116 GAIT TRAINING THERAPY: CPT

## 2024-11-12 PROCEDURE — 1200000000 HC SEMI PRIVATE

## 2024-11-12 PROCEDURE — 6360000002 HC RX W HCPCS: Performed by: PHYSICIAN ASSISTANT

## 2024-11-12 PROCEDURE — 97535 SELF CARE MNGMENT TRAINING: CPT

## 2024-11-12 PROCEDURE — 97530 THERAPEUTIC ACTIVITIES: CPT

## 2024-11-12 PROCEDURE — 6370000000 HC RX 637 (ALT 250 FOR IP): Performed by: NURSE PRACTITIONER

## 2024-11-12 PROCEDURE — 85025 COMPLETE CBC W/AUTO DIFF WBC: CPT

## 2024-11-12 PROCEDURE — 97161 PT EVAL LOW COMPLEX 20 MIN: CPT

## 2024-11-12 PROCEDURE — 2580000003 HC RX 258: Performed by: PHYSICIAN ASSISTANT

## 2024-11-12 RX ORDER — HYDROMORPHONE HYDROCHLORIDE 1 MG/ML
1 INJECTION, SOLUTION INTRAMUSCULAR; INTRAVENOUS; SUBCUTANEOUS
Status: DISCONTINUED | OUTPATIENT
Start: 2024-11-12 | End: 2024-11-16 | Stop reason: HOSPADM

## 2024-11-12 RX ORDER — ONDANSETRON 2 MG/ML
4 INJECTION INTRAMUSCULAR; INTRAVENOUS EVERY 6 HOURS PRN
Status: DISCONTINUED | OUTPATIENT
Start: 2024-11-12 | End: 2024-11-16 | Stop reason: HOSPADM

## 2024-11-12 RX ORDER — ACETAMINOPHEN 650 MG/1
650 SUPPOSITORY RECTAL EVERY 6 HOURS PRN
Status: DISCONTINUED | OUTPATIENT
Start: 2024-11-12 | End: 2024-11-16 | Stop reason: HOSPADM

## 2024-11-12 RX ORDER — ACETAMINOPHEN 325 MG/1
650 TABLET ORAL EVERY 6 HOURS PRN
Status: DISCONTINUED | OUTPATIENT
Start: 2024-11-12 | End: 2024-11-16 | Stop reason: HOSPADM

## 2024-11-12 RX ORDER — MAGNESIUM SULFATE IN WATER 40 MG/ML
2000 INJECTION, SOLUTION INTRAVENOUS PRN
Status: DISCONTINUED | OUTPATIENT
Start: 2024-11-12 | End: 2024-11-16 | Stop reason: HOSPADM

## 2024-11-12 RX ORDER — POTASSIUM CHLORIDE 7.45 MG/ML
10 INJECTION INTRAVENOUS PRN
Status: DISCONTINUED | OUTPATIENT
Start: 2024-11-12 | End: 2024-11-16 | Stop reason: HOSPADM

## 2024-11-12 RX ORDER — DEXTROSE MONOHYDRATE 100 MG/ML
INJECTION, SOLUTION INTRAVENOUS CONTINUOUS PRN
Status: DISCONTINUED | OUTPATIENT
Start: 2024-11-12 | End: 2024-11-16 | Stop reason: HOSPADM

## 2024-11-12 RX ORDER — PANTOPRAZOLE SODIUM 40 MG/1
40 TABLET, DELAYED RELEASE ORAL
Status: DISCONTINUED | OUTPATIENT
Start: 2024-11-12 | End: 2024-11-16 | Stop reason: HOSPADM

## 2024-11-12 RX ORDER — INSULIN LISPRO 100 [IU]/ML
0-8 INJECTION, SOLUTION INTRAVENOUS; SUBCUTANEOUS
Status: DISCONTINUED | OUTPATIENT
Start: 2024-11-12 | End: 2024-11-16 | Stop reason: HOSPADM

## 2024-11-12 RX ORDER — GABAPENTIN 300 MG/1
300 CAPSULE ORAL 3 TIMES DAILY
Status: DISCONTINUED | OUTPATIENT
Start: 2024-11-12 | End: 2024-11-16 | Stop reason: HOSPADM

## 2024-11-12 RX ORDER — ENOXAPARIN SODIUM 100 MG/ML
40 INJECTION SUBCUTANEOUS DAILY
Status: DISCONTINUED | OUTPATIENT
Start: 2024-11-12 | End: 2024-11-14

## 2024-11-12 RX ORDER — LOSARTAN POTASSIUM 25 MG/1
50 TABLET ORAL DAILY
Status: DISCONTINUED | OUTPATIENT
Start: 2024-11-12 | End: 2024-11-12

## 2024-11-12 RX ORDER — GLUCAGON 1 MG/ML
1 KIT INJECTION PRN
Status: DISCONTINUED | OUTPATIENT
Start: 2024-11-12 | End: 2024-11-16 | Stop reason: HOSPADM

## 2024-11-12 RX ORDER — KETOROLAC TROMETHAMINE 15 MG/ML
15 INJECTION, SOLUTION INTRAMUSCULAR; INTRAVENOUS EVERY 6 HOURS PRN
Status: DISCONTINUED | OUTPATIENT
Start: 2024-11-12 | End: 2024-11-16 | Stop reason: HOSPADM

## 2024-11-12 RX ORDER — ACETAMINOPHEN 500 MG
1000 TABLET ORAL 3 TIMES DAILY
Status: DISCONTINUED | OUTPATIENT
Start: 2024-11-12 | End: 2024-11-16 | Stop reason: HOSPADM

## 2024-11-12 RX ORDER — SODIUM CHLORIDE 0.9 % (FLUSH) 0.9 %
5-40 SYRINGE (ML) INJECTION PRN
Status: DISCONTINUED | OUTPATIENT
Start: 2024-11-12 | End: 2024-11-16 | Stop reason: HOSPADM

## 2024-11-12 RX ORDER — ATORVASTATIN CALCIUM 10 MG/1
10 TABLET, FILM COATED ORAL DAILY
Status: DISCONTINUED | OUTPATIENT
Start: 2024-11-12 | End: 2024-11-16 | Stop reason: HOSPADM

## 2024-11-12 RX ORDER — POTASSIUM CHLORIDE 1500 MG/1
40 TABLET, EXTENDED RELEASE ORAL PRN
Status: DISCONTINUED | OUTPATIENT
Start: 2024-11-12 | End: 2024-11-16 | Stop reason: HOSPADM

## 2024-11-12 RX ORDER — SENNOSIDES A AND B 8.6 MG/1
1 TABLET, FILM COATED ORAL DAILY PRN
Status: DISCONTINUED | OUTPATIENT
Start: 2024-11-12 | End: 2024-11-16 | Stop reason: HOSPADM

## 2024-11-12 RX ORDER — SODIUM CHLORIDE 0.9 % (FLUSH) 0.9 %
5-40 SYRINGE (ML) INJECTION EVERY 12 HOURS SCHEDULED
Status: DISCONTINUED | OUTPATIENT
Start: 2024-11-12 | End: 2024-11-16 | Stop reason: HOSPADM

## 2024-11-12 RX ORDER — LABETALOL HYDROCHLORIDE 5 MG/ML
10 INJECTION, SOLUTION INTRAVENOUS ONCE
Status: COMPLETED | OUTPATIENT
Start: 2024-11-12 | End: 2024-11-12

## 2024-11-12 RX ORDER — HYDROMORPHONE HYDROCHLORIDE 1 MG/ML
0.5 INJECTION, SOLUTION INTRAMUSCULAR; INTRAVENOUS; SUBCUTANEOUS
Status: DISCONTINUED | OUTPATIENT
Start: 2024-11-12 | End: 2024-11-16 | Stop reason: HOSPADM

## 2024-11-12 RX ORDER — SODIUM CHLORIDE 9 MG/ML
INJECTION, SOLUTION INTRAVENOUS PRN
Status: DISCONTINUED | OUTPATIENT
Start: 2024-11-12 | End: 2024-11-16 | Stop reason: HOSPADM

## 2024-11-12 RX ORDER — INSULIN GLARGINE 100 [IU]/ML
60 INJECTION, SOLUTION SUBCUTANEOUS DAILY
Status: DISCONTINUED | OUTPATIENT
Start: 2024-11-12 | End: 2024-11-16 | Stop reason: HOSPADM

## 2024-11-12 RX ORDER — MONTELUKAST SODIUM 10 MG/1
10 TABLET ORAL NIGHTLY
Status: DISCONTINUED | OUTPATIENT
Start: 2024-11-12 | End: 2024-11-16 | Stop reason: HOSPADM

## 2024-11-12 RX ORDER — LOSARTAN POTASSIUM 25 MG/1
50 TABLET ORAL NIGHTLY
Status: DISCONTINUED | OUTPATIENT
Start: 2024-11-12 | End: 2024-11-16 | Stop reason: HOSPADM

## 2024-11-12 RX ADMIN — HYDROMORPHONE HYDROCHLORIDE 1 MG: 1 INJECTION, SOLUTION INTRAMUSCULAR; INTRAVENOUS; SUBCUTANEOUS at 02:24

## 2024-11-12 RX ADMIN — LOSARTAN POTASSIUM 50 MG: 25 TABLET, FILM COATED ORAL at 02:24

## 2024-11-12 RX ADMIN — PANTOPRAZOLE SODIUM 40 MG: 40 TABLET, DELAYED RELEASE ORAL at 05:24

## 2024-11-12 RX ADMIN — Medication 10 ML: at 07:31

## 2024-11-12 RX ADMIN — ATORVASTATIN CALCIUM 10 MG: 10 TABLET, FILM COATED ORAL at 09:37

## 2024-11-12 RX ADMIN — Medication 10 ML: at 19:52

## 2024-11-12 RX ADMIN — INSULIN GLARGINE 60 UNITS: 100 INJECTION, SOLUTION SUBCUTANEOUS at 08:31

## 2024-11-12 RX ADMIN — LABETALOL HYDROCHLORIDE 10 MG: 5 INJECTION, SOLUTION INTRAVENOUS at 00:44

## 2024-11-12 RX ADMIN — HYDROMORPHONE HYDROCHLORIDE 1 MG: 1 INJECTION, SOLUTION INTRAMUSCULAR; INTRAVENOUS; SUBCUTANEOUS at 19:51

## 2024-11-12 RX ADMIN — INSULIN LISPRO 2 UNITS: 100 INJECTION, SOLUTION INTRAVENOUS; SUBCUTANEOUS at 19:50

## 2024-11-12 RX ADMIN — MONTELUKAST SODIUM 10 MG: 10 TABLET, FILM COATED ORAL at 19:50

## 2024-11-12 RX ADMIN — HYDROMORPHONE HYDROCHLORIDE 1 MG: 1 INJECTION, SOLUTION INTRAMUSCULAR; INTRAVENOUS; SUBCUTANEOUS at 05:23

## 2024-11-12 RX ADMIN — HYDROMORPHONE HYDROCHLORIDE 1 MG: 1 INJECTION, SOLUTION INTRAMUSCULAR; INTRAVENOUS; SUBCUTANEOUS at 16:23

## 2024-11-12 RX ADMIN — LOSARTAN POTASSIUM 50 MG: 25 TABLET, FILM COATED ORAL at 19:50

## 2024-11-12 RX ADMIN — ACETAMINOPHEN 1000 MG: 500 TABLET ORAL at 16:23

## 2024-11-12 RX ADMIN — INSULIN LISPRO 2 UNITS: 100 INJECTION, SOLUTION INTRAVENOUS; SUBCUTANEOUS at 02:24

## 2024-11-12 RX ADMIN — ACETAMINOPHEN 1000 MG: 500 TABLET ORAL at 09:37

## 2024-11-12 RX ADMIN — ENOXAPARIN SODIUM 40 MG: 100 INJECTION SUBCUTANEOUS at 09:37

## 2024-11-12 RX ADMIN — KETOROLAC TROMETHAMINE 15 MG: 15 INJECTION, SOLUTION INTRAMUSCULAR; INTRAVENOUS at 10:14

## 2024-11-12 RX ADMIN — ONDANSETRON 4 MG: 2 INJECTION, SOLUTION INTRAMUSCULAR; INTRAVENOUS at 10:14

## 2024-11-12 RX ADMIN — GABAPENTIN 300 MG: 300 CAPSULE ORAL at 09:36

## 2024-11-12 RX ADMIN — GABAPENTIN 300 MG: 300 CAPSULE ORAL at 19:51

## 2024-11-12 RX ADMIN — ACETAMINOPHEN 1000 MG: 500 TABLET ORAL at 19:50

## 2024-11-12 RX ADMIN — HYDROMORPHONE HYDROCHLORIDE 1 MG: 1 INJECTION, SOLUTION INTRAMUSCULAR; INTRAVENOUS; SUBCUTANEOUS at 12:36

## 2024-11-12 RX ADMIN — INSULIN LISPRO 4 UNITS: 100 INJECTION, SOLUTION INTRAVENOUS; SUBCUTANEOUS at 11:22

## 2024-11-12 ASSESSMENT — PAIN DESCRIPTION - LOCATION
LOCATION: ARM
LOCATION: HAND
LOCATION: WRIST
LOCATION: WRIST
LOCATION: HAND;WRIST
LOCATION: HAND

## 2024-11-12 ASSESSMENT — PAIN DESCRIPTION - ORIENTATION
ORIENTATION: RIGHT;LEFT

## 2024-11-12 ASSESSMENT — PAIN DESCRIPTION - DESCRIPTORS
DESCRIPTORS: ACHING
DESCRIPTORS: THROBBING
DESCRIPTORS: ACHING
DESCRIPTORS: ACHING;THROBBING

## 2024-11-12 ASSESSMENT — PAIN SCALES - WONG BAKER
WONGBAKER_NUMERICALRESPONSE: HURTS A LITTLE BIT
WONGBAKER_NUMERICALRESPONSE: HURTS A LITTLE BIT
WONGBAKER_NUMERICALRESPONSE: NO HURT
WONGBAKER_NUMERICALRESPONSE: NO HURT

## 2024-11-12 ASSESSMENT — PAIN SCALES - GENERAL
PAINLEVEL_OUTOF10: 0
PAINLEVEL_OUTOF10: 0
PAINLEVEL_OUTOF10: 7
PAINLEVEL_OUTOF10: 2
PAINLEVEL_OUTOF10: 1
PAINLEVEL_OUTOF10: 7
PAINLEVEL_OUTOF10: 7
PAINLEVEL_OUTOF10: 2
PAINLEVEL_OUTOF10: 7
PAINLEVEL_OUTOF10: 8

## 2024-11-12 ASSESSMENT — ENCOUNTER SYMPTOMS
NAUSEA: 0
ABDOMINAL PAIN: 0
BACK PAIN: 1
SHORTNESS OF BREATH: 0
VOMITING: 0
PHOTOPHOBIA: 0

## 2024-11-12 ASSESSMENT — PAIN - FUNCTIONAL ASSESSMENT
PAIN_FUNCTIONAL_ASSESSMENT: PREVENTS OR INTERFERES SOME ACTIVE ACTIVITIES AND ADLS

## 2024-11-12 NOTE — ED PROVIDER NOTES
[facility] EMERGENCY DEPARTMENT    Name: Yulissa Salter : 1960 MRN: [id number] Date of Service: 2024    Initial VS: BP: (!) 153/115, Temp: 98.7 °F (37.1 °C), Pulse: 99, Respirations: 22, SpO2: 96 %     CC: fall, pain to both wrists    HPI: this patient is a 64 y.o. female presenting to the ED from home via EMS. Ms. Salter tells me that earlier this evening she was walking outside of her home when she accidentally stepped on a sizable rock, which caused her to fall forward.  She stretched out both of her arms in order to brace her fall.  Each of her palms impacted the ground quite forcefully.  She then struck her face against the ground and rolled a short distance.  She immediately developed fairly intense pain to both of her wrists.  Soon thereafter she developed more mild pain to the right side of her face and to the center of her upper back.  She called 911 for assistance, EMS personnel arrived to the scene, and they brought her here to be evaluated.  During transit they gave her 50 mcg of fentanyl for pain and placed both of her upper extremities in splints.  On arrival here Ms. Salter reports ongoing pain to the same sites.  She has not appreciated other changes in her usual state of health and she denies other current complaints.  _____________________________________________________________________    Past Medical History:   Diagnosis Date    Asthma     Class 3 obesity     Diabetes mellitus type 2     GERD (gastroesophageal reflux disease)     Hyperlipidemia     Hypertension     Hypothyroidism     PCOS (polycystic ovarian syndrome)     Pyoderma     Sleep apnea     Ulcerative colitis       Past Surgical History:   Procedure Laterality Date    ABDOMEN SURGERY      COLON RESECTION FOR ULCERATIVE COLITIS THEN HAD ANUS REMOVED    ABDOMEN SURGERY N/A 2022    INCISION AND DRAINAGE OF ABDOMINAL WALL SEROMA () performed by Ab Chang MD at [facility] OR    ABDOMEN SURGERY  neurological function: normal  Pre-procedure range of motion: reduced    Anesthesia:  Local anesthesia used: no  Manipulation performed: yes  Skeletal traction used: yes  Reduction successful: yes  X-ray confirmed reduction: yes  Immobilization: splint  Splint type: sugar tong  Supplies used: Ortho-Glass  Post-procedure neurovascular assessment: post-procedure neurovascularly intact  Post-procedure distal perfusion: normal  Post-procedure neurological function: normal  Patient tolerance: patient tolerated the procedure well with no immediate complications      _____________________________________________________________________    MEDICAL DECISION MAKING & PLANS:    I reviewed the patient's recent and/or pertinent records, current medications, triage notes, allergies, and vital signs.    Most recent VS:  BP: (!) 191/84,Temp: 98.7 °F (37.1 °C), Pulse: 96, Respirations: 13, SpO2: 93 %     Treatments:  Medications   HYDROmorphone HCl PF (DILAUDID) injection 1 mg (1 mg IntraVENous Given 11/11/24 1931)   acetaminophen (TYLENOL) tablet 1,000 mg (1,000 mg Oral Given 11/11/24 1931)   iopamidol (ISOVUE-370) 76 % injection 75 mL (75 mLs IntraVENous Given 11/11/24 2002)   HYDROmorphone HCl PF (DILAUDID) injection 1 mg (1 mg IntraVENous Given 11/11/24 2213)   ketorolac (TORADOL) injection 15 mg (15 mg IntraVENous Given 11/11/24 2213)   propofol infusion 75 mg (200 mg IntraVENous New Bag 11/11/24 2247)     Disposition:  Unfortunately Ms. Salter appears to have suffered from fractures to her distal radius and ulna bilaterally.  Her imaging studies are negative for other significant, overt, acute, traumatic injuries.  Discussed exam findings, test results, diagnoses, and expected clinical course with her at length.  Discussed the R/B/A of proceeding with attempted closed reductions of her wrists utilizing procedural sedation and she wished to proceed with these interventions. She tolerated these procedures and splinting of her

## 2024-11-12 NOTE — CONSULTS
[facility] Orthopedic Surgery  Consult Note         This patient is seen in consultation at the request of Darius Martin MD    Reason for Consult:  Bilateral wrist pain/ markedly displaced bilateral comminuted intraarticular distal radius fracture.    CHIEF COMPLAINT:  Bilateral wrist pain/ markedly displaced bilateral comminuted intraarticular distal radius fracture.    History Obtained From:  patient, family member - brother and POA, electronic medical record    HISTORY OF PRESENT ILLNESS:   Ms. Salter is a 64 y.o.  female right handed who seen today at  for evaluation of a bilateral wrist injury.  The patient reports that this injury occurred when she fell on driveway.  She was first seen and evaluated in , when she was x-rayed and splinted, and asked to f/u with Orthopedics. The patient denies any other injuries. Rates pain a 9/10 VAS moderate, sharp, constant and show no change.  Movement makes the pain worse, the splint and resting makes the pain better. Alleviating factors rest. No numbness or tingling sensation.      Past Medical History:        Diagnosis Date    Asthma     Class 3 obesity     Diabetes mellitus type 2     GERD (gastroesophageal reflux disease)     Hyperlipidemia     Hypertension     Hypothyroidism     PCOS (polycystic ovarian syndrome)     Pyoderma     Sleep apnea     Ulcerative colitis        Past Surgical History:        Procedure Laterality Date    ABDOMEN SURGERY      COLON RESECTION FOR ULCERATIVE COLITIS THEN HAD ANUS REMOVED    ABDOMEN SURGERY N/A 09/30/2022    INCISION AND DRAINAGE OF ABDOMINAL WALL SEROMA (20005) performed by Ab Chang MD at [facility] OR    ABDOMEN SURGERY N/A 02/18/2023    ABDOMEN INCISION AND DRAINAGE performed by Ab Chang MD at [facility] OR    ABDOMEN SURGERY N/A 05/19/2023    DEBRIDE ABDOMINAL WALL ABSCESS performed by Ab Chang MD at [facility] OR    HERNIA REPAIR  11/2021    ILEOSTOMY OR JEJUNOSTOMY      KNEE    MUSCULOSKELETAL: Bilateral wrist pain.  All other ROS reviewed in chart or with patient or family and are grossly negative.       PHYSICAL EXAMINATION:  Ms. Salter is a very pleasant 64 y.o. female who seen today in no acute distress, awake, alert, and oriented.  She is well nourished and groomed.  Patient with normal affect. Body mass index is 41.55 kg/m².. Skin warm and dry. Resting respiratory rate is 16.  Resp deep and easy. Pulse is with regular rate and rhythm    BP (!) 159/88   Pulse 87   Temp 98 °F (36.7 °C) (Oral)   Resp 16   Ht 1.651 m (5' 5\")   Wt 113.3 kg (249 lb 11.2 oz)   LMP 08/16/2010   SpO2 95%   BMI 41.55 kg/m²        Airway is intact  Chest: chest clear, no wheezing, rales, normal symmetric air entry, no tachypnea, retractions or cyanosis  Heart: regular rate and rhythm ; heart sounds normal   Hearing intact, pupil equal and reactive bilateral  Lymphatics; No groin or axillary enlarged lymph nodes.  Neck; No swelling  Abdomen; soft, non distended.     MUSCULOSKELETAL: On evaluation of her bilateral upper extremity, there is moderate deformity.  There is moderate swelling and moderate ecchymosis.  She is tender to palpation over the distal radius, and otherwise nontender over the remainder of the extremity.  Range of motion is decreased secondary to pain over the bilateral wrist..  The skin overlying the bilateral wrist is intact without evidence of lesion, laceration or abrasion.  Distal pulses are 2+ and symmetric bilaterally.  Sensation is grossly intact to light touch and symmetric bilaterally.    NEUROLOGIC:   Sensory:    Touch:                     Right Upper Extremity:  normal                   Left Upper Extremity:  normal                  Right Lower Extremity:  normal                  Left Lower Extremity:  normal        DATA:    CBC:   Lab Results   Component Value Date/Time    WBC 8.1 11/12/2024 05:57 AM    RBC 4.26 11/12/2024 05:57 AM    HGB 12.5 11/12/2024 05:57 AM    HCT  No

## 2024-11-12 NOTE — PLAN OF CARE
Problem: Chronic Conditions and Co-morbidities  Goal: Patient's chronic conditions and co-morbidity symptoms are monitored and maintained or improved  11/12/2024 0741 by Radhika Urrutia RN  Flowsheets (Taken 11/12/2024 0741)  Care Plan - Patient's Chronic Conditions and Co-Morbidity Symptoms are Monitored and Maintained or Improved:   Monitor and assess patient's chronic conditions and comorbid symptoms for stability, deterioration, or improvement   Collaborate with multidisciplinary team to address chronic and comorbid conditions and prevent exacerbation or deterioration   Update acute care plan with appropriate goals if chronic or comorbid symptoms are exacerbated and prevent overall improvement and discharge     Problem: Discharge Planning  Goal: Discharge to home or other facility with appropriate resources  11/12/2024 0741 by Radhika Urrutia RN  Flowsheets (Taken 11/12/2024 0741)  Discharge to home or other facility with appropriate resources:   Identify barriers to discharge with patient and caregiver   Identify discharge learning needs (meds, wound care, etc)   Arrange for needed discharge resources and transportation as appropriate     Problem: Pain  Goal: Verbalizes/displays adequate comfort level or baseline comfort level  11/12/2024 0741 by Radhika Urrutia RN  Flowsheets (Taken 11/12/2024 0741)  Verbalizes/displays adequate comfort level or baseline comfort level:   Encourage patient to monitor pain and request assistance   Administer analgesics based on type and severity of pain and evaluate response   Assess pain using appropriate pain scale     Problem: ABCDS Injury Assessment  Goal: Absence of physical injury  11/12/2024 0741 by Radhika Urrutia RN  Flowsheets (Taken 11/12/2024 0741)  Absence of Physical Injury: Implement safety measures based on patient assessment

## 2024-11-12 NOTE — ACP (ADVANCE CARE PLANNING)
Advance Care Planning Note       Purpose of Encounter: Advanced care planning in light of hospitalization for Bilateral acute comminuted displaced intra-articular distal radius fracture   Parties in attendance: :Yulissa Salter, VALDO KC MD,  Decisional Capacity:Yes  Objective:  This 64-year-old female who presented after encountering fall at home   Goals of Care Determinations: Pt wants full support measures including CPR, intubation, trach, PEG tube, dialysis   Code Status: Full  Time Spent on Advanced Planning Documents: 17 mins.  Advanced Care Planning Documents:   Completed advances directives, advanced directives in chart.      Electronically signed by VALDO KC MD on 11/12/2024 at 4:47 PM

## 2024-11-12 NOTE — PROGRESS NOTES
Pt AOX4, vitals obtained. Shift assessment completed. Pt NPO waiting for ortho to see patient. No further needs at this time. Call light within reach.     Electronically signed by Radhika Urrutia RN on 11/12/2024 at 9:22 AM

## 2024-11-12 NOTE — ED NOTES
How does patient ambulate?   []Low Fall Risk (ambulates by themselves without support)  []Stand by assist   []Contact Guard   []Front wheel walker  []Wheelchair   []Steady  [x]Bed bound  []History of Lower Extremity Amputation  []Unknown, did not assess in the emergency department   How does patient take pills?  [x]Whole with Water  []Crushed in applesauce  []Crushed in pudding  []Other  []Unknown no oral medications were given in the ED  Is patient alert?   [x]Alert  []Drowsy but responds to voice  []Doesn't respond to voice but responds to painful stimuli  []Unresponsive  Is patient oriented?   [x]To person  [x]To place  [x]To time  [x]To situation  []Confused  []Agitated  [x]Follows commands  If patient is disoriented or from a Skill Nursing Facility has family been notified of admission?   []Yes   [x]No  Patient belongings?   []Cell phone  []Wallet   []Dentures  [x]Clothing  Any specific patient or family belongings/needs/dynamics?     Miscellaneous comments/pending orders?  Pt from home.  She fell trying to get her garbage cans and sustained bilateral radius and ulna fractures.  She had them both reduced and splinted under sedation.       If there are any additional questions please reach out to the Emergency Department.

## 2024-11-12 NOTE — H&P
articular surface in relation to the radial shaft.  There is also a displaced distal ulna fracture, involving the metaphysis and ulnar styloid.  No evidence of a radiocarpal joint dislocation.  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  There is soft tissue swelling.     Comminuted displaced intra-articular fractures of the bilateral distal radius. Impacted fractures of the bilateral distal ulna.     XR WRIST LEFT (MIN 3 VIEWS)    Result Date: 11/11/2024  EXAMINATION: 3 XRAY VIEWS OF THE LEFT WRIST; 3 XRAY VIEWS OF THE RIGHT WRIST 11/11/2024 7:09 pm COMPARISON: None. HISTORY: ORDERING SYSTEM PROVIDED HISTORY: Wrist pain and swelling following fall onto outstretched hands TECHNOLOGIST PROVIDED HISTORY: Reason for exam:->Wrist pain and swelling following fall onto outstretched hands Reason for Exam: wrist pain and swelling following fall onto outstretched hands FINDINGS: Left wrist: Comminuted impacted displaced intra-articular distal radius fracture.  There is volar displacement of the dominant distal fracture fragments by one shaft width in relation to the distal radial shaft.  There is also a comminuted impacted displaced distal ulna fracture, centered at the metaphysis, with overriding of the fracture fragments.  No evidence of a radiocarpal joint dislocation.  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  There is soft tissue swelling. Right wrist: Comminuted impacted intra-articular distal radius fracture. There is volar displacement of the radial articular surface in relation to the radial shaft.  There is also a displaced distal ulna fracture, involving the metaphysis and ulnar styloid.  No evidence of a radiocarpal joint dislocation.  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  There is soft tissue swelling.     Comminuted displaced intra-articular fractures of the bilateral distal radius. Impacted fractures of the bilateral distal ulna.     CT FACIAL BONES WO CONTRAST    Result Date: 11/11/2024  EXAMINATION: CT OF THE  Mild-to-moderate degenerative changes of the lumbar spine. SOFT TISSUES/RETROPERITONEUM: See same day CT chest abdomen and pelvis dated separately..     No acute osseous abnormality.     CT HEAD WO CONTRAST    Result Date: 11/11/2024  EXAMINATION: CT OF THE HEAD WITHOUT CONTRAST; CT OF THE CERVICAL SPINE WITHOUT CONTRAST 11/11/2024 7:51 pm; 11/11/2024 7:52 pm TECHNIQUE: CT of the head and the cervical spine was performed without the administration of intravenous contrast. Multiplanar reformatted images are provided for review. Automated exposure control, iterative reconstruction, and/or weight based adjustment of the mA/kV was utilized to reduce the radiation dose to as low as reasonably achievable. COMPARISON: None. HISTORY: Fall with head injury. FINDINGS: Image quality mildly degraded by motion artifact. BRAIN/VENTRICLES: No acute intracranial hemorrhage, mass effect, or midline shift.  No abnormal extra-axial fluid collection.  The gray-white differentiation is maintained without evidence of an acute infarct.  Mild parenchymal volume loss and chronic small vessel ischemic changes.  No hydrocephalus. ORBITS: The visualized portion of the orbits demonstrate no acute abnormality. SINUSES: The visualized paranasal sinuses and mastoid air cells demonstrate no acute abnormality. SOFT TISSUES/SKULL:  No acute abnormality of the visualized skull or soft tissues. CERVICAL SPINE BONES/ALIGNMENT: No acute fracture seen.  No significant spondylolisthesis. Straightening of normal cervical lordosis may be related to muscle spasm or patient positioning. DEGENERATIVE CHANGES: Moderate multilevel degenerative changes. SOFT TISSUES: Visualized neck soft tissues are unremarkable.     No acute abnormality of the head and cervical spine.     CT CERVICAL SPINE WO CONTRAST    Result Date: 11/11/2024  EXAMINATION: CT OF THE HEAD WITHOUT CONTRAST; CT OF THE CERVICAL SPINE WITHOUT CONTRAST 11/11/2024 7:51 pm; 11/11/2024 7:52 pm

## 2024-11-12 NOTE — PROGRESS NOTES
[facility] - Inpatient Rehabilitation Department   Phone: [phone]    Physical Therapy    [x] Initial Evaluation            [] Daily Treatment Note         [x] Discharge Summary      Patient: Yulissa Salter   : 1960   MRN: [id number]   Date of Service:  2024  Admitting Diagnosis: Closed fracture of bilateral radius and ulna, initial encounter    Current Admission Summary: Patient s/p fall with bilateral distal radius and ulna fx.  Surgery schduled for Thursday, 2024.    Past Medical History:  has a past medical history of Asthma, Class 3 obesity, Diabetes mellitus type 2, GERD (gastroesophageal reflux disease), Hyperlipidemia, Hypertension, Hypothyroidism, PCOS (polycystic ovarian syndrome), Pyoderma, Sleep apnea, and Ulcerative colitis.    Past Surgical History:  has a past surgical history that includes Abdomen surgery; Jejunostomy; ventral hernia repair (N/A, 2021); Upper gastrointestinal endoscopy (N/A, 2021); hernia repair (2021); Upper gastrointestinal endoscopy (N/A, 2022); Abdomen surgery (N/A, 2022); laparotomy (N/A, 10/14/2022); Abdomen surgery (N/A, 2023); knee surgery (Left); and Abdomen surgery (N/A, 2023).    Discharge Recommendations: Yulissa Salter scored a 18/24 on the AM-PAC short mobility form. Current research shows that an AM-PAC score of 18 or greater is typically associated with a discharge to the patient's home setting. Based on the patient's AM-PAC score and their current functional mobility deficits, it is recommended that the patient have 2-3 sessions per week of Physical Therapy at d/c to increase the patient's independence.  At this time, this patient demonstrates the endurance and safety to discharge home with home health PT and a follow up treatment frequency of 2-3x/wk.  Please see assessment section for further patient specific details.    If patient discharges prior to next session this note will serve as

## 2024-11-12 NOTE — PROGRESS NOTES
[facility] - Inpatient Rehabilitation Department   Phone: [phone]    Occupational Therapy    [x] Initial Evaluation            [] Daily Treatment Note         [] Discharge Summary      Patient: Yulissa Salter   : 1960   MRN: [id number]   Date of Service:  2024    Admitting Diagnosis:  Closed fracture of bilateral radius and ulna, initial encounter  Current Admission Summary: Patient s/p fall with bilateral distal radius and ulna fx. Surgery schduled for Thursday, 2024.   Past Medical History:  has a past medical history of Asthma, Class 3 obesity, Diabetes mellitus type 2, GERD (gastroesophageal reflux disease), Hyperlipidemia, Hypertension, Hypothyroidism, PCOS (polycystic ovarian syndrome), Pyoderma, Sleep apnea, and Ulcerative colitis.  Past Surgical History:  has a past surgical history that includes Abdomen surgery; Jejunostomy; ventral hernia repair (N/A, 2021); Upper gastrointestinal endoscopy (N/A, 2021); hernia repair (2021); Upper gastrointestinal endoscopy (N/A, 2022); Abdomen surgery (N/A, 2022); laparotomy (N/A, 10/14/2022); Abdomen surgery (N/A, 2023); knee surgery (Left); and Abdomen surgery (N/A, 2023).    Discharge Recommendations: Yulissa Salter scored a 6/24 on the AM-PAC ADL Inpatient form. Current research shows that an AM-PAC score of 18 or greater is typically associated with a discharge to the patient's home setting. Based on the patient's AM-PAC score, and their current ADL deficits, it is recommended that the patient have 2-3 sessions per week of Occupational Therapy at d/c to increase the patient's independence.  At this time, this patient demonstrates the endurance and safety to discharge home with HHOT vs OP  (home vs OP services) and a follow up treatment frequency of 2-3x/wk.   Please see assessment section for further patient specific details.    If patient discharges prior to next session this note will  (Complexity): low complexity  Clinical Presentation: stable      Subjective  General: patient seated in recliner on arrival, agreeable to therapy evaluation. Brother present  Pain: 4/10.  Location: B wrists  Pain Interventions: pain medication in place prior to arrival        Activities of Daily Living  Basic Activities of Daily Living  Feeding: dependent brother feeding pt lunch at [employer]  Lower Extremity Dressing: dependent brief change  Toileting: dependent voids on toilet, dep A for hygiene and clothing management.    Instrumental Activities of Daily Living  No IADL completed on this date.    Functional Mobility  Bed Mobility:  Bed mobility not completed on this date.  Comments:  Transfers:  Sit to stand transfer:stand by assistance  Stand to sit transfer: stand by assistance  Toilet transfer: stand by assistance  Comments: STS from recliner x3 and toilet to no device  Functional Mobility  Functional Mobility Activity: to/from bathroom, +500ft  Device Use: no device  Required Assistance: stand by assistance  Comment: steady gait  Balance:  Static Sitting Balance: good: independent with functional balance in unsupported position  Dynamic Sitting Balance: good: independent with functional balance in unsupported position  Static Standing Balance: fair (+): maintains balance at SBA/supervision without use of UE support  Dynamic Standing Balance: fair (+): maintains balance at SBA/supervision without use of UE support  Comments:    Other Therapeutic Interventions    Functional Outcomes  AM-PAC Inpatient Daily Activity Raw Score: 6                                    Cognition  WFL  Orientation:    alert and oriented x 4  Command Following:   WFL     Education  Barriers To Learning: none  Patient Education: patient educated on OT role and benefits, plan of care, precautions, ADL adaptive strategies, transfer training, discharge recommendations  Learning Assessment:  patient verbalizes understanding, would benefit from

## 2024-11-12 NOTE — PLAN OF CARE
[facility] Orthopedic Surgery  Plan of Care Note        Orthopedic Consult received, full note to follow.    Yulissa Salter 64 y.o. presenting to ER for mechanical fall and bilateral wrist pain.    Xray reviewed, and showed displaced comminuted bilateral distal radius fractures.    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  No assistive devices to ambulate.  RHD.  Hx RA, follows with Dr. Spivey.  Follows Dr. Diaz for pain management - only takes gabapentin at night. Prior to this injury she had some numbness in ring, middle and index finger of left hand. Lives alone and has a ramp to enter home, everything is on one floor after this. Wants to go to SNF post-op.    Plan:  - Schedule for open reduction and internal fixation of the bilateral distal radius with Dr. Rhiannon Rivero Thursday AM  - NPO midnight 11/14; diet ordered today  - NWB bilateral wrists; continue splints  - Verify informed consent  - Hold AC started 11/13  - Request pre-op clearance from hospitalist   - Pain control  - OK to work with therapy pre-op; expect she will have similar limitations post-op    CORDELL Campbell - CNP 11/12/2024 9:23 AM

## 2024-11-12 NOTE — PROGRESS NOTES
..4 Eyes Skin Assessment     NAME:  Yulissa Salter  YOB: 1960  MEDICAL RECORD NUMBER:  [id number]    The patient is being assessed for  Admission    I agree that at least one RN has performed a thorough Head to Toe Skin Assessment on the patient. ALL assessment sites listed below have been assessed.      Areas assessed by both nurses:    Head, Face, Ears, Shoulders, Back, Chest, Arms, Elbows, Hands, Sacrum. Buttock, Coccyx, Ischium, Legs. Feet and Heels, and Under Medical Devices         Does the Patient have a Wound? No noted wound(s)       Davide Prevention initiated by RN: Yes  Wound Care Orders initiated by RN: No    Pressure Injury (Stage 3,4, Unstageable, DTI, NWPT, and Complex wounds) if present, place Wound referral order by RN under : No    New Ostomies, if present place, Ostomy referral order under : No     Nurse 1 eSignature: Electronically signed by Sona Cabrera RN on 11/12/24 at 2:27 AM EST    **SHARE this note so that the co-signing nurse can place an eSignature**    Nurse 2 eSignature: Electronically signed by Stefani Galdamez RN on 11/12/24 at 2:44 AM EST

## 2024-11-12 NOTE — PROGRESS NOTES
HOSPITALISTS PROGRESS NOTE    11/12/2024 10:09 AM        Name: Yulissa Salter .              Admitted: 11/11/2024  Primary Care Provider: Agus Pollard MD (Tel: [phone])      Brief Course: This 64-year-old female who presented after encountering fall at home    Interval history:   Pt seen and examined today   Overnight events noted and interval ancillary notes reviewed.  On 2 L NC satting around 95 per; wean as 20 keep sat above 92%  Afebrile overnight, WBCs WNL.  Elevated BG this morning; insulin dose adjusted.  Pt up in chair; reported BLE pain but denied any fevers, chills, chest pain, SOB, bowel or bladder dysfunction    Assessment & Plan:     Bilateral acute comminuted displaced intra-articular distal radius fracture  Ortho consulted; plan for ORIF bilateral distal radius on Thursday.  NWB bilateral wrists; continue splints.  As needed pain meds  NPO midnight 11/14/24. Hold AC started 11/13     Diabetes mellitus; HgbA1c 7.5 on 8/27/2024.  Repeat A1c ordered.  Continue Lantus and SSI and monitor BG closely    Hypertension; continue current regimen and monitor BP closely     Hyperlipidemia; continue statins     DVT PPX: Lovenox  Code:Full Code    Disposition: Once acute medical issues have resolved    Current Medications  sodium chloride flush 0.9 % injection 5-40 mL, 2 times per day  sodium chloride flush 0.9 % injection 5-40 mL, PRN  0.9 % sodium chloride infusion, PRN  potassium chloride (KLOR-CON M) extended release tablet 40 mEq, PRN   Or  potassium bicarb-citric acid (EFFER-K) effervescent tablet 40 mEq, PRN   Or  potassium chloride 10 mEq/100 mL IVPB (Peripheral Line), PRN  magnesium sulfate 2000 mg in 50 mL IVPB premix, PRN  enoxaparin (LOVENOX) injection 40 mg, Daily  ondansetron (ZOFRAN) injection 4 mg, Q6H PRN  senna (SENOKOT) tablet 8.6 mg, Daily PRN  acetaminophen (TYLENOL) tablet 650 mg, Q6H PRN   Or  acetaminophen    ALT 24   BILITOT <0.2   ALKPHOS 87     XR WRIST LEFT (2 VIEWS)   Final Result   Redemonstration of acute comminuted displaced fractures of the distal radius   and ulna with mildly improved alignment.         XR WRIST RIGHT (2 VIEWS)   Final Result   1. Status post placement of a splint with significantly improved alignment of   the acute comminuted displaced intra-articular fracture of the distal radius.   2. Acute mildly displaced fracture of the distal ulna with improved alignment.         CT CHEST ABDOMEN PELVIS W CONTRAST Additional Contrast? None   Final Result      CT THORACIC SPINE BONY RECONSTRUCTION   Final Result   No acute osseous abnormality.         CT LUMBAR SPINE BONY RECONSTRUCTION   Final Result   No acute osseous abnormality.         CT CERVICAL SPINE WO CONTRAST   Final Result   No acute abnormality of the head and cervical spine.         CT FACIAL BONES WO CONTRAST   Final Result   No acute maxillofacial fracture.         CT HEAD WO CONTRAST   Final Result   No acute abnormality of the head and cervical spine.         XR WRIST RIGHT (MIN 3 VIEWS)   Final Result   Comminuted displaced intra-articular fractures of the bilateral distal radius.      Impacted fractures of the bilateral distal ulna.         XR WRIST LEFT (MIN 3 VIEWS)   Final Result   Comminuted displaced intra-articular fractures of the bilateral distal radius.      Impacted fractures of the bilateral distal ulna.             Problem List  Principal Problem:    Closed fracture of bilateral radius and ulna, initial encounter  Resolved Problems:    * No resolved hospital problems. *       VALDO KC MD   11/12/2024 10:09 AM      Please note that some part of this chart was generated using Dragon dictation software. Although every effort was made to ensure the accuracy of this automated transcription, some errors in transcription may have occurred inadvertently. If you may need any clarification, please do not hesitate to contact

## 2024-11-13 LAB
GLUCOSE BLD-MCNC: 161 MG/DL (ref 70–99)
GLUCOSE BLD-MCNC: 184 MG/DL (ref 70–99)
GLUCOSE BLD-MCNC: 204 MG/DL (ref 70–99)
GLUCOSE BLD-MCNC: 204 MG/DL (ref 70–99)
PERFORMED ON: ABNORMAL

## 2024-11-13 PROCEDURE — 97535 SELF CARE MNGMENT TRAINING: CPT

## 2024-11-13 PROCEDURE — 2580000003 HC RX 258: Performed by: PHYSICIAN ASSISTANT

## 2024-11-13 PROCEDURE — 1200000000 HC SEMI PRIVATE

## 2024-11-13 PROCEDURE — 6360000002 HC RX W HCPCS: Performed by: PHYSICIAN ASSISTANT

## 2024-11-13 PROCEDURE — 97530 THERAPEUTIC ACTIVITIES: CPT

## 2024-11-13 PROCEDURE — 6360000002 HC RX W HCPCS: Performed by: NURSE PRACTITIONER

## 2024-11-13 PROCEDURE — 6370000000 HC RX 637 (ALT 250 FOR IP): Performed by: NURSE PRACTITIONER

## 2024-11-13 PROCEDURE — 6370000000 HC RX 637 (ALT 250 FOR IP): Performed by: PHYSICIAN ASSISTANT

## 2024-11-13 RX ADMIN — HYDROMORPHONE HYDROCHLORIDE 1 MG: 1 INJECTION, SOLUTION INTRAMUSCULAR; INTRAVENOUS; SUBCUTANEOUS at 05:33

## 2024-11-13 RX ADMIN — INSULIN LISPRO 2 UNITS: 100 INJECTION, SOLUTION INTRAVENOUS; SUBCUTANEOUS at 11:20

## 2024-11-13 RX ADMIN — PANTOPRAZOLE SODIUM 40 MG: 40 TABLET, DELAYED RELEASE ORAL at 05:35

## 2024-11-13 RX ADMIN — KETOROLAC TROMETHAMINE 15 MG: 15 INJECTION, SOLUTION INTRAMUSCULAR; INTRAVENOUS at 11:21

## 2024-11-13 RX ADMIN — Medication 10 ML: at 20:32

## 2024-11-13 RX ADMIN — ACETAMINOPHEN 1000 MG: 500 TABLET ORAL at 20:30

## 2024-11-13 RX ADMIN — LOSARTAN POTASSIUM 50 MG: 25 TABLET, FILM COATED ORAL at 20:30

## 2024-11-13 RX ADMIN — ATORVASTATIN CALCIUM 10 MG: 10 TABLET, FILM COATED ORAL at 07:56

## 2024-11-13 RX ADMIN — GABAPENTIN 300 MG: 300 CAPSULE ORAL at 20:30

## 2024-11-13 RX ADMIN — INSULIN LISPRO 2 UNITS: 100 INJECTION, SOLUTION INTRAVENOUS; SUBCUTANEOUS at 16:22

## 2024-11-13 RX ADMIN — HYDROMORPHONE HYDROCHLORIDE 1 MG: 1 INJECTION, SOLUTION INTRAMUSCULAR; INTRAVENOUS; SUBCUTANEOUS at 16:23

## 2024-11-13 RX ADMIN — ACETAMINOPHEN 1000 MG: 500 TABLET ORAL at 07:56

## 2024-11-13 RX ADMIN — KETOROLAC TROMETHAMINE 15 MG: 15 INJECTION, SOLUTION INTRAMUSCULAR; INTRAVENOUS at 00:04

## 2024-11-13 RX ADMIN — INSULIN LISPRO 2 UNITS: 100 INJECTION, SOLUTION INTRAVENOUS; SUBCUTANEOUS at 07:55

## 2024-11-13 RX ADMIN — ACETAMINOPHEN 1000 MG: 500 TABLET ORAL at 14:24

## 2024-11-13 RX ADMIN — MONTELUKAST SODIUM 10 MG: 10 TABLET, FILM COATED ORAL at 20:30

## 2024-11-13 RX ADMIN — Medication 10 ML: at 07:56

## 2024-11-13 RX ADMIN — HYDROMORPHONE HYDROCHLORIDE 1 MG: 1 INJECTION, SOLUTION INTRAMUSCULAR; INTRAVENOUS; SUBCUTANEOUS at 22:50

## 2024-11-13 RX ADMIN — INSULIN GLARGINE 60 UNITS: 100 INJECTION, SOLUTION SUBCUTANEOUS at 08:01

## 2024-11-13 ASSESSMENT — PAIN DESCRIPTION - LOCATION
LOCATION: WRIST
LOCATION: ARM
LOCATION: HAND
LOCATION: HAND
LOCATION: WRIST

## 2024-11-13 ASSESSMENT — PAIN SCALES - GENERAL
PAINLEVEL_OUTOF10: 9
PAINLEVEL_OUTOF10: 2
PAINLEVEL_OUTOF10: 0
PAINLEVEL_OUTOF10: 7
PAINLEVEL_OUTOF10: 0
PAINLEVEL_OUTOF10: 0
PAINLEVEL_OUTOF10: 8
PAINLEVEL_OUTOF10: 2
PAINLEVEL_OUTOF10: 0
PAINLEVEL_OUTOF10: 6
PAINLEVEL_OUTOF10: 7

## 2024-11-13 ASSESSMENT — PAIN DESCRIPTION - ORIENTATION
ORIENTATION: RIGHT;LEFT
ORIENTATION: LEFT;RIGHT
ORIENTATION: RIGHT;LEFT
ORIENTATION: LEFT
ORIENTATION: RIGHT;LEFT

## 2024-11-13 ASSESSMENT — PAIN SCALES - WONG BAKER
WONGBAKER_NUMERICALRESPONSE: HURTS A LITTLE BIT
WONGBAKER_NUMERICALRESPONSE: NO HURT
WONGBAKER_NUMERICALRESPONSE: HURTS A LITTLE BIT
WONGBAKER_NUMERICALRESPONSE: NO HURT
WONGBAKER_NUMERICALRESPONSE: NO HURT

## 2024-11-13 ASSESSMENT — PAIN DESCRIPTION - DESCRIPTORS
DESCRIPTORS: ACHING
DESCRIPTORS: THROBBING
DESCRIPTORS: ACHING
DESCRIPTORS: THROBBING

## 2024-11-13 ASSESSMENT — PAIN - FUNCTIONAL ASSESSMENT
PAIN_FUNCTIONAL_ASSESSMENT: PREVENTS OR INTERFERES SOME ACTIVE ACTIVITIES AND ADLS
PAIN_FUNCTIONAL_ASSESSMENT: ACTIVITIES ARE NOT PREVENTED

## 2024-11-13 NOTE — PLAN OF CARE
[facility] Orthopedic Surgery  Plan of Care Note      Labs pending; vials reviewed    Plan:  - Scheduled for open reduction and internal fixation of the bilateral distal radius with Dr. Rhiannon Rivero Thursday 9:35AM  - NPO midnight   - NWB bilateral wrists; continue splints  - Verify informed consent  - Hold AC started 11/13  - Request pre-op clearance from hospitalist   - Pain control  - OK to work with therapy pre-op; expect she will have similar limitations post-op    CORDELL Campbell - CNP 11/13/2024 1:19 PM

## 2024-11-13 NOTE — PROGRESS NOTES
HOSPITALISTS PROGRESS NOTE    11/13/2024 9:53 AM        Name: Yulissa Salter .              Admitted: 11/11/2024  Primary Care Provider: Agus Pollard MD (Tel: [phone])      Brief Course: This 64-year-old female who presented after encountering fall at home    Interval history:   Pt seen and examined today.  Overnight events noted, interval ancillary notes and labs reviewed.   On RA satting well.  Afebrile overnight, WBCs pending.  Resting bed; reported bilateral arm pain but SOB, chest pain, nausea, vomiting or abdominal pain        Assessment & Plan:     Bilateral acute comminuted displaced intra-articular distal radius fracture  Ortho consulted; plan for ORIF bilateral distal radius on Thursday.  NWB bilateral wrists; continue splints.  As needed pain meds  NPO midnight 11/14/24. Hold AC started 11/13     Diabetes mellitus; HgbA1c 7.5 on 8/27/2024.  Repeat A1c ordered.  Continue Lantus and SSI and monitor BG closely    Hypertension; continue current regimen and monitor BP closely     Hyperlipidemia; continue statins     DVT PPX: Lovenox  Code:Full Code    Disposition: Once acute medical issues have resolved    Current Medications  sodium chloride flush 0.9 % injection 5-40 mL, 2 times per day  sodium chloride flush 0.9 % injection 5-40 mL, PRN  0.9 % sodium chloride infusion, PRN  potassium chloride (KLOR-CON M) extended release tablet 40 mEq, PRN   Or  potassium bicarb-citric acid (EFFER-K) effervescent tablet 40 mEq, PRN   Or  potassium chloride 10 mEq/100 mL IVPB (Peripheral Line), PRN  magnesium sulfate 2000 mg in 50 mL IVPB premix, PRN  enoxaparin (LOVENOX) injection 40 mg, Daily  ondansetron (ZOFRAN) injection 4 mg, Q6H PRN  senna (SENOKOT) tablet 8.6 mg, Daily PRN  acetaminophen (TYLENOL) tablet 650 mg, Q6H PRN   Or  acetaminophen (TYLENOL) suppository 650 mg, Q6H PRN  insulin glargine (LANTUS) injection vial 60 Units,

## 2024-11-13 NOTE — PLAN OF CARE
Problem: Chronic Conditions and Co-morbidities  Goal: Patient's chronic conditions and co-morbidity symptoms are monitored and maintained or improved  11/12/2024 2122 by Sona Cabrera RN  Outcome: Progressing  11/12/2024 0741 by Radhika Urrutia RN  Flowsheets (Taken 11/12/2024 0741)  Care Plan - Patient's Chronic Conditions and Co-Morbidity Symptoms are Monitored and Maintained or Improved:   Monitor and assess patient's chronic conditions and comorbid symptoms for stability, deterioration, or improvement   Collaborate with multidisciplinary team to address chronic and comorbid conditions and prevent exacerbation or deterioration   Update acute care plan with appropriate goals if chronic or comorbid symptoms are exacerbated and prevent overall improvement and discharge     Problem: Discharge Planning  Goal: Discharge to home or other facility with appropriate resources  11/12/2024 2122 by Sona Cabrera RN  Outcome: Progressing  11/12/2024 0741 by Radhika Urrutia RN  Flowsheets (Taken 11/12/2024 0741)  Discharge to home or other facility with appropriate resources:   Identify barriers to discharge with patient and caregiver   Identify discharge learning needs (meds, wound care, etc)   Arrange for needed discharge resources and transportation as appropriate     Problem: Pain  Goal: Verbalizes/displays adequate comfort level or baseline comfort level  11/12/2024 2122 by Sona Cabrera RN  Outcome: Progressing  11/12/2024 0741 by Radhika Urrutia RN  Flowsheets (Taken 11/12/2024 0741)  Verbalizes/displays adequate comfort level or baseline comfort level:   Encourage patient to monitor pain and request assistance   Administer analgesics based on type and severity of pain and evaluate response   Assess pain using appropriate pain scale     Problem: Safety - Adult  Goal: Free from fall injury  Outcome: Progressing     Problem: ABCDS Injury Assessment  Goal: Absence of physical injury  11/12/2024 2122 by Sona Cabrera

## 2024-11-13 NOTE — PLAN OF CARE
Problem: Chronic Conditions and Co-morbidities  Goal: Patient's chronic conditions and co-morbidity symptoms are monitored and maintained or improved  11/13/2024 0754 by Radhika Urrutia RN  Flowsheets (Taken 11/13/2024 0754)  Care Plan - Patient's Chronic Conditions and Co-Morbidity Symptoms are Monitored and Maintained or Improved: Monitor and assess patient's chronic conditions and comorbid symptoms for stability, deterioration, or improvement     Problem: Discharge Planning  Goal: Discharge to home or other facility with appropriate resources  11/13/2024 0754 by Radhika Urrutia RN  Flowsheets (Taken 11/13/2024 0754)  Discharge to home or other facility with appropriate resources:   Identify barriers to discharge with patient and caregiver   Arrange for needed discharge resources and transportation as appropriate     Problem: Pain  Goal: Verbalizes/displays adequate comfort level or baseline comfort level  11/13/2024 0754 by Radhika Urrutia RN  Flowsheets (Taken 11/13/2024 0754)  Verbalizes/displays adequate comfort level or baseline comfort level:   Encourage patient to monitor pain and request assistance   Assess pain using appropriate pain scale   Administer analgesics based on type and severity of pain and evaluate response     Problem: ABCDS Injury Assessment  Goal: Absence of physical injury  11/13/2024 0754 by Radhika Urrutia RN  Flowsheets (Taken 11/13/2024 0754)  Absence of Physical Injury: Implement safety measures based on patient assessment

## 2024-11-13 NOTE — PROGRESS NOTES
(Complexity): low complexity  Clinical Presentation: stable      Subjective  General: Patient seated in chair on arrival and agreeable for session. Brother and friend present. Patient agreeable for ADL's.   Pain: 4/10.  Location: B wrists  Pain Interventions: RN notified of patient request for pain medication, repositioned , and therapy activities modified        Activities of Daily Living  Basic Activities of Daily Living  Feeding: maximum assistance requires assistance to bring food to mouth requires assistance for beverage management requires use of adaptive utensils    Grooming: maximum assistance  Upper Extremity Bathing: maximum assistance  Lower Extremity Bathing: maximum assistance   Upper Extremity Dressing: maximum assistance  Lower Extremity Dressing: maximum assistance    Toileting: maximum assistance.    General Comments: ADL's completed with increased assistance secondary to BUE splints.  ADL's completed from chair level. Pt urinated in BR with assistance needed for pericare and clothing mgmt.   Instrumental Activities of Daily Living  No IADL completed on this date.    Functional Mobility  Bed Mobility:  Bed mobility not completed on this date.  Comments: Pt remains seated in chair on arrival and end of session.   Transfers:  Sit to stand transfer:stand by assistance  Stand to sit transfer: stand by assistance  Stand step transfer: stand by assistance  Toilet transfer: stand by assistance  Comments: sit<>stand to/from chair x 2-3 episodes  Functional Mobility  Functional Mobility Activity: to/from bathroom, 300'  Device Use: no device  Required Assistance: stand by assistance  Comment: steady gait ; pt ambulated in room and hallway using no AD x >300'. Steady gait and no LOB.  Pt with no c/o SOB, fatigue or pain.   Balance:  Static Sitting Balance: good: independent with functional balance in unsupported position  Dynamic Sitting Balance: good: independent with functional balance in unsupported  minimal assistance   Patient will complete toileting at minimal assistance   Patient will complete functional transfers at supervision   Patient will complete functional mobility at supervision     Above goals reviewed on 11/13/2024.  All goals are ongoing at this time unless indicated above.       Therapy Session Time     Individual Group Co-treatment   Time In 1314     Time Out 1410     Minutes 56          Timed Code Treatment Minutes:   56 minutes  Total Treatment Minutes:  56 minutes       Electronically Signed By: MALLORY KING, OTR/L [id number].

## 2024-11-14 ENCOUNTER — ANESTHESIA (OUTPATIENT)
Dept: OPERATING ROOM | Age: 64
End: 2024-11-14
Payer: COMMERCIAL

## 2024-11-14 ENCOUNTER — APPOINTMENT (OUTPATIENT)
Dept: GENERAL RADIOLOGY | Age: 64
End: 2024-11-14
Payer: COMMERCIAL

## 2024-11-14 ENCOUNTER — ANESTHESIA EVENT (OUTPATIENT)
Dept: OPERATING ROOM | Age: 64
End: 2024-11-14
Payer: COMMERCIAL

## 2024-11-14 LAB
ANION GAP SERPL CALCULATED.3IONS-SCNC: 11 MMOL/L (ref 3–16)
BASOPHILS # BLD: 0.1 K/UL (ref 0–0.2)
BASOPHILS NFR BLD: 0.8 %
BUN SERPL-MCNC: 14 MG/DL (ref 7–20)
CALCIUM SERPL-MCNC: 8.7 MG/DL (ref 8.3–10.6)
CHLORIDE SERPL-SCNC: 102 MMOL/L (ref 99–110)
CO2 SERPL-SCNC: 23 MMOL/L (ref 21–32)
CREAT SERPL-MCNC: 0.6 MG/DL (ref 0.6–1.2)
DEPRECATED RDW RBC AUTO: 14.1 % (ref 12.4–15.4)
EOSINOPHIL # BLD: 0 K/UL (ref 0–0.6)
EOSINOPHIL NFR BLD: 0 %
GFR SERPLBLD CREATININE-BSD FMLA CKD-EPI: >90 ML/MIN/{1.73_M2}
GLUCOSE BLD-MCNC: 186 MG/DL (ref 70–99)
GLUCOSE BLD-MCNC: 191 MG/DL (ref 70–99)
GLUCOSE BLD-MCNC: 228 MG/DL (ref 70–99)
GLUCOSE SERPL-MCNC: 196 MG/DL (ref 70–99)
HCT VFR BLD AUTO: 35.2 % (ref 36–48)
HGB BLD-MCNC: 12.1 G/DL (ref 12–16)
LYMPHOCYTES # BLD: 1.4 K/UL (ref 1–5.1)
LYMPHOCYTES NFR BLD: 19.8 %
MCH RBC QN AUTO: 29.9 PG (ref 26–34)
MCHC RBC AUTO-ENTMCNC: 34.3 G/DL (ref 31–36)
MCV RBC AUTO: 87.1 FL (ref 80–100)
MONOCYTES # BLD: 0.5 K/UL (ref 0–1.3)
MONOCYTES NFR BLD: 6.6 %
NEUTROPHILS # BLD: 5.2 K/UL (ref 1.7–7.7)
NEUTROPHILS NFR BLD: 72.8 %
PERFORMED ON: ABNORMAL
PLATELET # BLD AUTO: 211 K/UL (ref 135–450)
PMV BLD AUTO: 6.8 FL (ref 5–10.5)
POTASSIUM SERPL-SCNC: 3.8 MMOL/L (ref 3.5–5.1)
RBC # BLD AUTO: 4.04 M/UL (ref 4–5.2)
SODIUM SERPL-SCNC: 136 MMOL/L (ref 136–145)
WBC # BLD AUTO: 7.2 K/UL (ref 4–11)

## 2024-11-14 PROCEDURE — 6360000002 HC RX W HCPCS: Performed by: ORTHOPAEDIC SURGERY

## 2024-11-14 PROCEDURE — 80048 BASIC METABOLIC PNL TOTAL CA: CPT

## 2024-11-14 PROCEDURE — 6370000000 HC RX 637 (ALT 250 FOR IP): Performed by: NURSE PRACTITIONER

## 2024-11-14 PROCEDURE — 73100 X-RAY EXAM OF WRIST: CPT

## 2024-11-14 PROCEDURE — 2580000003 HC RX 258: Performed by: ORTHOPAEDIC SURGERY

## 2024-11-14 PROCEDURE — 6370000000 HC RX 637 (ALT 250 FOR IP): Performed by: NURSE ANESTHETIST, CERTIFIED REGISTERED

## 2024-11-14 PROCEDURE — 6360000002 HC RX W HCPCS: Performed by: ANESTHESIOLOGY

## 2024-11-14 PROCEDURE — 6370000000 HC RX 637 (ALT 250 FOR IP): Performed by: PHYSICIAN ASSISTANT

## 2024-11-14 PROCEDURE — 3600000014 HC SURGERY LEVEL 4 ADDTL 15MIN: Performed by: ORTHOPAEDIC SURGERY

## 2024-11-14 PROCEDURE — 1200000000 HC SEMI PRIVATE

## 2024-11-14 PROCEDURE — A4217 STERILE WATER/SALINE, 500 ML: HCPCS | Performed by: ORTHOPAEDIC SURGERY

## 2024-11-14 PROCEDURE — 25609 OPTX DST RD XART FX/EP SEP3+: CPT | Performed by: NURSE PRACTITIONER

## 2024-11-14 PROCEDURE — 2709999900 HC NON-CHARGEABLE SUPPLY: Performed by: ORTHOPAEDIC SURGERY

## 2024-11-14 PROCEDURE — 3700000001 HC ADD 15 MINUTES (ANESTHESIA): Performed by: ORTHOPAEDIC SURGERY

## 2024-11-14 PROCEDURE — 6360000002 HC RX W HCPCS: Performed by: NURSE PRACTITIONER

## 2024-11-14 PROCEDURE — 6360000002 HC RX W HCPCS: Performed by: NURSE ANESTHETIST, CERTIFIED REGISTERED

## 2024-11-14 PROCEDURE — 2500000003 HC RX 250 WO HCPCS: Performed by: NURSE ANESTHETIST, CERTIFIED REGISTERED

## 2024-11-14 PROCEDURE — C1713 ANCHOR/SCREW BN/BN,TIS/BN: HCPCS | Performed by: ORTHOPAEDIC SURGERY

## 2024-11-14 PROCEDURE — 2720000010 HC SURG SUPPLY STERILE: Performed by: ORTHOPAEDIC SURGERY

## 2024-11-14 PROCEDURE — 3600000004 HC SURGERY LEVEL 4 BASE: Performed by: ORTHOPAEDIC SURGERY

## 2024-11-14 PROCEDURE — 7100000000 HC PACU RECOVERY - FIRST 15 MIN: Performed by: ORTHOPAEDIC SURGERY

## 2024-11-14 PROCEDURE — 3700000000 HC ANESTHESIA ATTENDED CARE: Performed by: ORTHOPAEDIC SURGERY

## 2024-11-14 PROCEDURE — 85025 COMPLETE CBC W/AUTO DIFF WBC: CPT

## 2024-11-14 PROCEDURE — 6370000000 HC RX 637 (ALT 250 FOR IP): Performed by: ANESTHESIOLOGY

## 2024-11-14 PROCEDURE — 2580000003 HC RX 258: Performed by: NURSE PRACTITIONER

## 2024-11-14 PROCEDURE — 7100000001 HC PACU RECOVERY - ADDTL 15 MIN: Performed by: ORTHOPAEDIC SURGERY

## 2024-11-14 PROCEDURE — 25609 OPTX DST RD XART FX/EP SEP3+: CPT | Performed by: ORTHOPAEDIC SURGERY

## 2024-11-14 PROCEDURE — C1769 GUIDE WIRE: HCPCS | Performed by: ORTHOPAEDIC SURGERY

## 2024-11-14 DEVICE — LOCKING SCREW, FULLY THREADED,T8
Type: IMPLANTABLE DEVICE | Site: WRIST | Status: FUNCTIONAL
Brand: VARIAX

## 2024-11-14 DEVICE — VOLAR PLATE INTERMEDIATE RIGHT, SHORT
Type: IMPLANTABLE DEVICE | Site: WRIST | Status: FUNCTIONAL
Brand: VARIAX

## 2024-11-14 DEVICE — BONE SCREW, FULLY THREADED, T8
Type: IMPLANTABLE DEVICE | Site: WRIST | Status: FUNCTIONAL
Brand: VARIAX

## 2024-11-14 DEVICE — VOLAR PLATE INTERMEDIATE LEFT, SHORT
Type: IMPLANTABLE DEVICE | Site: WRIST | Status: FUNCTIONAL
Brand: VARIAX

## 2024-11-14 RX ORDER — DIPHENHYDRAMINE HYDROCHLORIDE 50 MG/ML
12.5 INJECTION INTRAMUSCULAR; INTRAVENOUS
Status: DISCONTINUED | OUTPATIENT
Start: 2024-11-14 | End: 2024-11-14 | Stop reason: HOSPADM

## 2024-11-14 RX ORDER — BUPIVACAINE HYDROCHLORIDE 5 MG/ML
INJECTION, SOLUTION EPIDURAL; INTRACAUDAL
Status: COMPLETED | OUTPATIENT
Start: 2024-11-14 | End: 2024-11-14

## 2024-11-14 RX ORDER — OXYCODONE HYDROCHLORIDE 5 MG/1
5 TABLET ORAL
Status: COMPLETED | OUTPATIENT
Start: 2024-11-14 | End: 2024-11-14

## 2024-11-14 RX ORDER — HYDROMORPHONE HYDROCHLORIDE 2 MG/ML
0.5 INJECTION, SOLUTION INTRAMUSCULAR; INTRAVENOUS; SUBCUTANEOUS EVERY 5 MIN PRN
Status: DISCONTINUED | OUTPATIENT
Start: 2024-11-14 | End: 2024-11-14 | Stop reason: HOSPADM

## 2024-11-14 RX ORDER — PROCHLORPERAZINE EDISYLATE 5 MG/ML
5 INJECTION INTRAMUSCULAR; INTRAVENOUS
Status: DISCONTINUED | OUTPATIENT
Start: 2024-11-14 | End: 2024-11-14 | Stop reason: HOSPADM

## 2024-11-14 RX ORDER — SODIUM CHLORIDE 9 MG/ML
INJECTION, SOLUTION INTRAVENOUS PRN
Status: DISCONTINUED | OUTPATIENT
Start: 2024-11-14 | End: 2024-11-14 | Stop reason: HOSPADM

## 2024-11-14 RX ORDER — ONDANSETRON 2 MG/ML
INJECTION INTRAMUSCULAR; INTRAVENOUS
Status: DISCONTINUED | OUTPATIENT
Start: 2024-11-14 | End: 2024-11-14 | Stop reason: SDUPTHER

## 2024-11-14 RX ORDER — SUCCINYLCHOLINE/SOD CL,ISO/PF 200MG/10ML
SYRINGE (ML) INTRAVENOUS
Status: DISCONTINUED | OUTPATIENT
Start: 2024-11-14 | End: 2024-11-14 | Stop reason: SDUPTHER

## 2024-11-14 RX ORDER — ROCURONIUM BROMIDE 10 MG/ML
INJECTION, SOLUTION INTRAVENOUS
Status: DISCONTINUED | OUTPATIENT
Start: 2024-11-14 | End: 2024-11-14 | Stop reason: SDUPTHER

## 2024-11-14 RX ORDER — FENTANYL CITRATE 50 UG/ML
25 INJECTION, SOLUTION INTRAMUSCULAR; INTRAVENOUS EVERY 5 MIN PRN
Status: DISCONTINUED | OUTPATIENT
Start: 2024-11-14 | End: 2024-11-14 | Stop reason: HOSPADM

## 2024-11-14 RX ORDER — ENOXAPARIN SODIUM 100 MG/ML
40 INJECTION SUBCUTANEOUS DAILY
Status: DISCONTINUED | OUTPATIENT
Start: 2024-11-15 | End: 2024-11-16 | Stop reason: HOSPADM

## 2024-11-14 RX ORDER — CEFAZOLIN SODIUM 3 G/150ML
3000 INJECTION, SOLUTION INTRAVENOUS ONCE
Status: COMPLETED | OUTPATIENT
Start: 2024-11-14 | End: 2024-11-14

## 2024-11-14 RX ORDER — HYDROMORPHONE HYDROCHLORIDE 2 MG/ML
INJECTION, SOLUTION INTRAMUSCULAR; INTRAVENOUS; SUBCUTANEOUS
Status: DISCONTINUED | OUTPATIENT
Start: 2024-11-14 | End: 2024-11-14 | Stop reason: SDUPTHER

## 2024-11-14 RX ORDER — FENTANYL CITRATE 50 UG/ML
25 INJECTION, SOLUTION INTRAMUSCULAR; INTRAVENOUS ONCE
Status: COMPLETED | OUTPATIENT
Start: 2024-11-14 | End: 2024-11-14

## 2024-11-14 RX ORDER — SODIUM CHLORIDE 0.9 % (FLUSH) 0.9 %
5-40 SYRINGE (ML) INJECTION PRN
Status: DISCONTINUED | OUTPATIENT
Start: 2024-11-14 | End: 2024-11-14 | Stop reason: HOSPADM

## 2024-11-14 RX ORDER — SODIUM CHLORIDE 9 MG/ML
INJECTION, SOLUTION INTRAVENOUS CONTINUOUS
Status: DISCONTINUED | OUTPATIENT
Start: 2024-11-14 | End: 2024-11-14 | Stop reason: HOSPADM

## 2024-11-14 RX ORDER — PROPOFOL 10 MG/ML
INJECTION, EMULSION INTRAVENOUS
Status: DISCONTINUED | OUTPATIENT
Start: 2024-11-14 | End: 2024-11-14 | Stop reason: SDUPTHER

## 2024-11-14 RX ORDER — OXYCODONE HYDROCHLORIDE 5 MG/1
5-10 TABLET ORAL EVERY 4 HOURS PRN
Qty: 42 TABLET | Refills: 0 | Status: SHIPPED | OUTPATIENT
Start: 2024-11-14 | End: 2024-11-21

## 2024-11-14 RX ORDER — ONDANSETRON 2 MG/ML
4 INJECTION INTRAMUSCULAR; INTRAVENOUS
Status: DISCONTINUED | OUTPATIENT
Start: 2024-11-14 | End: 2024-11-14 | Stop reason: HOSPADM

## 2024-11-14 RX ORDER — IPRATROPIUM BROMIDE AND ALBUTEROL SULFATE 2.5; .5 MG/3ML; MG/3ML
1 SOLUTION RESPIRATORY (INHALATION)
Status: DISCONTINUED | OUTPATIENT
Start: 2024-11-14 | End: 2024-11-14 | Stop reason: HOSPADM

## 2024-11-14 RX ORDER — SODIUM CHLORIDE 0.9 % (FLUSH) 0.9 %
5-40 SYRINGE (ML) INJECTION EVERY 12 HOURS SCHEDULED
Status: DISCONTINUED | OUTPATIENT
Start: 2024-11-14 | End: 2024-11-14 | Stop reason: HOSPADM

## 2024-11-14 RX ORDER — CARBOXYMETHYLCELLULOSE SODIUM 10 MG/ML
GEL OPHTHALMIC
Status: DISCONTINUED | OUTPATIENT
Start: 2024-11-14 | End: 2024-11-14 | Stop reason: SDUPTHER

## 2024-11-14 RX ORDER — MIDAZOLAM HYDROCHLORIDE 1 MG/ML
INJECTION, SOLUTION INTRAMUSCULAR; INTRAVENOUS
Status: DISCONTINUED | OUTPATIENT
Start: 2024-11-14 | End: 2024-11-14 | Stop reason: SDUPTHER

## 2024-11-14 RX ORDER — LABETALOL HYDROCHLORIDE 5 MG/ML
10 INJECTION, SOLUTION INTRAVENOUS
Status: DISCONTINUED | OUTPATIENT
Start: 2024-11-14 | End: 2024-11-14 | Stop reason: HOSPADM

## 2024-11-14 RX ORDER — LORAZEPAM 2 MG/ML
0.5 INJECTION INTRAMUSCULAR
Status: DISCONTINUED | OUTPATIENT
Start: 2024-11-14 | End: 2024-11-14 | Stop reason: HOSPADM

## 2024-11-14 RX ORDER — DEXAMETHASONE SODIUM PHOSPHATE 4 MG/ML
INJECTION, SOLUTION INTRA-ARTICULAR; INTRALESIONAL; INTRAMUSCULAR; INTRAVENOUS; SOFT TISSUE
Status: DISCONTINUED | OUTPATIENT
Start: 2024-11-14 | End: 2024-11-14 | Stop reason: SDUPTHER

## 2024-11-14 RX ORDER — DEXMEDETOMIDINE HYDROCHLORIDE 100 UG/ML
INJECTION, SOLUTION INTRAVENOUS
Status: DISCONTINUED | OUTPATIENT
Start: 2024-11-14 | End: 2024-11-14 | Stop reason: SDUPTHER

## 2024-11-14 RX ORDER — PHENYLEPHRINE HCL IN 0.9% NACL 1 MG/10 ML
SYRINGE (ML) INTRAVENOUS
Status: DISCONTINUED | OUTPATIENT
Start: 2024-11-14 | End: 2024-11-14 | Stop reason: SDUPTHER

## 2024-11-14 RX ORDER — NALOXONE HYDROCHLORIDE 0.4 MG/ML
INJECTION, SOLUTION INTRAMUSCULAR; INTRAVENOUS; SUBCUTANEOUS PRN
Status: DISCONTINUED | OUTPATIENT
Start: 2024-11-14 | End: 2024-11-14 | Stop reason: HOSPADM

## 2024-11-14 RX ADMIN — LOSARTAN POTASSIUM 50 MG: 25 TABLET, FILM COATED ORAL at 20:41

## 2024-11-14 RX ADMIN — MONTELUKAST SODIUM 10 MG: 10 TABLET, FILM COATED ORAL at 20:42

## 2024-11-14 RX ADMIN — FENTANYL CITRATE 25 MCG: 50 INJECTION INTRAMUSCULAR; INTRAVENOUS at 08:52

## 2024-11-14 RX ADMIN — CEFAZOLIN SODIUM 3000 MG: 3 INJECTION, SOLUTION INTRAVENOUS at 09:45

## 2024-11-14 RX ADMIN — Medication 25 MG: at 10:04

## 2024-11-14 RX ADMIN — SODIUM CHLORIDE: 9 INJECTION, SOLUTION INTRAVENOUS at 08:44

## 2024-11-14 RX ADMIN — OXYCODONE 5 MG: 5 TABLET ORAL at 12:51

## 2024-11-14 RX ADMIN — Medication 10 ML: at 20:56

## 2024-11-14 RX ADMIN — ACETAMINOPHEN 1000 MG: 500 TABLET ORAL at 20:41

## 2024-11-14 RX ADMIN — ROCURONIUM BROMIDE 40 MG: 10 INJECTION, SOLUTION INTRAVENOUS at 10:00

## 2024-11-14 RX ADMIN — WATER 2000 MG: 1 INJECTION INTRAMUSCULAR; INTRAVENOUS; SUBCUTANEOUS at 16:43

## 2024-11-14 RX ADMIN — INSULIN GLARGINE 60 UNITS: 100 INJECTION, SOLUTION SUBCUTANEOUS at 13:39

## 2024-11-14 RX ADMIN — ROCURONIUM BROMIDE 10 MG: 10 INJECTION, SOLUTION INTRAVENOUS at 09:51

## 2024-11-14 RX ADMIN — DEXAMETHASONE SODIUM PHOSPHATE 4 MG: 4 INJECTION, SOLUTION INTRAMUSCULAR; INTRAVENOUS at 10:00

## 2024-11-14 RX ADMIN — ACETAMINOPHEN 1000 MG: 500 TABLET ORAL at 15:30

## 2024-11-14 RX ADMIN — KETOROLAC TROMETHAMINE 15 MG: 15 INJECTION, SOLUTION INTRAMUSCULAR; INTRAVENOUS at 12:36

## 2024-11-14 RX ADMIN — HYDROMORPHONE HYDROCHLORIDE 0.5 MG: 2 INJECTION, SOLUTION INTRAMUSCULAR; INTRAVENOUS; SUBCUTANEOUS at 12:32

## 2024-11-14 RX ADMIN — Medication 100 MCG: at 10:34

## 2024-11-14 RX ADMIN — INSULIN LISPRO 2 UNITS: 100 INJECTION, SOLUTION INTRAVENOUS; SUBCUTANEOUS at 17:54

## 2024-11-14 RX ADMIN — KETOROLAC TROMETHAMINE 15 MG: 15 INJECTION, SOLUTION INTRAMUSCULAR; INTRAVENOUS at 20:37

## 2024-11-14 RX ADMIN — MIDAZOLAM 2 MG: 1 INJECTION INTRAMUSCULAR; INTRAVENOUS at 09:49

## 2024-11-14 RX ADMIN — GABAPENTIN 300 MG: 300 CAPSULE ORAL at 15:31

## 2024-11-14 RX ADMIN — Medication 100 MG: at 09:52

## 2024-11-14 RX ADMIN — DEXMEDETOMIDINE HYDROCHLORIDE 5 MCG: 100 INJECTION, SOLUTION INTRAVENOUS at 10:58

## 2024-11-14 RX ADMIN — DEXMEDETOMIDINE HYDROCHLORIDE 5 MCG: 100 INJECTION, SOLUTION INTRAVENOUS at 10:18

## 2024-11-14 RX ADMIN — ONDANSETRON 4 MG: 2 INJECTION, SOLUTION INTRAMUSCULAR; INTRAVENOUS at 10:05

## 2024-11-14 RX ADMIN — Medication 100 MCG: at 10:42

## 2024-11-14 RX ADMIN — PROPOFOL 200 MG: 10 INJECTION, EMULSION INTRAVENOUS at 09:51

## 2024-11-14 RX ADMIN — HYDROMORPHONE HYDROCHLORIDE 0.5 MG: 2 INJECTION, SOLUTION INTRAMUSCULAR; INTRAVENOUS; SUBCUTANEOUS at 12:11

## 2024-11-14 RX ADMIN — PANTOPRAZOLE SODIUM 40 MG: 40 TABLET, DELAYED RELEASE ORAL at 05:04

## 2024-11-14 RX ADMIN — DEXMEDETOMIDINE HYDROCHLORIDE 5 MCG: 100 INJECTION, SOLUTION INTRAVENOUS at 09:51

## 2024-11-14 RX ADMIN — HYDROMORPHONE HYDROCHLORIDE 1 MG: 2 INJECTION, SOLUTION INTRAMUSCULAR; INTRAVENOUS; SUBCUTANEOUS at 10:22

## 2024-11-14 RX ADMIN — SUGAMMADEX 200 MG: 100 INJECTION, SOLUTION INTRAVENOUS at 11:15

## 2024-11-14 RX ADMIN — HYDROMORPHONE HYDROCHLORIDE 1 MG: 1 INJECTION, SOLUTION INTRAMUSCULAR; INTRAVENOUS; SUBCUTANEOUS at 16:43

## 2024-11-14 RX ADMIN — Medication 25 MG: at 10:17

## 2024-11-14 RX ADMIN — GABAPENTIN 300 MG: 300 CAPSULE ORAL at 20:42

## 2024-11-14 RX ADMIN — Medication 200 MCG: at 10:52

## 2024-11-14 RX ADMIN — HYDROMORPHONE HYDROCHLORIDE 1 MG: 2 INJECTION, SOLUTION INTRAMUSCULAR; INTRAVENOUS; SUBCUTANEOUS at 09:51

## 2024-11-14 RX ADMIN — KETOROLAC TROMETHAMINE 15 MG: 15 INJECTION, SOLUTION INTRAMUSCULAR; INTRAVENOUS at 04:10

## 2024-11-14 RX ADMIN — CARBOXYMETHYLCELLULOSE SODIUM 2 DROP: 10 GEL OPHTHALMIC at 11:18

## 2024-11-14 RX ADMIN — HYDROMORPHONE HYDROCHLORIDE 0.5 MG: 2 INJECTION, SOLUTION INTRAMUSCULAR; INTRAVENOUS; SUBCUTANEOUS at 12:03

## 2024-11-14 ASSESSMENT — PAIN DESCRIPTION - DESCRIPTORS
DESCRIPTORS: ACHING
DESCRIPTORS: THROBBING
DESCRIPTORS: ACHING

## 2024-11-14 ASSESSMENT — PAIN SCALES - GENERAL
PAINLEVEL_OUTOF10: 5
PAINLEVEL_OUTOF10: 6
PAINLEVEL_OUTOF10: 2
PAINLEVEL_OUTOF10: 3
PAINLEVEL_OUTOF10: 8
PAINLEVEL_OUTOF10: 9
PAINLEVEL_OUTOF10: 7
PAINLEVEL_OUTOF10: 8
PAINLEVEL_OUTOF10: 6
PAINLEVEL_OUTOF10: 10

## 2024-11-14 ASSESSMENT — PAIN DESCRIPTION - LOCATION
LOCATION: WRIST

## 2024-11-14 ASSESSMENT — PAIN - FUNCTIONAL ASSESSMENT
PAIN_FUNCTIONAL_ASSESSMENT: PREVENTS OR INTERFERES SOME ACTIVE ACTIVITIES AND ADLS
PAIN_FUNCTIONAL_ASSESSMENT: 0-10
PAIN_FUNCTIONAL_ASSESSMENT: 0-10
PAIN_FUNCTIONAL_ASSESSMENT: PREVENTS OR INTERFERES SOME ACTIVE ACTIVITIES AND ADLS
PAIN_FUNCTIONAL_ASSESSMENT: 0-10
PAIN_FUNCTIONAL_ASSESSMENT: 0-10

## 2024-11-14 ASSESSMENT — PAIN SCALES - WONG BAKER: WONGBAKER_NUMERICALRESPONSE: HURTS A LITTLE BIT

## 2024-11-14 ASSESSMENT — PAIN DESCRIPTION - ORIENTATION
ORIENTATION: RIGHT;LEFT

## 2024-11-14 NOTE — ANESTHESIA PRE PROCEDURE
Department of Anesthesiology  Preprocedure Note       Name:  Yulissa Salter   Age:  64 y.o.  :  1960                                          MRN:  [id number]         Date:  2024      Surgeon: Surgeon(s):  Rhiannon Rivero MD    Procedure: Procedure(s):  BILATERAL OPEN REDUCTION INTERNAL FIXATION DISTAL RADIUS - BEST    Medications prior to admission:   Prior to Admission medications    Medication Sig Start Date End Date Taking? Authorizing Provider   LANTUS SOLOSTAR 100 UNIT/ML injection pen INJECT 60 UNITS SUBCUTANEOUSLY DAILY 24  Yes Saray Beckford MD   HUMALOG KWIKPEN 100 UNIT/ML SOPN INJECT 25 TO 30 UNITS WITH EACH MEAL UP TO 3 TIMES A DAY 24  Yes Saray Beckford MD   gabapentin (NEURONTIN) 300 MG capsule TAKE 1 CAPSULE BY MOUTH EVERY 8 HOURS FOR 30 DAYS 5/10/23  Yes Miles Manzo MD   magnesium oxide (MAG-OX) 400 (240 Mg) MG tablet Take 1 tablet by mouth nightly   Yes Miles Manzo MD   dicyclomine (BENTYL) 10 MG capsule Take 1 capsule by mouth every 6 hours as needed (Take 1 capsule by mouth as needed for stomach spasms.)   Yes Miles Manzo MD   omeprazole (PRILOSEC) 20 MG delayed release capsule Take by mouth OTC daily   Yes Miles Manzo MD   Multiple Vitamin (MULTIVITAMIN ADULT PO) Take by mouth   Yes Miles Manzo MD   Ferrous Sulfate (IRON PO) Take 325 mg by mouth daily   Yes Miles Manzo MD   Potassium 99 MG TABS Take 1 tablet by mouth daily   Yes Miles Manzo MD   montelukast (SINGULAIR) 10 MG tablet Take 1 tablet by mouth nightly   Yes Miles Manzo MD   Cholecalciferol (VITAMIN D3) 125 MCG (5000 UT) TABS Take 1 tablet by mouth daily   Yes Miles Manzo MD   losartan (COZAAR) 100 MG tablet Take 0.5 tablets by mouth daily   Yes Miles Manzo MD   Omega-3 Fatty Acids (FISH OIL) 1000 MG CAPS Take 1 capsule by mouth daily   Yes Miles Manzo MD   atorvastatin (LIPITOR) 10 MG tablet Take 1

## 2024-11-14 NOTE — PROGRESS NOTES
Pulse ox, O2 on at 2L/NC.  Verbalizes bilateral wrist pain, rates 5 and throbbing.  Fentanyl 25mg IV given.

## 2024-11-14 NOTE — PROGRESS NOTES
Pain improving pain pill given for moderate surgical pain.Criteria met to transfer to room 4481, 4 T notified handoff report  to Fabiana HARE transport called

## 2024-11-14 NOTE — PLAN OF CARE
[facility] Orthopedic Surgery  Plan of Care Note        S/p bilateral distal radius ORIF    Plan:  - NWB bilateral wrists; keep splints in place until 2 wk followup  - OK for bearing weight through the forearms.   - PT/OT  - Pain control; Rx for dc in chart  - Dispo: await therapy eval; ok to dc from our end once medically stable.     Followup with Dr. Rhiannon Rivero in 2 weeks.  [phone]    CORDELL Campbell - CNP 11/14/2024 12:34 PM

## 2024-11-14 NOTE — PLAN OF CARE
Problem: Chronic Conditions and Co-morbidities  Goal: Patient's chronic conditions and co-morbidity symptoms are monitored and maintained or improved  11/13/2024 2107 by Sona Cabrera RN  Outcome: Progressing  11/13/2024 0754 by Radhika Urrutia RN  Flowsheets (Taken 11/13/2024 0754)  Care Plan - Patient's Chronic Conditions and Co-Morbidity Symptoms are Monitored and Maintained or Improved: Monitor and assess patient's chronic conditions and comorbid symptoms for stability, deterioration, or improvement     Problem: Discharge Planning  Goal: Discharge to home or other facility with appropriate resources  11/13/2024 2107 by Sona Cabrera RN  Outcome: Progressing  11/13/2024 0754 by Radhika Urrutia RN  Flowsheets (Taken 11/13/2024 0754)  Discharge to home or other facility with appropriate resources:   Identify barriers to discharge with patient and caregiver   Arrange for needed discharge resources and transportation as appropriate     Problem: Pain  Goal: Verbalizes/displays adequate comfort level or baseline comfort level  11/13/2024 2107 by Sona Cabrera RN  Outcome: Progressing  11/13/2024 0754 by Radhika Urrutia RN  Flowsheets (Taken 11/13/2024 0754)  Verbalizes/displays adequate comfort level or baseline comfort level:   Encourage patient to monitor pain and request assistance   Assess pain using appropriate pain scale   Administer analgesics based on type and severity of pain and evaluate response     Problem: Safety - Adult  Goal: Free from fall injury  Outcome: Progressing     Problem: ABCDS Injury Assessment  Goal: Absence of physical injury  11/13/2024 2107 by Sona Cabrera RN  Outcome: Progressing  11/13/2024 0754 by Radhika Urrutia RN  Flowsheets (Taken 11/13/2024 0754)  Absence of Physical Injury: Implement safety measures based on patient assessment

## 2024-11-14 NOTE — PROGRESS NOTES
Unable to see patient today due to being in the OR for ORIF.  Chart reviewed and necessary orders placed.  Will follow-up once out of the OR.

## 2024-11-14 NOTE — PROGRESS NOTES
Received from OR - Unresponsive,simple mask @ 6 liters 98%,bilaterla hands with ace bandages/splints, elevated,circulation good, warm ,vss.

## 2024-11-14 NOTE — BRIEF OP NOTE
Brief Postoperative Note      Patient: Yulissa Salter  YOB: 1960  MRN: [id number]    Date of Procedure: 11/14/2024    Pre-Op Diagnosis Codes:      * Closed fracture of distal ends of radius and ulna of both forearms, initial encounter [S52.501A, S52.502A, S52.601A, S52.602A]    Post-Op Diagnosis: Same       Procedure(s):  BILATERAL OPEN REDUCTION INTERNAL FIXATION DISTAL RADIUS - BEST    Surgeon(s):  Rhiannon Rivero MD    Assistant: ADRIANNE Manjarrez    Anesthesia: General    Estimated Blood Loss (mL): Minimal    Complications: None    Specimens:   * No specimens in log *    Implants:  Implant Name Type Inv. Item Serial No.  Lot No. LRB No. Used Action   PLATE BNE 11 H SHT L DST RAD VOLAR INTMED - [id number]  PLATE BNE 11 H SHT L DST RAD VOLAR INTMED  [employer] [facility]  Left 1 Implanted   SCREW T8 BONE [id number] - [id number]  SCREW T8 BONE 2.3IRY70EX  [employer] [facility]  Left 1 Implanted   SCREW LK 2.7X14MM - [id number]  SCREW LK 2.7X14MM  [employer] [facility]  Left 2 Implanted   SCREW LOCKING 2.6TUB06XF - [id number]  SCREW LOCKING 2.1ZAB27PB  [employer] [facility]  Left 2 Implanted   SCREW BNE L18MM OD27MM ST KATIE FULL THRD T8 DRV - [id number]  SCREW BNE L18MM OD27MM ST KATIE FULL THRD T8 DRV  [employer] [facility]  Left 5 Implanted   PLATE BNE 11 H SHT R DST RAD VOLAR INTMED - [id number]  PLATE BNE 11 H SHT R DST RAD VOLAR INTMED  [employer] [facility]  Right 1 Implanted   SCREW T8 BONE 2.7UUY29ZU - [id number]  SCREW T8 BONE 2.9IUX38IS  [employer] [facility]  Right 1 Implanted   SCREW LK 2.7X14MM - [id number]  SCREW LK 2.7X14MM  [employer] [facility]  Right 2 Implanted   SCREW LOCKING 2.4VZC85NM - [id number]  SCREW LOCKING 2.5TKS39AQ  [employer] [facility]-  Right 2 Implanted   SCREW BNE L18MM OD27MM ST KATIE FULL THRD T8 V - [id number]  SCREW BNE L18MM OD27MM ST KATIE FULL THRD T8 SAÚL  [employer]  Right 5

## 2024-11-14 NOTE — ANESTHESIA PRE PROCEDURE
Department of Anesthesiology  Preprocedure Note       Name:  Yulissa Salter   Age:  64 y.o.  :  1960                                          MRN:  [id number]         Date:  2024      Surgeon: Surgeon(s):  Rhiannon Rivero MD    Procedure: Procedure(s):  BILATERAL OPEN REDUCTION INTERNAL FIXATION DISTAL RADIUS - BEST    Medications prior to admission:   Prior to Admission medications    Medication Sig Start Date End Date Taking? Authorizing Provider   LANTUS SOLOSTAR 100 UNIT/ML injection pen INJECT 60 UNITS SUBCUTANEOUSLY DAILY 24  Yes Saray Beckford MD   HUMALOG KWIKPEN 100 UNIT/ML SOPN INJECT 25 TO 30 UNITS WITH EACH MEAL UP TO 3 TIMES A DAY 24  Yes Saray Beckford MD   gabapentin (NEURONTIN) 300 MG capsule TAKE 1 CAPSULE BY MOUTH EVERY 8 HOURS FOR 30 DAYS 5/10/23  Yes Miles Manzo MD   magnesium oxide (MAG-OX) 400 (240 Mg) MG tablet Take 1 tablet by mouth nightly   Yes Miles Manzo MD   dicyclomine (BENTYL) 10 MG capsule Take 1 capsule by mouth every 6 hours as needed (Take 1 capsule by mouth as needed for stomach spasms.)   Yes Miles Manzo MD   omeprazole (PRILOSEC) 20 MG delayed release capsule Take by mouth OTC daily   Yes Miles Manzo MD   Multiple Vitamin (MULTIVITAMIN ADULT PO) Take by mouth   Yes Miles Manzo MD   Ferrous Sulfate (IRON PO) Take 325 mg by mouth daily   Yes Miles Manzo MD   Potassium 99 MG TABS Take 1 tablet by mouth daily   Yes Miles Manzo MD   montelukast (SINGULAIR) 10 MG tablet Take 1 tablet by mouth nightly   Yes Miles Manzo MD   Cholecalciferol (VITAMIN D3) 125 MCG (5000 UT) TABS Take 1 tablet by mouth daily   Yes Miles Manzo MD   losartan (COZAAR) 100 MG tablet Take 0.5 tablets by mouth daily   Yes Miles Manzo MD   Omega-3 Fatty Acids (FISH OIL) 1000 MG CAPS Take 1 capsule by mouth daily   Yes Miles Manzo MD   atorvastatin (LIPITOR) 10 MG tablet Take 1

## 2024-11-14 NOTE — ANESTHESIA POSTPROCEDURE EVALUATION
Department of Anesthesiology  Postprocedure Note    Patient: Yulissa Salter  MRN: [id number]  YOB: 1960  Date of evaluation: 11/14/2024    Procedure Summary       Date: 11/14/24 Room / Location: [address]    Anesthesia Start: 0949 Anesthesia Stop: 1141    Procedure: BILATERAL OPEN REDUCTION INTERNAL FIXATION DISTAL RADIUS - BEST (Bilateral: Wrist) Diagnosis:       Closed fracture of distal ends of radius and ulna of both forearms, initial encounter      (Closed fracture of distal ends of radius and ulna of both forearms, initial encounter [S52.501A, S52.502A, S52.601A, S52.602A])    Surgeons: Rhiannon Rivero MD Responsible Provider: Maximino Oakes MD    Anesthesia Type: General ASA Status: 3            Anesthesia Type: General    Ruchi Phase I: Ruchi Score: 5    Ruchi Phase II:      Anesthesia Post Evaluation    Patient location during evaluation: PACU  Patient participation: complete - patient participated  Level of consciousness: awake  Airway patency: patent  Nausea & Vomiting: no nausea and no vomiting  Cardiovascular status: blood pressure returned to baseline and hemodynamically stable  Respiratory status: acceptable  Hydration status: euvolemic  Multimodal analgesia pain management approach  Pain management: adequate    No notable events documented.

## 2024-11-14 NOTE — PROGRESS NOTES
Dilaudid titrated for severe surgical pain,  asymptomatic,taking sips water tolerating well, repositioned arms for comfort.

## 2024-11-15 LAB
ALBUMIN SERPL-MCNC: 3.5 G/DL (ref 3.4–5)
ALBUMIN/GLOB SERPL: 1.1 {RATIO} (ref 1.1–2.2)
ALP SERPL-CCNC: 80 U/L (ref 40–129)
ALT SERPL-CCNC: 16 U/L (ref 10–40)
ANION GAP SERPL CALCULATED.3IONS-SCNC: 10 MMOL/L (ref 3–16)
AST SERPL-CCNC: 30 U/L (ref 15–37)
BASOPHILS # BLD: 0.1 K/UL (ref 0–0.2)
BASOPHILS NFR BLD: 1.1 %
BILIRUB SERPL-MCNC: 0.4 MG/DL (ref 0–1)
BUN SERPL-MCNC: 13 MG/DL (ref 7–20)
CALCIUM SERPL-MCNC: 8.6 MG/DL (ref 8.3–10.6)
CHLORIDE SERPL-SCNC: 102 MMOL/L (ref 99–110)
CO2 SERPL-SCNC: 25 MMOL/L (ref 21–32)
CREAT SERPL-MCNC: 0.6 MG/DL (ref 0.6–1.2)
DEPRECATED RDW RBC AUTO: 14 % (ref 12.4–15.4)
EOSINOPHIL # BLD: 0 K/UL (ref 0–0.6)
EOSINOPHIL NFR BLD: 0 %
GFR SERPLBLD CREATININE-BSD FMLA CKD-EPI: >90 ML/MIN/{1.73_M2}
GLUCOSE BLD-MCNC: 138 MG/DL (ref 70–99)
GLUCOSE BLD-MCNC: 172 MG/DL (ref 70–99)
GLUCOSE BLD-MCNC: 190 MG/DL (ref 70–99)
GLUCOSE BLD-MCNC: 245 MG/DL (ref 70–99)
GLUCOSE SERPL-MCNC: 155 MG/DL (ref 70–99)
HCT VFR BLD AUTO: 34.3 % (ref 36–48)
HGB BLD-MCNC: 11.4 G/DL (ref 12–16)
LYMPHOCYTES # BLD: 1.6 K/UL (ref 1–5.1)
LYMPHOCYTES NFR BLD: 17.3 %
MAGNESIUM SERPL-MCNC: 1.98 MG/DL (ref 1.8–2.4)
MCH RBC QN AUTO: 28.6 PG (ref 26–34)
MCHC RBC AUTO-ENTMCNC: 33.3 G/DL (ref 31–36)
MCV RBC AUTO: 86 FL (ref 80–100)
MONOCYTES # BLD: 0.8 K/UL (ref 0–1.3)
MONOCYTES NFR BLD: 8.9 %
NEUTROPHILS # BLD: 6.5 K/UL (ref 1.7–7.7)
NEUTROPHILS NFR BLD: 72.7 %
PERFORMED ON: ABNORMAL
PHOSPHATE SERPL-MCNC: 3.1 MG/DL (ref 2.5–4.9)
PLATELET # BLD AUTO: 211 K/UL (ref 135–450)
PMV BLD AUTO: 6.9 FL (ref 5–10.5)
POTASSIUM SERPL-SCNC: 3.9 MMOL/L (ref 3.5–5.1)
PROT SERPL-MCNC: 6.6 G/DL (ref 6.4–8.2)
RBC # BLD AUTO: 3.99 M/UL (ref 4–5.2)
SODIUM SERPL-SCNC: 137 MMOL/L (ref 136–145)
WBC # BLD AUTO: 9 K/UL (ref 4–11)

## 2024-11-15 PROCEDURE — 6360000002 HC RX W HCPCS: Performed by: NURSE PRACTITIONER

## 2024-11-15 PROCEDURE — 2580000003 HC RX 258: Performed by: NURSE PRACTITIONER

## 2024-11-15 PROCEDURE — 97164 PT RE-EVAL EST PLAN CARE: CPT

## 2024-11-15 PROCEDURE — 80053 COMPREHEN METABOLIC PANEL: CPT

## 2024-11-15 PROCEDURE — 97530 THERAPEUTIC ACTIVITIES: CPT

## 2024-11-15 PROCEDURE — 84100 ASSAY OF PHOSPHORUS: CPT

## 2024-11-15 PROCEDURE — 97168 OT RE-EVAL EST PLAN CARE: CPT

## 2024-11-15 PROCEDURE — 85025 COMPLETE CBC W/AUTO DIFF WBC: CPT

## 2024-11-15 PROCEDURE — 6370000000 HC RX 637 (ALT 250 FOR IP): Performed by: NURSE PRACTITIONER

## 2024-11-15 PROCEDURE — 83735 ASSAY OF MAGNESIUM: CPT

## 2024-11-15 PROCEDURE — 1200000000 HC SEMI PRIVATE

## 2024-11-15 PROCEDURE — 97535 SELF CARE MNGMENT TRAINING: CPT

## 2024-11-15 RX ADMIN — GABAPENTIN 300 MG: 300 CAPSULE ORAL at 20:30

## 2024-11-15 RX ADMIN — ACETAMINOPHEN 1000 MG: 500 TABLET ORAL at 20:30

## 2024-11-15 RX ADMIN — WATER 2000 MG: 1 INJECTION INTRAMUSCULAR; INTRAVENOUS; SUBCUTANEOUS at 01:48

## 2024-11-15 RX ADMIN — GABAPENTIN 300 MG: 300 CAPSULE ORAL at 08:32

## 2024-11-15 RX ADMIN — ACETAMINOPHEN 1000 MG: 500 TABLET ORAL at 08:32

## 2024-11-15 RX ADMIN — GABAPENTIN 300 MG: 300 CAPSULE ORAL at 13:50

## 2024-11-15 RX ADMIN — HYDROMORPHONE HYDROCHLORIDE 1 MG: 1 INJECTION, SOLUTION INTRAMUSCULAR; INTRAVENOUS; SUBCUTANEOUS at 13:50

## 2024-11-15 RX ADMIN — PANTOPRAZOLE SODIUM 40 MG: 40 TABLET, DELAYED RELEASE ORAL at 08:32

## 2024-11-15 RX ADMIN — Medication 10 ML: at 08:35

## 2024-11-15 RX ADMIN — HYDROMORPHONE HYDROCHLORIDE 1 MG: 1 INJECTION, SOLUTION INTRAMUSCULAR; INTRAVENOUS; SUBCUTANEOUS at 08:33

## 2024-11-15 RX ADMIN — HYDROMORPHONE HYDROCHLORIDE 1 MG: 1 INJECTION, SOLUTION INTRAMUSCULAR; INTRAVENOUS; SUBCUTANEOUS at 01:53

## 2024-11-15 RX ADMIN — ACETAMINOPHEN 1000 MG: 500 TABLET ORAL at 16:16

## 2024-11-15 RX ADMIN — HYDROMORPHONE HYDROCHLORIDE 1 MG: 1 INJECTION, SOLUTION INTRAMUSCULAR; INTRAVENOUS; SUBCUTANEOUS at 20:36

## 2024-11-15 RX ADMIN — KETOROLAC TROMETHAMINE 15 MG: 15 INJECTION, SOLUTION INTRAMUSCULAR; INTRAVENOUS at 17:06

## 2024-11-15 RX ADMIN — MONTELUKAST SODIUM 10 MG: 10 TABLET, FILM COATED ORAL at 20:29

## 2024-11-15 RX ADMIN — ATORVASTATIN CALCIUM 10 MG: 10 TABLET, FILM COATED ORAL at 08:32

## 2024-11-15 RX ADMIN — Medication 10 ML: at 20:31

## 2024-11-15 RX ADMIN — INSULIN GLARGINE 60 UNITS: 100 INJECTION, SOLUTION SUBCUTANEOUS at 08:33

## 2024-11-15 RX ADMIN — INSULIN LISPRO 2 UNITS: 100 INJECTION, SOLUTION INTRAVENOUS; SUBCUTANEOUS at 20:32

## 2024-11-15 RX ADMIN — KETOROLAC TROMETHAMINE 15 MG: 15 INJECTION, SOLUTION INTRAMUSCULAR; INTRAVENOUS at 04:32

## 2024-11-15 RX ADMIN — LOSARTAN POTASSIUM 50 MG: 25 TABLET, FILM COATED ORAL at 20:30

## 2024-11-15 RX ADMIN — KETOROLAC TROMETHAMINE 15 MG: 15 INJECTION, SOLUTION INTRAMUSCULAR; INTRAVENOUS at 10:58

## 2024-11-15 ASSESSMENT — PAIN SCALES - GENERAL
PAINLEVEL_OUTOF10: 7
PAINLEVEL_OUTOF10: 8
PAINLEVEL_OUTOF10: 8
PAINLEVEL_OUTOF10: 7
PAINLEVEL_OUTOF10: 6
PAINLEVEL_OUTOF10: 8
PAINLEVEL_OUTOF10: 5
PAINLEVEL_OUTOF10: 8
PAINLEVEL_OUTOF10: 7

## 2024-11-15 ASSESSMENT — PAIN DESCRIPTION - DESCRIPTORS
DESCRIPTORS: ACHING;SHARP
DESCRIPTORS: THROBBING;ACHING
DESCRIPTORS: ACHING;SHARP

## 2024-11-15 ASSESSMENT — PAIN DESCRIPTION - ORIENTATION
ORIENTATION: RIGHT;LEFT

## 2024-11-15 ASSESSMENT — PAIN - FUNCTIONAL ASSESSMENT
PAIN_FUNCTIONAL_ASSESSMENT: ACTIVITIES ARE NOT PREVENTED
PAIN_FUNCTIONAL_ASSESSMENT: PREVENTS OR INTERFERES SOME ACTIVE ACTIVITIES AND ADLS

## 2024-11-15 ASSESSMENT — PAIN DESCRIPTION - LOCATION
LOCATION: WRIST

## 2024-11-15 ASSESSMENT — PAIN SCALES - WONG BAKER: WONGBAKER_NUMERICALRESPONSE: NO HURT

## 2024-11-15 NOTE — PROGRESS NOTES
level dynamic balance in unsupported position  Dynamic Sitting Balance: good(+): independent with high level dynamic balance in unsupported position  Static Standing Balance: good: independent with functional balance in unsupported position  Dynamic Standing Balance: good: independent with functional balance in unsupported position  Comments:    Other Therapeutic Interventions    Functional Outcomes  AM-PAC Inpatient Daily Activity Raw Score: 12            Cognition  WFL  Orientation:    alert and oriented x 4  Command Following:   WFL     Education  Barriers To Learning: none  Patient Education: patient educated on goals, OT role and benefits, plan of care, discharge recommendations  Learning Assessment:  patient verbalizes and demonstrates understanding    Assessment  Activity Tolerance: tolerated fair, limited by pain in BUE  Impairments Requiring Therapeutic Intervention: decreased ADL status, decreased IADL, increased pain  Prognosis: good  Clinical Assessment: Pt is currently functioning slightly below occupational baseline and demo the deficits listed above. Pt is typically IND with ADL/IADL tasks and works full time. Today, pt is requiring max(A) for UB/LB ADLS and is IND for mobility/transfers. Pt is primarily limited by increased pain in BUE and BUE NWBing status. Pt would benefit from continued skilled OT services at d/c to address these deficits and increase IND, safety, and ease with all occupational pursuits.   Safety Interventions: patient left in chair, chair alarm in place, call light within reach, and nurse notified    Plan  Frequency: 3-5 x/per week  Current Treatment Recommendations: strengthening, balance training, functional mobility training, transfer training, patient/caregiver education, ADL/self-care training, safety education, and equipment evaluation/education    Goals  Patient Goals: return home and have pain managed prior to leaving hospital   Short Term Goals:  Time Frame: by d/c    Patient will complete upper body ADL at minimal assistance   Patient will complete lower body ADL at minimal assistance   Patient will complete toileting at minimal assistance   Patient will complete functional transfers at Independent   Patient will complete functional mobility at Independent     Above goals reviewed on 11/15/2024.  All goals are ongoing at this time unless indicated above.       Therapy Session Time     Individual Group Co-treatment   Time In 1105  1035   Time Out 1200  1105   Minutes 55  30        Timed Code Treatment Minutes:  Timed Code Treatment Minutes: 70 Minutes  Total Treatment Minutes:  85 minutes total       Electronically Signed By: Rhoda Leos OT, Rhoda Leos OTR/L [id number]

## 2024-11-15 NOTE — PLAN OF CARE
Problem: Chronic Conditions and Co-morbidities  Goal: Patient's chronic conditions and co-morbidity symptoms are monitored and maintained or improved  Outcome: Progressing  Flowsheets (Taken 11/14/2024 1318 by Fabiana Swanson, RN)  Care Plan - Patient's Chronic Conditions and Co-Morbidity Symptoms are Monitored and Maintained or Improved: Monitor and assess patient's chronic conditions and comorbid symptoms for stability, deterioration, or improvement     Problem: Discharge Planning  Goal: Discharge to home or other facility with appropriate resources  Outcome: Progressing  Flowsheets (Taken 11/14/2024 1318 by Fabiana Swanson, RN)  Discharge to home or other facility with appropriate resources: Identify barriers to discharge with patient and caregiver   Continuing to work with patient and health care team on discharge plan. Discharge instructions and medication management will be reviewed prior to discharge.    Problem: Pain  Goal: Verbalizes/displays adequate comfort level or baseline comfort level  Outcome: Progressing  Flowsheets (Taken 11/14/2024 2034)  Verbalizes/displays adequate comfort level or baseline comfort level: Encourage patient to monitor pain and request assistance   Pt able to express presence/absence of pain and rate pain appropriately using numerical scale. Pain/discomfort being managed with PRN analgesics per MD orders (see MAR). Pain assessed every shift and after interventions.    Problem: Safety - Adult  Goal: Free from fall injury  Outcome: Progressing   Pt free from falls this shift. Fall precautions in place at all times. Call light always within reach. Pt able and agreeable to contact for safety appropriately.    Problem: ABCDS Injury Assessment  Goal: Absence of physical injury  Outcome: Progressing

## 2024-11-15 NOTE — PROGRESS NOTES
[facility] - Inpatient Rehabilitation Department   Phone: [phone]    Physical Therapy    [x] Re-Evaluation            [] Daily Treatment Note         [x] Discharge Summary      Patient: Yulissa Salter   : 1960   MRN: [id number]   Date of Service:  11/15/2024  Admitting Diagnosis: Closed fracture of bilateral radius and ulna, initial encounter    Current Admission Summary: Patient s/p fall with bilateral distal radius and ulna fx. S/P B distal radius ORIF 24.     Past Medical History:  has a past medical history of Asthma, Class 3 obesity, Diabetes mellitus type 2, GERD (gastroesophageal reflux disease), Hyperlipidemia, Hypertension, Hypothyroidism, PCOS (polycystic ovarian syndrome), Pyoderma, Sleep apnea, and Ulcerative colitis.    Past Surgical History:  has a past surgical history that includes Abdomen surgery; Jejunostomy; ventral hernia repair (N/A, 2021); Upper gastrointestinal endoscopy (N/A, 2021); hernia repair (2021); Upper gastrointestinal endoscopy (N/A, 2022); Abdomen surgery (N/A, 2022); laparotomy (N/A, 10/14/2022); Abdomen surgery (N/A, 2023); knee surgery (Left); Abdomen surgery (N/A, 2023); and Forearm surgery (Bilateral, 2024).    Discharge Recommendations: Yulissa Salter scored a  on the AM-PAC short mobility form. Current research shows that an AM-PAC score of 18 or greater is typically associated with a discharge to the patient's home setting. Based on the patient's AM-PAC score and their current functional mobility deficits, it is recommended that the patient have 2-3 sessions per week of Physical Therapy at d/c to increase the patient's independence.  At this time, this patient demonstrates the endurance and safety to discharge home with home health PT and a follow up treatment frequency of 2-3x/wk.  Please see assessment section for further patient specific details.    HOME HEALTH CARE: LEVEL 1 STANDARD  -  Initial home health evaluation to occur within 24-48 hours, in patient home   - Therapy to evaluate with goal of regaining prior level of functioning   - Therapy to evaluate if patient has Home Health Aide needs for personal care     If patient discharges prior to next session this note will serve as a discharge summary.  Please see below for the latest assessment towards goals.       DME Required For Discharge: no DME required at discharge  Precautions/Restrictions: high fall risk  Weight Bearing Restrictions: non weight bearing (OK for WB through B forearms per Ortho CNP note)  [x] Right Upper Extremity  [x] Left Upper Extremity [] Right Lower Extremity  [] Left Lower Extremity     Required Braces/Orthotics:  Patient currently in B splints from wrist to above elbows.   [x] Right  [x] Left  Positional Restrictions:no positional restrictions    Pre-Admission Information   Lives With:  sister                    Type of Home: house  Home Layout: one level  Home Access: ramped entry  Bathroom Layout: walk in shower  Bathroom Equipment: grab bars in shower, built in shower seat, can use vanity to help stand from commode  Toilet Height: elevated height  Home Equipment: rolling walker, single point cane  Transfer Assistance: Independent without use of device  Ambulation Assistance:Independent without use of device  ADL Assistance: independent with all ADL's  IADL Assistance: independent with homemaking tasks  Active :        [x] Yes                 [] No  Hand Dominance: [] Left                 [x] Right  Current Employment: full time employment.  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

## 2024-11-15 NOTE — OP NOTE
[place] [address]                            OPERATIVE REPORT      PATIENT NAME: MAE DELACRUZ             : 1960  MED REC NO: [id number]                      ROOM: [address] [place] [address]  ACCOUNT NO: [id number]                       ADMIT DATE: 2024  PROVIDER: Rhiannon Rivero MD      DATE OF PROCEDURE:  2024    SURGEON:  Rhiannon Rivero MD    ASSISTANT:  Ana Manjarrez CNP    PREOPERATIVE DIAGNOSIS:  Bilateral distal radius comminuted 3- to 4-part displaced fracture.    POSTOPERATIVE DIAGNOSIS:  Bilateral distal radius comminuted 3 to 4 parts displaced fracture.    PROCEDURE DONE:    1. Open treatment of left distal radius 3 to 4 part severely comminuted unstable distal radius fracture with open reduction and internal fixation.  2. Open treatment of right distal radius 3 to 4 parts severely comminuted intra-articular displaced fracture with open reduction and internal fixation.    ANESTHESIA:  General anesthesia.    ESTIMATED BLOOD LOSS:  Minimal.    COMPLICATIONS:  None.    TOURNIQUET:  Left upper arm and right arm respectively.    IMPLANT USED:  Ramon titanium intermediate volar locking plate with 3 proximal screws and 7 distal locking screws on each side, left and right.    INDICATION:  This is a 64-year-old white female with a BMI of 44, who sustained a fall, sustained injury to her bilateral upper extremity.  She was seen at [facility] ER where she was found to have a severely comminuted intra-articular bilateral distal radius fracture.  She was admitted to the hospital and was consulted for her injury.  All risks, benefits, and alternatives discussed with the patient and her family and they agreed to proceed with surgical fixation.    Given the patient's Body mass index is 43.93 kg/m². added significant challenge to the procedure. It required significant physical and mental effort. It required 100% more time for  application.    POSTOPERATIVE PLAN:  The patient will be readmitted as an inpatient.  She may need a rehab placement.  She is nonweightbearing on both upper extremities on the wrist, but she can start range of motion of the fingers immediately and the wrist in 2 weeks.  She is nonweightbearing for 6 weeks.          MELLISA DAVE MD      D:  11/14/2024 14:20:37     T:  11/14/2024 20:24:26     /DUNCAN  Job #:  [id number]     Doc#:  [id number]

## 2024-11-15 NOTE — PROGRESS NOTES
HOSPITALISTS PROGRESS NOTE    11/15/2024 1:35 PM        Name: Yulissa Salter .              Admitted: 11/11/2024  Primary Care Provider: Agus Pollard MD (Tel: [phone])      Brief Course: This 64-year-old female who presented after encountering fall at home    Interval history:   Pt seen and examined today.  Overnight events noted, interval ancillary notes and labs reviewed.   On RA satting well.  Afebrile.  Sitting up in a recliner; reported bilateral arm pain but no SOB, chest pain, nausea, vomiting or abdominal pain      Assessment & Plan:     Bilateral acute comminuted displaced intra-articular distal radius fracture  Ortho consulted; s/p ORIF bilateral distal radius on 11/14/2024.  NWB bilateral wrists; keep splints in place until 2 week follow up.  OK for bearing weight through the forearms.   PT/OT evaluation.  Pain control.  Followup with Dr. Rhiannon Rivero in 2 weeks.     Diabetes mellitus:  Hgb A1c 7.5 on 8/27/2024.    Continue Lantus and SSI and monitor BG closely    Hypertension; continue home dose of losartan and monitor BP closely     Hyperlipidemia; continue statin       DVT PPX: Lovenox  Code:Full Code    Disposition: HHPT recommended.  Anticipate DC tomorrow if pain remains well-controlled.      Current Medications  [Held by provider] enoxaparin (LOVENOX) injection 40 mg, Daily  sodium chloride flush 0.9 % injection 5-40 mL, 2 times per day  sodium chloride flush 0.9 % injection 5-40 mL, PRN  0.9 % sodium chloride infusion, PRN  potassium chloride (KLOR-CON M) extended release tablet 40 mEq, PRN   Or  potassium bicarb-citric acid (EFFER-K) effervescent tablet 40 mEq, PRN   Or  potassium chloride 10 mEq/100 mL IVPB (Peripheral Line), PRN  magnesium sulfate 2000 mg in 50 mL IVPB premix, PRN  ondansetron (ZOFRAN) injection 4 mg, Q6H PRN  senna (SENOKOT) tablet 8.6 mg, Daily PRN  acetaminophen (TYLENOL) tablet 650 mg, Q6H

## 2024-11-16 VITALS
BODY MASS INDEX: 48.82 KG/M2 | RESPIRATION RATE: 16 BRPM | HEIGHT: 65 IN | HEART RATE: 89 BPM | DIASTOLIC BLOOD PRESSURE: 75 MMHG | WEIGHT: 293 LBS | TEMPERATURE: 98.6 F | OXYGEN SATURATION: 97 % | SYSTOLIC BLOOD PRESSURE: 152 MMHG

## 2024-11-16 LAB
ALBUMIN SERPL-MCNC: 3.2 G/DL (ref 3.4–5)
ALBUMIN/GLOB SERPL: 1 {RATIO} (ref 1.1–2.2)
ALP SERPL-CCNC: 77 U/L (ref 40–129)
ALT SERPL-CCNC: 18 U/L (ref 10–40)
ANION GAP SERPL CALCULATED.3IONS-SCNC: 9 MMOL/L (ref 3–16)
AST SERPL-CCNC: 35 U/L (ref 15–37)
BILIRUB SERPL-MCNC: 0.6 MG/DL (ref 0–1)
BUN SERPL-MCNC: 13 MG/DL (ref 7–20)
CALCIUM SERPL-MCNC: 8.7 MG/DL (ref 8.3–10.6)
CHLORIDE SERPL-SCNC: 104 MMOL/L (ref 99–110)
CO2 SERPL-SCNC: 24 MMOL/L (ref 21–32)
CREAT SERPL-MCNC: 0.5 MG/DL (ref 0.6–1.2)
DEPRECATED RDW RBC AUTO: 13.9 % (ref 12.4–15.4)
FERRITIN SERPL IA-MCNC: 167 NG/ML (ref 15–150)
FOLATE SERPL-MCNC: 17.3 NG/ML (ref 4.78–24.2)
GFR SERPLBLD CREATININE-BSD FMLA CKD-EPI: >90 ML/MIN/{1.73_M2}
GLUCOSE BLD-MCNC: 147 MG/DL (ref 70–99)
GLUCOSE BLD-MCNC: 248 MG/DL (ref 70–99)
GLUCOSE SERPL-MCNC: 151 MG/DL (ref 70–99)
HCT VFR BLD AUTO: 32.2 % (ref 36–48)
HGB BLD-MCNC: 10.9 G/DL (ref 12–16)
IRON SATN MFR SERPL: 15 % (ref 15–50)
IRON SERPL-MCNC: 38 UG/DL (ref 37–145)
MAGNESIUM SERPL-MCNC: 1.87 MG/DL (ref 1.8–2.4)
MCH RBC QN AUTO: 29 PG (ref 26–34)
MCHC RBC AUTO-ENTMCNC: 33.8 G/DL (ref 31–36)
MCV RBC AUTO: 85.8 FL (ref 80–100)
PERFORMED ON: ABNORMAL
PERFORMED ON: ABNORMAL
PHOSPHATE SERPL-MCNC: 2.9 MG/DL (ref 2.5–4.9)
PLATELET # BLD AUTO: 200 K/UL (ref 135–450)
PMV BLD AUTO: 6.6 FL (ref 5–10.5)
POTASSIUM SERPL-SCNC: 3.6 MMOL/L (ref 3.5–5.1)
PROT SERPL-MCNC: 6.5 G/DL (ref 6.4–8.2)
RBC # BLD AUTO: 3.75 M/UL (ref 4–5.2)
SODIUM SERPL-SCNC: 137 MMOL/L (ref 136–145)
TIBC SERPL-MCNC: 258 UG/DL (ref 260–445)
VIT B12 SERPL-MCNC: 475 PG/ML (ref 211–911)
WBC # BLD AUTO: 6.2 K/UL (ref 4–11)

## 2024-11-16 PROCEDURE — 85027 COMPLETE CBC AUTOMATED: CPT

## 2024-11-16 PROCEDURE — 84100 ASSAY OF PHOSPHORUS: CPT

## 2024-11-16 PROCEDURE — 6370000000 HC RX 637 (ALT 250 FOR IP): Performed by: NURSE PRACTITIONER

## 2024-11-16 PROCEDURE — 83735 ASSAY OF MAGNESIUM: CPT

## 2024-11-16 PROCEDURE — 6360000002 HC RX W HCPCS: Performed by: NURSE PRACTITIONER

## 2024-11-16 PROCEDURE — 36415 COLL VENOUS BLD VENIPUNCTURE: CPT

## 2024-11-16 PROCEDURE — 83550 IRON BINDING TEST: CPT

## 2024-11-16 PROCEDURE — 82728 ASSAY OF FERRITIN: CPT

## 2024-11-16 PROCEDURE — 83540 ASSAY OF IRON: CPT

## 2024-11-16 PROCEDURE — 80053 COMPREHEN METABOLIC PANEL: CPT

## 2024-11-16 PROCEDURE — 82607 VITAMIN B-12: CPT

## 2024-11-16 PROCEDURE — 82746 ASSAY OF FOLIC ACID SERUM: CPT

## 2024-11-16 RX ORDER — PANTOPRAZOLE SODIUM 40 MG/1
40 TABLET, DELAYED RELEASE ORAL
Qty: 30 TABLET | Refills: 0 | Status: SHIPPED | OUTPATIENT
Start: 2024-11-17

## 2024-11-16 RX ADMIN — GABAPENTIN 300 MG: 300 CAPSULE ORAL at 13:21

## 2024-11-16 RX ADMIN — HYDROMORPHONE HYDROCHLORIDE 1 MG: 1 INJECTION, SOLUTION INTRAMUSCULAR; INTRAVENOUS; SUBCUTANEOUS at 05:46

## 2024-11-16 RX ADMIN — HYDROMORPHONE HYDROCHLORIDE 0.5 MG: 1 INJECTION, SOLUTION INTRAMUSCULAR; INTRAVENOUS; SUBCUTANEOUS at 13:21

## 2024-11-16 RX ADMIN — PANTOPRAZOLE SODIUM 40 MG: 40 TABLET, DELAYED RELEASE ORAL at 06:46

## 2024-11-16 RX ADMIN — ATORVASTATIN CALCIUM 10 MG: 10 TABLET, FILM COATED ORAL at 08:17

## 2024-11-16 RX ADMIN — KETOROLAC TROMETHAMINE 15 MG: 15 INJECTION, SOLUTION INTRAMUSCULAR; INTRAVENOUS at 00:27

## 2024-11-16 RX ADMIN — INSULIN GLARGINE 60 UNITS: 100 INJECTION, SOLUTION SUBCUTANEOUS at 08:17

## 2024-11-16 RX ADMIN — INSULIN LISPRO 2 UNITS: 100 INJECTION, SOLUTION INTRAVENOUS; SUBCUTANEOUS at 11:40

## 2024-11-16 RX ADMIN — ACETAMINOPHEN 1000 MG: 500 TABLET ORAL at 08:16

## 2024-11-16 RX ADMIN — KETOROLAC TROMETHAMINE 15 MG: 15 INJECTION, SOLUTION INTRAMUSCULAR; INTRAVENOUS at 15:51

## 2024-11-16 RX ADMIN — ACETAMINOPHEN 1000 MG: 500 TABLET ORAL at 15:51

## 2024-11-16 RX ADMIN — KETOROLAC TROMETHAMINE 15 MG: 15 INJECTION, SOLUTION INTRAMUSCULAR; INTRAVENOUS at 08:16

## 2024-11-16 RX ADMIN — GABAPENTIN 300 MG: 300 CAPSULE ORAL at 08:16

## 2024-11-16 ASSESSMENT — PAIN DESCRIPTION - LOCATION
LOCATION: WRIST

## 2024-11-16 ASSESSMENT — PAIN SCALES - GENERAL
PAINLEVEL_OUTOF10: 5
PAINLEVEL_OUTOF10: 6
PAINLEVEL_OUTOF10: 8
PAINLEVEL_OUTOF10: 9

## 2024-11-16 ASSESSMENT — PAIN DESCRIPTION - DESCRIPTORS
DESCRIPTORS: ACHING;ITCHING
DESCRIPTORS: ACHING
DESCRIPTORS: ACHING

## 2024-11-16 ASSESSMENT — PAIN DESCRIPTION - ORIENTATION
ORIENTATION: RIGHT;LEFT

## 2024-11-16 ASSESSMENT — PAIN SCALES - WONG BAKER: WONGBAKER_NUMERICALRESPONSE: NO HURT

## 2024-11-16 NOTE — PROGRESS NOTES
[facility] Orthopedic Surgery  Progress Note        POD # 2, s/p ORIF bilateral distal radius.    Pt comfortable, no c/o.  splint bilateral wrist D/C/I,  No pain with bilateral fingers ROM, NVI    CBC:   Lab Results   Component Value Date/Time    WBC 6.2 11/16/2024 05:20 AM    RBC 3.75 11/16/2024 05:20 AM    HGB 10.9 11/16/2024 05:20 AM    HCT 32.2 11/16/2024 05:20 AM    MCV 85.8 11/16/2024 05:20 AM    MCH 29.0 11/16/2024 05:20 AM    MCHC 33.8 11/16/2024 05:20 AM    RDW 13.9 11/16/2024 05:20 AM     11/16/2024 05:20 AM    MPV 6.6 11/16/2024 05:20 AM     PT/INR:    Lab Results   Component Value Date/Time    PROTIME 12.8 11/11/2024 10:37 AM    INR 0.94 11/11/2024 10:37 AM     PTT:    Lab Results   Component Value Date/Time    APTT 28.6 11/22/2021 01:01 AM   [APTT    A/P: s/p ORIF bilateral distal radius.  - Stable  - PT/OT, NWB bilateral wrists.  - Ok to D/C to ECF from ortho standpoint.  - F/U Dr Rivero in 2 weeks.      Rhiannon Rivero MD 11/16/2024 12:33 PM

## 2024-11-16 NOTE — CARE COORDINATION
Case Management -  Discharge Note      Patient Name: Yulissa Salter                   YOB: 1960            Readmission Risk (Low < 19, Mod (19-27), High > 27): Readmission Risk Score: 9.1    Current PCP: Agus Pollard MD      (IMM) Important Message from Medicare:    Has pt received appropriate IMM before discharge if required: N/A  Date: 11/16/2024    PT AM-PAC: 24 /24  OT AM-PAC: 12 /24    Patient/patient representative has been educated on the benefits of HHC as well as the possible risks of declining recommended services. Patient/patient representative has acknowledged the information provided and decided on the following discharge plan. Patient/ patient representative has been provided freedom of choice regarding service provider, supported by basic dialogue that supports the patient's individualized plan of care/goals.    Pt reports would like [facility] for [facility] .       Home Care Information:   Is patient resuming current home health care services: No    Home Care Agency:   [facility] ([facility])  [address]   [place] [address]  Phone: [phone]  Fax: [phone]                      Services: RN PT/OT   Home Health Order Obtained: Order #: [id number]     Kathy Keaton [phone] accepted pt and is aware of d/c order day. Home health agency notified of discharge.       Financial    Payor: BCBS / Plan: [facility] - [place] PPO / Product Type: *No Product type* /     Pharmacy:  Potential assistance Purchasing Medications: No  Meds-to-Beds request:        [id number] [place], [place] - [address] [phone] - F [phone]  [address] [facility] [address]  Phone: [phone] Fax: [phone]    [id number] - [place], [place] - [address] [phone] - F [phone]  [address]  [facility] [address]  Phone: [phone] Fax: [phone]    [id number] - [place], [place] - [address] - P [phone] - F 
Case Management Assessment  Initial Evaluation    Date/Time of Evaluation: 11/13/2024 3:34 PM  Assessment Completed by: CATHY Hurtado    If patient is discharged prior to next notation, then this note serves as note for discharge by case management.    Patient Name: Yulissa Salter                   YOB: 1960  Diagnosis: Acute pain due to trauma [G89.11]  Contusion of face, initial encounter [S00.83XA]  Facial abrasion, initial encounter [S00.81XA]  Fall from standing, initial encounter [W19.XXXA]  Closed fracture of bilateral radius and ulna, initial encounter [S52.91XA, S52.201A, S52.202A, S52.92XA]  Closed fracture of distal end of right radius, unspecified fracture morphology, initial encounter [S52.501A]  Closed fracture of distal end of left radius, unspecified fracture morphology, initial encounter [S52.502A]  Closed fracture of distal end of left ulna, unspecified fracture morphology, initial encounter [S52.602A]  Closed fracture of distal end of right ulna, unspecified fracture morphology, initial encounter [S52.601A]                   Date / Time: 11/11/2024  6:40 PM    Patient Admission Status: Inpatient   Readmission Risk (Low < 19, Mod (19-27), High > 27): Readmission Risk Score: 6.6    Current PCP: Agus Pollard MD  PCP verified by CM? (P) Yes    Chart Reviewed: Yes      History Provided by: (P) Patient  Patient Orientation: (P) Alert and Oriented    Patient Cognition: (P) Alert    Hospitalization in the last 30 days (Readmission):  No    If yes, Readmission Assessment in  Navigator will be completed.    Advance Directives:      Code Status: Full Code   Patient's Primary Decision Maker is: (P) Legal Next of Kin    Primary Decision Maker: Trixie Friedman - Brother/Sister - [phone]    Discharge Planning:    Patient lives with: (P) Family Members Type of Home: (P) House  Primary Care Giver: (P) Self  Patient Support Systems include: (P) Family Members (Patient is caregiver 
Patient with colostomy.  Patient uses 1 piece convex [place] pouches.  Patient requests help to change pouch tomorrow.  Patient fell at home has bilateral fractures to wrists.  Will assist patient  as requested. POPPY KONGN, RN, CWOCN  Inpatient  Wound/Ostomy Care  [phone]   
  Plan for Ostomy Care:    Empty pouch 5 to 6  times a day or when   1/3 to 1/2 full of stool or gas.  Change pouch every 2 to 4 days or if leaking.    TO CHANGE:  PATIENT HAS HER OWN SUPPLIES, USES [employer]AND.    Empty pouch, remove, cleanse skin with water only and dry. NO Soap or wipes will interfere with adhesion.  Measure stoma. (11/14 measured 32mm), antony wafer and cut to fit around stoma.    Remove plastic back from wafer, Place barrier on back of wafer.  Place over stoma.  Connect pouch to wafer at coupling with plastic rim. Will click in place like tupperware.  Close end of pouch.   Place warm blanket over pouch, will help with adhesion.     Ostomy Plan of Care  Patient using home supplies    Current Diet: ADULT DIET; Regular  Dietician consult:  Yes    Discharge Plan:  Placement for patient upon discharge: To be determined TBD    Outpatient visit plan No  Supplies given Yes   Samples requested No    Referrals:      Patient/Caregiver Teaching:  Pouch maintenance, Supplies , and Manipulate closure        Level of patient/caregiver understanding able to:  Indicates understanding and Needs reinforcement     Electronically signed by  AMBER KONG, RN, CWOCN  Inpatient  Wound/Ostomy Care  [phone]  on 11/15/2024 at 12:33 PM

## 2024-11-16 NOTE — PROGRESS NOTES
VSS, A&OX4, shift assessment, some rashes on her right hand, no pain itching, neuro WNL, colostomy care completed, pain managed with dilaudid, all scheduled medications given, care plan reviewed and updated, standard safety  measures applied, patient transfer to bed, denied for other needs at this time, pats appropriately call for any needs, will continue to monitored.   Martha Sultana RN

## 2024-11-16 NOTE — DISCHARGE SUMMARY
Patient: Yulissa Salter     Gender: female  : 1960   Age: 64 y.o.  MRN: [id number]    Admitting Physician: Leobardo Beth DO  Discharge Physician: Desiree Villegas MD     Code Status: Full Code     Admit Date: 2024   Discharge Date:       Disposition:  Home    Discharge Diagnoses:  Bilateral acute comminuted displaced intra-articular distal radius fracture s/p open reduction internal fixation on     Active Hospital Problems    Diagnosis Date Noted    Fall from standing [W19.XXXA] 2024    Closed fracture of right distal radius [S52.501A] 2024    Closed fracture of left distal radius [S52.502A] 2024    Closed fracture of bilateral radius and ulna, initial encounter [S52.91XA, S52.201A, S52.202A, S52.92XA] 2024       Follow-up appointments:  one week    Outpatient to do list: Follow-up with Dr. Marcial in 2 weeks to    Condition at Discharge:  Stable    Hospital Course:   This 64-year-old female who presented after encountering fall at home   Bilateral acute comminuted displaced intra-articular distal radius fracture  Ortho consulted; s/p ORIF bilateral distal radius on 2024.  NWB bilateral wrists; keep splints in place until 2 week follow up.  OK for bearing weight through the forearms.   PT/OT evaluation.  Pain control.  Followup with Dr. Rhiannon Rivero in 2 weeks.     Diabetes mellitus:  Hgb A1c 7.5 on 2024.    Continue Lantus and SSI and monitor BG closely     Hypertension; continue home dose of losartan and monitor BP closely     Hyperlipidemia; continue statin    Discharge Medications:   Current Discharge Medication List        START taking these medications    Details   oxyCODONE (ROXICODONE) 5 MG immediate release tablet Take 1-2 tablets by mouth every 4 hours as needed for Pain for up to 7 days. Intended supply: 7 days. Take lowest dose possible to manage pain Max Daily Amount: 60 mg  Qty: 42 tablet, Refills: 0    Comments: Reduce doses taken as pain  becomes manageable  Associated Diagnoses: Closed fracture of distal end of left radius, unspecified fracture morphology, initial encounter           Current Discharge Medication List        Current Discharge Medication List        CONTINUE these medications which have NOT CHANGED    Details   LANTUS SOLOSTAR 100 UNIT/ML injection pen INJECT 60 UNITS SUBCUTANEOUSLY DAILY  Qty: 54 mL, Refills: 0    Associated Diagnoses: Controlled type 2 diabetes mellitus without complication, with long-term current use of insulin ([facility])      HUMALOG KWIKPEN 100 UNIT/ML SOPN INJECT 25 TO 30 UNITS WITH EACH MEAL UP TO 3 TIMES A DAY  Qty: 30 mL, Refills: 3    Associated Diagnoses: Controlled type 2 diabetes mellitus without complication, with long-term current use of insulin ([facility])      gabapentin (NEURONTIN) 300 MG capsule TAKE 1 CAPSULE BY MOUTH EVERY 8 HOURS FOR 30 DAYS      magnesium oxide (MAG-OX) 400 (240 Mg) MG tablet Take 1 tablet by mouth nightly      dicyclomine (BENTYL) 10 MG capsule Take 1 capsule by mouth every 6 hours as needed (Take 1 capsule by mouth as needed for stomach spasms.)      omeprazole (PRILOSEC) 20 MG delayed release capsule Take by mouth OTC daily      Multiple Vitamin (MULTIVITAMIN ADULT PO) Take by mouth      Ferrous Sulfate (IRON PO) Take 325 mg by mouth daily      Potassium 99 MG TABS Take 1 tablet by mouth daily      montelukast (SINGULAIR) 10 MG tablet Take 1 tablet by mouth nightly      Cholecalciferol (VITAMIN D3) 125 MCG (5000 UT) TABS Take 1 tablet by mouth daily      losartan (COZAAR) 100 MG tablet Take 0.5 tablets by mouth daily      Omega-3 Fatty Acids (FISH OIL) 1000 MG CAPS Take 1 capsule by mouth daily      atorvastatin (LIPITOR) 10 MG tablet Take 1 tablet by mouth daily      CRANBERRY PO Take by mouth daily      acetaminophen (TYLENOL) 500 MG tablet Take 1 tablet by mouth every 6 hours as needed for Pain      loratadine (CLARITIN) 10 MG capsule Take by mouth daily       Insulin Pen Needle (B-D

## 2024-11-16 NOTE — DISCHARGE INSTRUCTIONS
Nonweightbearing on both wrist     Home Care Agency:   [facility] ([facility])  [address] [address]  Phone: [phone]  Fax: [phone]                      Services: RN PT/OT   Home Health Order Obtained: Order #: [id number]      Kathy Pugh [phone] accepted pt and is aware of d/c order day. Home health agency notified of discharge.

## 2024-11-16 NOTE — DISCHARGE INSTR - COC
11/2021    ILEOSTOMY OR JEJUNOSTOMY      KNEE SURGERY Left     LAPAROTOMY N/A 10/14/2022    DRAINAGE OF ABDOMINAL WALL ABSCESS WITH PLACEMENT OF WOUND VAC performed by Ab Chang MD at [facility] OR    UPPER GASTROINTESTINAL ENDOSCOPY N/A 11/23/2021    EGD BIOPSY performed by Nasir Scott MD at [facility] ENDOSCOPY    UPPER GASTROINTESTINAL ENDOSCOPY N/A 01/18/2022    EGD DIAGNOSTIC ONLY performed by Nasir Scott MD at [facility] ENDOSCOPY    VENTRAL HERNIA REPAIR N/A 11/16/2021    OPEN REPAIR INCISIONAL HERNIA WITH MESH, LYSIS OF ADHESIONS performed by Ab Chang MD at [facility] OR       Immunization History:   Immunization History   Administered Date(s) Administered    COVID-19, MODERNA BLUE border, Primary or Immunocompromised, (age 12y+), IM, 100 mcg/0.5mL 01/06/2021, 02/04/2021, 10/28/2021    COVID-19, PFIZER Bivalent, DO NOT Dilute, (age 12y+), IM, 30 mcg/0.3 mL 09/07/2022, 06/01/2023    COVID-19, PFIZER GRAY top, DO NOT Dilute, (age 12 y+), IM, 30 mcg/0.3 mL 04/11/2022    COVID-19, PFIZER, (age 12y+), IM, 30mcg/0.3mL 12/05/2023, 10/22/2024       Active Problems:  Patient Active Problem List   Diagnosis Code    Ileostomy care ([facility]) Z43.2    Type 2 diabetes mellitus, with long-term current use of insulin ([facility]) E11.9, Z79.4    Hypothyroidism E03.9    Essential hypertension I10    Mixed hyperlipidemia E78.2    Ulcerative (chronic) enterocolitis, unspecified complication ([facility]) K51.019    Small bowel obstruction ([facility]) K56.609    Incarcerated incisional hernia K43.0    Generalized abdominal pain R10.84    Postoperative fever R50.82    Abdominal wall seroma S30.1XXA    Abdominal wall seroma, initial encounter S30.1XXA    Abscess of abdominal wall L02.211    Abdominal wall abscess L02.211    Wound infection T14.8XXA, L08.9    Open abdominal wall wound S31.109A    Lower back pain M54.50    Foraminal stenosis of lumbosacral region M48.07    Closed fracture of bilateral radius and ulna, initial  Assessment: Moist, Pink, Protrudes  Ileostomy Ileostomy RLQ-Peristomal Assessment: Clean, dry & intact  Ileostomy Ileostomy RLQ-Treatment: Barrier ring  Ileostomy Ileostomy RLQ-Stool Appearance: Loose  Ileostomy Ileostomy RLQ-Stool Color: Brown  Ileostomy Ileostomy RLQ-Stool Amount: Medium  Ileostomy Ileostomy RLQ-Output (mL): 100 ml    Date of Last BM: ***    Intake/Output Summary (Last 24 hours) at 2024 1246  Last data filed at 2024 0800  Gross per 24 hour   Intake --   Output 250 ml   Net -250 ml     I/O last 3 completed shifts:  In: 150 [P.O.:150]  Out: 350 [Stool:350]    Safety Concerns:     { ZBIGNIEW Safety Concerns:394255403}    Impairments/Disabilities:      { ZBIGNIEW Impairments/Disabilities:498478922}    Nutrition Therapy:  Current Nutrition Therapy:   { ZBIGNIEW Diet List:070008702}    Routes of Feeding: {[facility] Other Feedings:172548427}  Liquids: {Slp liquid thickness:84566}  Daily Fluid Restriction: {[facility] DME Yes amt example:888918272}  Last Modified Barium Swallow with Video (Video Swallowing Test): {Done Not Done Date:}    Treatments at the Time of Hospital Discharge:   Respiratory Treatments: ***  Oxygen Therapy:  {Therapy; copd oxygen:04089}  Ventilator:    {[facility] Vent List:492285526}    Rehab Therapies: {THERAPEUTIC INTERVENTION:5928614203}  Weight Bearing Status/Restrictions: {[facility] Weight Bearin}  Other Medical Equipment (for information only, NOT a DME order):  {EQUIPMENT:170519062}  Other Treatments: ***    Patient's personal belongings (please select all that are sent with patient):  {[facility] DME Belongings:742354456}    RN SIGNATURE:  {Esignature:323290860}    CASE MANAGEMENT/SOCIAL WORK SECTION    Inpatient Status Date: ***    Readmission Risk Assessment Score:  Readmission Risk              Risk of Unplanned Readmission:  9           Discharging to Facility/ Agency   Name:   Address:  Phone:  Fax:    Dialysis Facility (if applicable)   Name:  Address:  Dialysis

## 2024-11-18 ENCOUNTER — TELEPHONE (OUTPATIENT)
Dept: ORTHOPEDIC SURGERY | Age: 64
End: 2024-11-18

## 2024-11-18 NOTE — TELEPHONE ENCOUNTER
Medical Facility Question     Facility Name: [facility]  Contact Name: GUILLE  Contact Number: [phone]  Request or Information: [facility] ORDER SKILLED NURSING ,PT, AND OT. CAN LVM.

## 2024-11-18 NOTE — TELEPHONE ENCOUNTER
Left VM on confidential mail box with [facility] ,PT, AND OT.        POSTOPERATIVE PLAN:  The patient will be readmitted as an inpatient.  She may need a rehab placement.  She is nonweightbearing on both upper extremities on the wrist, but she can start range of motion of the fingers immediately and the wrist in 2 weeks.  She is nonweightbearing for 6 weeks.

## 2024-11-20 ENCOUNTER — TELEPHONE (OUTPATIENT)
Dept: ORTHOPEDIC SURGERY | Age: 64
End: 2024-11-20

## 2024-11-20 NOTE — TELEPHONE ENCOUNTER
11/14/24 BILATERAL OPEN REDUCTION INTERNAL FIXATION DISTAL RADIUS - [facility]     [facility] calling in regards to L wrist, splint is rubbing and creating a bad blister, she did pad it a little before she left but just wanted to see if she could come in to get it readjusted before her appt on 11/26    Please advise,     [phone]- Nayeli

## 2024-11-21 ENCOUNTER — OFFICE VISIT (OUTPATIENT)
Dept: ORTHOPEDIC SURGERY | Age: 64
End: 2024-11-21

## 2024-11-21 VITALS — HEIGHT: 65 IN | WEIGHT: 293 LBS | BODY MASS INDEX: 48.82 KG/M2

## 2024-11-21 DIAGNOSIS — S52.592A OTHER CLOSED FRACTURE OF DISTAL END OF LEFT RADIUS, INITIAL ENCOUNTER: ICD-10-CM

## 2024-11-21 DIAGNOSIS — S52.591A OTHER CLOSED FRACTURE OF DISTAL END OF RIGHT RADIUS, INITIAL ENCOUNTER: ICD-10-CM

## 2024-11-21 RX ORDER — CEPHALEXIN 500 MG/1
500 CAPSULE ORAL 4 TIMES DAILY
Qty: 40 CAPSULE | Refills: 0 | Status: SHIPPED | OUTPATIENT
Start: 2024-11-21 | End: 2024-12-01

## 2024-11-22 ENCOUNTER — TELEPHONE (OUTPATIENT)
Dept: ORTHOPEDIC SURGERY | Age: 64
End: 2024-11-22

## 2024-11-22 NOTE — PROGRESS NOTES
DIAGNOSIS:   1-Left distal radius 3-4 parts intra articular displaced fracture, status post ORIF.  2-Right distal radius 3-4 parts intra articular displaced fracture, status post ORIF.      DATE OF SURGERY:  11/14/2024.    HISTORY OF PRESENT ILLNESS:  Ms. Salter 64 y.o.  female right handed returns today  for early postoperative visit with c/o blisters.  The patient denies any significant pain in the bilateral wrist.  Rates pain a 3/10 VAS moderate, aching, constant and show no change. Aggravating factors movement. Alleviating factors rest.  No numbness or tingling sensation. No fever or Chills. She  is in a splint.    PHYSICAL EXAMINATION:  The incision is completely healed . There are numerous fracture blisters right wrist that are some ruptured and dried. No signs of any erythema or drainage. She  has no pain with the active or passive range of motion of the bilateral wrist, but decrease ROM.  She  has intact sensation, distally, and she  is neurovascularly intact.    IMAGING:  Three views bilateral wrist taken today in the office showed anatomic alignment of bilateral distal radius, plate and screws in good position, no loosening. Distal ulna fracture.     IMPRESSION:  2 weeks out from   1-Left distal radius 3-4 parts intra articular displaced fracture, status post ORIF.  2-Right distal radius 3-4 parts intra articular displaced fracture, status post ORIF.      PLAN:  I have told the patient to work on ROM, as well as strengthening exercises. A removable forearm brace applied. No heavy impact activities. Dressing applied and instructed in care. Will start her on Keflex, rx sent and instructed in care. The patient will come back for a follow up in 6 weeks.  At that time, we will take 3 views of the bilateral wrist. PT if needed then.    As this patient has demonstrated risk factors for osteoporosis, such as age greater than fifty years and evidence of a fracture, I have referred the patient back to the

## 2024-11-22 NOTE — TELEPHONE ENCOUNTER
Patient notes she may not have eaten enough when taking the antibiotics. Will try to eat her yogurt which usually helps settle her stomach. Told her to cease the antibiotics if it continues to make her sick and call the officer for alternatives

## 2024-11-22 NOTE — TELEPHONE ENCOUNTER
General Question     Subject: THE ANTIBIOTICS ARE CAUSING HER STOMACH PROBLEMS.  Patient and /or Facility Request: Yulissa Salter \"Nayeli\"   Contact Number: [phone]

## 2024-11-25 ENCOUNTER — TELEPHONE (OUTPATIENT)
Dept: ORTHOPEDIC SURGERY | Age: 64
End: 2024-11-25

## 2024-11-25 DIAGNOSIS — S52.592A OTHER CLOSED FRACTURE OF DISTAL END OF LEFT RADIUS, INITIAL ENCOUNTER: ICD-10-CM

## 2024-11-25 DIAGNOSIS — S52.591A OTHER CLOSED FRACTURE OF DISTAL END OF RIGHT RADIUS, INITIAL ENCOUNTER: Primary | ICD-10-CM

## 2024-11-25 RX ORDER — TRAMADOL HYDROCHLORIDE 50 MG/1
50 TABLET ORAL EVERY 6 HOURS PRN
Qty: 20 TABLET | Refills: 0 | Status: SHIPPED | OUTPATIENT
Start: 2024-11-25 | End: 2024-11-30

## 2024-11-25 NOTE — TELEPHONE ENCOUNTER
[facility]- Wound care orders Placed and faxed    [facility]   [address]   [address]   [place] [address]     Ph: [phone]   Fax: [phone]     Patient last seen 11/21/2024 and medication last filled 11/14/2024: Patient has been taking OTC Ibuprofen though she has a HX of stomach ulcers. Since stopping the ibuprofen she has had no issues with the antibiotics.    Requested Prescriptions     Pending Prescriptions Disp Refills    traMADol (ULTRAM) 50 MG tablet 20 tablet 0     Sig: Take 1 tablet by mouth every 6 hours as needed for Pain for up to 5 days. Max Daily Amount: 200 mg

## 2024-11-25 NOTE — TELEPHONE ENCOUNTER
Medical Facility Question     Facility Name: [facility]  Contact Name: KATIE  Contact Number: [phone]  Request or Information: PATIENT HAS NOT BEEN WEARING HER BRACE AND SHE IS INQUIRING ABOUT RANGE OF MOTION FOR WRIST.

## 2024-11-25 NOTE — TELEPHONE ENCOUNTER
General Question     Subject: AMELIA WRIST WOUND CARE ORDERS  Patient and /or Facility Request: Yulissa Salter \"Nayeli\"   Contact Number: [phone]     PATIENT CALLING REGARDING SHE HAD SURGERY ON 11/14/24.    PATIENT HAS NOT RECEIVED ANY ORDERS REGARDING WOUND CARE.    PLEASE CALL BACK PATIENT AT THE ABOVE NUMBER.

## 2024-11-26 ENCOUNTER — TELEPHONE (OUTPATIENT)
Dept: ORTHOPEDIC SURGERY | Age: 64
End: 2024-11-26

## 2024-11-26 DIAGNOSIS — E11.9 CONTROLLED TYPE 2 DIABETES MELLITUS WITHOUT COMPLICATION, WITH LONG-TERM CURRENT USE OF INSULIN (HCC): ICD-10-CM

## 2024-11-26 DIAGNOSIS — Z79.4 CONTROLLED TYPE 2 DIABETES MELLITUS WITHOUT COMPLICATION, WITH LONG-TERM CURRENT USE OF INSULIN (HCC): ICD-10-CM

## 2024-11-26 RX ORDER — INSULIN LISPRO 100 [IU]/ML
INJECTION, SOLUTION INTRAVENOUS; SUBCUTANEOUS
Qty: 30 ML | Refills: 0 | Status: SHIPPED | OUTPATIENT
Start: 2024-11-26

## 2024-11-26 NOTE — TELEPHONE ENCOUNTER
Medication:   Requested Prescriptions     Pending Prescriptions Disp Refills    HUMALOG KWIKPEN 100 UNIT/ML SOPN [Pharmacy Med Name: HumaLOG KwikPen Subcutaneous Solution Pen-injector 100 UNIT/ML] 30 mL 0     Sig: INJECT 25 TO 30 UNITS WITH EACH MEAL UP TO 3 TIMES A DAY     Last Filled:  07/18/2024    Last appt: 8/29/2024   Next appt: 1/9/2025    Last Labs DM:   Lab Results   Component Value Date/Time    LABA1C 7.5 08/27/2024 10:14 AM     Last Lipid:   Lab Results   Component Value Date/Time    CHOL 127 08/27/2024 10:14 AM    TRIG 134 08/27/2024 10:14 AM    HDL 42 08/27/2024 10:14 AM    HDL 51 04/28/2012 11:50 AM     Last PSA: No results found for: \"PSA\"  Last Thyroid:   Lab Results   Component Value Date/Time    TSH 2.74 08/27/2024 10:14 AM    TSH 2.86 01/25/2022 08:35 AM    T4FREE 1.7 04/12/2023 04:35 AM

## 2024-11-26 NOTE — TELEPHONE ENCOUNTER
General Question     Subject: WRIST   Patient and /or Facility Request: Yulissa Salter \"Nayeli\"   Contact Number: [phone]      [facility] STATING THAT THE PATIENT BLISTER WOUND HAS HEALED     PLEASE ADVISE

## 2024-11-26 NOTE — TELEPHONE ENCOUNTER
ZOYA Loza on her confidential mail with with details    PLAN:  I have told the patient to work on ROM, as well as strengthening exercises. A removable forearm brace applied. No heavy impact activities. Dressing applied and instructed in care. Will start her on Keflex, rx sent and instructed in care. The patient will come back for a follow up in 6 weeks.  At that time, we will take 3 views of the bilateral wrist. PT if needed then.

## 2024-12-13 ENCOUNTER — TELEPHONE (OUTPATIENT)
Dept: ORTHOPEDIC SURGERY | Age: 64
End: 2024-12-13

## 2024-12-20 ENCOUNTER — TELEPHONE (OUTPATIENT)
Dept: ORTHOPEDIC SURGERY | Age: 64
End: 2024-12-20

## 2024-12-26 ENCOUNTER — TELEPHONE (OUTPATIENT)
Dept: ORTHOPEDIC SURGERY | Age: 64
End: 2024-12-26

## 2025-01-02 ENCOUNTER — OFFICE VISIT (OUTPATIENT)
Dept: ORTHOPEDIC SURGERY | Age: 65
End: 2025-01-02

## 2025-01-02 VITALS — BODY MASS INDEX: 48.82 KG/M2 | HEIGHT: 65 IN | WEIGHT: 293 LBS

## 2025-01-02 DIAGNOSIS — S52.592A OTHER CLOSED FRACTURE OF DISTAL END OF LEFT RADIUS, INITIAL ENCOUNTER: ICD-10-CM

## 2025-01-02 DIAGNOSIS — S52.591A OTHER CLOSED FRACTURE OF DISTAL END OF RIGHT RADIUS, INITIAL ENCOUNTER: Primary | ICD-10-CM

## 2025-01-02 PROCEDURE — 99024 POSTOP FOLLOW-UP VISIT: CPT | Performed by: NURSE PRACTITIONER

## 2025-01-02 RX ORDER — HYDROCHLOROTHIAZIDE 12.5 MG/1
CAPSULE ORAL
Qty: 2 EACH | Refills: 0 | Status: SHIPPED | OUTPATIENT
Start: 2025-01-02

## 2025-01-02 NOTE — TELEPHONE ENCOUNTER
[facility] pharmacy called requesting a refill  He stated that they do not have Claudia 3 sensor, would need to switch to      Rx- Freestyle claudia 3 plus sensor     Pharmacy- [facility] pharmacy    LOV-  NOV- 1/9/25    Please advise

## 2025-01-03 NOTE — PROGRESS NOTES
DIAGNOSIS:   1-Left distal radius 3-4 parts intra articular displaced fracture, status post ORIF.  2-Right distal radius 3-4 parts intra articular displaced fracture, status post ORIF.      DATE OF SURGERY:  11/14/2024.    HISTORY OF PRESENT ILLNESS:  Ms. Salter 64 y.o.  female right handed returns today for follow up visit. Her blisters have resolved.  The patient denies any significant pain in the bilateral wrist.  Rates pain a 2/10 VAS mild, aching, intermittent and improving. Aggravating factors movement. Alleviating factors rest.  No numbness or tingling sensation. No fever or Chills. She  is in a brace.    PHYSICAL EXAMINATION:  The incision is completely healed . There are numerous fracture blisters right wrist that are now healed well.  No signs of any erythema or drainage. She  has no pain with the active or passive range of motion of the bilateral wrist, but decrease ROM.  She  has intact sensation, distally, and she  is neurovascularly intact.    IMAGING:  Three views bilateral wrist taken today in the office showed anatomic alignment of bilateral distal radius, plate and screws in good position, no loosening. Distal ulna fracture.     IMPRESSION:  8 weeks out from   1-Left distal radius 3-4 parts intra articular displaced fracture, status post ORIF.  2-Right distal radius 3-4 parts intra articular displaced fracture, status post ORIF.      PLAN:  I have told the patient to work on ROM, as well as strengthening exercises. She can discontinue the forearm brace. No heavy impact activities.I discussed with the patient that I think that she would really benefit from a course of occupational therapy for further strengthening and stretching. An Rx for occupational therapy was given to the patient. The patient will come back for a follow up in 6 weeks.  At that time, we will take 3 views of the bilateral wrist.     As this patient has demonstrated risk factors for osteoporosis, such as age greater than

## 2025-01-08 ENCOUNTER — TELEPHONE (OUTPATIENT)
Dept: ORTHOPEDIC SURGERY | Age: 65
End: 2025-01-08

## 2025-01-09 ENCOUNTER — OFFICE VISIT (OUTPATIENT)
Dept: ENDOCRINOLOGY | Age: 65
End: 2025-01-09

## 2025-01-09 VITALS
HEIGHT: 65 IN | TEMPERATURE: 98 F | HEART RATE: 79 BPM | RESPIRATION RATE: 14 BRPM | DIASTOLIC BLOOD PRESSURE: 85 MMHG | SYSTOLIC BLOOD PRESSURE: 138 MMHG | WEIGHT: 248 LBS | BODY MASS INDEX: 41.32 KG/M2

## 2025-01-09 DIAGNOSIS — E11.9 CONTROLLED TYPE 2 DIABETES MELLITUS WITHOUT COMPLICATION, WITH LONG-TERM CURRENT USE OF INSULIN (HCC): Primary | ICD-10-CM

## 2025-01-09 DIAGNOSIS — I10 ESSENTIAL HYPERTENSION: ICD-10-CM

## 2025-01-09 DIAGNOSIS — Z79.4 CONTROLLED TYPE 2 DIABETES MELLITUS WITHOUT COMPLICATION, WITH LONG-TERM CURRENT USE OF INSULIN (HCC): Primary | ICD-10-CM

## 2025-01-09 DIAGNOSIS — E06.3 HYPOTHYROIDISM DUE TO HASHIMOTO THYROIDITIS: ICD-10-CM

## 2025-01-09 DIAGNOSIS — E78.2 MIXED HYPERLIPIDEMIA: ICD-10-CM

## 2025-01-09 LAB — HBA1C MFR BLD: 7.7 %

## 2025-01-09 RX ORDER — INSULIN LISPRO 100 [IU]/ML
INJECTION, SOLUTION INTRAVENOUS; SUBCUTANEOUS
Qty: 30 ADJUSTABLE DOSE PRE-FILLED PEN SYRINGE | Refills: 1 | Status: SHIPPED | OUTPATIENT
Start: 2025-01-09

## 2025-01-09 RX ORDER — INSULIN GLARGINE 100 [IU]/ML
INJECTION, SOLUTION SUBCUTANEOUS
Qty: 54 ML | Refills: 1 | Status: SHIPPED | OUTPATIENT
Start: 2025-01-09

## 2025-01-09 RX ORDER — PEN NEEDLE, DIABETIC 31 GX5/16"
NEEDLE, DISPOSABLE MISCELLANEOUS
Qty: 400 EACH | Refills: 1 | Status: SHIPPED | OUTPATIENT
Start: 2025-01-09

## 2025-01-09 NOTE — PROGRESS NOTES
Yulissa Salter is a 64 y.o. female is seen for management of uncontrolled Type 2  Diabetes, obesity and Hypothyroidism.  Patient has complex medical history inclusive of hypertension hyperlipidemia, asthma, PCOD, ulcerative colitis status post total colectomy and now has a colectomy bag. Yulissa Salter was diagnosed with Diabetes mellitus at age 31   Diabetes was diagnosed at routine screening .  . Yulissa Salter got diabetic education in the past.  Comorbid conditions: Neuropathy    Hypothyroidism diagnosed in 2015 which was diagnosed in 2015 due to her abnormal thyroid fx test   She also has MNG, which was considered stable as per previous imaging and there were no compressive symptoms  She has hypertension and hyperlipidemia   She has Ulcerative coliitis diagnosed  at age 7 she had colectomy in 2004 , in 2006 she had rectal surgery due to UC she has colectomy bag   --- she ended up with seroma which led to sepsis and she was hospitalized in oct 2022 and it is healing now       INTERIM:    Underwent surgery for fracture in both hands after a traumatic fall.  Just recovering from that  She has been eating poorly and not active   Didn't start Mounjaro as she ended up with other health issues      Past Medical History:   Diagnosis Date    Asthma     Class 3 obesity     Diabetes mellitus type 2     GERD (gastroesophageal reflux disease)     Hyperlipidemia     Hypertension     Hypothyroidism     PCOS (polycystic ovarian syndrome)     Pyoderma     Sleep apnea     Ulcerative colitis       Patient Active Problem List   Diagnosis    Ileostomy care (HCC)    Type 2 diabetes mellitus, with long-term current use of insulin (HCC)    Hypothyroidism    Essential hypertension    Mixed hyperlipidemia    Ulcerative (chronic) enterocolitis, unspecified complication (HCC)    Small bowel obstruction (HCC)    Incarcerated incisional hernia    Generalized abdominal pain    Postoperative fever    Abdominal wall seroma    Abdominal

## 2025-01-20 ENCOUNTER — TELEPHONE (OUTPATIENT)
Dept: ORTHOPEDIC SURGERY | Age: 65
End: 2025-01-20

## 2025-01-20 NOTE — TELEPHONE ENCOUNTER
General Question     Subject: RETURN TO WORK DATE   Patient and /or Facility Request: Yulissa Salter \"Nayeli\"   Contact Number: [phone]     PATIENT REQUESTING A CALL BACK WANTING TO KNOW IF SHE COULD GET AN EXTENSION ON HER RETURN TO WORK DATE    PLEASE CALL THE PATIENT BACK AT THE ABOVE NUMBER

## 2025-02-05 ENCOUNTER — TELEPHONE (OUTPATIENT)
Dept: ENDOCRINOLOGY | Age: 65
End: 2025-02-05

## 2025-02-05 NOTE — TELEPHONE ENCOUNTER
Good Afternoon,     I am having issues taking Monjero 2.5.  I had some nausea first week, but third week of use I was throwing up, had severe stomach cramps, and my stoma did not produce anything for almost two days. I could not drink water. It felt like a bowel blocage.  I was going to go to ER but symptoms began improving.       Is ther a lower dose of Monjero? First week I had some moderate symptoms, but Monjero has lowered my average Glucose by 50 points.  I was not spiking to 300, but to 200.  I dramatically decreased amount of insulin used to about 30 units of fast acting and about 20 units of long lasting insulin.     Thank you for your time and consideration of this matter.     Yulissa Salter

## 2025-02-13 ENCOUNTER — TELEPHONE (OUTPATIENT)
Dept: ORTHOPEDIC SURGERY | Age: 65
End: 2025-02-13

## 2025-02-13 ENCOUNTER — OFFICE VISIT (OUTPATIENT)
Dept: ORTHOPEDIC SURGERY | Age: 65
End: 2025-02-13
Payer: COMMERCIAL

## 2025-02-13 VITALS — WEIGHT: 248 LBS | BODY MASS INDEX: 41.32 KG/M2 | HEIGHT: 65 IN

## 2025-02-13 DIAGNOSIS — S52.592A OTHER CLOSED FRACTURE OF DISTAL END OF LEFT RADIUS, INITIAL ENCOUNTER: ICD-10-CM

## 2025-02-13 DIAGNOSIS — S52.591A OTHER CLOSED FRACTURE OF DISTAL END OF RIGHT RADIUS, INITIAL ENCOUNTER: Primary | ICD-10-CM

## 2025-02-13 PROCEDURE — 99214 OFFICE O/P EST MOD 30 MIN: CPT | Performed by: ORTHOPAEDIC SURGERY

## 2025-02-13 NOTE — PROGRESS NOTES
DIAGNOSIS:   1-Left distal radius 3-4 parts intra articular displaced fracture, status post ORIF.  2-Right distal radius 3-4 parts intra articular displaced fracture, status post ORIF.      DATE OF SURGERY:  11/14/2024.    HISTORY OF PRESENT ILLNESS:  Yulissa Salter 64 y.o.   female right handed returns today for 3 months follow up visit. Her blisters have resolved.  The patient denies any significant pain in the bilateral wrist.  Rates pain a 2/10 VAS mild, aching, intermittent and improving. Aggravating factors movement. Alleviating factors rest.She is working with OT and is still stiff. No numbness or tingling sensation. No fever or Chills.     Past Medical History:   Diagnosis Date    Asthma     Class 3 obesity     Diabetes mellitus type 2     GERD (gastroesophageal reflux disease)     Hyperlipidemia     Hypertension     Hypothyroidism     PCOS (polycystic ovarian syndrome)     Pyoderma     Sleep apnea     Ulcerative colitis        Past Surgical History:   Procedure Laterality Date    ABDOMEN SURGERY      COLON RESECTION FOR ULCERATIVE COLITIS THEN HAD ANUS REMOVED    ABDOMEN SURGERY N/A 09/30/2022    INCISION AND DRAINAGE OF ABDOMINAL WALL SEROMA (20005) performed by Ab Chang MD at [facility] OR    ABDOMEN SURGERY N/A 02/18/2023    ABDOMEN INCISION AND DRAINAGE performed by Ab Chang MD at [facility] OR    ABDOMEN SURGERY N/A 05/19/2023    DEBRIDE ABDOMINAL WALL ABSCESS performed by Ab Chang MD at [facility] OR    FOREARM SURGERY Bilateral 11/14/2024    BILATERAL OPEN REDUCTION INTERNAL FIXATION DISTAL RADIUS - BEST performed by Rhiannon Rivero MD at [facility] OR    HERNIA REPAIR  11/2021    ILEOSTOMY OR JEJUNOSTOMY      KNEE SURGERY Left     LAPAROTOMY N/A 10/14/2022    DRAINAGE OF ABDOMINAL WALL ABSCESS WITH PLACEMENT OF WOUND VAC performed by Ab Chang MD at [facility] OR    UPPER GASTROINTESTINAL ENDOSCOPY N/A 11/23/2021    EGD BIOPSY performed by Nasir RIVAS

## 2025-02-18 ENCOUNTER — TELEPHONE (OUTPATIENT)
Dept: ORTHOPEDIC SURGERY | Age: 65
End: 2025-02-18

## 2025-02-18 NOTE — TELEPHONE ENCOUNTER
Faxing Patient Information     Facility Name: [facility]  Contact Name: [facility]  Contact Number: [phone]   Facility Fax Number: [phone]    [facility] WITH [facility] IS REQUESTING NEW ORDER FOR PHYSICAL THERAPY. SAYS THEY ARE REQUESTING MORE VISITS FOR HER WRIST

## 2025-02-19 NOTE — PLAN OF CARE
Dr. Brown reviewed labs.  Stable.  Cystatin C is about 30 points greater than GRF based.  No changes.    Problem: Skin/Tissue Integrity  Goal: Absence of new skin breakdown  Description: 1. Monitor for areas of redness and/or skin breakdown  2. Assess vascular access sites hourly  3. Every 4-6 hours minimum:  Change oxygen saturation probe site  4. Every 4-6 hours:  If on nasal continuous positive airway pressure, respiratory therapy assess nares and determine need for appliance change or resting period.   Outcome: Progressing     Problem: Discharge Planning  Goal: Discharge to home or other facility with appropriate resources  Outcome: Progressing  Flowsheets (Taken 2/22/2023 0351)  Discharge to home or other facility with appropriate resources: Identify discharge learning needs (meds, wound care, etc)     Problem: Pain  Goal: Verbalizes/displays adequate comfort level or baseline comfort level  Outcome: Progressing  Flowsheets (Taken 2/22/2023 0351)  Verbalizes/displays adequate comfort level or baseline comfort level: Assess pain using appropriate pain scale     Problem: Chronic Conditions and Co-morbidities  Goal: Patient's chronic conditions and co-morbidity symptoms are monitored and maintained or improved  Outcome: Progressing  Flowsheets (Taken 2/22/2023 0351)  Care Plan - Patient's Chronic Conditions and Co-Morbidity Symptoms are Monitored and Maintained or Improved: Monitor and assess patient's chronic conditions and comorbid symptoms for stability, deterioration, or improvement     Problem: ABCDS Injury Assessment  Goal: Absence of physical injury  Outcome: Progressing  Flowsheets (Taken 2/22/2023 0351)  Absence of Physical Injury: Implement safety measures based on patient assessment     Problem: Skin/Tissue Integrity - Adult  Goal: Incisions, wounds, or drain sites healing without S/S of infection  Outcome: Progressing  Flowsheets (Taken 2/22/2023 0351)  Incisions, Wounds, or Drain Sites Healing Without Sign and Symptoms of Infection: TWICE DAILY: Assess and document skin integrity     Problem: Gastrointestinal - Adult  Goal: Establish and maintain optimal ostomy function  Outcome: Progressing  Flowsheets (Taken 2/22/2023 0351)  Establish and maintain optimal ostomy function: Monitor output from ostomies

## 2025-02-20 ENCOUNTER — OFFICE VISIT (OUTPATIENT)
Dept: ORTHOPEDIC SURGERY | Age: 65
End: 2025-02-20

## 2025-02-20 ENCOUNTER — TELEPHONE (OUTPATIENT)
Dept: ORTHOPEDIC SURGERY | Age: 65
End: 2025-02-20

## 2025-02-20 DIAGNOSIS — M25.561 RIGHT KNEE PAIN, UNSPECIFIED CHRONICITY: Primary | ICD-10-CM

## 2025-02-20 DIAGNOSIS — M17.11 OSTEOARTHRITIS OF RIGHT KNEE, UNSPECIFIED OSTEOARTHRITIS TYPE: ICD-10-CM

## 2025-02-20 RX ORDER — LIDOCAINE HYDROCHLORIDE 10 MG/ML
4 INJECTION, SOLUTION INFILTRATION; PERINEURAL ONCE
Status: COMPLETED | OUTPATIENT
Start: 2025-02-20 | End: 2025-02-20

## 2025-02-20 RX ORDER — TRIAMCINOLONE ACETONIDE 40 MG/ML
40 INJECTION, SUSPENSION INTRA-ARTICULAR; INTRAMUSCULAR ONCE
Status: COMPLETED | OUTPATIENT
Start: 2025-02-20 | End: 2025-02-20

## 2025-02-20 RX ADMIN — TRIAMCINOLONE ACETONIDE 40 MG: 40 INJECTION, SUSPENSION INTRA-ARTICULAR; INTRAMUSCULAR at 16:11

## 2025-02-20 RX ADMIN — LIDOCAINE HYDROCHLORIDE 4 ML: 10 INJECTION, SOLUTION INFILTRATION; PERINEURAL at 16:11

## 2025-02-20 NOTE — PROGRESS NOTES
CHIEF COMPLAINT: Right knee pain/osteoarthritis.    HISTORY:  Ms. Salter 64 y.o.  female well known to me presents today for the first visit for evaluation of right knee pain which started 2/19/2025 when she felt a pop in the back of her knee.  She is complaining of sharp pain, 8/10.  Pain is increase with standing and walking and decrease with rest. Pain is sharp early in the morning with first few steps, dull achy pain by the end of the day. Alleviating factors: rest. No radiation and no numbness and tingling sensation. No other complaint.  No h/o trauma or gout. The patient is known to me for bilateral distal radius 3-4 parts intra articular displaced fracture, status post ORIF, 11/14/2024.     Past Medical History:   Diagnosis Date    Asthma     Class 3 obesity     Diabetes mellitus type 2     GERD (gastroesophageal reflux disease)     Hyperlipidemia     Hypertension     Hypothyroidism     PCOS (polycystic ovarian syndrome)     Pyoderma     Sleep apnea     Ulcerative colitis        Past Surgical History:   Procedure Laterality Date    ABDOMEN SURGERY      COLON RESECTION FOR ULCERATIVE COLITIS THEN HAD ANUS REMOVED    ABDOMEN SURGERY N/A 09/30/2022    INCISION AND DRAINAGE OF ABDOMINAL WALL SEROMA (20005) performed by Ab Chang MD at [facility] OR    ABDOMEN SURGERY N/A 02/18/2023    ABDOMEN INCISION AND DRAINAGE performed by Ab Chang MD at [facility] OR    ABDOMEN SURGERY N/A 05/19/2023    DEBRIDE ABDOMINAL WALL ABSCESS performed by Ab Chang MD at [facility] OR    FOREARM SURGERY Bilateral 11/14/2024    BILATERAL OPEN REDUCTION INTERNAL FIXATION DISTAL RADIUS - BEST performed by Rhiannon Rivero MD at [facility] OR    HERNIA REPAIR  11/2021    ILEOSTOMY OR JEJUNOSTOMY      KNEE SURGERY Left     LAPAROTOMY N/A 10/14/2022    DRAINAGE OF ABDOMINAL WALL ABSCESS WITH PLACEMENT OF WOUND VAC performed by Ab Chang MD at [facility] OR    UPPER GASTROINTESTINAL ENDOSCOPY N/A

## 2025-03-06 ENCOUNTER — OFFICE VISIT (OUTPATIENT)
Dept: ORTHOPEDIC SURGERY | Age: 65
End: 2025-03-06
Payer: COMMERCIAL

## 2025-03-06 VITALS — HEIGHT: 65 IN | BODY MASS INDEX: 41.32 KG/M2 | WEIGHT: 248 LBS

## 2025-03-06 DIAGNOSIS — M17.11 OSTEOARTHRITIS OF RIGHT KNEE, UNSPECIFIED OSTEOARTHRITIS TYPE: Primary | ICD-10-CM

## 2025-03-06 PROCEDURE — 99214 OFFICE O/P EST MOD 30 MIN: CPT | Performed by: ORTHOPAEDIC SURGERY

## 2025-03-06 RX ORDER — PREDNISONE 10 MG/1
TABLET ORAL
Qty: 26 TABLET | Refills: 0 | Status: SHIPPED | OUTPATIENT
Start: 2025-03-06

## 2025-03-06 NOTE — PROGRESS NOTES
CHIEF COMPLAINT: Right knee pain/osteoarthritis.    HISTORY:  Ms. Salter 64 y.o.  female well known to me presents today for f/u evaluation of right knee pain which started 2/19/2025 when she felt a pop in the back of her knee. She had right knee cortisone injection on 2/202/25 with good improvement for 3 days but after started cleaning the floor with her feet she twisted her knee and pain came back.  She is complaining of sharp pain, 4/10.  Pain is increase with standing and walking and decrease with rest. Pain is sharp early in the morning with first few steps, dull achy pain by the end of the day. Alleviating factors: rest. No radiation and no numbness and tingling sensation. No other complaint.  No h/o trauma or gout. The patient is known to me for bilateral distal radius 3-4 parts intra articular displaced fracture, status post ORIF, 11/14/2024.     Past Medical History:   Diagnosis Date    Asthma     Class 3 obesity     Diabetes mellitus type 2     GERD (gastroesophageal reflux disease)     Hyperlipidemia     Hypertension     Hypothyroidism     PCOS (polycystic ovarian syndrome)     Pyoderma     Sleep apnea     Ulcerative colitis        Past Surgical History:   Procedure Laterality Date    ABDOMEN SURGERY      COLON RESECTION FOR ULCERATIVE COLITIS THEN HAD ANUS REMOVED    ABDOMEN SURGERY N/A 09/30/2022    INCISION AND DRAINAGE OF ABDOMINAL WALL SEROMA (20005) performed by Ab Chang MD at [facility] OR    ABDOMEN SURGERY N/A 02/18/2023    ABDOMEN INCISION AND DRAINAGE performed by Ab Chang MD at [facility] OR    ABDOMEN SURGERY N/A 05/19/2023    DEBRIDE ABDOMINAL WALL ABSCESS performed by Ab Chang MD at [facility] OR    FOREARM SURGERY Bilateral 11/14/2024    BILATERAL OPEN REDUCTION INTERNAL FIXATION DISTAL RADIUS - BEST performed by Rhiannon Rivero MD at [facility] OR    HERNIA REPAIR  11/2021    ILEOSTOMY OR JEJUNOSTOMY      KNEE SURGERY Left     LAPAROTOMY N/A 10/14/2022

## 2025-03-13 DIAGNOSIS — E11.9 CONTROLLED TYPE 2 DIABETES MELLITUS WITHOUT COMPLICATION, WITH LONG-TERM CURRENT USE OF INSULIN (HCC): ICD-10-CM

## 2025-03-13 DIAGNOSIS — Z79.4 CONTROLLED TYPE 2 DIABETES MELLITUS WITHOUT COMPLICATION, WITH LONG-TERM CURRENT USE OF INSULIN (HCC): ICD-10-CM

## 2025-03-13 RX ORDER — ACYCLOVIR 800 MG/1
TABLET ORAL
Qty: 2 EACH | Refills: 0 | Status: SHIPPED | OUTPATIENT
Start: 2025-03-13

## 2025-03-13 NOTE — TELEPHONE ENCOUNTER
Medication:   Requested Prescriptions     Pending Prescriptions Disp Refills    Continuous Glucose Sensor (FREESTYLE EDY 3 SENSOR) MISC [Pharmacy Med Name: FreeStyle Edy 3 Sensor Miscellaneous] 2 each 0     Sig: USE TO CHECK BLOOD SUGAR AND CHANGE EVERY 14 DAYS       Last Filled:      Patient Phone Number: [phone] (home)     Last appt: 1/9/2025   Next appt: 5/8/2025    Last Labs DM:   Lab Results   Component Value Date/Time    LABA1C 7.7 01/09/2025 11:41 AM

## 2025-03-21 ENCOUNTER — OFFICE VISIT (OUTPATIENT)
Age: 65
End: 2025-03-21

## 2025-03-21 VITALS
TEMPERATURE: 98.1 F | OXYGEN SATURATION: 95 % | SYSTOLIC BLOOD PRESSURE: 149 MMHG | HEART RATE: 112 BPM | RESPIRATION RATE: 18 BRPM | DIASTOLIC BLOOD PRESSURE: 83 MMHG

## 2025-03-21 DIAGNOSIS — J18.9 PNEUMONIA DUE TO INFECTIOUS ORGANISM, UNSPECIFIED LATERALITY, UNSPECIFIED PART OF LUNG: Primary | ICD-10-CM

## 2025-03-21 DIAGNOSIS — R03.0 ELEVATED BLOOD PRESSURE READING: ICD-10-CM

## 2025-03-21 RX ORDER — AZELASTINE 1 MG/ML
SPRAY, METERED NASAL
COMMUNITY
Start: 2025-02-20

## 2025-03-21 RX ORDER — ALBUTEROL SULFATE 90 UG/1
INHALANT RESPIRATORY (INHALATION)
COMMUNITY
Start: 2025-02-12

## 2025-03-21 RX ORDER — DOXYCYCLINE HYCLATE 100 MG
100 TABLET ORAL 2 TIMES DAILY
Qty: 20 TABLET | Refills: 0 | Status: SHIPPED | OUTPATIENT
Start: 2025-03-21 | End: 2025-03-31

## 2025-03-21 RX ORDER — BENZONATATE 200 MG/1
200 CAPSULE ORAL 3 TIMES DAILY PRN
Qty: 20 CAPSULE | Refills: 0 | Status: SHIPPED | OUTPATIENT
Start: 2025-03-21

## 2025-03-21 ASSESSMENT — ENCOUNTER SYMPTOMS
DIARRHEA: 0
VOMITING: 0
SHORTNESS OF BREATH: 1
COUGH: 1
WHEEZING: 1
RHINORRHEA: 1

## 2025-03-21 NOTE — PATIENT INSTRUCTIONS
New Prescriptions    BENZONATATE (TESSALON) 200 MG CAPSULE    Take 1 capsule by mouth 3 times daily as needed for Cough    DOXYCYCLINE HYCLATE (VIBRA-TABS) 100 MG TABLET    Take 1 tablet by mouth 2 times daily for 10 days     -Follow up with your PCP for reevaluation and to get your blood pressure rechecked since it was elevated here  -If your symptoms worsen, go to the nearest Emergency Department

## 2025-03-21 NOTE — PROGRESS NOTES
Yulissa Salter (:  1960) is a 64 y.o. female, New patient, here for evaluation of the following chief complaint(s):  Cough (Pt c/o chest congestion, wheezing, cough x 4-5 days)      ASSESSMENT/PLAN:    ICD-10-CM    1. Pneumonia due to infectious organism, unspecified laterality, unspecified part of lung  J18.9       2. Elevated blood pressure reading  R03.0           Ddx -illness, asthma exacerbation, pneumonia, other  With her productive cough, rhonchi and wheezing on exam, will treat for presumed pneumonia with doxycycline.  She is also having an asthma exacerbation.  We discussed prednisone but she just completed a prednisone course 2 days ago for her knee pain and reports it made her glucose increased to 350s.  She is type 2 diabetes.  We discussed that avoiding prednisone is probably the best option.  She is in agreement with this.  Instructed to use her home albuterol inhaler every 4 hours for the next 3 days.  She does not need a refill.  Will also discharge with Tessalon.  Discussed her elevated blood pressure and to get rechecked with PCP.  Her heart rate was 112.  Recommend increasing fluid intake.  Discussed that if her symptoms worsen, she should go to the nearest emergency department.    SUBJECTIVE/OBJECTIVE:  Patient presents for productive cough, shortness of breath and wheezing for the past 4-5 days.  Cough is productive of a cloudy white or green sputum.  will occasionally have streaks of blood once or twice but no cuco hemoptysis  She also has some congestion and rhinorrhea.  No fever.  No vomiting or diarrhea.  She had asthma.  Has been using albuterol inhaler about 3 times a day.  She is around a lot of sick people.  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  Recently exposed to someone with pneumonia.  She just completed prednisone 2 days ago for right knee pain.          Vitals:    25 1010   BP: (!) 149/83   Pulse: (!) 112   Resp: 18   Temp: 98.1 °F (36.7 °C)   SpO2: 95%

## 2025-04-10 DIAGNOSIS — Z79.4 CONTROLLED TYPE 2 DIABETES MELLITUS WITHOUT COMPLICATION, WITH LONG-TERM CURRENT USE OF INSULIN: ICD-10-CM

## 2025-04-10 DIAGNOSIS — E11.9 CONTROLLED TYPE 2 DIABETES MELLITUS WITHOUT COMPLICATION, WITH LONG-TERM CURRENT USE OF INSULIN: ICD-10-CM

## 2025-04-14 RX ORDER — ACYCLOVIR 800 MG/1
TABLET ORAL
Qty: 2 EACH | Refills: 3 | Status: SHIPPED | OUTPATIENT
Start: 2025-04-14

## 2025-04-14 RX ORDER — INSULIN GLARGINE 100 [IU]/ML
INJECTION, SOLUTION SUBCUTANEOUS
Qty: 54 ML | Refills: 0 | Status: SHIPPED | OUTPATIENT
Start: 2025-04-14

## 2025-05-02 ENCOUNTER — HOSPITAL ENCOUNTER (OUTPATIENT)
Age: 65
Discharge: HOME | End: 2025-05-02
Payer: COMMERCIAL

## 2025-05-02 DIAGNOSIS — E78.2 MIXED HYPERLIPIDEMIA: ICD-10-CM

## 2025-05-02 DIAGNOSIS — Z79.4 CONTROLLED TYPE 2 DIABETES MELLITUS WITHOUT COMPLICATION, WITH LONG-TERM CURRENT USE OF INSULIN (HCC): ICD-10-CM

## 2025-05-02 DIAGNOSIS — E06.3 HYPOTHYROIDISM DUE TO HASHIMOTO THYROIDITIS: ICD-10-CM

## 2025-05-02 DIAGNOSIS — I10 ESSENTIAL HYPERTENSION: ICD-10-CM

## 2025-05-02 DIAGNOSIS — E11.9 CONTROLLED TYPE 2 DIABETES MELLITUS WITHOUT COMPLICATION, WITH LONG-TERM CURRENT USE OF INSULIN (HCC): ICD-10-CM

## 2025-05-02 LAB
ALBUMIN SERPL-MCNC: 3.8 G/DL (ref 3.4–5)
ALBUMIN/GLOB SERPL: 1.2 {RATIO} (ref 1.1–2.2)
ALP SERPL-CCNC: 102 U/L (ref 40–129)
ALT SERPL-CCNC: 39 U/L (ref 10–40)
ANION GAP SERPL CALCULATED.3IONS-SCNC: 9 MMOL/L (ref 3–16)
AST SERPL-CCNC: 35 U/L (ref 15–37)
BASOPHILS # BLD: 0.1 K/UL (ref 0–0.2)
BASOPHILS NFR BLD: 1.2 %
BILIRUB SERPL-MCNC: 0.4 MG/DL (ref 0–1)
BUN SERPL-MCNC: 8 MG/DL (ref 7–20)
CALCIUM SERPL-MCNC: 8.9 MG/DL (ref 8.3–10.6)
CHLORIDE SERPL-SCNC: 102 MMOL/L (ref 99–110)
CHOLEST SERPL-MCNC: 164 MG/DL (ref 0–199)
CO2 SERPL-SCNC: 23 MMOL/L (ref 21–32)
CREAT SERPL-MCNC: 0.6 MG/DL (ref 0.6–1.2)
CREAT UR-MCNC: 111 MG/DL (ref 28–259)
DEPRECATED RDW RBC AUTO: 15.1 % (ref 12.4–15.4)
EOSINOPHIL # BLD: 0 K/UL (ref 0–0.6)
EOSINOPHIL NFR BLD: 0.1 %
EST. AVERAGE GLUCOSE BLD GHB EST-MCNC: 171.4 MG/DL
GFR SERPLBLD CREATININE-BSD FMLA CKD-EPI: >90 ML/MIN/{1.73_M2}
GLUCOSE SERPL-MCNC: 203 MG/DL (ref 70–99)
HBA1C MFR BLD: 7.6 %
HCT VFR BLD AUTO: 39.1 % (ref 36–48)
HDLC SERPL-MCNC: 38 MG/DL (ref 40–60)
HGB BLD-MCNC: 12.9 G/DL (ref 12–16)
LDLC SERPL CALC-MCNC: 94 MG/DL
LYMPHOCYTES # BLD: 1.3 K/UL (ref 1–5.1)
LYMPHOCYTES NFR BLD: 22 %
MCH RBC QN AUTO: 29 PG (ref 26–34)
MCHC RBC AUTO-ENTMCNC: 33.1 G/DL (ref 31–36)
MCV RBC AUTO: 87.7 FL (ref 80–100)
MICROALBUMIN UR DL<=1MG/L-MCNC: <1.2 MG/DL
MICROALBUMIN/CREAT UR: NORMAL MG/G (ref 0–30)
MONOCYTES # BLD: 0.4 K/UL (ref 0–1.3)
MONOCYTES NFR BLD: 6.4 %
NEUTROPHILS # BLD: 4.2 K/UL (ref 1.7–7.7)
NEUTROPHILS NFR BLD: 70.3 %
PLATELET # BLD AUTO: 208 K/UL (ref 135–450)
PMV BLD AUTO: 8.1 FL (ref 5–10.5)
POTASSIUM SERPL-SCNC: 4.1 MMOL/L (ref 3.5–5.1)
PROT SERPL-MCNC: 6.9 G/DL (ref 6.4–8.2)
RBC # BLD AUTO: 4.46 M/UL (ref 4–5.2)
SODIUM SERPL-SCNC: 134 MMOL/L (ref 136–145)
TRIGL SERPL-MCNC: 161 MG/DL (ref 0–150)
TSH SERPL DL<=0.005 MIU/L-ACNC: 2.71 UIU/ML (ref 0.27–4.2)
VLDLC SERPL CALC-MCNC: 32 MG/DL
WBC # BLD AUTO: 6 K/UL (ref 4–11)

## 2025-05-02 PROCEDURE — 82043 UR ALBUMIN QUANTITATIVE: CPT

## 2025-05-02 PROCEDURE — 83036 HEMOGLOBIN GLYCOSYLATED A1C: CPT

## 2025-05-02 PROCEDURE — 85025 COMPLETE CBC W/AUTO DIFF WBC: CPT

## 2025-05-02 PROCEDURE — 80053 COMPREHEN METABOLIC PANEL: CPT

## 2025-05-02 PROCEDURE — 80061 LIPID PANEL: CPT

## 2025-05-02 PROCEDURE — 84443 ASSAY THYROID STIM HORMONE: CPT

## 2025-05-02 PROCEDURE — 36415 COLL VENOUS BLD VENIPUNCTURE: CPT

## 2025-05-02 PROCEDURE — 82570 ASSAY OF URINE CREATININE: CPT

## 2025-05-08 ENCOUNTER — OFFICE VISIT (OUTPATIENT)
Dept: ENDOCRINOLOGY | Age: 65
End: 2025-05-08

## 2025-05-08 VITALS
WEIGHT: 250 LBS | OXYGEN SATURATION: 98 % | HEART RATE: 82 BPM | BODY MASS INDEX: 41.6 KG/M2 | SYSTOLIC BLOOD PRESSURE: 138 MMHG | DIASTOLIC BLOOD PRESSURE: 74 MMHG

## 2025-05-08 DIAGNOSIS — E11.29 TYPE 2 DIABETES MELLITUS WITH OTHER DIABETIC KIDNEY COMPLICATION, WITH LONG-TERM CURRENT USE OF INSULIN (HCC): Primary | ICD-10-CM

## 2025-05-08 DIAGNOSIS — K51.019 ULCERATIVE (CHRONIC) ENTEROCOLITIS, UNSPECIFIED COMPLICATION (HCC): ICD-10-CM

## 2025-05-08 DIAGNOSIS — E78.2 MIXED HYPERLIPIDEMIA: ICD-10-CM

## 2025-05-08 DIAGNOSIS — I10 ESSENTIAL HYPERTENSION: ICD-10-CM

## 2025-05-08 DIAGNOSIS — Z79.4 TYPE 2 DIABETES MELLITUS WITH OTHER DIABETIC KIDNEY COMPLICATION, WITH LONG-TERM CURRENT USE OF INSULIN (HCC): Primary | ICD-10-CM

## 2025-05-08 NOTE — PROGRESS NOTES
status: Never    Smokeless tobacco: Never   Vaping Use    Vaping status: Never Used   Substance and Sexual Activity    Alcohol use: Not Currently    Drug use: No    Sexual activity: Not Currently   Other Topics Concern    Not on file   Social History Narrative    Not on file     Social Drivers of Health     Financial Resource Strain: Not on file   Food Insecurity: No Food Insecurity (11/12/2024)    Hunger Vital Sign     Worried About Running Out of Food in the Last Year: Never true     Ran Out of Food in the Last Year: Never true   Transportation Needs: No Transportation Needs (11/12/2024)    PRAPARE - Transportation     Lack of Transportation (Medical): No     Lack of Transportation (Non-Medical): No   Physical Activity: Not on file   Stress: Not on file   Social Connections: Not on file   Intimate Partner Violence: Unknown (1/23/2024)    [facility]    Interpersonal Safety     Feel physically or emotionally unsafe where currently live: Not on file     Harm by anyone: Not on file     Emotionally Harmed: Not on file   Housing Stability: Low Risk  (11/12/2024)    Housing Stability Vital Sign     Unable to Pay for Housing in the Last Year: No     Number of Times Moved in the Last Year: 0     Homeless in the Last Year: No     Family History   Problem Relation Age of Onset    Uterine Cancer Mother     No Known Problems Father     Diabetes Sister     Colon Cancer Maternal Grandfather      Current Outpatient Medications   Medication Sig Dispense Refill    Continuous Glucose Sensor (FREESTYLE EDY 3 SENSOR) MISC USE TO CHECK BLOOD SUGAR AND CHANGE EVERY 14 DAYS 2 each 3    insulin glargine (LANTUS SOLOSTAR) 100 UNIT/ML injection pen INJECT 60 UNITS SUBCUTANEOUSLY IN THE MORNING AND 40 UNITS AT BEDTIME 54 mL 0    azelastine (ASTELIN) 0.1 % nasal spray ADMINISTER 1 TO 2 SPRAYS IN EACH NOSTRIL 2 TIMES A DAY      albuterol sulfate HFA

## 2025-05-15 ENCOUNTER — OFFICE VISIT (OUTPATIENT)
Dept: ORTHOPEDIC SURGERY | Age: 65
End: 2025-05-15
Payer: COMMERCIAL

## 2025-05-15 VITALS — HEIGHT: 65 IN | WEIGHT: 250 LBS | BODY MASS INDEX: 41.65 KG/M2

## 2025-05-15 DIAGNOSIS — S52.592A OTHER CLOSED FRACTURE OF DISTAL END OF LEFT RADIUS, INITIAL ENCOUNTER: Primary | ICD-10-CM

## 2025-05-15 DIAGNOSIS — S52.591A OTHER CLOSED FRACTURE OF DISTAL END OF RIGHT RADIUS, INITIAL ENCOUNTER: ICD-10-CM

## 2025-05-15 PROCEDURE — 99214 OFFICE O/P EST MOD 30 MIN: CPT | Performed by: ORTHOPAEDIC SURGERY

## 2025-05-15 NOTE — PROGRESS NOTES
DIAGNOSIS:   1-Left distal radius 3-4 parts intra articular displaced fracture, status post ORIF.  2-Right distal radius 3-4 parts intra articular displaced fracture, status post ORIF.      DATE OF SURGERY:  11/14/2024.    HISTORY OF PRESENT ILLNESS:  Yulissa Salter 64 y.o.   female right handed returns today for 6 months follow up visit. Her blisters have resolved.  The patient denies any significant pain in the bilateral wrist.  Rates pain a 0/10 VAS mild, aching, intermittent and improving. Aggravating factors movement. Alleviating factors rest.She is working with OT and is still stiff. No numbness or tingling sensation. No fever or Chills.     Past Medical History:   Diagnosis Date    Asthma     Class 3 obesity (HCC)     Diabetes mellitus type 2     GERD (gastroesophageal reflux disease)     Hyperlipidemia     Hypertension     Hypothyroidism     PCOS (polycystic ovarian syndrome)     Pyoderma     Sleep apnea     Ulcerative colitis        Past Surgical History:   Procedure Laterality Date    ABDOMEN SURGERY      COLON RESECTION FOR ULCERATIVE COLITIS THEN HAD ANUS REMOVED    ABDOMEN SURGERY N/A 09/30/2022    INCISION AND DRAINAGE OF ABDOMINAL WALL SEROMA (20005) [facility]    ABDOMEN SURGERY N/A 02/18/2023    ABDOMEN INCISION AND DRAINAGE [facility] OR    ABDOMEN SURGERY N/A 05/19/2023    DEBRIDE ABDOMINAL WALL ABSCESS performed [facility] at [facility] OR    FOREARM SURGERY Bilateral 11/14/2024    BILATERAL OPEN REDUCTION INTERNAL FIXATION DISTAL RADIUS - BEST performed [facility]    HERNIA REPAIR  11/2021    ILEOSTOMY OR JEJUNOSTOMY      KNEE SURGERY Left     LAPAROTOMY N/A 10/14/2022    DRAINAGE OF ABDOMINAL WALL ABSCESS WITH PLACEMENT OF WOUND VAC performed by [facility] at [facility] OR    UPPER GASTROINTESTINAL ENDOSCOPY N/A 11/23/2021    EGD BIOPSY performed by Nasir

## 2025-05-19 DIAGNOSIS — Z79.4 CONTROLLED TYPE 2 DIABETES MELLITUS WITHOUT COMPLICATION, WITH LONG-TERM CURRENT USE OF INSULIN (HCC): ICD-10-CM

## 2025-05-19 DIAGNOSIS — E11.9 CONTROLLED TYPE 2 DIABETES MELLITUS WITHOUT COMPLICATION, WITH LONG-TERM CURRENT USE OF INSULIN (HCC): ICD-10-CM

## 2025-05-20 RX ORDER — INSULIN GLARGINE 100 [IU]/ML
INJECTION, SOLUTION SUBCUTANEOUS
Qty: 54 ML | Refills: 0 | Status: SHIPPED | OUTPATIENT
Start: 2025-05-20

## 2025-07-03 NOTE — PROGRESS NOTES
Patient reached ____ yes  _X____ no         MY Chart message sent  __YES___  VM instructions left __X__ yes   phone number __[phone]______                                ____ no-office notified    [address], [place]   [address]      Date __7/9/2025_______  Time ___0815____  Arrival __0645_/per office___    Nothing to eat or drink after midnight    Responsible adult 18 or older to stay on site while you are here-drive you home-stay with you after    Dress comfortably-No makeup or jewelry    Follow any instructions your doctors office has given you    Bring a complete list of all your medications and supplements including name,dose,how often taken the day of your procedure     If you normally take the following medications in the morning please do so the AM of your procedure with a small sip of water    XX   Heart,blood pressure,seizure,thyroid or breathing medications-use your inhalers-bring any rescue inhalers with you DOS   XX    DO NOT take blood pressure medications ending in \"caryn\" or \"pril\" the AM of procedure or evening prior    XX  Take half or your normal dose of any long acting insulins the night before your procedure-do not take any diabetic medications the AM of procedure.     If you take a weekly injection for diabetes or weight loss-do not take one week prior to surgery/procedure.If you have already taken your injection this week,contact your surgeon. There is a possibility of cancellation if taken.    If you take any SGLT2  medications such as Jardiance ,Invokana, Synjardy or Farxiga. You need to stop these 3 days prior to surgery/procedure. If you have already taken, contact your surgeon. There is a possibility of cancellation if taken.    Follow your doctors instructions regarding stopping or taking  any blood thinners-if you do not have instructions-call them    Any questions call your doctor    Other ______________________________________________________________

## 2025-07-09 ENCOUNTER — ANESTHESIA (OUTPATIENT)
Dept: ENDOSCOPY | Age: 65
End: 2025-07-09
Payer: COMMERCIAL

## 2025-07-09 ENCOUNTER — HOSPITAL ENCOUNTER (OUTPATIENT)
Age: 65
Setting detail: OUTPATIENT SURGERY
Discharge: HOME | End: 2025-07-09
Attending: INTERNAL MEDICINE | Admitting: INTERNAL MEDICINE
Payer: COMMERCIAL

## 2025-07-09 ENCOUNTER — ANESTHESIA EVENT (OUTPATIENT)
Dept: ENDOSCOPY | Age: 65
End: 2025-07-09
Payer: COMMERCIAL

## 2025-07-09 VITALS
WEIGHT: 250 LBS | HEART RATE: 79 BPM | SYSTOLIC BLOOD PRESSURE: 124 MMHG | BODY MASS INDEX: 41.6 KG/M2 | DIASTOLIC BLOOD PRESSURE: 86 MMHG | TEMPERATURE: 97.6 F | RESPIRATION RATE: 16 BRPM | OXYGEN SATURATION: 97 %

## 2025-07-09 DIAGNOSIS — R10.13 EPIGASTRIC PAIN: ICD-10-CM

## 2025-07-09 LAB
GLUCOSE BLD-MCNC: 160 MG/DL (ref 70–99)
GLUCOSE BLD-MCNC: 191 MG/DL (ref 70–99)
PERFORMED ON: ABNORMAL
PERFORMED ON: ABNORMAL

## 2025-07-09 PROCEDURE — 2580000003 HC RX 258: Performed by: ANESTHESIOLOGY

## 2025-07-09 PROCEDURE — 3700000000 HC ANESTHESIA ATTENDED CARE: Performed by: INTERNAL MEDICINE

## 2025-07-09 PROCEDURE — 7100000010 HC PHASE II RECOVERY - FIRST 15 MIN: Performed by: INTERNAL MEDICINE

## 2025-07-09 PROCEDURE — 3700000001 HC ADD 15 MINUTES (ANESTHESIA): Performed by: INTERNAL MEDICINE

## 2025-07-09 PROCEDURE — 6360000002 HC RX W HCPCS: Performed by: NURSE ANESTHETIST, CERTIFIED REGISTERED

## 2025-07-09 PROCEDURE — 2709999900 HC NON-CHARGEABLE SUPPLY: Performed by: INTERNAL MEDICINE

## 2025-07-09 PROCEDURE — 3609012400 HC EGD TRANSORAL BIOPSY SINGLE/MULTIPLE: Performed by: INTERNAL MEDICINE

## 2025-07-09 PROCEDURE — 7100000011 HC PHASE II RECOVERY - ADDTL 15 MIN: Performed by: INTERNAL MEDICINE

## 2025-07-09 RX ORDER — LIDOCAINE HYDROCHLORIDE 20 MG/ML
INJECTION, SOLUTION EPIDURAL; INFILTRATION; INTRACAUDAL; PERINEURAL
Status: DISCONTINUED | OUTPATIENT
Start: 2025-07-09 | End: 2025-07-09 | Stop reason: SDUPTHER

## 2025-07-09 RX ORDER — OMEPRAZOLE 20 MG/1
40 CAPSULE, DELAYED RELEASE ORAL DAILY
COMMUNITY

## 2025-07-09 RX ORDER — PROPOFOL 10 MG/ML
INJECTION, EMULSION INTRAVENOUS
Status: DISCONTINUED | OUTPATIENT
Start: 2025-07-09 | End: 2025-07-09 | Stop reason: SDUPTHER

## 2025-07-09 RX ORDER — SODIUM CHLORIDE 9 MG/ML
INJECTION, SOLUTION INTRAVENOUS CONTINUOUS
Status: DISCONTINUED | OUTPATIENT
Start: 2025-07-09 | End: 2025-07-09 | Stop reason: HOSPADM

## 2025-07-09 RX ADMIN — PROPOFOL 130 MCG/KG/MIN: 10 INJECTION, EMULSION INTRAVENOUS at 08:25

## 2025-07-09 RX ADMIN — SODIUM CHLORIDE: 0.9 INJECTION, SOLUTION INTRAVENOUS at 06:59

## 2025-07-09 RX ADMIN — PROPOFOL 50 MG: 10 INJECTION, EMULSION INTRAVENOUS at 08:26

## 2025-07-09 RX ADMIN — PROPOFOL 100 MG: 10 INJECTION, EMULSION INTRAVENOUS at 08:24

## 2025-07-09 RX ADMIN — LIDOCAINE HYDROCHLORIDE 50 MG: 20 INJECTION, SOLUTION EPIDURAL; INFILTRATION; INTRACAUDAL; PERINEURAL at 08:24

## 2025-07-09 ASSESSMENT — PAIN - FUNCTIONAL ASSESSMENT
PAIN_FUNCTIONAL_ASSESSMENT: NONE - DENIES PAIN
PAIN_FUNCTIONAL_ASSESSMENT: 0-10
PAIN_FUNCTIONAL_ASSESSMENT: NONE - DENIES PAIN
PAIN_FUNCTIONAL_ASSESSMENT: NONE - DENIES PAIN

## 2025-07-09 NOTE — ANESTHESIA PRE PROCEDURE
05/02/2025 11:45 AM    RDW 15.1 05/02/2025 11:45 AM     05/02/2025 11:45 AM       CMP:   Lab Results   Component Value Date/Time     05/02/2025 11:45 AM    K 4.1 05/02/2025 11:45 AM    K 3.8 11/14/2024 05:00 AM     05/02/2025 11:45 AM    CO2 23 05/02/2025 11:45 AM    BUN 8 05/02/2025 11:45 AM    CREATININE 0.6 05/02/2025 11:45 AM    GFRAA >60 10/15/2022 05:34 AM    GFRAA >60 03/22/2013 12:10 PM    AGRATIO 1.2 05/02/2025 11:45 AM    LABGLOM >90 05/02/2025 11:45 AM    LABGLOM >60 05/19/2023 01:19 PM    GLUCOSE 203 05/02/2025 11:45 AM    CALCIUM 8.9 05/02/2025 11:45 AM    BILITOT 0.4 05/02/2025 11:45 AM    ALKPHOS 102 05/02/2025 11:45 AM    AST 35 05/02/2025 11:45 AM    ALT 39 05/02/2025 11:45 AM       POC Tests:   Recent Labs     07/09/25  0658   POCGLU 191*       Coags:   Lab Results   Component Value Date/Time    PROTIME 12.8 11/11/2024 10:37 AM    INR 0.94 11/11/2024 10:37 AM    APTT 28.6 11/22/2021 01:01 AM       HCG (If Applicable): No results found for: \"PREGTESTUR\", \"PREGSERUM\", \"HCG\", \"HCGQUANT\"     ABGs: No results found for: \"PHART\", \"PO2ART\", \"UXH3MCV\", \"TQH7CXR\", \"BEART\", \"O3CHZWWM\"     Type & Screen (If Applicable):  Lab Results   Component Value Date    ABORH A POS 11/16/2021    LABANTI NEG 11/16/2021       Drug/Infectious Status (If Applicable):  No results found for: \"HIV\", \"HEPCAB\"    COVID-19 Screening (If Applicable):   Lab Results   Component Value Date/Time    COVID19 Not Detected 11/15/2021 05:55 AM           Anesthesia Evaluation  Patient summary reviewed and Nursing notes reviewed   no history of anesthetic complications:   Airway: Mallampati: II  TM distance: >3 FB   Neck ROM: full  Mouth opening: > = 3 FB   Dental: normal exam         Pulmonary:normal exam    (+)     sleep apnea:       asthma:                            Cardiovascular:    (+) hypertension:                  Neuro/Psych:   Negative Neuro/Psych ROS              GI/Hepatic/Renal:   (+) GERD:, PUD, morbid

## 2025-07-09 NOTE — DISCHARGE INSTRUCTIONS
EGD DISCHARGE INSTRUCTIONS    Use salt water gargle, lozenges, or Chloraseptic spray as needed for sore throat.    If you have fever, chills, excessive bleeding, severe chest pain, or abdominal pain, or any other problems, contact your physician's office immediately at [phone].    If you had an esophageal dilatation, and experience fever, chills, excessive bleeding, shortness of breath, chest or abdominal pain, or any other unusual symptom, call the office immediately at [phone].    Continue home medications as directed.    Call physician's office in 14 business days for biopsy results or further instructions.    Call your physician at [phone] if any problems or concerns.    See your physician's report for procedure details and recommendations.      ANESTHESIA DISCHARGE INSTRUCTIONS    Wear your seatbelt home.    A responsible adult needs to be with you for 24 hours  You are under the influence of drugs-do not drink alcohol, drive ,operate machinery,or make any important decisions or sign any legal documentsfor 24 hours. You may resume normal activities tomorrow.    You may experience lightheadedness,dizziness,or sleepiness following surgery.  Rest at home today - increase activity as tolerated. It is recommended to take a four hour nap after procedure.    Progress slowly to a regular diet unless your physician has instructed you otherwise. Avoid spicy and greasy food on first meal.  Drink plenty of water.  If nausea becomes a problem call your physician.    Call your doctor if concerns arise.

## 2025-07-09 NOTE — PROGRESS NOTES
Teaching / education initiated regarding perioperative experience, expectations, and pain management during stay. Patient verbalized understanding.   Afib    GERD (gastroesophageal reflux disease)    HTN (hypertension)

## 2025-07-09 NOTE — ANESTHESIA POSTPROCEDURE EVALUATION
Department of Anesthesiology  Postprocedure Note    Patient: Yulissa Salter  MRN: [id number]  YOB: 1960  Date of evaluation: 7/9/2025    Procedure Summary       Date: 07/09/25 Room / Location: [facility] [place] [facility]  / [facility]    Anesthesia Start: 0821 Anesthesia Stop: 0838    Procedure: ESOPHAGOGASTRODUODENOSCOPY BIOPSY (Abdomen) Diagnosis:       Epigastric pain      (Epigastric pain [R10.13])    Surgeons: Nasir Scott MD Responsible Provider: Simba Jamison MD    Anesthesia Type: MAC ASA Status: 3            Anesthesia Type: No value filed.    Ruchi Phase I: Ruchi Score: 10    Ruchi Phase II: Ruchi Score: 9    Anesthesia Post Evaluation    Patient location during evaluation: PACU  Patient participation: complete - patient participated  Level of consciousness: awake and alert  Airway patency: patent  Nausea & Vomiting: no nausea and no vomiting  Cardiovascular status: hemodynamically stable  Respiratory status: acceptable  Hydration status: euvolemic  Multimodal analgesia pain management approach  Pain management: satisfactory to patient    No notable events documented.

## 2025-07-09 NOTE — H&P
Gastroenterology Note                 Pre-operative History and Physical    Patient: Yulissa Salter  : 1960  CSN:     History Obtained From:   Patient or guardian.      HISTORY OF PRESENT ILLNESS:    The patient is a 65 y.o. female here for Endoscopy.      Past Medical History:    Past Medical History:   Diagnosis Date    Asthma     Class 3 obesity (HCC)     Diabetes mellitus type 2     GERD (gastroesophageal reflux disease)     Hyperlipidemia     Hypertension     Hypothyroidism     PCOS (polycystic ovarian syndrome)     Pyoderma     Sleep apnea     Ulcerative colitis      Past Surgical History:    Past Surgical History:   Procedure Laterality Date    ABDOMEN SURGERY      COLON RESECTION FOR ULCERATIVE COLITIS THEN HAD ANUS REMOVED    ABDOMEN SURGERY N/A 2022    INCISION AND DRAINAGE OF ABDOMINAL WALL SEROMA () performed by Ab Chang MD at [facility] OR    ABDOMEN SURGERY N/A 2023    ABDOMEN INCISION AND DRAINAGE performed by Ab Chang MD at [facility] OR    ABDOMEN SURGERY N/A 2023    DEBRIDE ABDOMINAL WALL ABSCESS performed by Ab Chang MD at [facility] OR    FOREARM SURGERY Bilateral 2024    BILATERAL OPEN REDUCTION INTERNAL FIXATION DISTAL RADIUS - BEST performed by Rhiannon Rivero MD at [facility] OR    HERNIA REPAIR  2021    ILEOSTOMY OR JEJUNOSTOMY      KNEE SURGERY Left     LAPAROTOMY N/A 10/14/2022    DRAINAGE OF ABDOMINAL WALL ABSCESS WITH PLACEMENT OF WOUND VAC performed by Ab Chang MD at [facility] OR    UPPER GASTROINTESTINAL ENDOSCOPY N/A 2021    EGD BIOPSY performed by Nasir Scott MD at [facility] ENDOSCOPY    UPPER GASTROINTESTINAL ENDOSCOPY N/A 2022    EGD DIAGNOSTIC ONLY performed by Nasir Scott MD at [facility] ENDOSCOPY    VENTRAL HERNIA REPAIR N/A 2021    OPEN REPAIR INCISIONAL HERNIA WITH MESH, LYSIS OF ADHESIONS performed by Ab Chang MD at [facility] OR     Medications

## 2025-07-13 DIAGNOSIS — Z79.4 CONTROLLED TYPE 2 DIABETES MELLITUS WITHOUT COMPLICATION, WITH LONG-TERM CURRENT USE OF INSULIN (HCC): ICD-10-CM

## 2025-07-13 DIAGNOSIS — E11.9 CONTROLLED TYPE 2 DIABETES MELLITUS WITHOUT COMPLICATION, WITH LONG-TERM CURRENT USE OF INSULIN (HCC): ICD-10-CM

## 2025-07-14 RX ORDER — INSULIN GLARGINE 100 [IU]/ML
INJECTION, SOLUTION SUBCUTANEOUS
Qty: 45 ML | Refills: 0 | Status: SHIPPED | OUTPATIENT
Start: 2025-07-14

## 2025-07-14 NOTE — TELEPHONE ENCOUNTER
Medication:   Requested Prescriptions     Pending Prescriptions Disp Refills    LANTUS SOLOSTAR 100 UNIT/ML injection pen [Pharmacy Med Name: Lantus SoloStar Subcutaneous Solution Pen-injector 100 UNIT/ML] 45 mL 0     Sig: INJECT 60 UNITS SUBCUTANEOUSLY IN THE MORNING AND 40 UNITS AT BEDTIME         Last appt: 5/8/2025   Next appt: 9/11/2025    Last Labs DM:   Lab Results   Component Value Date/Time    LABA1C 7.6 05/02/2025 11:45 AM     Last Lipid:   Lab Results   Component Value Date/Time    CHOL 164 05/02/2025 11:45 AM    TRIG 161 05/02/2025 11:45 AM    HDL 38 05/02/2025 11:45 AM    HDL 51 04/28/2012 11:50 AM     Last PSA: No results found for: \"PSA\"  Last Thyroid:   Lab Results   Component Value Date/Time    TSH 2.71 05/02/2025 11:45 AM    TSH 2.86 01/25/2022 08:35 AM    T4FREE 1.7 04/12/2023 04:35 AM

## 2025-08-18 DIAGNOSIS — Z79.4 CONTROLLED TYPE 2 DIABETES MELLITUS WITHOUT COMPLICATION, WITH LONG-TERM CURRENT USE OF INSULIN (HCC): ICD-10-CM

## 2025-08-18 DIAGNOSIS — E11.9 CONTROLLED TYPE 2 DIABETES MELLITUS WITHOUT COMPLICATION, WITH LONG-TERM CURRENT USE OF INSULIN (HCC): ICD-10-CM

## 2025-08-18 RX ORDER — HYDROCHLOROTHIAZIDE 12.5 MG/1
CAPSULE ORAL
Qty: 2 EACH | Refills: 3 | Status: SHIPPED | OUTPATIENT
Start: 2025-08-18

## 2025-08-21 DIAGNOSIS — Z79.4 CONTROLLED TYPE 2 DIABETES MELLITUS WITHOUT COMPLICATION, WITH LONG-TERM CURRENT USE OF INSULIN (HCC): ICD-10-CM

## 2025-08-21 DIAGNOSIS — E11.9 CONTROLLED TYPE 2 DIABETES MELLITUS WITHOUT COMPLICATION, WITH LONG-TERM CURRENT USE OF INSULIN (HCC): ICD-10-CM

## 2025-08-21 RX ORDER — INSULIN LISPRO 100 [IU]/ML
INJECTION, SOLUTION INTRAVENOUS; SUBCUTANEOUS
Qty: 90 ML | Refills: 0 | Status: SHIPPED | OUTPATIENT
Start: 2025-08-21

## 2025-08-21 RX ORDER — INSULIN GLARGINE 100 [IU]/ML
INJECTION, SOLUTION SUBCUTANEOUS
Qty: 45 ML | Refills: 0 | Status: SHIPPED | OUTPATIENT
Start: 2025-08-21

## (undated) DEVICE — MOUTHPIECE ENDOSCP L CTRL OPN AND SIDE PORTS DISP

## (undated) DEVICE — SUTURE VCRL + SZ 3-0 L18IN ABSRB UD SH 1/2 CIR TAPERCUT NDL VCP864D

## (undated) DEVICE — BLADE ES ELASTOMERIC COAT INSUL DURABLE BEND UPTO 90DEG

## (undated) DEVICE — MAJOR SET UP PK

## (undated) DEVICE — UPPER EXTREMTY: Brand: MEDLINE INDUSTRIES, INC.

## (undated) DEVICE — PENCIL SMK EVAC TELSCP 3 M TBNG

## (undated) DEVICE — BANDAGE,GAUZE,BULKEE II,4.5"X4.1YD,STRL: Brand: MEDLINE

## (undated) DEVICE — SOLUTION IV IRRIG WATER 500ML POUR BRL ST 2F7113

## (undated) DEVICE — SOLUTION IRRIG 1000ML 0.9% SOD CHL USP POUR PLAS BTL

## (undated) DEVICE — COVER LT HNDL BLU PLAS

## (undated) DEVICE — BLADE CLIPPER GEN PURP NS

## (undated) DEVICE — GLOVE SURG SZ 6.5 L11.2IN FNGR THK9.8MIL STRW LTX POLYMER

## (undated) DEVICE — COLLECTOR SPEC RAYON LIQ STUART DBL FOR THRT VAG SKIN WND

## (undated) DEVICE — PADDING CAST W4INXL4YD ST COT RAYON MICROPLEATED HIGHLY

## (undated) DEVICE — SYRINGE, LUER LOCK, 10ML: Brand: MEDLINE

## (undated) DEVICE — GAUZE,SPONGE,4"X4",8PLY,STRL,LF,10/TRAY: Brand: MEDLINE

## (undated) DEVICE — SHEET, T, LAPAROTOMY, STERILE: Brand: MEDLINE

## (undated) DEVICE — TOWEL,OR,DSP,ST,BLUE,DLX,10/PK,8PK/CS: Brand: MEDLINE

## (undated) DEVICE — SET VLV 3 PC AWS DISPOSABLE GRDIAN SCOPEVALET

## (undated) DEVICE — ZIMMER® STERILE DISPOSABLE TOURNIQUET CUFF WITH PLC, DUAL PORT, SINGLE BLADDER, 18 IN. (46 CM)

## (undated) DEVICE — WET SKIN PREP TRAY: Brand: MEDLINE INDUSTRIES, INC.

## (undated) DEVICE — SHEET,DRAPE,53X77,STERILE: Brand: MEDLINE

## (undated) DEVICE — TUBING IRRIG COMPATIBLE W ERBE MEDIVATOR PMP HYDR

## (undated) DEVICE — MEDICINE CUP, GRADUATED, STER: Brand: MEDLINE

## (undated) DEVICE — PAD ABSRB W8XL10IN ABD HYDROPHOBIC NONWOVEN THCK LAYR CELOS

## (undated) DEVICE — GLOVE SURG SZ 65 THK91MIL LTX FREE SYN POLYISOPRENE

## (undated) DEVICE — SOLUTION IRRIG 2000ML 0.9% SOD CHL USP UROMATIC PLAS CONT

## (undated) DEVICE — GLOVE ORANGE PI 8   MSG9080

## (undated) DEVICE — SUTURE VCRL + SZ 2-0 L36IN ABSRB UD L36MM CT-1 1/2 CIR VCP945H

## (undated) DEVICE — GLOVE SURG SZ 65 L12IN FNGR THK79MIL GRN LTX FREE

## (undated) DEVICE — TOTAL TRAY, DB, 100% SILI FOLEY, 16FR 10: Brand: MEDLINE

## (undated) DEVICE — AIR/WATER CLEANING ADAPTER FOR OLYMPUS® GI ENDOSCOPE: Brand: BULLDOG®

## (undated) DEVICE — DRESSING,GAUZE,XEROFORM,CURAD,1"X8",ST: Brand: CURAD

## (undated) DEVICE — Z DISCONTINUED USE 2272117 DRAPE SURG 3 QTR N INVASIVE 2 LAYR DISP

## (undated) DEVICE — PAD,ABDOMINAL,5"X9",ST,LF,25/BX: Brand: MEDLINE INDUSTRIES, INC.

## (undated) DEVICE — MERCY FAIRFIELD TURNOVER KIT: Brand: MEDLINE INDUSTRIES, INC.

## (undated) DEVICE — 3M™ IOBAN™ 2 ANTIMICROBIAL INCISE DRAPE 6650EZ: Brand: IOBAN™ 2

## (undated) DEVICE — SYRINGE IRRIG 60ML SFT PLIABLE BLB EZ TO GRP 1 HND USE W/

## (undated) DEVICE — TOWEL,OR,DSP,ST,BLUE,STD,4/PK,20PK/CS: Brand: MEDLINE

## (undated) DEVICE — PAD,ABDOMINAL,8"X10",ST,LF: Brand: MEDLINE

## (undated) DEVICE — 3 ML SYRINGE LUER-LOCK TIP: Brand: MONOJECT

## (undated) DEVICE — GLOVE,SURG,SENSICARE SLT,LF,PF,8: Brand: MEDLINE

## (undated) DEVICE — DRESSING,GAUZE,XEROFORM,CURAD,5"X9",ST: Brand: CURAD

## (undated) DEVICE — SPONGE LAP W18XL18IN WHT COT 4 PLY FLD STRUNG RADPQ DISP ST

## (undated) DEVICE — APPLICATOR MEDICATED 26 CC SOLUTION HI LT ORNG CHLORAPREP

## (undated) DEVICE — NEEDLE HYPO 22GA L1.5IN BLK POLYPR HUB S STL REG BVL STR

## (undated) DEVICE — GOWN AURORA NONREINF LG: Brand: MEDLINE INDUSTRIES, INC.

## (undated) DEVICE — DRAPE,LAP,CHOLE,W/TROUGHS,STERILE: Brand: MEDLINE

## (undated) DEVICE — GOWN SIRUS NONREIN LG W/TWL: Brand: MEDLINE INDUSTRIES, INC.

## (undated) DEVICE — FORCEPS BX L240CM WRK CHN 2.8MM STD CAP W/ NDL MIC MESH

## (undated) DEVICE — STAPLER SKIN H3.9MM WIRE DIA0.58MM CRWN 6.9MM 35 STPL ROT

## (undated) DEVICE — COTTON UNDERCAST PADDING,CRIMPED FINISH: Brand: WEBRIL

## (undated) DEVICE — UNTHREADED GUIDE WIRE: Brand: FIXOS

## (undated) DEVICE — DRAPE C-ARM X-RAY

## (undated) DEVICE — BW-412T DISP COMBO CLEANING BRUSH: Brand: SINGLE USE COMBINATION CLEANING BRUSH

## (undated) DEVICE — PROCEDURE KIT ENDOSCP CUST

## (undated) DEVICE — SUTURE ETHBND EXCEL SZ 0 L18IN NONABSORBABLE GRN L36MM CT-1 CX21D

## (undated) DEVICE — SUTURE MCRYL + SZ 4-0 L27IN ABSRB UD L19MM PS-2 3/8 CIR MCP426H

## (undated) DEVICE — ELECTRODE PT RET AD L9FT HI MOIST COND ADH HYDRGEL CORDED

## (undated) DEVICE — BLANKET WRM W29.9XL79.1IN UP BODY FORC AIR MISTRAL-AIR

## (undated) DEVICE — ADHESIVE SKIN CLSR 0.7ML TOP DERMBND ADV

## (undated) DEVICE — PICO 7 10CM X 40CM: Brand: PICO™ 7

## (undated) DEVICE — BINDER ABD 4 PANEL LG 12 IN 46-62 IN UNISX LINING ELASTIC

## (undated) DEVICE — SUTURE VICRYL + SZ 3-0 L18IN ABSRB UD SH 1/2 CIR TAPERCUT NDL VCP864D

## (undated) DEVICE — SUTURE MONOCRYL + SZ 4-0 L27IN ABSRB UD L19MM PS-2 3/8 CIR MCP426H

## (undated) DEVICE — DRAPE,U/ SHT,SPLIT,PLAS,STERIL: Brand: MEDLINE

## (undated) DEVICE — SOLUTION IRRIG 500ML 0.9% SOD CHLO USP POUR PLAS BTL

## (undated) DEVICE — SWAB CULT SGL AMIES W/O CHAR FOR THRT VAG SKIN HRT CULTSWAB

## (undated) DEVICE — STRIP,CLOSURE,WOUND,MEDI-STRIP,1/2X4: Brand: MEDLINE

## (undated) DEVICE — DRAPE HND W114XL142IN BLU POLYPR W O PCH FEN CRD AND TB HLDR

## (undated) DEVICE — INTENDED FOR TISSUE SEPARATION, AND OTHER PROCEDURES THAT REQUIRE A SHARP SURGICAL BLADE TO PUNCTURE OR CUT.: Brand: BARD-PARKER ® STAINLESS STEEL BLADES

## (undated) DEVICE — BANDAGE COMPR W4INXL12FT E DISP ESMARCH EVEN

## (undated) DEVICE — BNDG,ELSTC,MATRIX,STRL,4"X5YD,LF,HOOK&LP: Brand: MEDLINE

## (undated) DEVICE — PENCIL ES ULT VAC W TELSCP NOSE EZ CLN BLDE 10FT

## (undated) DEVICE — GLOVE ORTHO 8   MSG9480

## (undated) DEVICE — DRILL BIT, AO: Brand: PANGEA

## (undated) DEVICE — DRAIN CHN 19FR L0.25IN DIA6.3MM SIL RND HUBLESS FULL FLUT

## (undated) DEVICE — SOLUTION IRRIG 500ML STRL H2O NONPYROGENIC

## (undated) DEVICE — SUTURE ETHBND SZ 1 L18IN NONABSORBABLE CTX L48MM 1/2 CIR CX30D

## (undated) DEVICE — MASTISOL ADHESIVE LIQ 2/3ML

## (undated) DEVICE — HANDPIECE SET WITH HIGH FLOW TIP AND SUCTION TUBE: Brand: INTERPULSE

## (undated) DEVICE — BANDAGE,GAUZE,4.5"X4.1YD,STERILE,LF: Brand: MEDLINE

## (undated) DEVICE — SUTURE PERMA-HAND SZ 2-0 L30IN NONABSORBABLE BLK L26MM SH K833H

## (undated) DEVICE — SUTURE ABSORBABLE MONOFILAMENT 0 CTX 60 IN VIO PDS + PDP990G

## (undated) DEVICE — DRAIN SURG W10MMXL20CM SIL FULL PERF HUBLESS FLAT RADPQ

## (undated) DEVICE — ENDOSCOPIC KIT 6X3/16 FT COLON W/ 1.1 OZ 2 GWN W/O BRSH

## (undated) DEVICE — SINGLE USE AIR/WATER, SUCTION AND BIOPSY VALVES SET: Brand: ORCAPOD™

## (undated) DEVICE — GLOVE SURG SZ 65 L12IN THK75MIL DK GRN LTX FREE

## (undated) DEVICE — KIRSCHNER WIRE
Type: IMPLANTABLE DEVICE | Site: WRIST | Status: NON-FUNCTIONAL
Brand: VARIAX
Removed: 2024-11-14

## (undated) DEVICE — SCRUB DRY SURG EZ SCRUB BRUSH PREOPERATIVE GRN

## (undated) DEVICE — KIT DRSG SM W12.5XH3.2XL18CM VAC SH DRP PD W/ CONN DISP RUL

## (undated) DEVICE — CAP WATER BTL AIR TBNG L 16 IN CO2 TBNG L 48 IN ENDOSCP

## (undated) DEVICE — BANDAGE ELASTIC 5YDX4IN ST COMPRSS LF NON ADH